# Patient Record
Sex: FEMALE | Race: ASIAN | NOT HISPANIC OR LATINO | Employment: FULL TIME
[De-identification: names, ages, dates, MRNs, and addresses within clinical notes are randomized per-mention and may not be internally consistent; named-entity substitution may affect disease eponyms.]

---

## 2017-08-12 ENCOUNTER — HEALTH MAINTENANCE LETTER (OUTPATIENT)
Age: 37
End: 2017-08-12

## 2017-11-18 ENCOUNTER — TELEPHONE (OUTPATIENT)
Dept: FAMILY MEDICINE | Facility: CLINIC | Age: 37
End: 2017-11-18

## 2019-02-12 ENCOUNTER — TELEPHONE (OUTPATIENT)
Dept: PSYCHIATRY | Facility: CLINIC | Age: 39
End: 2019-02-12

## 2019-02-12 NOTE — TELEPHONE ENCOUNTER
PSYCHIATRY CLINIC PHONE INTAKE     SERVICES REQUESTED / INTERESTED IN          Med Management    Presenting Problem and Brief History                              What would you like to be seen for? (brief description):  Pt has been on bi-polar medications for a long time, but was formally diagnosed over a year ago. [place], and she need a new provider. In the past, she received several different diagnosis, and medications, and her most recent diagnosis was from a provider in [place]. She is currently taking depakote, lamictal, gabapentin, adavan as needed. The meds are helping to treat her symptoms. She has somatic symptoms while taking her medications. She is prone to side effects with medications and sometimes, its hard for her to manage her medications due to the side effects, so her doctor was keeping her meds at a low dosage. Sleep is on and off. She has days when she's stresses out and her anxiety starts and it keeps her awake. She had one anxiety about a year ago. She was actively involved in CBT and DBT while in [place].     Have you received a mental health diagnosis? Yes   Which one (s): Bi-Polar mixed states  Is there any history of developmental delay?  No   Are you currently seeing a mental health provider?  Yes            Who / month last seen:  Luz Starks, Psychiatrist  At [facility] in [place], [place]. Julio Gonzalez, Specialist Psychiatrist, in [place], [place], Farzana Marcelino, Therapist in [place], [place]  Do you have mental health records elsewhere?  Yes  Will you sign a release so we can obtain them?  Yes    Have you ever been hospitalized for psychiatric reasons?  No  Describe:  But has been in a few outpt programs    Do you have current thoughts of self-harm?  No    Do you currently have thoughts of harming others?  No       Substance Use History     Do you have any history of alcohol / illicit drug use?  No  Describe:  NA  Have you ever received  treatment for this?  No    Describe:  NA     Social History     Does the patient have a guardian?  No    Name / number: NA  Have you had an ACT team in last 12 months?  No  Describe: NA   Do you have any current or past legal issues?  No  Describe: NA   OK to leave a detailed voicemail?  Yes    Medical/ Surgical History                                   Patient Active Problem List   Diagnosis     Heavy menses     CARDIOVASCULAR SCREENING; LDL GOAL LESS THAN 160     Major depressive disorder, recurrent episode, moderate (H)     Allergic rhinitis          Medications             Current Outpatient Medications   Medication Sig Dispense Refill     buPROPion (WELLBUTRIN) 100 MG tablet Take by mouth 2 times daily       cholecalciferol (VITAMIN D3) 76815 UNITS capsule Take 1 capsule (50,000 Units) by mouth once a week 12 capsule 0     Cyanocobalamin (VITAMIN B 12 PO)        levonorgestrel-ethinyl estradiol (AVIANE,ALESSE,LESSINA) 0.1-20 MG-MCG per tablet Take 1 tablet by mouth daily 3 Package 3     PARoxetine (PAXIL) 20 MG tablet Take 1 per day 30 tablet 4         DISPOSITION      2/12/19 Intake completed.

## 2019-02-13 ENCOUNTER — OFFICE VISIT (OUTPATIENT)
Dept: FAMILY MEDICINE | Facility: CLINIC | Age: 39
End: 2019-02-13
Attending: FAMILY MEDICINE
Payer: COMMERCIAL

## 2019-02-13 VITALS
SYSTOLIC BLOOD PRESSURE: 100 MMHG | HEART RATE: 97 BPM | BODY MASS INDEX: 21.43 KG/M2 | DIASTOLIC BLOOD PRESSURE: 66 MMHG | HEIGHT: 64 IN | WEIGHT: 125.5 LBS

## 2019-02-13 DIAGNOSIS — F31.9 BIPOLAR I DISORDER (H): Primary | ICD-10-CM

## 2019-02-13 PROCEDURE — G0463 HOSPITAL OUTPT CLINIC VISIT: HCPCS | Mod: ZF

## 2019-02-13 RX ORDER — DIVALPROEX SODIUM 250 MG/1
250 TABLET, DELAYED RELEASE ORAL DAILY
COMMUNITY
End: 2019-02-13

## 2019-02-13 RX ORDER — LORAZEPAM 0.5 MG/1
0.5 TABLET ORAL EVERY 6 HOURS PRN
COMMUNITY

## 2019-02-13 RX ORDER — NORGESTIMATE AND ETHINYL ESTRADIOL 7DAYSX3 LO
1 KIT ORAL DAILY
COMMUNITY
End: 2019-06-19

## 2019-02-13 RX ORDER — LAMOTRIGINE 25 MG/1
25 TABLET ORAL DAILY
Qty: 30 TABLET | Refills: 2 | Status: SHIPPED | OUTPATIENT
Start: 2019-02-13 | End: 2019-06-19

## 2019-02-13 RX ORDER — GABAPENTIN 100 MG/1
100 CAPSULE ORAL DAILY
Qty: 30 CAPSULE | Refills: 2 | Status: SHIPPED | OUTPATIENT
Start: 2019-02-13 | End: 2019-06-19

## 2019-02-13 RX ORDER — GABAPENTIN 100 MG/1
100 CAPSULE ORAL DAILY
COMMUNITY
End: 2019-02-13

## 2019-02-13 RX ORDER — LAMOTRIGINE 25 MG/1
25 TABLET ORAL DAILY
COMMUNITY
End: 2019-02-13

## 2019-02-13 RX ORDER — DIVALPROEX SODIUM 250 MG/1
250 TABLET, DELAYED RELEASE ORAL DAILY
Qty: 30 TABLET | Refills: 2 | Status: SHIPPED | OUTPATIENT
Start: 2019-02-13 | End: 2019-02-22 | Stop reason: ALTCHOICE

## 2019-02-13 ASSESSMENT — MIFFLIN-ST. JEOR: SCORE: 1221.32

## 2019-02-13 ASSESSMENT — PAIN SCALES - GENERAL: PAINLEVEL: NO PAIN (0)

## 2019-02-13 NOTE — PATIENT INSTRUCTIONS
[facility] [phone]  [facility] [phone]  [facility] [phone]  [facility] [phone]

## 2019-02-13 NOTE — PROGRESS NOTES
"Pt. Here to establish care  Move here recently from [place]--  Up to date on preventive heatlh per patient    1. Bipolar disorder, mixed--Dx depression age 14. Started various medication without working diagnosis in late 20's, had been resistant to medication in past, with multiple somatic symptoms, multiple diagnoses in past. Official diagnosis made a little over a year ago by mood disorder specialist in [place], done as second opinion as struggling with approach of first psychiatrist.   Has been on the current combination of medications since saw specialist, and per specialist, if have this type of condition, cannot be on antidepressants.     Per pt.. Mood has been the best it has been in  7 years on current dose and type of meds; no recent changes in medications. . Pt. states she is prone to side effects, told she processes meds differently so approach has been to lower doses if possible. Tends to get very drowsy. Using Ativan on average: 6 times since November.     No psychiatric  Hospitalizations, with 2 overdose in teens  No hx eating disorders  Cutting/SIB in college/early 20's, none now  ETOH--none, no problems in past  No recreational substances  No hallucinations    Describes self as Rapid cycling, with rumination  Working full time--high functions, but \"miserable\" all the time.     Stresses currently--current work environment stressful  didn't get desired job, critical of self  PHQ 9  Max, SI =3 but no plans, fixation always there, this is not unusual for patient, total score can be 1/2 this when not stressed  EAMON 7=19    Just started with therapist, on 3rd therapist. Doing CBT    2. Contraception-- On OCP for menstrual control. Menses regular, heavy, cramping lasting 7 days without OCPs. A little irritable with menses. Not currently sexually active with partner.     Not currently sexually active with partner.         Current Outpatient Medications   Medication     divalproex sodium delayed-release " "(DEPAKOTE) 250 MG DR tablet     gabapentin (NEURONTIN) 100 MG capsule     lamoTRIgine (LAMICTAL) 25 MG tablet     LORazepam (ATIVAN) 0.5 MG tablet     norgestim-eth estrad triphasic (ORTHO TRI-CYCLEN LO) 0.18/0.215/0.25 MG-25 MCG tablet     Nutritional Supplements (NUTRITIONAL SUPPLEMENT PO)     No current facility-administered medications for this visit.      PMH  Tonsillectomy    FH  Adopted    ROS  12 point ROS negative except where noted above    PE  Blood pressure 100/66, pulse 97, height 1.613 m (5' 3.5\"), weight 56.9 kg (125 lb 8 oz), last menstrual period 01/28/2019, not currently breastfeeding.  alert, NAD  Speech RRR, mood is mildy anxious, no psychomotor changes, thought process is appropriate,   Affect is normal. No evidence of suicidality.     A/P  1. Bipolar disorder by history  Refer to psychiatry, patient given multiple possible options, to check with insurance coverage  Discussed increasing either gabapentin or lamictal given degree of SI and increased stressors. Pt. Agrees to trial increase lamictal 50 mg daily, call if wishes to stay on higher dose, otherwise let me know and return back to 25 mg dose    Can consider Continous OCPS in future, refill given     F/u me if not getting into psychiatry in  3 months   "

## 2019-02-13 NOTE — LETTER
"2/13/2019       RE: Artie Diez  [address] [id number]     Dear Colleague,    Thank you for referring your patient, Artie Diez, to the [facility] at [facility]. Please see a copy of my visit note below.    Pt. Here to establish care  Move here recently from [place]--  Up to date on preventive heatlh per patient    1. Bipolar disorder, mixed--Dx depression age 14. Started various medication without working diagnosis in late 20's, had been resistant to medication in past, with multiple somatic symptoms, multiple diagnoses in past. Official diagnosis made a little over a year ago by mood disorder specialist in [place], done as second opinion as struggling with approach of first psychiatrist.   Has been on the current combination of medications since saw specialist, and per specialist, if have this type of condition, cannot be on antidepressants.     Per pt.. Mood has been the best it has been in  7 years on current dose and type of meds; no recent changes in medications. . Pt. states she is prone to side effects, told she processes meds differently so approach has been to lower doses if possible. Tends to get very drowsy. Using Ativan on average: 6 times since November.     No psychiatric  Hospitalizations, with 2 overdose in teens  No hx eating disorders  Cutting/SIB in college/early 20's, none now  ETOH--none, no problems in past  No recreational substances  No hallucinations    Describes self as Rapid cycling, with rumination  Working full time--high functions, but \"miserable\" all the time.     Stresses currently--current work environment stressful  didn't get desired job, critical of self  PHQ 9  Max, SI =3 but no plans, fixation always there, this is not unusual for patient, total score can be 1/2 this when not stressed  EAMON 7=19    Just started with therapist, on 3rd therapist. Doing CBT    2. Contraception-- On OCP for " "menstrual control. Menses regular, heavy, cramping lasting 7 days without OCPs. A little irritable with menses. Not currently sexually active with partner.     Not currently sexually active with partner.         Current Outpatient Medications   Medication     divalproex sodium delayed-release (DEPAKOTE) 250 MG DR tablet     gabapentin (NEURONTIN) 100 MG capsule     lamoTRIgine (LAMICTAL) 25 MG tablet     LORazepam (ATIVAN) 0.5 MG tablet     norgestim-eth estrad triphasic (ORTHO TRI-CYCLEN LO) 0.18/0.215/0.25 MG-25 MCG tablet     Nutritional Supplements (NUTRITIONAL SUPPLEMENT PO)     No current facility-administered medications for this visit.      PMH  Tonsillectomy    FH  Adopted    ROS  12 point ROS negative except where noted above    PE  Blood pressure 100/66, pulse 97, height 1.613 m (5' 3.5\"), weight 56.9 kg (125 lb 8 oz), last menstrual period 01/28/2019, not currently breastfeeding.  alert, NAD  Speech RRR, mood is mildy anxious, no psychomotor changes, thought process is appropriate,   Affect is normal. No evidence of suicidality.     A/P  1. Bipolar disorder by history  Refer to psychiatry, patient given multiple possible options, to check with insurance coverage  Discussed increasing either gabapentin or lamictal given degree of SI and increased stressors. Pt. Agrees to trial increase lamictal 50 mg daily, call if wishes to stay on higher dose, otherwise let me know and return back to 25 mg dose    Can consider Continous OCPS in future, refill given     F/u me if not getting into psychiatry in  3 months     Again, thank you for allowing me to participate in the care of your patient.      Sincerely,    Ran Cabral MD      "

## 2019-02-22 ENCOUNTER — TELEPHONE (OUTPATIENT)
Dept: OBGYN | Facility: CLINIC | Age: 39
End: 2019-02-22

## 2019-02-22 DIAGNOSIS — F31.9 BIPOLAR I DISORDER (H): Primary | ICD-10-CM

## 2019-02-22 RX ORDER — DIVALPROEX SODIUM 250 MG/1
250 TABLET, EXTENDED RELEASE ORAL DAILY
Qty: 30 TABLET | Refills: 2 | Status: SHIPPED | OUTPATIENT
Start: 2019-02-22 | End: 2019-05-21

## 2019-02-22 ASSESSMENT — ANXIETY QUESTIONNAIRES
3. WORRYING TOO MUCH ABOUT DIFFERENT THINGS: NEARLY EVERY DAY
1. FEELING NERVOUS, ANXIOUS, OR ON EDGE: NEARLY EVERY DAY
GAD7 TOTAL SCORE: 20
6. BECOMING EASILY ANNOYED OR IRRITABLE: NEARLY EVERY DAY
2. NOT BEING ABLE TO STOP OR CONTROL WORRYING: NEARLY EVERY DAY
7. FEELING AFRAID AS IF SOMETHING AWFUL MIGHT HAPPEN: NEARLY EVERY DAY
5. BEING SO RESTLESS THAT IT IS HARD TO SIT STILL: NEARLY EVERY DAY

## 2019-02-22 ASSESSMENT — PATIENT HEALTH QUESTIONNAIRE - PHQ9
SUM OF ALL RESPONSES TO PHQ QUESTIONS 1-9: 27
5. POOR APPETITE OR OVEREATING: MORE THAN HALF THE DAYS

## 2019-02-22 NOTE — TELEPHONE ENCOUNTER
Received phone call from pharmacy regarding patient's Divalproex rx recently prescribed by Dr. Cabral. Prescription was sent over as delayed release, but pharmacy states that patient was previously on extended release.    Reviewed with Dr. Cabral and verbal order to reorder for Divalproex extended release, as patient was previously prescribed.

## 2019-02-23 ASSESSMENT — ANXIETY QUESTIONNAIRES: GAD7 TOTAL SCORE: 20

## 2019-04-22 ENCOUNTER — TELEPHONE (OUTPATIENT)
Dept: FAMILY MEDICINE | Facility: CLINIC | Age: 39
End: 2019-04-22

## 2019-04-22 ENCOUNTER — OFFICE VISIT (OUTPATIENT)
Dept: FAMILY MEDICINE | Facility: CLINIC | Age: 39
End: 2019-04-22
Payer: COMMERCIAL

## 2019-04-22 VITALS
BODY MASS INDEX: 21.8 KG/M2 | TEMPERATURE: 98.3 F | DIASTOLIC BLOOD PRESSURE: 71 MMHG | WEIGHT: 125 LBS | HEART RATE: 88 BPM | SYSTOLIC BLOOD PRESSURE: 102 MMHG | OXYGEN SATURATION: 98 % | RESPIRATION RATE: 16 BRPM

## 2019-04-22 DIAGNOSIS — H61.21 IMPACTED CERUMEN OF RIGHT EAR: ICD-10-CM

## 2019-04-22 DIAGNOSIS — H60.391 INFECTIVE OTITIS EXTERNA, RIGHT: Primary | ICD-10-CM

## 2019-04-22 DIAGNOSIS — H60.391 INFECTIVE OTITIS EXTERNA, RIGHT: ICD-10-CM

## 2019-04-22 PROCEDURE — 99203 OFFICE O/P NEW LOW 30 MIN: CPT | Mod: 25 | Performed by: FAMILY MEDICINE

## 2019-04-22 PROCEDURE — 69209 REMOVE IMPACTED EAR WAX UNI: CPT | Mod: RT | Performed by: FAMILY MEDICINE

## 2019-04-22 RX ORDER — NEOMYCIN SULFATE, POLYMYXIN B SULFATE, HYDROCORTISONE 3.5; 10000; 1 MG/ML; [USP'U]/ML; MG/ML
3 SOLUTION/ DROPS AURICULAR (OTIC) 3 TIMES DAILY
Qty: 4 ML | Refills: 0 | Status: SHIPPED | OUTPATIENT
Start: 2019-04-22 | End: 2019-05-15

## 2019-04-22 RX ORDER — CIPROFLOXACIN 0.5 MG/.25ML
0.25 SOLUTION/ DROPS AURICULAR (OTIC) 2 TIMES DAILY
Qty: 3.5 ML | Refills: 0 | Status: SHIPPED | OUTPATIENT
Start: 2019-04-22 | End: 2019-05-15

## 2019-04-22 NOTE — PROGRESS NOTES
SUBJECTIVE:   Artie Diez is a 38 year old female who presents to clinic today for the following   health issues:      Ear Pain       Duration: x1 day     Description (location/character/radiation): left ear, would like to check on right ear too      Intensity:  severe    Accompanying signs and symptoms: painful, shooting pain and headache      History (similar episodes/previous evaluation): None    Therapies tried and outcome: extra strength tylenol, try flushing ear wax yesterday      The patient reports that about every year and a half she has an episode of ear pain and decreased hearing, goes to the doctor, is found to have cerumen impaction, which is removed, then treated with topical drops for slight infection which then resolves.  She is here today with similar symptoms, initially on the left side, which resolved when she irrigated at home.  Now she has pain in the right ear which is radiating up her head to cause a headache.  It woke her up last night.  No fevers, chills, sinus symptoms, sore throat or other sick symptoms.  Denies exudate from the ear.  Has been told her ear canals are small and twisty.  Her hearing is decreased on the right; no vertigo.          Additional history: as documented    Reviewed  and updated as needed this visit by clinical staff  Tobacco  Allergies  Meds  Med Hx  Surg Hx  Fam Hx  Soc Hx        Reviewed and updated as needed this visit by Provider         Patient Active Problem List   Diagnosis     Heavy menses     CARDIOVASCULAR SCREENING; LDL GOAL LESS THAN 160     Major depressive disorder, recurrent episode, moderate (H)     Allergic rhinitis     Anxiety state     Attention deficit disorder     Bipolar I disorder (H)     Dysmenorrhea     Glaucoma suspect     Myopia     Past Surgical History:   Procedure Laterality Date     no surgical history         Social History     Tobacco Use     Smoking status: Never Smoker     Smokeless tobacco: Never Used   Substance Use  Topics     Alcohol use: No     Family History   Problem Relation Age of Onset     Unknown/Adopted Mother      Unknown/Adopted Father          Current Outpatient Medications   Medication Sig Dispense Refill     ciprofloxacin (CETRAXAL) 0.2 % otic solution Place 0.25 mLs into the right ear 2 times daily for 7 days 3.5 mL 0     divalproex sodium extended-release (DEPAKOTE ER) 250 MG 24 hr tablet Take 1 tablet (250 mg) by mouth daily 30 tablet 2     gabapentin (NEURONTIN) 100 MG capsule Take 1 capsule (100 mg) by mouth daily 30 capsule 2     lamoTRIgine (LAMICTAL) 25 MG tablet Take 1 tablet (25 mg) by mouth daily 30 tablet 2     LORazepam (ATIVAN) 0.5 MG tablet Take 0.5 mg by mouth every 6 hours as needed for anxiety       norgestim-eth estrad triphasic (ORTHO TRI-CYCLEN LO) 0.18/0.215/0.25 MG-25 MCG tablet Take 1 tablet by mouth daily       Nutritional Supplements (NUTRITIONAL SUPPLEMENT PO) l-methyl folate  Zinc selenium  Hair skin and nails vitamin       Allergies   Allergen Reactions     Amphotericin B      Phenylbutazones      Penicillin G Nausea and Rash       ROS:  Constitutional, HEENT, cardiovascular, pulmonary, gi and gu systems are negative, except as otherwise noted.    OBJECTIVE:     /71 (BP Location: Left arm, Patient Position: Sitting, Cuff Size: Adult Regular)   Pulse 88   Temp 98.3  F (36.8  C) (Oral)   Resp 16   Wt 56.7 kg (125 lb)   SpO2 98%   BMI 21.80 kg/m    Body mass index is 21.8 kg/m .  GENERAL APPEARANCE: healthy, alert and no distress  EYES: Eyes grossly normal to inspection, PERRL and conjunctivae and sclerae normal  HENT: left EAC and TM normal, right EAC initially occluded by cerumen; following irrigation, the EAC is free from cerumen and is mildly erythematous and irritated.  The right TM appears normal.   The nose and mouth are without ulcers or lesions  NECK: no adenopathy, no asymmetry, masses, or scars and thyroid normal to palpation  RESP: lungs clear to auscultation - no  rales, rhonchi or wheezes  CV: regular rates and rhythm, normal S1 S2, no S3 or S4 and no murmur, click or rub  NEURO: Normal strength and tone, mentation intact and speech normal  PSYCH: mentation appears normal and affect normal/bright        ASSESSMENT/PLAN:             1. Infective otitis externa, right  Discussed that there is some irritation of the right EAC following irrigation; unclear if this is simply from irrigation or due to an infection.  Given the degree of pain she describes, I will treat for otitis externa.  F/u if needed.    - ciprofloxacin (CETRAXAL) 0.2 % otic solution; Place 0.25 mLs into the right ear 2 times daily for 7 days  Dispense: 3.5 mL; Refill: 0    2. Impacted cerumen of right ear  Removed via irrigation by DENIZ Caldera MD  [facility]

## 2019-04-22 NOTE — TELEPHONE ENCOUNTER
ciprofloxacin (CETRAXAL) 0.2 % otic solution is not covered by pt insurance, is there something else that can be used instead? Please assist. Thanks!     Stephani Baca  [employer] Patient Rep

## 2019-04-22 NOTE — NURSING NOTE
Patient identified using two patient identifiers.  Ear exam showing wax occlusion completed by provider.  Solution: warm water was placed in the bilateral ear(s) via irrigation tool: elephant ear.      Rachael Ignacio MA

## 2019-05-15 ENCOUNTER — OFFICE VISIT (OUTPATIENT)
Dept: FAMILY MEDICINE | Facility: CLINIC | Age: 39
End: 2019-05-15
Payer: COMMERCIAL

## 2019-05-15 VITALS
WEIGHT: 126.8 LBS | HEART RATE: 71 BPM | OXYGEN SATURATION: 99 % | HEIGHT: 63 IN | SYSTOLIC BLOOD PRESSURE: 97 MMHG | TEMPERATURE: 98.3 F | BODY MASS INDEX: 22.47 KG/M2 | DIASTOLIC BLOOD PRESSURE: 68 MMHG

## 2019-05-15 DIAGNOSIS — H92.01 RIGHT EAR PAIN: Primary | ICD-10-CM

## 2019-05-15 DIAGNOSIS — S00.411A EXCORIATION OF EAR CANAL, RIGHT, INITIAL ENCOUNTER: ICD-10-CM

## 2019-05-15 PROCEDURE — 99213 OFFICE O/P EST LOW 20 MIN: CPT | Performed by: NURSE PRACTITIONER

## 2019-05-15 ASSESSMENT — MIFFLIN-ST. JEOR: SCORE: 1219.29

## 2019-05-15 NOTE — PROGRESS NOTES
SUBJECTIVE:   Artie Diez is a 38 year old female who presents to clinic today for the following   health issues:    Concern - Ear Pain    Onset: 3 weeks     Description:   Right Ear is itchy, feels painful and feels muffled      Intensity: moderate    Progression of Symptoms:  worsening    Accompanying Signs & Symptoms:  Ear discomfort     Previous history of similar problem:   Chronic ear issues     Precipitating factors:   Worsened by: time     Alleviating factors:  Improved by: ear drops and flushing     Therapies Tried and outcome: got both ears flushed 3 weeks ago and got antibiotic drops .     Swimmer and little ear canals, now just feels itchy so has been digging in there admits tried qtips and scratchy sharp things   Every 1.5 years this happens since a teenager  No surgeries or tubes  Had drops she restarted but didn't help or makes worse  Some fluid came out not sure if was blood   No allergies or colds lately    Additional history: as documented    Reviewed  and updated as needed this visit by clinical staff         Reviewed and updated as needed this visit by Provider         Patient Active Problem List   Diagnosis     Heavy menses     CARDIOVASCULAR SCREENING; LDL GOAL LESS THAN 160     Major depressive disorder, recurrent episode, moderate (H)     Allergic rhinitis     Anxiety state     Attention deficit disorder     Bipolar I disorder (H)     Dysmenorrhea     Glaucoma suspect     Myopia     Past Surgical History:   Procedure Laterality Date     no surgical history         Social History     Tobacco Use     Smoking status: Never Smoker     Smokeless tobacco: Never Used   Substance Use Topics     Alcohol use: No     Family History   Problem Relation Age of Onset     Unknown/Adopted Mother      Unknown/Adopted Father          Current Outpatient Medications   Medication Sig Dispense Refill     divalproex sodium extended-release (DEPAKOTE ER) 250 MG 24 hr tablet Take 1 tablet (250 mg) by mouth  "daily 30 tablet 2     gabapentin (NEURONTIN) 100 MG capsule Take 1 capsule (100 mg) by mouth daily 30 capsule 2     lamoTRIgine (LAMICTAL) 25 MG tablet Take 1 tablet (25 mg) by mouth daily 30 tablet 2     LORazepam (ATIVAN) 0.5 MG tablet Take 0.5 mg by mouth every 6 hours as needed for anxiety       norgestim-eth estrad triphasic (ORTHO TRI-CYCLEN LO) 0.18/0.215/0.25 MG-25 MCG tablet Take 1 tablet by mouth daily       Nutritional Supplements (NUTRITIONAL SUPPLEMENT PO) l-methyl folate  Zinc selenium  Hair skin and nails vitamin       Allergies   Allergen Reactions     Amphotericin B      Phenylbutazones      Penicillin G Nausea and Rash       ROS:  Constitutional, HEENT, cardiovascular, pulmonary, gi and gu systems are negative, except as otherwise noted.    OBJECTIVE:     BP 97/68   Pulse 71   Temp 98.3  F (36.8  C) (Oral)   Ht 1.6 m (5' 3\")   Wt 57.5 kg (126 lb 12.8 oz)   LMP 04/22/2019   SpO2 99%   BMI 22.46 kg/m    Body mass index is 22.46 kg/m .  GENERAL: healthy, alert and no distress  EYES: Eyes grossly normal to inspection, PERRL and conjunctivae and sclerae normal  HENT: normal cephalic/atraumatic, right ear: perforation partially healed, canal with skin irritation no redness, nose and mouth without ulcers or lesions, oropharynx clear and oral mucous membranes moist  NECK: no adenopathy, no asymmetry, masses, or scars and thyroid normal to palpation  RESP: lungs clear to auscultation - no rales, rhonchi or wheezes  CV: regular rate and rhythm, normal S1 S2, no S3 or S4, no murmur, click or rub, no peripheral edema and peripheral pulses strong  MS: no gross musculoskeletal defects noted, no edema  SKIN: no suspicious lesions or rashes  NEURO: Normal strength and tone, mentation intact and speech normal  PSYCH: mentation appears normal, affect normal/bright    Diagnostic Test Results:  none     ASSESSMENT/PLAN:               ICD-10-CM    1. Right ear pain H92.01 OTOLARYNGOLOGY REFERRAL   2. Excoriation " of ear canal, right, initial encounter S00.411A OTOLARYNGOLOGY REFERRAL   appears was a perforation partially healed, vs effusion, also with canal excoration pt admits was rubbing and putting some sharp obhects in ears, stop all this and stop drops, earplug precautions in right ear and follow up with ENT if not improving     Patient Instructions       Patient Education     Earache, No Infection (Adult)  Earaches can happen without an infection. This occurs when air and fluid build up behind the eardrum causing a feeling of fullness and discomfort and reduced hearing. This is called otitis media with effusion (OME) or serous otitis media. It means there is fluid in the middle ear. It is not the same as acute otitis media, which is typically from infection.  OME can happen when you have a cold if congestion blocks the passage that drains the middle ear. This passage is called the eustachian tube. OME may also occur with nasal allergies or after a bacterial middle ear infection.    The pain or discomfort may come and go. You may hear clicking or popping sounds when you chew or swallow. You may feel that your balance is off. Or you may hear ringing in the ear.  It often takes from several weeks up to 3 months for the fluid to clear on its own. Oral pain relievers and ear drops help if there is pain. Decongestants and antihistamines sometimes help. Antibiotics don't help since there is no infection. Your doctor may prescribe a nasal spray to help reduce swelling in the nose and eustachian tube. This can allow the ear to drain.  If your OME doesn't improve after 3 months, surgery may be used to drain the fluid and insert a small tube in the eardrum to allow continued drainage.  Because the middle ear fluid can become infected, it is important to watch for signs of an ear infection which may develop later. These signs include increased ear pain, fever, or drainage from the ear.  Home care  The following guidelines will  help you care for yourself at home:    You may use over-the-counter medicine as directed to control pain, unless another medicine was prescribed. If you have chronic liver or kidney disease or ever had a stomach ulcer or GI bleeding, talk with your doctor before using these medicines. Aspirin should never be used in anyone under 18 years of age who is ill with a fever. It may cause severe liver damage.    You may use over-the-counter decongestants such as phenylephrine or pseudoephedrine. But they are not always helpful. Don't use nasal spray decongestants more than 3 days. Longer use can make congestion worse. Prescription nasal sprays from your doctor don't typically have those restrictions.    Antihistamines may help if you are also having allergy symptoms.    You may use medicines such as guaifenesin to thin mucus and promote drainage.  Follow-up care  Follow up with your healthcare provider or as advised if you are not feeling better after 3 days.  When to seek medical advice  Call your healthcare provider right away if any of the following occur:    Your ear pain gets worse or does not start to improve     Fever of 100.4 F (38 C) or higher, or as directed by your healthcare provider    Fluid or blood draining from the ear    Headache or sinus pain    Stiff neck    Unusual drowsiness or confusion  Date Last Reviewed: 10/1/2016    1125-1690 [employer]. [address] [place] [address]. All rights reserved. This information is not intended as a substitute for professional medical care. Always follow your healthcare professional's instructions.           Patient Education     Eardrum Rupture (Perforation)    Your eardrum is a thin membrane between your outer and middle ear. Sound waves entering your ear cause the membrane to vibrate. This helps you hear. An injury or infection can cause your eardrum to tear (rupture). This creates a hole (perforation) that may affect your hearing.  Causes  of eardrum perforation  Causes of a ruptured eardrum include:    Pressure from an ear infection    Putting an object such as a cotton swab or pencil into the ear    A very loud noise such as a gunshot close to the ear    Rapid changes in air pressure. These can happen during scuba diving or traveling at high altitudes.    A slap or blow to the ear  When to go to the emergency room (ER)  Seek medical care right away if you:    Have severe pain, bleeding, or ringing in your ear.    Lose your hearing suddenly.    Become very dizzy for no reason.    Have an object lodged in your ear.  A ruptured eardrum from an ear infection usually isn't an emergency. In fact, the rupture often relieves pressure and pain. It usually heals within hours or days. But you should have the ear looked at by a healthcare provider within 24 hours.  What to expect in the ER  Your ear will be examined. Treatment will depend on how severe the damage is. Small holes often heal on their own. A small patch may be placed over a minor eardrum tear. Large tears may need to be repaired during an operation. If you are very dizzy or have severe hearing loss, you are likely to stay in the hospital for treatment for one or more days.  Don't clean inside the ear canal with cotton swabs or any other object.   Date Last Reviewed: 10/1/2016    8017-3193 [employer]. [address] [place] [address]. All rights reserved. This information is not intended as a substitute for professional medical care. Always follow your healthcare professional's instructions.               [facility]ULEIKA [facility]

## 2019-05-15 NOTE — PATIENT INSTRUCTIONS
Patient Education     Earache, No Infection (Adult)  Earaches can happen without an infection. This occurs when air and fluid build up behind the eardrum causing a feeling of fullness and discomfort and reduced hearing. This is called otitis media with effusion (OME) or serous otitis media. It means there is fluid in the middle ear. It is not the same as acute otitis media, which is typically from infection.  OME can happen when you have a cold if congestion blocks the passage that drains the middle ear. This passage is called the eustachian tube. OME may also occur with nasal allergies or after a bacterial middle ear infection.    The pain or discomfort may come and go. You may hear clicking or popping sounds when you chew or swallow. You may feel that your balance is off. Or you may hear ringing in the ear.  It often takes from several weeks up to 3 months for the fluid to clear on its own. Oral pain relievers and ear drops help if there is pain. Decongestants and antihistamines sometimes help. Antibiotics don't help since there is no infection. Your doctor may prescribe a nasal spray to help reduce swelling in the nose and eustachian tube. This can allow the ear to drain.  If your OME doesn't improve after 3 months, surgery may be used to drain the fluid and insert a small tube in the eardrum to allow continued drainage.  Because the middle ear fluid can become infected, it is important to watch for signs of an ear infection which may develop later. These signs include increased ear pain, fever, or drainage from the ear.  Home care  The following guidelines will help you care for yourself at home:    You may use over-the-counter medicine as directed to control pain, unless another medicine was prescribed. If you have chronic liver or kidney disease or ever had a stomach ulcer or GI bleeding, talk with your doctor before using these medicines. Aspirin should never be used in anyone under 18 years of age who is  ill with a fever. It may cause severe liver damage.    You may use over-the-counter decongestants such as phenylephrine or pseudoephedrine. But they are not always helpful. Don't use nasal spray decongestants more than 3 days. Longer use can make congestion worse. Prescription nasal sprays from your doctor don't typically have those restrictions.    Antihistamines may help if you are also having allergy symptoms.    You may use medicines such as guaifenesin to thin mucus and promote drainage.  Follow-up care  Follow up with your healthcare provider or as advised if you are not feeling better after 3 days.  When to seek medical advice  Call your healthcare provider right away if any of the following occur:    Your ear pain gets worse or does not start to improve     Fever of 100.4 F (38 C) or higher, or as directed by your healthcare provider    Fluid or blood draining from the ear    Headache or sinus pain    Stiff neck    Unusual drowsiness or confusion  Date Last Reviewed: 10/1/2016    6593-7591 [employer]. [address] [place] [address]. All rights reserved. This information is not intended as a substitute for professional medical care. Always follow your healthcare professional's instructions.           Patient Education     Eardrum Rupture (Perforation)    Your eardrum is a thin membrane between your outer and middle ear. Sound waves entering your ear cause the membrane to vibrate. This helps you hear. An injury or infection can cause your eardrum to tear (rupture). This creates a hole (perforation) that may affect your hearing.  Causes of eardrum perforation  Causes of a ruptured eardrum include:    Pressure from an ear infection    Putting an object such as a cotton swab or pencil into the ear    A very loud noise such as a gunshot close to the ear    Rapid changes in air pressure. These can happen during scuba diving or traveling at high altitudes.    A slap or blow to the ear  When  to go to the emergency room (ER)  Seek medical care right away if you:    Have severe pain, bleeding, or ringing in your ear.    Lose your hearing suddenly.    Become very dizzy for no reason.    Have an object lodged in your ear.  A ruptured eardrum from an ear infection usually isn't an emergency. In fact, the rupture often relieves pressure and pain. It usually heals within hours or days. But you should have the ear looked at by a healthcare provider within 24 hours.  What to expect in the ER  Your ear will be examined. Treatment will depend on how severe the damage is. Small holes often heal on their own. A small patch may be placed over a minor eardrum tear. Large tears may need to be repaired during an operation. If you are very dizzy or have severe hearing loss, you are likely to stay in the hospital for treatment for one or more days.  Don't clean inside the ear canal with cotton swabs or any other object.   Date Last Reviewed: 10/1/2016    6417-1917 [employer]. [address] [place] [address], [place] [address]. All rights reserved. This information is not intended as a substitute for professional medical care. Always follow your healthcare professional's instructions.

## 2019-05-21 DIAGNOSIS — F31.9 BIPOLAR I DISORDER (H): ICD-10-CM

## 2019-05-21 RX ORDER — DIVALPROEX SODIUM 250 MG/1
250 TABLET, EXTENDED RELEASE ORAL DAILY
Qty: 30 TABLET | Refills: 0 | Status: SHIPPED | OUTPATIENT
Start: 2019-05-21 | End: 2019-06-19

## 2019-05-21 NOTE — TELEPHONE ENCOUNTER
Received refill request for depakote.  Last in clinic 2/13/19 with Dr Cabral. Dr Cabral wanted her to rtc in 3 months if hasn't established care with Psychiatrist. Left vm one month refill will be sent,and needs to f/u with Dr Cabral in clinic for further refills.

## 2019-06-19 ENCOUNTER — OFFICE VISIT (OUTPATIENT)
Dept: FAMILY MEDICINE | Facility: CLINIC | Age: 39
End: 2019-06-19
Attending: FAMILY MEDICINE
Payer: COMMERCIAL

## 2019-06-19 VITALS
DIASTOLIC BLOOD PRESSURE: 66 MMHG | BODY MASS INDEX: 22.47 KG/M2 | SYSTOLIC BLOOD PRESSURE: 97 MMHG | HEART RATE: 83 BPM | WEIGHT: 126.8 LBS | HEIGHT: 63 IN

## 2019-06-19 DIAGNOSIS — F31.9 BIPOLAR I DISORDER (H): ICD-10-CM

## 2019-06-19 PROCEDURE — G0463 HOSPITAL OUTPT CLINIC VISIT: HCPCS | Mod: ZF

## 2019-06-19 RX ORDER — GABAPENTIN 100 MG/1
100 CAPSULE ORAL DAILY
Qty: 90 CAPSULE | Refills: 0 | Status: SHIPPED | OUTPATIENT
Start: 2019-06-19 | End: 2022-09-01

## 2019-06-19 RX ORDER — LAMOTRIGINE 25 MG/1
25 TABLET ORAL DAILY
Qty: 90 TABLET | Refills: 0 | Status: SHIPPED | OUTPATIENT
Start: 2019-06-19 | End: 2022-05-23 | Stop reason: DRUGHIGH

## 2019-06-19 RX ORDER — DIVALPROEX SODIUM 250 MG/1
250 TABLET, EXTENDED RELEASE ORAL DAILY
Qty: 90 TABLET | Refills: 0 | Status: SHIPPED | OUTPATIENT
Start: 2019-06-19 | End: 2022-09-01

## 2019-06-19 RX ORDER — NORGESTIMATE AND ETHINYL ESTRADIOL 7DAYSX3 LO
1 KIT ORAL DAILY
Qty: 84 TABLET | Refills: 3 | Status: SHIPPED | OUTPATIENT
Start: 2019-06-19 | End: 2020-06-05

## 2019-06-19 ASSESSMENT — MIFFLIN-ST. JEOR: SCORE: 1219.29

## 2019-06-19 ASSESSMENT — PAIN SCALES - GENERAL: PAINLEVEL: NO PAIN (0)

## 2019-06-19 NOTE — PROGRESS NOTES
"Pt. Here for follow-up bipolar disorder    HPI:  Pt. Tried to find psychiatrist, but due to working with medical school, is reluctant to go to [facility] psychiatry clinic. Did see an outside psychiatrist and felt had inadequate intake, that physician was not attentive to history.   Will be attempting to find another psychiatrist again.     Continues on Lamictal 25 mg and Gabapentin 100 mg daily, Depakote  mg daily  Using Ativan 0.5 mg 3-4x/month  Transition difficult, job stressful, mother's health poor--(reason for pt.  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     Insomnia--mostly able to fall and stay asleep, sometimes dificulty with anxiety  Appetite--too good, eat because has to.  Needs exercise routine--driving everywhere    ROS  Review Of Systems  Respiratory: No shortness of breath, dyspnea on exertion, cough, or hemoptysis  Cardiovascular: negative for chest pain, palpitations, edema  Neurologic: negative  Weight stable  Otherwise as above        Blood pressure 97/66, pulse 83, height 1.6 m (5' 3\"), weight 57.5 kg (126 lb 12.8 oz), not currently breastfeeding.         Psychiatric Examination:   Appearance:  awake, alert and adequately groomed  Attitude:  cooperative  Eye Contact:  good  Mood:  depressed  Affect:  appropriate and in normal range  Speech:  clear, coherent  Psychomotor Behavior:  no evidence of tardive dyskinesia, dystonia, or tics  Thought Process:  logical, linear and goal oriented  Associations:  no loose associations  Thought Content:  no evidence of suicidal ideation or homicidal ideation, no auditory hallucinations present and no visual hallucinations present  Insight:  good  Judgment:  intact  Oriented to:  time, person, and place  Attention Span and Concentration:  intact  Recent and Remote Memory:  intact  Fund of Knowledge: " appropriate  Muscle Strength and Tone: normal    A/P  1. Bipolar disorder, current mood depressed  Stable on current medication regimen  Discussed scheduline with [facility] psychiatrist--get on waiting list if needed  Discussed increasing involvement with physical and social activities  weight stable  Exam: mildly depressed  Looking into activities--big brother big sister, gardening    Follow-up 3 months if no psychiatrist    CPE due around Jan 2020

## 2019-06-19 NOTE — TELEPHONE ENCOUNTER
Patient calling because she forgot to ask Dr. Cabral for a refill of ocp today when she was in clinic. Discussed with Dr. Cabral who approved refill. rx sent.

## 2019-06-19 NOTE — LETTER
"6/19/2019       RE: Artie Diez  [address] [address] [address]     Dear Colleague,    Thank you for referring your patient, Artie Diez, to the [facility] at [facility]. Please see a copy of my visit note below.    Pt. Here for follow-up bipolar disorder    HPI:  Pt. Tried to find psychiatrist, but due to working with medical school, is reluctant to go to [facility] psychiatry clinic. Did see an outside psychiatrist and felt had inadequate intake, that physician was not attentive to history.   Will be attempting to find another psychiatrist again.     Continues on Lamictal 25 mg and Gabapentin 100 mg daily, Depakote  mg daily  Using Ativan 0.5 mg 3-4x/month  Transition difficult, job stressful, mother's health poor--(reason for pt.  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     Insomnia--mostly able to fall and stay asleep, sometimes dificulty with anxiety  Appetite--too good, eat because has to.  Needs exercise routine--driving everywhere    ROS  Review Of Systems  Respiratory: No shortness of breath, dyspnea on exertion, cough, or hemoptysis  Cardiovascular: negative for chest pain, palpitations, edema  Neurologic: negative  Weight stable  Otherwise as above        Blood pressure 97/66, pulse 83, height 1.6 m (5' 3\"), weight 57.5 kg (126 lb 12.8 oz), not currently breastfeeding.         Psychiatric Examination:   Appearance:  awake, alert and adequately groomed  Attitude:  cooperative  Eye Contact:  good  Mood:  depressed  Affect:  appropriate and in normal range  Speech:  clear, coherent  Psychomotor Behavior:  no evidence of tardive dyskinesia, dystonia, or tics  Thought Process:  logical, linear and goal oriented  Associations:  no loose associations  Thought Content:  no evidence " of suicidal ideation or homicidal ideation, no auditory hallucinations present and no visual hallucinations present  Insight:  good  Judgment:  intact  Oriented to:  time, person, and place  Attention Span and Concentration:  intact  Recent and Remote Memory:  intact  Fund of Knowledge: appropriate  Muscle Strength and Tone: normal    A/P  1. Bipolar disorder, current mood depressed  Stable on current medication regimen  Discussed scheduline with [facility] psychiatrist--get on waiting list if needed  Discussed increasing involvement with physical and social activities  weight stable  Exam: mildly depressed  Looking into activities--big brother big sister, gardening    Follow-up 3 months if no psychiatrist    CPE due around Jan 2020      Again, thank you for allowing me to participate in the care of your patient.      Sincerely,    Ran Cabral MD

## 2019-07-18 ENCOUNTER — TELEPHONE (OUTPATIENT)
Dept: FAMILY MEDICINE | Facility: CLINIC | Age: 39
End: 2019-07-18

## 2019-07-18 NOTE — LETTER
[address]  [facility] [address]  Phone: [phone]  Fax: [phone]              July 18, 2019      Artie Diez  [facility] [facility] [address]        Dear Artie,    I care about your health and have reviewed your health plan. I have reviewed your medical conditions, medication list, and lab results and am making recommendations based on this review, to better manage your health.    You are in particular need of attention regarding:  -Cervical Cancer Screening    I am recommending that you:  -schedule a PAP SMEAR EXAM which is due.  Please disregard this reminder if you have had this exam elsewhere within the last year.  It would be helpful for us to have a copy of your recent pap smear report in our file so that we can best coordinate your care.        Here is a list of Health Maintenance topics that are due now or due soon:  Health Maintenance Due   Topic Date Due     HIV Screening  11/02/1994     Preventive Care Visit  05/19/2016     PAP Smear  06/09/2017       Please call us at [phone] (or use [employer]) to address the above recommendations.     Thank you for trusting [facility] and we appreciate the opportunity to serve you.  We look forward to supporting your healthcare needs in the future.    Healthy Regards,    Milagro Kinsey NP

## 2019-07-18 NOTE — TELEPHONE ENCOUNTER
Panel Management Review      Patient has the following on her problem list:     Depression / Dysthymia review    Measure:  Needs PHQ-9 score of 4 or less during index window.  Administer PHQ-9 and if score is 5 or more, send encounter to provider for next steps.    5 - 7 month window range: yes    PHQ-9 SCORE 2/20/2015 5/19/2015 2/22/2019   PHQ-9 Total Score 12 9 -   PHQ-9 Total Score - - 27       If PHQ-9 recheck is 5 or more, route to provider for next steps.    Patient is due for:  None      Composite cancer screening  Chart review shows that this patient is due/due soon for the following Pap Smear  Summary:    Patient is due/failing the following:   PAP and PHYSICAL    Action needed:   Patient needs office visit for physical.    Type of outreach:    Sent [employer] message. and Sent letter.    Questions for provider review:    None                                                                                                                                    Aleah Cid MA

## 2019-09-30 ENCOUNTER — HEALTH MAINTENANCE LETTER (OUTPATIENT)
Age: 39
End: 2019-09-30

## 2019-10-08 NOTE — TELEPHONE ENCOUNTER
Panel Management Review      Patient has the following on her problem list:     Depression / Dysthymia review    Measure:  Needs PHQ-9 score of 4 or less during index window.  Administer PHQ-9 and if score is 5 or more, send encounter to provider for next steps.    5 - 7 month window range: due    PHQ-9 SCORE 2/20/2015 5/19/2015 2/22/2019   PHQ-9 Total Score 12 9 -   PHQ-9 Total Score - - 27       If PHQ-9 recheck is 5 or more, route to provider for next steps.    Patient is due for:  PHQ9      Composite cancer screening  Chart review shows that this patient is due/due soon for the following None  Summary:    Patient is due/failing the following:   PHQ9    Action needed:   Patient needs to do PHQ9.    Type of outreach:    Sent [employer] message.    Questions for provider review:    None                                                                                                                                    Joann Bullock    Care Management Coordinator - [facility]    [facility]  [facility]

## 2019-11-13 DIAGNOSIS — F31.81 BIPOLAR II DISORDER (H): Primary | ICD-10-CM

## 2019-11-13 LAB
ALBUMIN SERPL-MCNC: 3.3 G/DL (ref 3.4–5)
ALP SERPL-CCNC: 64 U/L (ref 40–150)
ALT SERPL W P-5'-P-CCNC: 16 U/L (ref 0–50)
ANION GAP SERPL CALCULATED.3IONS-SCNC: 8 MMOL/L (ref 3–14)
AST SERPL W P-5'-P-CCNC: 13 U/L (ref 0–45)
BILIRUB DIRECT SERPL-MCNC: <0.1 MG/DL (ref 0–0.2)
BILIRUB SERPL-MCNC: 0.3 MG/DL (ref 0.2–1.3)
BUN SERPL-MCNC: 16 MG/DL (ref 7–30)
CALCIUM SERPL-MCNC: 8.7 MG/DL (ref 8.5–10.1)
CHLORIDE SERPL-SCNC: 107 MMOL/L (ref 94–109)
CO2 SERPL-SCNC: 24 MMOL/L (ref 20–32)
CREAT SERPL-MCNC: 0.72 MG/DL (ref 0.52–1.04)
GFR SERPL CREATININE-BSD FRML MDRD: >90 ML/MIN/{1.73_M2}
GLUCOSE SERPL-MCNC: 79 MG/DL (ref 70–99)
POTASSIUM SERPL-SCNC: 4 MMOL/L (ref 3.4–5.3)
PROT SERPL-MCNC: 7.3 G/DL (ref 6.8–8.8)
SODIUM SERPL-SCNC: 139 MMOL/L (ref 133–144)
VALPROATE SERPL-MCNC: 37 MG/L (ref 50–100)

## 2019-11-13 PROCEDURE — 80076 HEPATIC FUNCTION PANEL: CPT | Performed by: PSYCHIATRY & NEUROLOGY

## 2019-11-13 PROCEDURE — 80164 ASSAY DIPROPYLACETIC ACD TOT: CPT | Performed by: PSYCHIATRY & NEUROLOGY

## 2019-11-13 PROCEDURE — 80048 BASIC METABOLIC PNL TOTAL CA: CPT | Performed by: PSYCHIATRY & NEUROLOGY

## 2019-11-13 PROCEDURE — 36415 COLL VENOUS BLD VENIPUNCTURE: CPT | Performed by: PSYCHIATRY & NEUROLOGY

## 2019-12-21 ENCOUNTER — TELEPHONE (OUTPATIENT)
Dept: SCHEDULING | Age: 39
End: 2019-12-21

## 2020-06-05 DIAGNOSIS — Z30.41 SURVEILLANCE OF PREVIOUSLY PRESCRIBED CONTRACEPTIVE PILL: Primary | ICD-10-CM

## 2020-06-05 RX ORDER — NORGESTIMATE AND ETHINYL ESTRADIOL 7DAYSX3 LO
1 KIT ORAL DAILY
Qty: 84 TABLET | Refills: 0 | Status: SHIPPED | OUTPATIENT
Start: 2020-06-05 | End: 2020-09-10

## 2020-06-05 NOTE — TELEPHONE ENCOUNTER
Received refill request for OCP.  Last in clinic 6/2019.  Annual exams delayed until September due to covid.  Short-term refill sent.    Tried to call but mailbox was full. Sent [facility] message indicating need for annual exam and to call [phone] to schedule.

## 2020-09-10 DIAGNOSIS — Z30.41 SURVEILLANCE OF PREVIOUSLY PRESCRIBED CONTRACEPTIVE PILL: ICD-10-CM

## 2020-09-11 RX ORDER — NORGESTIMATE AND ETHINYL ESTRADIOL 7DAYSX3 LO
1 KIT ORAL DAILY
Qty: 84 TABLET | Refills: 0 | Status: SHIPPED | OUTPATIENT
Start: 2020-09-11 | End: 2020-10-30

## 2020-10-29 ASSESSMENT — ANXIETY QUESTIONNAIRES
GAD7 TOTAL SCORE: 11
1. FEELING NERVOUS, ANXIOUS, OR ON EDGE: MORE THAN HALF THE DAYS
GAD7 TOTAL SCORE: 11
3. WORRYING TOO MUCH ABOUT DIFFERENT THINGS: MORE THAN HALF THE DAYS
7. FEELING AFRAID AS IF SOMETHING AWFUL MIGHT HAPPEN: SEVERAL DAYS
7. FEELING AFRAID AS IF SOMETHING AWFUL MIGHT HAPPEN: SEVERAL DAYS
5. BEING SO RESTLESS THAT IT IS HARD TO SIT STILL: NOT AT ALL
6. BECOMING EASILY ANNOYED OR IRRITABLE: NEARLY EVERY DAY
2. NOT BEING ABLE TO STOP OR CONTROL WORRYING: SEVERAL DAYS
4. TROUBLE RELAXING: MORE THAN HALF THE DAYS

## 2020-10-30 ENCOUNTER — OFFICE VISIT (OUTPATIENT)
Dept: INTERNAL MEDICINE | Facility: CLINIC | Age: 40
End: 2020-10-30
Attending: INTERNAL MEDICINE
Payer: COMMERCIAL

## 2020-10-30 VITALS
HEIGHT: 63 IN | SYSTOLIC BLOOD PRESSURE: 118 MMHG | BODY MASS INDEX: 21.97 KG/M2 | HEART RATE: 89 BPM | DIASTOLIC BLOOD PRESSURE: 78 MMHG | WEIGHT: 124 LBS

## 2020-10-30 DIAGNOSIS — Z23 NEED FOR INFLUENZA VACCINATION: Primary | ICD-10-CM

## 2020-10-30 DIAGNOSIS — Z00.00 ROUTINE GENERAL MEDICAL EXAMINATION AT A HEALTH CARE FACILITY: ICD-10-CM

## 2020-10-30 DIAGNOSIS — F31.81 BIPOLAR II DISORDER (H): Primary | ICD-10-CM

## 2020-10-30 DIAGNOSIS — Z00.00 PREVENTATIVE HEALTH CARE: ICD-10-CM

## 2020-10-30 DIAGNOSIS — Z30.41 SURVEILLANCE OF PREVIOUSLY PRESCRIBED CONTRACEPTIVE PILL: ICD-10-CM

## 2020-10-30 LAB
ALBUMIN SERPL-MCNC: 3.6 G/DL (ref 3.4–5)
ALP SERPL-CCNC: 57 U/L (ref 40–150)
ALT SERPL W P-5'-P-CCNC: 15 U/L (ref 0–50)
ANION GAP SERPL CALCULATED.3IONS-SCNC: 9 MMOL/L (ref 3–14)
AST SERPL W P-5'-P-CCNC: 10 U/L (ref 0–45)
BILIRUB SERPL-MCNC: 0.3 MG/DL (ref 0.2–1.3)
BUN SERPL-MCNC: 12 MG/DL (ref 7–30)
CALCIUM SERPL-MCNC: 8.7 MG/DL (ref 8.5–10.1)
CHLORIDE SERPL-SCNC: 106 MMOL/L (ref 94–109)
CHOLEST SERPL-MCNC: 167 MG/DL
CO2 SERPL-SCNC: 26 MMOL/L (ref 20–32)
CREAT SERPL-MCNC: 0.79 MG/DL (ref 0.52–1.04)
ERYTHROCYTE [DISTWIDTH] IN BLOOD BY AUTOMATED COUNT: 11.9 % (ref 10–15)
GFR SERPL CREATININE-BSD FRML MDRD: >90 ML/MIN/{1.73_M2}
GLUCOSE SERPL-MCNC: 80 MG/DL (ref 70–99)
HCT VFR BLD AUTO: 40.1 % (ref 35–47)
HDLC SERPL-MCNC: 56 MG/DL
HGB BLD-MCNC: 13 G/DL (ref 11.7–15.7)
LDLC SERPL CALC-MCNC: 81 MG/DL
MCH RBC QN AUTO: 27.8 PG (ref 26.5–33)
MCHC RBC AUTO-ENTMCNC: 32.4 G/DL (ref 31.5–36.5)
MCV RBC AUTO: 86 FL (ref 78–100)
NONHDLC SERPL-MCNC: 111 MG/DL
PLATELET # BLD AUTO: 321 10E9/L (ref 150–450)
POTASSIUM SERPL-SCNC: 3.6 MMOL/L (ref 3.4–5.3)
PROT SERPL-MCNC: 7.8 G/DL (ref 6.8–8.8)
RBC # BLD AUTO: 4.67 10E12/L (ref 3.8–5.2)
SODIUM SERPL-SCNC: 141 MMOL/L (ref 133–144)
TRIGL SERPL-MCNC: 148 MG/DL
TSH SERPL DL<=0.005 MIU/L-ACNC: 2.22 MU/L (ref 0.4–4)
VALPROATE SERPL-MCNC: 39 MG/L (ref 50–100)
WBC # BLD AUTO: 7.6 10E9/L (ref 4–11)

## 2020-10-30 PROCEDURE — 99386 PREV VISIT NEW AGE 40-64: CPT | Performed by: INTERNAL MEDICINE

## 2020-10-30 PROCEDURE — 82306 VITAMIN D 25 HYDROXY: CPT | Performed by: INTERNAL MEDICINE

## 2020-10-30 PROCEDURE — 36415 COLL VENOUS BLD VENIPUNCTURE: CPT | Performed by: INTERNAL MEDICINE

## 2020-10-30 PROCEDURE — 85027 COMPLETE CBC AUTOMATED: CPT | Performed by: INTERNAL MEDICINE

## 2020-10-30 PROCEDURE — G0008 ADMIN INFLUENZA VIRUS VAC: HCPCS

## 2020-10-30 PROCEDURE — 86803 HEPATITIS C AB TEST: CPT | Performed by: INTERNAL MEDICINE

## 2020-10-30 PROCEDURE — 80061 LIPID PANEL: CPT | Performed by: INTERNAL MEDICINE

## 2020-10-30 PROCEDURE — 87389 HIV-1 AG W/HIV-1&-2 AB AG IA: CPT | Performed by: INTERNAL MEDICINE

## 2020-10-30 PROCEDURE — 80053 COMPREHEN METABOLIC PANEL: CPT | Performed by: INTERNAL MEDICINE

## 2020-10-30 PROCEDURE — 90686 IIV4 VACC NO PRSV 0.5 ML IM: CPT

## 2020-10-30 PROCEDURE — 250N000011 HC RX IP 250 OP 636

## 2020-10-30 PROCEDURE — G0463 HOSPITAL OUTPT CLINIC VISIT: HCPCS

## 2020-10-30 PROCEDURE — 80164 ASSAY DIPROPYLACETIC ACD TOT: CPT | Performed by: INTERNAL MEDICINE

## 2020-10-30 PROCEDURE — 84443 ASSAY THYROID STIM HORMONE: CPT | Performed by: INTERNAL MEDICINE

## 2020-10-30 RX ORDER — NORGESTIMATE AND ETHINYL ESTRADIOL 7DAYSX3 LO
1 KIT ORAL DAILY
Qty: 84 TABLET | Refills: 4 | Status: SHIPPED | OUTPATIENT
Start: 2020-10-30 | End: 2022-01-28

## 2020-10-30 ASSESSMENT — PAIN SCALES - GENERAL: PAINLEVEL: NO PAIN (0)

## 2020-10-30 ASSESSMENT — MIFFLIN-ST. JEOR: SCORE: 1201.59

## 2020-10-30 ASSESSMENT — ANXIETY QUESTIONNAIRES: GAD7 TOTAL SCORE: 11

## 2020-10-30 NOTE — NURSING NOTE
Chief Complaint   Patient presents with     Follow Up     Birth control refill   Shayla Villanueva LPN

## 2020-10-30 NOTE — LETTER
10/30/2020       RE: Artie Diez  [address] [id number]     Dear Colleague,    Thank you for referring your patient, Artie Diez, to the [facility] [place] at [facility]. Please see a copy of my visit note below.     SUBJECTIVE:   CC: Artie Diez is an 39 year old woman who presents for preventive health visit.       Patient has been advised of split billing requirements and indicates understanding: Yes  Healthy Habits:    Do you get at least three servings of calcium containing foods daily (dairy, green leafy vegetables, etc.)? yes    Amount of exercise or daily activities, outside of work: walking once a week    Problems taking medications regularly No    Medication side effects: No    Have you had an eye exam in the past two years? no    Do you see a dentist twice per year? yes    Do you have sleep apnea, excessive snoring or daytime drowsiness?no      -------------------------------------    Today's PHQ-2 Score:   PHQ-2 ( 1999 Pfizer) 2/27/2015 2/27/2015   Q1: Little interest or pleasure in doing things 0 0   Q2: Feeling down, depressed or hopeless 0 0   PHQ-2 Score 0 0       Abuse: Current or Past(Physical, Sexual or Emotional)- No  Do you feel safe in your environment? No        Social History     Tobacco Use     Smoking status: Never Smoker     Smokeless tobacco: Never Used   Substance Use Topics     Alcohol use: No     If you drink alcohol do you typically have >3 drinks per day or >7 drinks per week? No                     Reviewed orders with patient.  Reviewed health maintenance and updated orders accordingly - Yes  Labs reviewed in EPIC    Mammogram Screening: Patient under age 50, mutual decision reflected in health maintenance.      Pertinent mammograms are reviewed under the imaging tab.  History of abnormal Pap smear: NO - age 30-65 PAP every 5 years with negative HPV co-testing recommended  PAP / HPV  "6/9/2014   PAP NIL     Reviewed and updated as needed this visit by clinical staff  Tobacco  Allergies  Meds              Reviewed and updated as needed this visit by Provider                Past Medical History:   Diagnosis Date     Depressive disorder, not elsewhere classified     Depression (non-psychotic)     Excessive or frequent menstruation     Heavy periods      Past Surgical History:   Procedure Laterality Date     no surgical history         ROS:  Review of Systems     Constitutional:  Negative for fever and fatigue.   HENT:  Negative for dry mouth.    Eyes:  Negative for decreased vision.   Respiratory:   Negative for cough, wheezing, respiratory pain and cough disturbing sleep.    Cardiovascular:  Negative for chest pain and edema.   Gastrointestinal:  Negative for nausea, abdominal pain, diarrhea, constipation and melena.   Genitourinary:  Negative for voiding less frequently and excessive menstruation.   Musculoskeletal:  Negative for back pain.   Skin:  Negative for itching and rash.   Neurological:  Negative for weakness and numbness.   Endo/Heme:  Negative for anemia, swollen glands and bruises/bleeds easily.   Psychiatric/Behavioral:  Negative for depression, decreased concentration, mood swings and panic attacks.    Breast:  Negative for breast discharge, breast mass, breast pain and nipple retraction.   Endocrine:  Negative for altered temperature regulation, polyphagia, polydipsia, unwanted hair growth and change in facial hair.        OBJECTIVE:   /78   Pulse 89   Ht 1.6 m (5' 3\")   Wt 56.2 kg (124 lb)   LMP 10/07/2020   Breastfeeding No   BMI 21.97 kg/m    EXAM:  GENERAL: healthy, alert and no distress  EYES: Eyes grossly normal to inspection, PERRL and conjunctivae and sclerae normal  HENT: normal cephalic/atraumatic and oral mucous membranes moist  NECK: no adenopathy and no asymmetry, masses, or scars  RESP: lungs clear to auscultation - no rales, rhonchi or wheezes  CV: " regular rate and rhythm, normal S1 S2, no S3 or S4, no murmur, click or rub, no peripheral edema and peripheral pulses strong  ABDOMEN: soft, nontender, no hepatosplenomegaly, no masses and bowel sounds normal  MS: no gross musculoskeletal defects noted, no edema  SKIN: no suspicious lesions or rashes  NEURO: Normal strength and tone, mentation intact and speech normal  PSYCH: mentation appears normal, affect normal/bright    Diagnostic Test Results:  Labs reviewed in Epic    ASSESSMENT/PLAN:   1. Surveillance of previously prescribed contraceptive pill  Patient was given refill for birth control pill.  She is satisfied with the results of birth control pill use at this time.  - norgestim-eth estrad triphasic (ORTHO TRI-CYCLEN LO) 0.18/0.215/0.25 MG-25 MCG tablet; Take 1 tablet by mouth daily  Dispense: 84 tablet; Refill: 4    2. Need for influenza vaccination  Recommend influenza vaccine.  - FLU VAC PRESRV FREE QUAD SPLIT VIR 3+YRS IM  - Comprehensive metabolic panel  - Valproic acid    3. Preventative health care  Discussed preventive healthcare needs with patient.  Reviewed mammograms as well as Pap smears.  Patient was encouraged to contact her previous clinic for results of the Pap smear.  Recommend to continue with every 5-year Pap smear with HPV cotesting.  - MA Screening Digital Bilateral; Future  - Lipid Profile  - CBC with platelets  - TSH with free T4 reflex  - 25- OH-Vitamin D  - HIV Antigen Antibody Combo  - Hepatitis C Screen Reflex to HCV RNA Quant and Genotype    4. Routine general medical examination at a health care facility  Reviewed vaccinations and age-appropriate cancer screening with patient.      Patient has been advised of split billing requirements and indicates understanding: Yes  COUNSELING:   Reviewed preventive health counseling, as reflected in patient instructions       Regular exercise       Healthy diet/nutrition       Vision screening    Estimated body mass index is 21.97 kg/m  as  "calculated from the following:    Height as of this encounter: 1.6 m (5' 3\").    Weight as of this encounter: 56.2 kg (124 lb).        She reports that she has never smoked. She has never used smokeless tobacco.      Counseling Resources:  ATP IV Guidelines  Pooled Cohorts Equation Calculator  Breast Cancer Risk Calculator  BRCA-Related Cancer Risk Assessment: FHS-7 Tool  FRAX Risk Assessment  ICSI Preventive Guidelines  Dietary Guidelines for Americans, 2010  USDA's MyPlate  ASA Prophylaxis  Lung CA Screening    Niecy Ji MD  [facility] [place]    "

## 2020-10-30 NOTE — PROGRESS NOTES
SUBJECTIVE:   CC: Artie Diez is an 39 year old woman who presents for preventive health visit.       Patient has been advised of split billing requirements and indicates understanding: Yes  Healthy Habits:    Do you get at least three servings of calcium containing foods daily (dairy, green leafy vegetables, etc.)? yes    Amount of exercise or daily activities, outside of work: walking once a week    Problems taking medications regularly No    Medication side effects: No    Have you had an eye exam in the past two years? no    Do you see a dentist twice per year? yes    Do you have sleep apnea, excessive snoring or daytime drowsiness?no      -------------------------------------    Today's PHQ-2 Score:   PHQ-2 ( 1999 Pfizer) 2/27/2015 2/27/2015   Q1: Little interest or pleasure in doing things 0 0   Q2: Feeling down, depressed or hopeless 0 0   PHQ-2 Score 0 0       Abuse: Current or Past(Physical, Sexual or Emotional)- No  Do you feel safe in your environment? No        Social History     Tobacco Use     Smoking status: Never Smoker     Smokeless tobacco: Never Used   Substance Use Topics     Alcohol use: No     If you drink alcohol do you typically have >3 drinks per day or >7 drinks per week? No                     Reviewed orders with patient.  Reviewed health maintenance and updated orders accordingly - Yes  Labs reviewed in [facility]    Mammogram Screening: Patient under age 50, mutual decision reflected in health maintenance.      Pertinent mammograms are reviewed under the imaging tab.  History of abnormal Pap smear: NO - age 30-65 PAP every 5 years with negative HPV co-testing recommended  PAP / HPV 6/9/2014   PAP NIL     Reviewed and updated as needed this visit by clinical staff  Tobacco  Allergies  Meds              Reviewed and updated as needed this visit by Provider                Past Medical History:   Diagnosis Date     Depressive disorder, not elsewhere classified     Depression  "(non-psychotic)     Excessive or frequent menstruation     Heavy periods      Past Surgical History:   Procedure Laterality Date     no surgical history         ROS:  Review of Systems     Constitutional:  Negative for fever and fatigue.   HENT:  Negative for dry mouth.    Eyes:  Negative for decreased vision.   Respiratory:   Negative for cough, wheezing, respiratory pain and cough disturbing sleep.    Cardiovascular:  Negative for chest pain and edema.   Gastrointestinal:  Negative for nausea, abdominal pain, diarrhea, constipation and melena.   Genitourinary:  Negative for voiding less frequently and excessive menstruation.   Musculoskeletal:  Negative for back pain.   Skin:  Negative for itching and rash.   Neurological:  Negative for weakness and numbness.   Endo/Heme:  Negative for anemia, swollen glands and bruises/bleeds easily.   Psychiatric/Behavioral:  Negative for depression, decreased concentration, mood swings and panic attacks.    Breast:  Negative for breast discharge, breast mass, breast pain and nipple retraction.   Endocrine:  Negative for altered temperature regulation, polyphagia, polydipsia, unwanted hair growth and change in facial hair.        OBJECTIVE:   /78   Pulse 89   Ht 1.6 m (5' 3\")   Wt 56.2 kg (124 lb)   LMP 10/07/2020   Breastfeeding No   BMI 21.97 kg/m    EXAM:  GENERAL: healthy, alert and no distress  EYES: Eyes grossly normal to inspection, PERRL and conjunctivae and sclerae normal  HENT: normal cephalic/atraumatic and oral mucous membranes moist  NECK: no adenopathy and no asymmetry, masses, or scars  RESP: lungs clear to auscultation - no rales, rhonchi or wheezes  CV: regular rate and rhythm, normal S1 S2, no S3 or S4, no murmur, click or rub, no peripheral edema and peripheral pulses strong  ABDOMEN: soft, nontender, no hepatosplenomegaly, no masses and bowel sounds normal  MS: no gross musculoskeletal defects noted, no edema  SKIN: no suspicious lesions or " "rashes  NEURO: Normal strength and tone, mentation intact and speech normal  PSYCH: mentation appears normal, affect normal/bright    Diagnostic Test Results:  Labs reviewed in Epic    ASSESSMENT/PLAN:   1. Surveillance of previously prescribed contraceptive pill  Patient was given refill for birth control pill.  She is satisfied with the results of birth control pill use at this time.  - norgestim-eth estrad triphasic (ORTHO TRI-CYCLEN LO) 0.18/0.215/0.25 MG-25 MCG tablet; Take 1 tablet by mouth daily  Dispense: 84 tablet; Refill: 4    2. Need for influenza vaccination  Recommend influenza vaccine.  - FLU VAC PRESRV FREE QUAD SPLIT VIR 3+YRS IM  - Comprehensive metabolic panel  - Valproic acid    3. Preventative health care  Discussed preventive healthcare needs with patient.  Reviewed mammograms as well as Pap smears.  Patient was encouraged to contact her previous clinic for results of the Pap smear.  Recommend to continue with every 5-year Pap smear with HPV cotesting.  - MA Screening Digital Bilateral; Future  - Lipid Profile  - CBC with platelets  - TSH with free T4 reflex  - 25- OH-Vitamin D  - HIV Antigen Antibody Combo  - Hepatitis C Screen Reflex to HCV RNA Quant and Genotype    4. Routine general medical examination at a health care facility  Reviewed vaccinations and age-appropriate cancer screening with patient.      Patient has been advised of split billing requirements and indicates understanding: Yes  COUNSELING:   Reviewed preventive health counseling, as reflected in patient instructions       Regular exercise       Healthy diet/nutrition       Vision screening    Estimated body mass index is 21.97 kg/m  as calculated from the following:    Height as of this encounter: 1.6 m (5' 3\").    Weight as of this encounter: 56.2 kg (124 lb).        She reports that she has never smoked. She has never used smokeless tobacco.      Counseling Resources:  ATP IV Guidelines  Pooled Cohorts Equation " Calculator  Breast Cancer Risk Calculator  BRCA-Related Cancer Risk Assessment: FHS-7 Tool  FRAX Risk Assessment  ICSI Preventive Guidelines  Dietary Guidelines for Americans, 2010  USDA's MyPlate  ASA Prophylaxis  Lung CA Screening    Niecy Ji MD  [facility]

## 2020-11-01 LAB
DEPRECATED CALCIDIOL+CALCIFEROL SERPL-MC: 30 UG/L (ref 20–75)
HCV AB SERPL QL IA: NONREACTIVE
HIV 1+2 AB+HIV1 P24 AG SERPL QL IA: NONREACTIVE

## 2020-11-01 ASSESSMENT — ENCOUNTER SYMPTOMS
FEVER: 0
BREAST MASS: 0
INSOMNIA: 0
COUGH: 0
SWOLLEN GLANDS: 0
FATIGUE: 0
ABDOMINAL PAIN: 0
NERVOUS/ANXIOUS: 0
RESPIRATORY PAIN: 0
ALTERED TEMPERATURE REGULATION: 0
WEAKNESS: 0
BREAST PAIN: 0
CONSTIPATION: 0
WHEEZING: 0
POLYPHAGIA: 0
NAUSEA: 0
DEPRESSION: 0
DECREASED CONCENTRATION: 0
DIARRHEA: 0
COUGH DISTURBING SLEEP: 0
POLYDIPSIA: 0
PANIC: 0
NUMBNESS: 0
BRUISES/BLEEDS EASILY: 0
BACK PAIN: 0

## 2020-12-02 ENCOUNTER — OFFICE VISIT (OUTPATIENT)
Dept: OPHTHALMOLOGY | Facility: CLINIC | Age: 40
End: 2020-12-02
Attending: OPHTHALMOLOGY
Payer: COMMERCIAL

## 2020-12-02 DIAGNOSIS — H52.203 MYOPIC ASTIGMATISM OF BOTH EYES: ICD-10-CM

## 2020-12-02 DIAGNOSIS — H52.13 MYOPIC ASTIGMATISM OF BOTH EYES: ICD-10-CM

## 2020-12-02 DIAGNOSIS — H52.4 PRESBYOPIA: ICD-10-CM

## 2020-12-02 DIAGNOSIS — H40.003 GLAUCOMA SUSPECT OF BOTH EYES: Primary | ICD-10-CM

## 2020-12-02 PROCEDURE — G0463 HOSPITAL OUTPT CLINIC VISIT: HCPCS | Mod: 25

## 2020-12-02 PROCEDURE — 92004 COMPRE OPH EXAM NEW PT 1/>: CPT | Mod: GC | Performed by: OPHTHALMOLOGY

## 2020-12-02 PROCEDURE — 92015 DETERMINE REFRACTIVE STATE: CPT

## 2020-12-02 ASSESSMENT — REFRACTION_WEARINGRX
OS_AXIS: 006
SPECS_TYPE: SVL
OS_SPHERE: -1.75
OD_SPHERE: -1.50
OD_CYLINDER: +0.75
OS_CYLINDER: +1.00
OD_AXIS: 173

## 2020-12-02 ASSESSMENT — SLIT LAMP EXAM - LIDS
COMMENTS: NORMAL
COMMENTS: NORMAL

## 2020-12-02 ASSESSMENT — CONF VISUAL FIELD
OD_NORMAL: 1
OS_NORMAL: 1
METHOD: COUNTING FINGERS

## 2020-12-02 ASSESSMENT — REFRACTION_MANIFEST
OS_AXIS: 006
OD_SPHERE: -1.25
OS_CYLINDER: +1.00
OS_CYLINDER: +1.00
OD_CYLINDER: +0.75
OS_SPHERE: -1.50
OS_AXIS: 006
OD_AXIS: 173
OS_SPHERE: -2.00
OD_AXIS: 173
OD_SPHERE: -1.75
OD_CYLINDER: +0.75

## 2020-12-02 ASSESSMENT — VISUAL ACUITY
OS_CC: 20/20
METHOD: SNELLEN - LINEAR
OD_CC: J1+
OD_CC: 20/20
OS_CC: J1+

## 2020-12-02 ASSESSMENT — TONOMETRY
OD_IOP_MMHG: 10
OS_IOP_MMHG: 10
IOP_METHOD: APPLANATION

## 2020-12-02 ASSESSMENT — CUP TO DISC RATIO
OD_RATIO: 0.7
OS_RATIO: 0.8

## 2020-12-02 ASSESSMENT — EXTERNAL EXAM - LEFT EYE: OS_EXAM: NORMAL

## 2020-12-02 ASSESSMENT — EXTERNAL EXAM - RIGHT EYE: OD_EXAM: NORMAL

## 2020-12-02 NOTE — PROGRESS NOTES
CC:  Increased difficulty w/ vision on computer    Chief Complaint(s) and History of Present Illness(es)     New Patient     Laterality: both eyes    Onset: gradual    Timing: when reading    Context: near vision and distance vision    Associated symptoms: dryness.  Negative for eye pain, floaters and   itching    Pain scale: 0/10              Comments     Artie is here as a new patient for a medical eye exam. She says she has   been having trouble seeing up close with her single vision glasses. She   feels her distance vision has changed a little as well. Her current   glasses RX is five years old. She has a history of being a glaucoma   suspect as well.     Jhonny Matthews COT 3:12 PM December 2, 2020     HPI:  Pt is a healthy 39 yo F who presents for mildly worsened vision at near. Reports no other issues w/ vision. No flashes, floaters, diplopia. No other complaints at this time.     POHx:  Last eye exam: 10 years ago  Prior eye surgery/laser: denies  CTL wearer: occasional since 2015  Glasses: started wearing in 2015    Fam hx of eye disease: unknown - adopted    Gtts:  denies    Allergies:  denies    A/P:  1. Myopia w/ presbyopia  -Discussed presbyopia and MRx  -Dispensed MRx for bifocals and for computer glasses    2. Glaucoma suspect  -[facility] w/ Dr. Callahan (last seen 2015)  -Reports she underwent extensive testing for glaucoma suspicion, including IOP checks at various times during the day, VF testing annually - reportedly no abnormalities  -Noted to have enlarged C/D - 0.7/0.8  -Pachys at that time 493/492  -On my exam today, stable ONH appearance each eye   -recommend that she get VF testing in 1 year, possibly followed by OCT RNFL the year after that    Follow up: Annual exam - check G-Top VF at that time  --    Pt seen and examined w/ Dr. Rowena Kent MD - PGY 4  Ophthalmology resident    Teaching statement:  Complete documentation of historical and exam  elements from today's encounter can be found in the full encounter summary report (not reduplicated in this progress note). I personally obtained the chief complaint(s) and history of present illness.  I confirmed and edited as necessary the review of systems, past medical/surgical history, family history, social history, and examination findings as documented by others; and I examined the patient myself. I personally reviewed the relevant tests, images, and reports as documented above.     I formulated and edited as necessary the assessment and plan and discussed the findings and management plan with the patient and family.    Maya Guaman MD  Comprehensive Ophthalmology & Ocular Pathology  Department of Ophthalmology and Visual Neurosciences  [email or web address]  Pager [phone]

## 2020-12-30 ENCOUNTER — ANCILLARY PROCEDURE (OUTPATIENT)
Dept: MAMMOGRAPHY | Facility: CLINIC | Age: 40
End: 2020-12-30
Attending: INTERNAL MEDICINE
Payer: COMMERCIAL

## 2020-12-30 DIAGNOSIS — Z00.00 PREVENTATIVE HEALTH CARE: ICD-10-CM

## 2020-12-30 PROCEDURE — 77067 SCR MAMMO BI INCL CAD: CPT | Performed by: STUDENT IN AN ORGANIZED HEALTH CARE EDUCATION/TRAINING PROGRAM

## 2021-04-01 ENCOUNTER — TELEPHONE (OUTPATIENT)
Dept: PSYCHIATRY | Facility: CLINIC | Age: 41
End: 2021-04-01

## 2021-04-01 NOTE — TELEPHONE ENCOUNTER
PSYCHIATRY CLINIC PHONE INTAKE     SERVICES REQUESTED / INTERESTED IN          Med Management - [facility]    Presenting Problem and Brief History                              What would you like to be seen for? (brief description):  Patient is wanting an appointment with [facility] because their current psychiatrist is not comfortable making changes to depakote. Current meds are lamictal, depakote, ativan PRN, gabapentin, and recently started propranolol. She feels the medications are working fine and is just below therapeutic level for depakote. Patient reported she has been experiencing an episode for about a month so she is realizing she needs higher care than with her current psychiatrist.  Have you received a mental health diagnosis? Yes   Which one (s): Bipolar  Is there any history of developmental delay?  No   Are you currently seeing a mental health provider?  Yes            Who / month last seen:  Weekly therapy, psychiatry with private practice (Dr. Lee)  Do you have mental health records elsewhere?  Yes  Will you sign a release so we can obtain them?  Yes    Have you ever been hospitalized for psychiatric reasons?  No  Describe:      Do you have current thoughts of self-harm?  Yes  - talks to her therapist regularly. Declined speaking with a  today.  Do you currently have thoughts of harming others?  No       Substance Use History     Do you have any history of alcohol / illicit drug use?  No  Describe:    Have you ever received treatment for this?  No    Describe:       Social History     Who is the patient's a guardian?  No    Name / number:   Have you had an ACT team in last 12 months?  No  Describe:    Do you have any current or past legal issues?  No  Describe:    OK to leave a detailed voicemail?  Yes    Medical/ Surgical History                                   Patient Active Problem List   Diagnosis     Heavy menses     CARDIOVASCULAR SCREENING; LDL GOAL LESS THAN 160     Major depressive  disorder, recurrent episode, moderate (H)     Allergic rhinitis     Anxiety state     Attention deficit disorder     Bipolar I disorder (H)     Dysmenorrhea     Glaucoma suspect     Myopia          Medications             Current Outpatient Medications   Medication Sig Dispense Refill     divalproex sodium extended-release (DEPAKOTE ER) 250 MG 24 hr tablet Take 1 tablet (250 mg) by mouth daily 90 tablet 0     gabapentin (NEURONTIN) 100 MG capsule Take 1 capsule (100 mg) by mouth daily 90 capsule 0     lamoTRIgine (LAMICTAL) 25 MG tablet Take 1 tablet (25 mg) by mouth daily 90 tablet 0     LORazepam (ATIVAN) 0.5 MG tablet Take 0.5 mg by mouth every 6 hours as needed for anxiety       norgestim-eth estrad triphasic (ORTHO TRI-CYCLEN LO) 0.18/0.215/0.25 MG-25 MCG tablet Take 1 tablet by mouth daily 84 tablet 4     Nutritional Supplements (NUTRITIONAL SUPPLEMENT PO) l-methyl folate  Zinc selenium  Hair skin and nails vitamin           DISPOSITION      Completed phone screen with patient. Added to [facility] wait list. Female provider preferred but first available is ok.    Lula Teran,

## 2021-05-24 ENCOUNTER — RECORDS - HEALTHEAST (OUTPATIENT)
Dept: ADMINISTRATIVE | Facility: CLINIC | Age: 41
End: 2021-05-24

## 2021-10-24 ENCOUNTER — HEALTH MAINTENANCE LETTER (OUTPATIENT)
Age: 41
End: 2021-10-24

## 2021-10-27 ENCOUNTER — OFFICE VISIT (OUTPATIENT)
Dept: FAMILY MEDICINE | Facility: CLINIC | Age: 41
End: 2021-10-27
Attending: FAMILY MEDICINE
Payer: COMMERCIAL

## 2021-10-27 VITALS
BODY MASS INDEX: 19.49 KG/M2 | HEIGHT: 63 IN | SYSTOLIC BLOOD PRESSURE: 110 MMHG | DIASTOLIC BLOOD PRESSURE: 74 MMHG | HEART RATE: 102 BPM | WEIGHT: 110 LBS

## 2021-10-27 DIAGNOSIS — R63.4 WEIGHT LOSS: ICD-10-CM

## 2021-10-27 DIAGNOSIS — F31.9 BIPOLAR I DISORDER (H): Primary | ICD-10-CM

## 2021-10-27 DIAGNOSIS — R68.81 EARLY SATIETY: ICD-10-CM

## 2021-10-27 PROCEDURE — 99214 OFFICE O/P EST MOD 30 MIN: CPT | Performed by: FAMILY MEDICINE

## 2021-10-27 PROCEDURE — G0463 HOSPITAL OUTPT CLINIC VISIT: HCPCS

## 2021-10-27 RX ORDER — SPIRONOLACTONE 50 MG/1
TABLET, FILM COATED ORAL
COMMUNITY
Start: 2021-10-26 | End: 2023-03-29

## 2021-10-27 RX ORDER — ESZOPICLONE 1 MG/1
TABLET, FILM COATED ORAL
COMMUNITY
Start: 2021-10-18 | End: 2022-06-29

## 2021-10-27 RX ORDER — PROPRANOLOL HYDROCHLORIDE 10 MG/1
TABLET ORAL
COMMUNITY
Start: 2021-08-18 | End: 2022-09-02

## 2021-10-27 ASSESSMENT — ANXIETY QUESTIONNAIRES
7. FEELING AFRAID AS IF SOMETHING AWFUL MIGHT HAPPEN: SEVERAL DAYS
3. WORRYING TOO MUCH ABOUT DIFFERENT THINGS: NEARLY EVERY DAY
7. FEELING AFRAID AS IF SOMETHING AWFUL MIGHT HAPPEN: SEVERAL DAYS
GAD7 TOTAL SCORE: 16
8. IF YOU CHECKED OFF ANY PROBLEMS, HOW DIFFICULT HAVE THESE MADE IT FOR YOU TO DO YOUR WORK, TAKE CARE OF THINGS AT HOME, OR GET ALONG WITH OTHER PEOPLE?: SOMEWHAT DIFFICULT
4. TROUBLE RELAXING: NEARLY EVERY DAY
2. NOT BEING ABLE TO STOP OR CONTROL WORRYING: NEARLY EVERY DAY
5. BEING SO RESTLESS THAT IT IS HARD TO SIT STILL: NOT AT ALL
GAD7 TOTAL SCORE: 16
1. FEELING NERVOUS, ANXIOUS, OR ON EDGE: NEARLY EVERY DAY
6. BECOMING EASILY ANNOYED OR IRRITABLE: NEARLY EVERY DAY
GAD7 TOTAL SCORE: 16

## 2021-10-27 ASSESSMENT — PAIN SCALES - GENERAL: PAINLEVEL: NO PAIN (0)

## 2021-10-27 ASSESSMENT — MIFFLIN-ST. JEOR: SCORE: 1133.09

## 2021-10-27 NOTE — NURSING NOTE
Chief Complaint   Patient presents with     Follow Up     Thyroid concerns   Shayla Villanueva LPN

## 2021-10-27 NOTE — PROGRESS NOTES
Martha Alva is a 40 year old who presents for the following health issues     HPI     1. Weight loss  Pt. Noted 13 lbs weight loss over 4  Month period, appetite down, was under huge stress: parents sold family home and move to senior living, patient is their sole support system   Not sleeping well, energy/adrenaline up, pt moving at the same time --patient was living with them  Pandemic bad at the time, Anxiety    Parents settled down only 6 weeks ago, things still new, house closed only recently  Pt. Had new place in July, not able to focus on it--still looks like just moved in still helping parents, working full time.    Appetite came back around end of August,  But felt full quickly  Went to urgent care for sore throat, and discoved how much weight loss, felt lightheadedness  Had several episodes but then resolved    Mother has friend with similar mental health issues and wonders if pt has thyroid issues  Started on Lunesta for sleep and propranolol for anxiety, taking -10 mg multiple times/day several per week    Known bipolar disorder      Current Outpatient Medications   Medication     divalproex sodium extended-release (DEPAKOTE ER) 250 MG 24 hr tablet     eszopiclone (LUNESTA) 1 MG tablet     gabapentin (NEURONTIN) 100 MG capsule     lamoTRIgine (LAMICTAL) 25 MG tablet     LORazepam (ATIVAN) 0.5 MG tablet     norgestim-eth estrad triphasic (ORTHO TRI-CYCLEN LO) 0.18/0.215/0.25 MG-25 MCG tablet     Nutritional Supplements (NUTRITIONAL SUPPLEMENT PO)     propranolol (INDERAL) 10 MG tablet     spironolactone (ALDACTONE) 50 MG tablet     No current facility-administered medications for this visit.       Review of Systems    During stress period, diarrhea and constipation, now more constipation (baseline)  L calf little bigger, sometimes numb with walking x 3 mnths  A little more hair loss--on spironolactone  Globus sensation  Menses regular on OCP  Recent URI, Covid test negative    Patient Active  "Problem List   Diagnosis     Heavy menses     CARDIOVASCULAR SCREENING; LDL GOAL LESS THAN 160     Major depressive disorder, recurrent episode, moderate (H)     Allergic rhinitis     Anxiety state     Attention deficit disorder     Bipolar I disorder (H)     Dysmenorrhea     Glaucoma suspect     Myopia         Objective    /74   Pulse 102   Ht 1.6 m (5' 3\")   Wt 49.9 kg (110 lb)   LMP 09/27/2021   Breastfeeding No   BMI 19.49 kg/m    Body mass index is 19.49 kg/m .  Physical Exam   EXAM:  Constitutional: healthy, alert and no distress   Cardiovascular: negative, PMI normal. No lifts, heaves, or thrills. RRR. No murmurs, clicks gallops or rub  Respiratory: negative, Percussion normal. Good diaphragmatic excursion. Lungs clear  Neck: Neck supple. No adenopathy. Thyroid symmetric, normal size,, Carotids without bruits.  ENT: ENT exam normal, no neck nodes or sinus tenderness  No edema  Speech RRR, mood is mildly anxious, no psychomotor changes, thought process is appropriate,   Affect is normal. No evidence of suicidality.       EAMON 7 TOTAL SCORE: 16  A/P  1. Weight loss  2. Early satiety  3. Anxiety  Will check Vit D, CBC, TSH with reflex, Valproic acid  Discussed possible role anxiety, dyspepsia  Recommend small frequent meals, consider trial OTC omeprazole  Recommend discuss Anxiety management strategies with therapist.  Consider scheduled propranolol rather than prn, consider Buspar--to discuss with psychiatry    Follow-up prn        "

## 2021-10-27 NOTE — LETTER
10/27/2021       RE: Artie Diez  [address]  [place] [address]     Dear Colleague,    Thank you for referring your patient, Artie Diez, to the [facility] [place] [facility]. Please see a copy of my visit note below.          Subjective   Artie is a 40 year old who presents for the following health issues     HPI     1. Weight loss  Pt. Noted 13 lbs weight loss over 4  Month period, appetite down, was under huge stress: parents sold family home and move to senior living, patient is their sole support system   Not sleeping well, energy/adrenaline up, pt moving at the same time --patient was living with them  Pandemic bad at the time, Anxiety    Parents settled down only 6 weeks ago, things still new, house closed only recently  Pt. Had new place in July, not able to focus on it--still looks like just moved in still helping parents, working full time.    Appetite came back around end of August,  But felt full quickly  Went to urgent care for sore throat, and discoved how much weight loss, felt lightheadedness  Had several episodes but then resolved    Mother has friend with similar mental health issues and wonders if pt has thyroid issues  Started on Lunesta for sleep and propranolol for anxiety, taking -10 mg multiple times/day several per week    Known bipolar disorder      Current Outpatient Medications   Medication     divalproex sodium extended-release (DEPAKOTE ER) 250 MG 24 hr tablet     eszopiclone (LUNESTA) 1 MG tablet     gabapentin (NEURONTIN) 100 MG capsule     lamoTRIgine (LAMICTAL) 25 MG tablet     LORazepam (ATIVAN) 0.5 MG tablet     norgestim-eth estrad triphasic (ORTHO TRI-CYCLEN LO) 0.18/0.215/0.25 MG-25 MCG tablet     Nutritional Supplements (NUTRITIONAL SUPPLEMENT PO)     propranolol (INDERAL) 10 MG tablet     spironolactone (ALDACTONE) 50 MG tablet     No current facility-administered medications  "for this visit.       Review of Systems    During stress period, diarrhea and constipation, now more constipation (baseline)  L calf little bigger, sometimes numb with walking x 3 mnths  A little more hair loss--on spironolactone  Globus sensation  Menses regular on OCP  Recent URI, Covid test negative    Patient Active Problem List   Diagnosis     Heavy menses     CARDIOVASCULAR SCREENING; LDL GOAL LESS THAN 160     Major depressive disorder, recurrent episode, moderate (H)     Allergic rhinitis     Anxiety state     Attention deficit disorder     Bipolar I disorder (H)     Dysmenorrhea     Glaucoma suspect     Myopia         Objective    /74   Pulse 102   Ht 1.6 m (5' 3\")   Wt 49.9 kg (110 lb)   LMP 09/27/2021   Breastfeeding No   BMI 19.49 kg/m    Body mass index is 19.49 kg/m .  Physical Exam   EXAM:  Constitutional: healthy, alert and no distress   Cardiovascular: negative, PMI normal. No lifts, heaves, or thrills. RRR. No murmurs, clicks gallops or rub  Respiratory: negative, Percussion normal. Good diaphragmatic excursion. Lungs clear  Neck: Neck supple. No adenopathy. Thyroid symmetric, normal size,, Carotids without bruits.  ENT: ENT exam normal, no neck nodes or sinus tenderness  No edema  Speech RRR, mood is mildly anxious, no psychomotor changes, thought process is appropriate,   Affect is normal. No evidence of suicidality.       EAMON 7 TOTAL SCORE: 16  A/P  1. Weight loss  2. Early satiety  3. Anxiety  Will check Vit D, CBC, TSH with reflex, Valproic acid  Discussed possible role anxiety, dyspepsia  Recommend small frequent meals, consider trial OTC omeprazole  Recommend discuss Anxiety management strategies with therapist.  Consider scheduled propranolol rather than prn, consider Buspar--to discuss with psychiatry    Follow-up prn    Ran Cabral MD      "

## 2021-10-28 ASSESSMENT — ANXIETY QUESTIONNAIRES: GAD7 TOTAL SCORE: 16

## 2021-11-24 ENCOUNTER — LAB (OUTPATIENT)
Dept: LAB | Facility: CLINIC | Age: 41
End: 2021-11-24
Payer: COMMERCIAL

## 2021-11-24 DIAGNOSIS — R63.4 WEIGHT LOSS: ICD-10-CM

## 2021-11-24 DIAGNOSIS — F31.9 BIPOLAR I DISORDER (H): ICD-10-CM

## 2021-11-24 LAB
ALBUMIN SERPL-MCNC: 3.6 G/DL (ref 3.4–5)
ALP SERPL-CCNC: 49 U/L (ref 40–150)
ALT SERPL W P-5'-P-CCNC: 15 U/L (ref 0–50)
ANION GAP SERPL CALCULATED.3IONS-SCNC: 4 MMOL/L (ref 3–14)
AST SERPL W P-5'-P-CCNC: 11 U/L (ref 0–45)
BASOPHILS # BLD AUTO: 0 10E3/UL (ref 0–0.2)
BASOPHILS NFR BLD AUTO: 1 %
BILIRUB SERPL-MCNC: 0.2 MG/DL (ref 0.2–1.3)
BUN SERPL-MCNC: 13 MG/DL (ref 7–30)
CALCIUM SERPL-MCNC: 9.7 MG/DL (ref 8.5–10.1)
CHLORIDE BLD-SCNC: 108 MMOL/L (ref 94–109)
CO2 SERPL-SCNC: 29 MMOL/L (ref 20–32)
CREAT SERPL-MCNC: 0.79 MG/DL (ref 0.52–1.04)
EOSINOPHIL # BLD AUTO: 0.1 10E3/UL (ref 0–0.7)
EOSINOPHIL NFR BLD AUTO: 1 %
ERYTHROCYTE [DISTWIDTH] IN BLOOD BY AUTOMATED COUNT: 12 % (ref 10–15)
GFR SERPL CREATININE-BSD FRML MDRD: >90 ML/MIN/1.73M2
GLUCOSE BLD-MCNC: 77 MG/DL (ref 70–99)
HCT VFR BLD AUTO: 39.3 % (ref 35–47)
HGB BLD-MCNC: 12.9 G/DL (ref 11.7–15.7)
HOLD SPECIMEN: NORMAL
IMM GRANULOCYTES # BLD: 0 10E3/UL
IMM GRANULOCYTES NFR BLD: 0 %
LYMPHOCYTES # BLD AUTO: 2.8 10E3/UL (ref 0.8–5.3)
LYMPHOCYTES NFR BLD AUTO: 37 %
MCH RBC QN AUTO: 28.4 PG (ref 26.5–33)
MCHC RBC AUTO-ENTMCNC: 32.8 G/DL (ref 31.5–36.5)
MCV RBC AUTO: 86 FL (ref 78–100)
MONOCYTES # BLD AUTO: 0.4 10E3/UL (ref 0–1.3)
MONOCYTES NFR BLD AUTO: 5 %
NEUTROPHILS # BLD AUTO: 4.3 10E3/UL (ref 1.6–8.3)
NEUTROPHILS NFR BLD AUTO: 56 %
NRBC # BLD AUTO: 0 10E3/UL
NRBC BLD AUTO-RTO: 0 /100
PLATELET # BLD AUTO: 300 10E3/UL (ref 150–450)
POTASSIUM BLD-SCNC: 4.2 MMOL/L (ref 3.4–5.3)
PROT SERPL-MCNC: 7.6 G/DL (ref 6.8–8.8)
RBC # BLD AUTO: 4.55 10E6/UL (ref 3.8–5.2)
SODIUM SERPL-SCNC: 141 MMOL/L (ref 133–144)
TSH SERPL DL<=0.005 MIU/L-ACNC: 1.86 MU/L (ref 0.4–4)
VALPROATE SERPL-MCNC: 38 MG/L
WBC # BLD AUTO: 7.6 10E3/UL (ref 4–11)

## 2021-11-24 PROCEDURE — 82306 VITAMIN D 25 HYDROXY: CPT

## 2021-11-24 PROCEDURE — 85025 COMPLETE CBC W/AUTO DIFF WBC: CPT

## 2021-11-24 PROCEDURE — 80164 ASSAY DIPROPYLACETIC ACD TOT: CPT

## 2021-11-24 PROCEDURE — 80053 COMPREHEN METABOLIC PANEL: CPT

## 2021-11-24 PROCEDURE — 84443 ASSAY THYROID STIM HORMONE: CPT

## 2021-11-24 PROCEDURE — 36415 COLL VENOUS BLD VENIPUNCTURE: CPT

## 2021-11-26 LAB — DEPRECATED CALCIDIOL+CALCIFEROL SERPL-MC: 34 UG/L (ref 20–75)

## 2021-12-02 NOTE — RESULT ENCOUNTER NOTE
Dear Artie,     Here are your recent results which are within the expected range. Please continue with your current plan of care and let us know if you have any questions or concerns.    Regards,  Ran Cabral MD

## 2021-12-07 NOTE — PROGRESS NOTES
[facility]  Psychiatry Clinic  NEW PATIENT EVALUATION     CARE TEAM:  PCP- Ran Cabral, Psychotherapist- Evangelina ([place] [facility]), None  Artie Diez is a 41 year old who uses the name Artie and pronouns she, her, hers.      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 Her mental history dates back to her early childhood. Patient was adopted along with her twin sister from [place] at age 3 and 1/2. She had behavioral challenges as a child, which involved rages and destruction of property. In her teens she was diagnosed with depression and engaged in SIB by cutting as well as several over doses. She has been on several antidepressants in the past with very limited response. About 3 years ago she was diagnosed with Bipolar 2 Disorder while in [place]. She would feel that she mainly has mixed episodes. She has been on her current medication since her diagnosis and dose increase has been difficult due to her extreme sensitivity to medications, she has had Gene sight testing which also confirmed this.    Patient also gives a history consistent with EAMON as well as Social Anxiety.   With the stress of adjusting to a new environment and job after moving to [place] as well as taking care of her aged parents, she has been experiencing depressive symptoms in the last couple of weeks.    We have advised the major drug interaction between Depakote and Lamictal which she is currently prescribed, side effects were also discussed including the fatal skin rash Samay[place] Ito syndrome that could occur though rarely with Lamictal. She is to report to get medically checked if she had any concerns. Her Depakote level was on the lower end when checked 2 weeks ago, we plan to increase  Lamictal today to target depression, and would like her to have Lamictal level drawn before increasing the dose as Depakote could interact with this medication leading to increased levels.  Future considerations will include Lithium therapy, which has both antimanic and antidepressant effects.  There were no safety concerns today. We will review her again in the clinic in about 1 month. Emergency services and contacts were discussed.    [place]:  Indicates taking controlled medication as prescribed.     PLAN                                                                                                                1) Meds-  - Increase Lamotrigine 75 mg daily (after doing Lamotrigine levels)  - Depakote ER - 250 mg   - Gabapentin- 100 mg bid  - Lunesta 1 mg at bedtime (stopped working recently)  - Ativan 0.5 mg at bedtime/prn (3-4 times a week)  - Propranolol 10 mg tid /prn- for anxiety    PCP-   Spironolactone- 50 mg bid- for hair loss  L-Methylfolate    2) Psychotherapy- Individual therapy weekly    3) Next due-  Labs- Lamictal, Folate, Vitamin B12, and Methylmalonic acid levels ordered today  EKG-  As indicated  Rating scales- PHQ9    4) Referrals-  None    5) Dispo- 3-4 weeks     PERTINENT BACKGROUND                           [most recent eval 12/07/21]   Dx depression age 14. Started various medication without working diagnosis in late 20's, had been resistant to medication in past, with multiple somatic symptoms, multiple diagnoses in past. Official diagnosis made about 3 years ago by mood disorder specialist in [place], done as second opinion as struggling with approach of first psychiatrist.   Has been on the current combination of medications since saw specialist, and per specialist, if have this type of condition, cannot be on antidepressants. She is prone to side effects with medications and sometimes, its hard for her to manage her medications due to the side effects, so her doctor was  "keeping her meds at a low dosage. Sleep is on and off. She was actively involved in CBT and DBT while in [place]    Psych pertinent item history includes suicide attempt [xseveral overdoses as a teenager], SIB [mainly in her teens/20's] and mutiple psychotropic trials         CHIEF COMPLAINT   Management of Bipolar Disorder     MURIEL Alva was diagnosed with bipolar disorder about 3 years ago.  At the time she was living in [place]. She moved back to [place] recently and would like us to manage her medication.    Started taking medication at age 14 at the time she was diagnosed with depression, has had behavioral issues most of her life.  Adopted at age 4-1/2 from [place] with her twin sister.  States she was commenced on antidepressants, which seem to be not working.  In 2015 she has what she describes as really bad breakdown since then her mental health has been very difficult to manage.  In [place] she saw a psychiatrist and was started on several medications there was a concern for bipolar and she got a second opinion from the bipolar disorder specialist and she was diagnosed with bipolar disorder mixed state.  At that time she stopped antidepressants and was commenced Depakote Lamictal and gabapentin.  He recently moved back home to [place] for family reasons to be with her aging parents and states there has been a lot of stress associated with the move.  States things have been very rough although she presents as high functioning she has been \"pretty miserable\" every day.  She describes having a difficult time with her job as she is finding difficult with the people she works with.  Also other stressors include conflict with her parents \"being around them all day can be exhausting\".  States she has been on same the meds for a while.  Last spring she was really doing bad, had a \"mixed episode\".  She describes as some moments of hypomania,  being fixated on things, increased anxiety and energy as well " as suicidal thoughts. She engaged in SIB for a while. This was the first time she was doing it since her 20's.  The last couple of weeks has been very stressful to work life and her parents and she would feel that she is beginning to get into a depressive episode within the last week.  She rates her mood today as 4/10, where 10 is the best  Her sleep remains the same, has never been good after her breakdown in 2015 . She has difficulty falling asleep because her anxiety usually picks at night which affects her sleep  Appetite is beginning to improve, was poor due to increased stress with her parents in the last 6 months  Energy is always been pretty low states she always feels tired due to lack of sleep.  Sometimes at night she could get increased energy and stay up cleaning all night  Concentration has not been great because of stress in the last couple of weeks.    Does not endorse suicidal thoughts, last time was in spring when she had a mixed episode  At the time she was cutting and that was the first time she had done that since her teens.  She describes anxiety feeling anxious about everything as well as social anxiety which she says started after her break down in 2015.  This affects her's social interactions she has never had a boyfriend because being close to people gives her a lot of anxiety.   She is currently in therapy and also working on these things in therapy.  Does not endorse auditory or visual hallucinations  She does not endorse symptoms of PTSD or OCD  She does not use drugs or alcohol      RECENT PSYCH ROS:   Depression:  depressed mood, low energy, insomnia and poor concentration /memory  Elevated:  increased energy, decreased sleep need and distractibility   Psychosis:  none  Anxiety:  excessive worry, social anxiety and nervous/overwhelmed  Trauma Related:  none  Sleep: yes  Other: N/A    Adverse Effects: Hair loss- from Depakote, Lamictal- stimulating, takes it during the day, Can't drink  "at all- with Depakote  Pertinent Negative Symptoms: No suicidal ideation or violent ideation  Recent Substance Use:     None reported, takes 1 cup coffee     FAMILY and SOCIAL HISTORY                                 pt reported     Family Hx: Adopted, Has a twin sister with \"recreational depression that she takes recreational antidepressants.\"     Social Hx:  Financial/ Work- working , N   Partner/ - ,   Children- None      Living situation- lives alone   Social/ Spiritual Support- Parents, mostly mom    Feels Safe at Home- yes   Legal- None     Trauma History (self-report)- yes, abandoned at age 3 and 1/2 years   Early History/Education- Adopted from [place] with her Twin sister, they were abandoned by their bio mom, she has an older sister who was also adopted from [place] as an infant. She grew up in [place], has a Masters in Communications, older sister adopted 10 years before them       PSYCHIATRIC HISTORY     SIB- yes, started cutting SIB in , in her early 20's while in college   Suicide Attempt [#, most recent]- yes, 2 over doses as a teenager (Over dose on meds a few times in )  Suicidal Ideation Hx- yes    Violence/Aggression Hx- None  Psychosis Hx- None  Eating Disorder Hx- None  Other- None    Psych Hosp [#, most recent]- None  Commitment- None  ECT- None  Outpatient Programs - One outpatient program in 2001  Other - DBT x 2years ([id number])     PAST MED TRIALS         New Antidepressants:  Paxil (paroxetine) and Wellbutrin, Zyban, Aplenzin (bupropion)  Mood Stabilizers:  Lithium Carbonate and Depakote and Depakot ER (valproate)  Stimulants / ADHD Meds:  Adderall (amphetamine salts), Ritalin (methylphenidate) and Strattera (atomoxetine)  Tranquilizers:  Klonopin (clonazepam) and Buspar (buspirone)-  It caused Akathisia  Paxil- low dose, further bad side effects on higher doses, night sweats  Pristiq- Ok, low dose, stepping on Akathisia    Tends not to tolerate most medications, " "c/o GI symptoms and HA      SUBSTANCE USE HISTORY     Past Use- none reported  Treatment- #, most recent- N/A  Medical Consequences- N/A  Legal Consequences- N/A  Other- N/A     MEDICAL HISTORY and ALLERGY     ALLERGIES: Amphotericin b, Phenylbutazones, and Penicillin g    Patient Active Problem List   Diagnosis     Heavy menses     CARDIOVASCULAR SCREENING; LDL GOAL LESS THAN 160     Major depressive disorder, recurrent episode, moderate (H)     Allergic rhinitis     Anxiety state     Attention deficit disorder     Bipolar I disorder (H)     Dysmenorrhea     Glaucoma suspect     Myopia        MEDICAL REVIEW OF SYSTEMS   Contraception- oral contraceptives (combined)      A comprehensive review of systems was performed and is negative other than noted in the HPI.     MEDICATIONS     Current Outpatient Medications   Medication Sig Dispense Refill     divalproex sodium extended-release (DEPAKOTE ER) 250 MG 24 hr tablet Take 1 tablet (250 mg) by mouth daily 90 tablet 0     eszopiclone (LUNESTA) 1 MG tablet        gabapentin (NEURONTIN) 100 MG capsule Take 1 capsule (100 mg) by mouth daily 90 capsule 0     lamoTRIgine (LAMICTAL) 25 MG tablet Take 1 tablet (25 mg) by mouth daily 90 tablet 0     LORazepam (ATIVAN) 0.5 MG tablet Take 0.5 mg by mouth every 6 hours as needed for anxiety       norgestim-eth estrad triphasic (ORTHO TRI-CYCLEN LO) 0.18/0.215/0.25 MG-25 MCG tablet Take 1 tablet by mouth daily 84 tablet 4     Nutritional Supplements (NUTRITIONAL SUPPLEMENT PO) l-methyl folate  Zinc selenium  Hair skin and nails vitamin       propranolol (INDERAL) 10 MG tablet        spironolactone (ALDACTONE) 50 MG tablet         VITALS   /82   Pulse 112   Wt 51.3 kg (113 lb)   BMI 20.02 kg/m      MENTAL STATUS EXAM     Alertness: alert  and oriented  Appearance: well groomed  Behavior/Demeanor: cooperative, with good  eye contact   Speech: normal  Language: intact  Psychomotor: normal or unremarkable  Mood: \"Stressful- " "4/10\" - where 10 is the best  Affect: appropriate; congruent to: mood- yes, content- yes  Thought Process/Associations: unremarkable  Thought Content:  Reports none;  Denies suicidal & violent ideation and delusions  Perception:  Reports none;  Denies hallucinations  Insight: adequate  Judgment: good  Cognition: does  appear grossly intact; formal cognitive testing was not done  Gait and Station: unremarkable     LABS and DATA     PHQ9 TODAY = 24  PHQ 2/22/2019 12/8/2021   PHQ-9 Total Score 27 24   Q9: Thoughts of better off dead/self-harm past 2 weeks Nearly every day More than half the days       Recent Labs   Lab Test 11/24/21  1543 10/30/20  1200   CR 0.79 0.79   GFRESTIMATED >90 >90     Recent Labs   Lab Test 11/24/21  1543 10/30/20  1200   AST 11 10   ALT 15 15   ALKPHOS 49 57            PSYCHOTROPIC DRUG INTERACTIONS     DEPAKOTE & LAMICTAL- Valproate Products may enhance the adverse/toxic effect of LamoTRIgine. Valproate Products may increase the serum concentration of LamoTRIgine. Severity Major     ATIVAN, GABAPENTIN, DEPAKOTE, LAMICTAL- CNS Depressants may enhance the adverse/toxic effect of other CNS Depressants   LAMICTAL & ORTHO TRI-CYCLEN LO- Estrogen Derivatives (Contraceptive) may decrease the serum concentration of LamoTRIgine       MANAGEMENT:  Monitoring for adverse effects, routine labs, using lowest therapeutic dose of [Depakote & Lamictal] and patient is aware of risks     RISK STATEMENT for SAFETY     Artie Diez did not appear to be an imminent safety risk to self or others.    TREATMENT RISK STATEMENT: The risks, benefits, alternatives and potential adverse effects have been discussed and are understood by the pt. The pt understands the risks of using street drugs or alcohol. There are no medical contraindications, the pt agrees to treatment with the ability to do so. The pt knows to call the clinic for any problems or to access emergency care if needed.  Medical and substance use " concerns are documented above.  Psychotropic drug interaction check was done, including changes made today.     PROVIDER: Graciela Wright MD    Patient staffed in clinic with Dr. Chang who will sign the note.  Supervisor is Dr. Monreal.      TELEHEALTH ATTENDING ATTESTATION    Following the ACGME guidelines on telemedicine and direct supervision due to COVID-19, I was concurrently participating in and/or monitoring the patient care through appropriate telecommunication technology.  I discussed the key portions of the service with the resident, including the mental status examination and developing the plan of care. I reviewed key portions of the history with the resident. I agree with the findings and plan as documented in this note.     Nikhil Chang MD

## 2021-12-08 ENCOUNTER — OFFICE VISIT (OUTPATIENT)
Dept: PSYCHIATRY | Facility: CLINIC | Age: 41
End: 2021-12-08
Attending: PSYCHIATRY & NEUROLOGY
Payer: COMMERCIAL

## 2021-12-08 VITALS
BODY MASS INDEX: 20.02 KG/M2 | SYSTOLIC BLOOD PRESSURE: 127 MMHG | WEIGHT: 113 LBS | HEART RATE: 112 BPM | DIASTOLIC BLOOD PRESSURE: 82 MMHG

## 2021-12-08 DIAGNOSIS — F31.9 BIPOLAR I DISORDER (H): Primary | ICD-10-CM

## 2021-12-08 PROCEDURE — 90792 PSYCH DIAG EVAL W/MED SRVCS: CPT | Mod: GC | Performed by: STUDENT IN AN ORGANIZED HEALTH CARE EDUCATION/TRAINING PROGRAM

## 2021-12-08 PROCEDURE — G0463 HOSPITAL OUTPT CLINIC VISIT: HCPCS

## 2021-12-08 ASSESSMENT — PAIN SCALES - GENERAL: PAINLEVEL: NO PAIN (0)

## 2021-12-08 NOTE — PATIENT INSTRUCTIONS
It was nice seeing you today    Treatment Plan Today:     1) Medications-  - Increase Lamotrigine 75 mg daily  - Depakote ER - 250 mg   - Gabapentin- 100 mg bid  - Lunesta 1 mg at bedtime (stopped working recently)  - Ativan 0.5 mg at bedtime/prn (3-4 times a week)  - Propranolol 10 mg tid /prn- for anxiety        2) Follow-up appt with Dr Wright 4 weeks    3) Crisis numbers are below and clinic after hours number is [phone]              **For crisis resources, please see the information at the end of this document**     Patient Education      Thank you for coming to the [facility] HEALTH [facility] ADDI[facility] [place] [facility].    Lab Testing:  If you had lab testing today and your results are reassuring or normal they will be mailed to you or sent through [facility] within 7 days. If the lab tests need quick action we will call you with the results. The phone number we will call with results is # [phone] (home) . If this is not the best number please call our clinic and change the number.    Medication Refills:  If you need any refills please call your pharmacy and they will contact us. Our fax number for refills is [phone]. Please allow three business for refill processing. If you need to  your refill at a new pharmacy, please contact the new pharmacy directly. The new pharmacy will help you get your medications transferred.     Scheduling:  If you have any concerns about today's visit or wish to schedule another appointment please call our office during normal business hours [phone] (8-5:00 M-F)    Contact Us:  Please call [phone] during business hours (8-5:00 M-F).  If after clinic hours, or on the weekend, please call  [phone].    Financial Assistance [phone]  [facility] Billing [phone]  Central Billing Office, [facility]: [phone]  [facility] Billing [phone]  Medical Records [phone]  [facility] Patient Bill of Rights  [email or web address]       MENTAL HEALTH CRISIS NUMBERS:  For a medical emergency please call  911 or go to the nearest ER.     [facility]:   [facility] -[phone]   Crisis Residence [facility] -[phone]   Walk-In Counseling Center [facility] -[phone]   COPE 24/7 [facility] Mobile Team -[phone] (adults)/[phone] (child)  CHILD: [facility] needs assessment team - [phone]      [facility]:   [facility] - [phone]   Walk-in counseling [facility] - [phone]   Walk-in counseling [facility] - [phone]   Crisis Residence [facility] - [phone]  Urgent Care Adult [facility]-[phone] mobile unit/ 24/7 crisis line    National Crisis Numbers:   National Suicide Prevention Lifeline: 3-706-179-TALK (831-722-3154)  Poison Control Center - 1-347.177.8909  [facility]/resources for a list of additional resources (SOS)  Trans Lifeline a hotline for transgender people 8-336-794-8356  The [employer] a hotline for LGBT youth 0-492-235-5897  Crisis Text Line: For any crisis 24/7   To: 216592  see www.crisistextline.org  - IF MAKING A CALL FEELS TOO HARD, send a text!         Again thank you for choosing [facility] MENTAL HEALTH & ADDICTION [facility] and please let us know how we can best partner with you to improve you and your family's health.    You may be receiving a survey regarding this appointment. We would love to have your feedback, both positive and negative. The survey is done by an external company, so your answers are anonymous.

## 2021-12-09 ASSESSMENT — PATIENT HEALTH QUESTIONNAIRE - PHQ9: SUM OF ALL RESPONSES TO PHQ QUESTIONS 1-9: 24

## 2021-12-15 ENCOUNTER — LAB (OUTPATIENT)
Dept: LAB | Facility: CLINIC | Age: 41
End: 2021-12-15
Payer: COMMERCIAL

## 2021-12-15 ENCOUNTER — TELEPHONE (OUTPATIENT)
Dept: PSYCHIATRY | Facility: CLINIC | Age: 41
End: 2021-12-15

## 2021-12-15 DIAGNOSIS — F31.9 BIPOLAR I DISORDER (H): ICD-10-CM

## 2021-12-15 LAB — VIT B12 SERPL-MCNC: 366 PG/ML (ref 193–986)

## 2021-12-15 PROCEDURE — 82607 VITAMIN B-12: CPT

## 2021-12-15 PROCEDURE — 36415 COLL VENOUS BLD VENIPUNCTURE: CPT

## 2021-12-15 PROCEDURE — 83921 ORGANIC ACID SINGLE QUANT: CPT

## 2021-12-15 PROCEDURE — 80175 DRUG SCREEN QUAN LAMOTRIGINE: CPT

## 2021-12-15 PROCEDURE — 82746 ASSAY OF FOLIC ACID SERUM: CPT

## 2021-12-15 NOTE — CONFIDENTIAL NOTE
Called patient to follow up on lab test. At last visit plan was to check Lamictal level and then go up on the dose if level ok. She is yet to get the test done, says she will do that soon. She has also not increased  Lamictal dose.    At time of call she was at work. Did not report any acute concerns

## 2021-12-16 LAB — FOLATE SERPL-MCNC: >100 NG/ML

## 2021-12-17 LAB — LAMOTRIGINE SERPL-MCNC: 3.7 UG/ML

## 2021-12-19 ENCOUNTER — HEALTH MAINTENANCE LETTER (OUTPATIENT)
Age: 41
End: 2021-12-19

## 2021-12-21 LAB — METHYLMALONATE SERPL-SCNC: 0.21 UMOL/L (ref 0–0.4)

## 2022-01-03 ENCOUNTER — OFFICE VISIT (OUTPATIENT)
Dept: PSYCHIATRY | Facility: CLINIC | Age: 42
End: 2022-01-03
Attending: PSYCHIATRY & NEUROLOGY
Payer: COMMERCIAL

## 2022-01-03 VITALS
DIASTOLIC BLOOD PRESSURE: 75 MMHG | HEART RATE: 78 BPM | WEIGHT: 113.4 LBS | BODY MASS INDEX: 20.09 KG/M2 | SYSTOLIC BLOOD PRESSURE: 107 MMHG

## 2022-01-03 DIAGNOSIS — F31.81 BIPOLAR 2 DISORDER (H): Primary | ICD-10-CM

## 2022-01-03 PROCEDURE — G0463 HOSPITAL OUTPT CLINIC VISIT: HCPCS

## 2022-01-03 PROCEDURE — 99215 OFFICE O/P EST HI 40 MIN: CPT | Mod: GC | Performed by: STUDENT IN AN ORGANIZED HEALTH CARE EDUCATION/TRAINING PROGRAM

## 2022-01-03 ASSESSMENT — PAIN SCALES - GENERAL: PAINLEVEL: NO PAIN (0)

## 2022-01-03 ASSESSMENT — PATIENT HEALTH QUESTIONNAIRE - PHQ9: SUM OF ALL RESPONSES TO PHQ QUESTIONS 1-9: 23

## 2022-01-03 NOTE — PATIENT INSTRUCTIONS
It was nice seeing you today    Treatment Plan Today:     1) Medications-  - Increase Lamotrigine 75 mg daily x 2 weeks, and increase to 100mg as toleratd  - Depakote ER - 250 mg   - Gabapentin- 100 mg bid  - Lunesta 1 mg at bedtime (stopped working recently)  - Ativan 0.5 mg at bedtime/prn (3-4 times a week)  - Propranolol 10 mg tid /prn- for anxiety    PCP-   Spironolactone- 50 mg bid- for hair loss  L-Methylfolate      2) Follow-up appt with Dr Wright RTC in 6 weeks    3) Crisis numbers are below and clinic after hours number is [phone]              **For crisis resources, please see the information at the end of this document**     Patient Education      Thank you for coming to the [facility].    Lab Testing:  If you had lab testing today and your results are reassuring or normal they will be mailed to you or sent through [employer] within 7 days. If the lab tests need quick action we will call you with the results. The phone number we will call with results is # [phone] (home) . If this is not the best number please call our clinic and change the number.    Medication Refills:  If you need any refills please call your pharmacy and they will contact us. Our fax number for refills is [phone]. Please allow three business for refill processing. If you need to  your refill at a new pharmacy, please contact the new pharmacy directly. The new pharmacy will help you get your medications transferred.     Scheduling:  If you have any concerns about today's visit or wish to schedule another appointment please call our office during normal business hours [phone] (8-5:00 M-F)    Contact Us:  Please call [phone] during business hours (8-5:00 M-F).  If after clinic hours, or on the weekend, please call  [phone].    Financial Assistance [phone]  [facility] Billing [phone]  [place] Billing Office, [facility]: [phone]  [facility] Billing  [phone]  Medical Records [phone]  [place] Patient Bill of Rights [email or web address]       MENTAL HEALTH CRISIS NUMBERS:  For a medical emergency please call  911 or go to the nearest ER.     [facility]:   [facility] -[phone]   Crisis Residence [facility] Residence -[phone]   Walk-In Counseling Center \[facility]\"" -[phone]   COPE 24/7 [place] Mobile Team -[phone] (adults)/[phone] (child)  CHILD: [facility] needs assessment team - [phone]      [facility]:   [facility] - [phone]   Walk-in counseling [facility] - [phone]   Walk-in counseling [facility] - [phone]   Crisis Residence [facility] Residence - [phone]  Urgent Care Adult Mental [facility]-[phone] mobile unit/ 24/7 crisis line    National Crisis Numbers:   National Suicide Prevention Lifeline: 0-747-413-TALK (157-899-1479)  Poison Control Center - 1-981.343.1755  [facility]/resources for a list of additional resources (SOS)  Trans Lifeline a hotline for transgender people 8-938-557-8465  The [employer] a hotline for LGBT youth 1-553.986.2331  Crisis Text Line: For any crisis 24/7   To: 380482  see www.crisistextline.org  - IF MAKING A CALL FEELS TOO HARD, send a text!         Again thank you for choosing [facility] MENTAL HE[facility] & ADDICTION [facility] [[email or web address]] and please let us know how we can best partner with you to improve you and your family's health.    You may be receiving a survey regarding this appointment. We would love to have your feedback, both positive and negative. The survey is done by an external company, so your answers are anonymous.

## 2022-01-03 NOTE — PROGRESS NOTES
[facility]  Psychiatry Clinic  PSYCHIATRY PROGRESS NOTE     CARE TEAM:  PCP- Ran Cabral, Psychotherapist- [facility] ([place] [facility]), None  Artie Diez is a 41 year old who uses the name Artie and pronouns she, her, hers.      DIAGNOSIS   Bipolar II Disorder, current episode depressed  EAMON  Social Anxiety     ASSESSMENT   Artie reports a worsening in her depression and anxiety today.  This she attributes to ongoing stressors including taking care of her aged parents and her current job which she is hoping to change.  Her recent labs including B12, folate, Lamictal level were all within normal limits. We discussed today that Lamictal which she is currently prescribed is an approved medication for managing bipolar depression, as well as preventing cycling.   We have agreed to increase the dose of this to target her depressive symptoms.  With the improvement in her depression, her anxiety is likely to improve, however if this does not occur we will discuss further management options.  She meets with her therapist weekly and finds the support helpful.  There were no safety concerns today we will see her again in the clinic in about 6 weeks.  Emergency services were discussed and contact numbers given    [place] review was not needed today.     PLAN                                                                                                                1) Meds-  - Increase Lamotrigine 75 mg daily x 2 weeks, and increase to 100mg as toleratd  - Depakote ER - 250 mg   - Gabapentin- 100 mg bid  - Lunesta 1 mg at bedtime (unsure of benefit)  - Ativan 0.5 mg at bedtime/prn (3-4 times a week)  - Propranolol 10 mg tid /prn- for anxiety    PCP-   Spironolactone- 50 mg bid- for hair loss  L-Methylfolate    2) Psychotherapy- Individual therapy weekly    3) Next due-  Labs- Depakote labs 4/22  EKG-  As indicated  Rating scales- PHQ9    4) Referrals-  None    5) Dispo- 6 weeks      "PERTINENT BACKGROUND                           [most recent eval 12/07/21]   Dx depression age 14. Started various medication without working diagnosis in late 20's, had been resistant to medication in past, with multiple somatic symptoms, multiple diagnoses in past. Official diagnosis made about 3 years ago by mood disorder specialist in [place], done as second opinion as struggling with approach of first psychiatrist.   Has been on the current combination of medications since saw specialist, and per specialist, if have this type of condition, cannot be on antidepressants. She is prone to side effects with medications and sometimes, its hard for her to manage her medications due to the side effects, so her doctor was keeping her meds at a low dosage. Sleep is on and off. She was actively involved in CBT and DBT while in [place]    Psych pertinent item history includes suicide attempt [xseveral overdoses as a teenager], SIB [mainly in her teens/20's] and mutiple psychotropic trials           SUBJECTIVE   The holiday went well, she spent time with parents, sister and neice  She has been quite stressed with her job, she is trying to find a new job she was hoping to apply for one recently but discovered that she missed application deadline, she felt upset with herself and her anxiety has increased, especially in the evenings. She has been \"overthinking things... reading meaning into everything.\"  Her mood is \"sad\", rates is at a \"3/10\" where 10 is the best  And sleep is okay with the tablets which she takes to help her fall asleep  Her energy is usually low in the morning because she states she is not a morning person  She does not endorse suicidal thoughts today. Has had occasional thoughts of feeling better off dead, but has no plan or intent. She is able to reach out for help if the thoughts were to be more severe.  Does not endorse auditory or visual hallucinations  She has been using her DBT skills effectively " "trying to cope with anxiety.  She continues to meet with her therapist weekly and finds the support very helpful        RECENT PSYCH ROS:   Depression:  depressed mood, low energy and poor concentration /memory  Elevated:  distractibility   Psychosis:  none  Anxiety:  excessive worry, social anxiety and nervous/overwhelmed  Trauma Related:  none  Sleep: yes  Other: N/A    Adverse Effects: Hair loss- from Depakote, Lamictal- stimulating, takes it during the day, Can't drink at all- with Depakote  Pertinent Negative Symptoms: No suicidal ideation or violent ideation  Recent Substance Use:     None reported, takes 1 cup coffee     FAMILY and SOCIAL HISTORY                                 pt reported     Family Hx: Adopted, Has a twin sister with \"recreational depression that she takes recreational antidepressants.\"     Social Hx:  Financial/ Work- working , [employer]   Partner/ - ,   Children- None      Living situation- lives alone   Social/ Spiritual Support- Parents, mostly mom    Feels Safe at Home- yes   Legal- None         PSYCH and SUBSTANCE USE Critical Summary Points since July 2020         1/3/22- Increased Lamictal to target Bipolar depression       MEDICAL HISTORY and ALLERGY     ALLERGIES: Amphotericin b, Phenylbutazones, and Penicillin g    Patient Active Problem List   Diagnosis     Heavy menses     CARDIOVASCULAR SCREENING; LDL GOAL LESS THAN 160     Major depressive disorder, recurrent episode, moderate (H)     Allergic rhinitis     Anxiety state     Attention deficit disorder     Bipolar I disorder (H)     Dysmenorrhea     Glaucoma suspect     Myopia        MEDICAL REVIEW OF SYSTEMS   Contraception- oral contraceptives (combined)      A comprehensive review of systems was performed and is negative other than noted in the HPI.     MEDICATIONS     Current Outpatient Medications   Medication Sig Dispense Refill     divalproex sodium extended-release (DEPAKOTE ER) 250 MG 24 hr tablet Take " 1 tablet (250 mg) by mouth daily 90 tablet 0     eszopiclone (LUNESTA) 1 MG tablet        gabapentin (NEURONTIN) 100 MG capsule Take 1 capsule (100 mg) by mouth daily 90 capsule 0     lamoTRIgine (LAMICTAL) 25 MG tablet Take 1 tablet (25 mg) by mouth daily 90 tablet 0     LORazepam (ATIVAN) 0.5 MG tablet Take 0.5 mg by mouth every 6 hours as needed for anxiety       norgestim-eth estrad triphasic (ORTHO TRI-CYCLEN LO) 0.18/0.215/0.25 MG-25 MCG tablet Take 1 tablet by mouth daily 84 tablet 4     Nutritional Supplements (NUTRITIONAL SUPPLEMENT PO) l-methyl folate  Zinc selenium  Hair skin and nails vitamin       propranolol (INDERAL) 10 MG tablet        spironolactone (ALDACTONE) 50 MG tablet         VITALS   /75 (BP Location: Left arm, Patient Position: Sitting, Cuff Size: Adult Regular)   Pulse 78   Wt 51.4 kg (113 lb 6.4 oz)   BMI 20.09 kg/m      MENTAL STATUS EXAM     Alertness: alert  and oriented  Appearance: well groomed  Behavior/Demeanor: cooperative, with good  eye contact   Speech: normal  Language: intact  Psychomotor: normal or unremarkable  Mood: depressed  Affect: appropriate; congruent to: mood- yes, content- yes  Thought Process/Associations: unremarkable  Thought Content:  Reports none;  Denies suicidal & violent ideation and delusions  Perception:  Reports none;  Denies hallucinations  Insight: adequate  Judgment: good  Cognition: does  appear grossly intact; formal cognitive testing was not done  Gait and Station: unremarkable     LABS and DATA     PHQ9 TODAY = 24  PHQ 2/22/2019 12/8/2021 1/3/2022   PHQ-9 Total Score 27 24 23   Q9: Thoughts of better off dead/self-harm past 2 weeks Nearly every day More than half the days More than half the days       Recent Labs   Lab Test 11/24/21  1543 10/30/20  1200   CR 0.79 0.79   GFRESTIMATED >90 >90     Recent Labs   Lab Test 11/24/21  1543 10/30/20  1200   AST 11 10   ALT 15 15   ALKPHOS 49 57            PSYCHOTROPIC DRUG INTERACTIONS     DEPAKOTE  & LAMICTAL- Valproate Products may enhance the adverse/toxic effect of LamoTRIgine. Valproate Products may increase the serum concentration of LamoTRIgine. Severity Major     ATIVAN, GABAPENTIN, DEPAKOTE, LAMICTAL- CNS Depressants may enhance the adverse/toxic effect of other CNS Depressants   LAMICTAL & ORTHO TRI-CYCLEN LO- Estrogen Derivatives (Contraceptive) may decrease the serum concentration of LamoTRIgine       MANAGEMENT:  Monitoring for adverse effects, routine labs, using lowest therapeutic dose of [Depakote & Lamictal] and patient is aware of risks     RISK STATEMENT for SAFETY     Artie Diez did not appear to be an imminent safety risk to self or others.    TREATMENT RISK STATEMENT: The risks, benefits, alternatives and potential adverse effects have been discussed and are understood by the pt. The pt understands the risks of using street drugs or alcohol. There are no medical contraindications, the pt agrees to treatment with the ability to do so. The pt knows to call the clinic for any problems or to access emergency care if needed.  Medical and substance use concerns are documented above.  Psychotropic drug interaction check was done, including changes made today.     PROVIDER: Graciela Wright MD    Patient staffed in clinic with Dr. Spear who will sign the note.  Supervisor is Dr. Monreal.      TELEHEALTH ATTENDING ATTESTATION  Following the ACGME guidelines on telemedicine and direct supervision due to COVID-19, I was concurrently participating in and/or monitoring the patient care through appropriate telecommunication technology.  I discussed the key portions of the service with the resident, including the mental status examination and developing the plan of care. I reviewed key portions of the history with the resident. I agree with the findings and plan as documented in this note.   Demetrio Spear MD

## 2022-02-14 ENCOUNTER — VIRTUAL VISIT (OUTPATIENT)
Dept: PSYCHIATRY | Facility: CLINIC | Age: 42
End: 2022-02-14
Attending: PSYCHIATRY & NEUROLOGY
Payer: COMMERCIAL

## 2022-02-14 DIAGNOSIS — F31.81 BIPOLAR 2 DISORDER (H): Primary | ICD-10-CM

## 2022-02-14 PROCEDURE — 99215 OFFICE O/P EST HI 40 MIN: CPT | Mod: GT | Performed by: STUDENT IN AN ORGANIZED HEALTH CARE EDUCATION/TRAINING PROGRAM

## 2022-02-14 NOTE — PROGRESS NOTES
" VIDEO VISIT  Artie Diez is a 41 year old patient that has consented to receive services via billable video visit.      The patient has been notified of following:   \"This video visit will be conducted via a call between you and your physician/provider. We have found that certain health care needs can be provided without the need for an in-person physical exam. This service lets us provide the care you need with a video conversation. If a prescription is necessary we can send it directly to your pharmacy. If lab work is needed we can place an order for that and you can then stop by our lab to have the test done at a later time. Insurers are generally covering virtual visits as they would in-office visits so billing should not be different than normal.  If for some reason you do get billed incorrectly, you should contact the billing office to correct it and that number is in the AVS .    Patient will join video visit via:  email invite was sent (EnWavehart is preferred, but patient is unable to join via IntegriChain)    If patient attempts to join the video via IntegriChain at appointment start time, but is unable to, they would prefer that the provider send them a video invitation via:   Send to e-mail: [email or web address]      How would patient like to obtain AVS?:  EnWaveharDailyWorth    Video- Visit Details  Type of service:  video visit for medication management  Time of service:    Date:  02/14/2022    Video Start Time:  9:32 AM        Video End Time:  10:00 am    Reason for video visit:  Patient has requested telehealth visit  Originating Site (patient location):  [facility]- [place]   Location- Patient's home  Distant Site (provider location):  [facility]  Mode of Communication:  Video Conference via AmWell  Consent:  Patient has given verbal consent for video visit?: Yes              [facility]  Psychiatry Clinic  PSYCHIATRY PROGRESS NOTE     CARE TEAM:  PCP- Ran Cabral Psychotherapist- " [facility] ([place] [facility] None  Artie Diez is a 41 year old who uses the name Artie and pronouns she, her, hers.      DIAGNOSIS   Bipolar II Disorder, current episode depressed  EAMON  Social Anxiety     ASSESSMENT   Artie tried an increased dose of lamotrigine after our last visit, it was beneficial but she developed constipation and she was not sure if this was a side effect of the medication.  She subsequently reduced back to her previous dose of lamotrigine, and the constipation initially settled but has since resumed again.  Today she notes worsening in her mood as well as increased anxiety.  She also endorses chronic suicidal thoughts which are at baseline without any suicidal intent or plan.  She is able to volunteer a safety plan, noting that she did reach out about 10 months ago when she felt unsafe.  We have discussed today that constipation is a rare side effect of lamotrigine and it will be helpful to have a review with her primary care provider to exclude other causes.  After her review if another etiology for the constipation is found she could recommence increase of lamotrigine to 75 mg.  In the meantime she is to use propranolol as needed for her anxiety.    She endorses having poor concentration and was wondering if this stemmed from ADD diagnosis which she received as a teenager. We have given her  A list of centers where she could get ADHD/ ADD testing, and advised that it would be better to get her depression and anxiety under control before getting the test as these could affect the result.  She continues to meet with her therapist weekly for support.  We will see her again in the clinic in about 2 weeks.  Emergency services and contacts were extensively discussed and numbers were given.    [facility]     PLAN                                                                                                                1) Meds-  - Increase Lamotrigine 75 mg  daily x 2 weeks, and increase to 100mg as toleratd  - Depakote ER - 250 mg   - Gabapentin- 100 mg bid  - Lunesta 1 mg at bedtime (unsure of benefit)  - Ativan 0.5 mg at bedtime/prn (3-4 times a week)  - Propranolol 10 mg tid /prn- for anxiety    PCP-   Spironolactone- 50 mg bid- for hair loss  L-Methylfolate    2) Psychotherapy- Individual therapy weekly    3) Next due-  Labs- Depakote labs 4/22  EKG-  As indicated  Rating scales- PHQ9    4) Referrals-  None    5) Dispo- 2 weeks     PERTINENT BACKGROUND                           [most recent eval 12/07/21]   Dx depression age 14. Started various medication without working diagnosis in late 20's, had been resistant to medication in past, with multiple somatic symptoms, multiple diagnoses in past. Official diagnosis made about 3 years ago by mood disorder specialist in [place], done as second opinion as struggling with approach of first psychiatrist.   Has been on the current combination of medications since saw specialist, and per specialist, if have this type of condition, cannot be on antidepressants. She is prone to side effects with medications and sometimes, its hard for her to manage her medications due to the side effects, so her doctor was keeping her meds at a low dosage. Sleep is on and off. She was actively involved in CBT and DBT while in [place]    Psych pertinent item history includes suicide attempt [xseveral overdoses as a teenager], SIB [mainly in her teens/20's] and mutiple psychotropic trials           MURIEL Alva tried increasing the Lamictal after her last visit.  She felt like it was helping a little bit, but then developed severe constipation.  She does note that she is prone to side effects with medications and so usually on lower doses.  The constipation lasted for about 2 weeks, she then subsequently went back to the previous dose of her Lamictal felt better and after a week it started again.  She does note that she is under severe  "stress, due to issues at work and not sure if that might also be contributing to the constipation.  She is unable to really describe her mood right now, but thinks she might be \"burnt out\"..  She has been experiencing increased agitation, she has a lot of frustration with her work.  Recently she usually feels disappointed by people and is overly sensitive.  She has been attending therapy and does review these issues.  She would rate her mood as 3-4 over 10 where 10 is the best.  Her concentration is poor, was diagnosed with ADD as a teen, but never really treated for it as she had a bad reaction to Ritalin.  She endorses anxiety associated with insomnia, but has been trying to reduce her use of Ativan and limits it to mainly on Sunday night as Monday makes her very anxious.  She rates her anxiety about 6-7 over 10 with 10 is the worst.  She does use her coping skills, as well as as needed propanolol which helps with her physical symptoms.  As mentioned earlier her sleep is poor, describes struggling to fall asleep but when she does fall asleep she is able to stay asleep for 6 to 7 hours.  She does not endorse auditory or visual hallucinations  She endorses chronic SI, which she describes as always been a part of my life, but denies any intent or plan.  There is no history of recent drug or alcohol use.      RECENT PSYCH ROS:   Depression:  depressed mood, low energy and poor concentration /memory  Elevated:  distractibility   Psychosis:  none  Anxiety:  excessive worry, social anxiety and nervous/overwhelmed  Trauma Related:  none  Sleep: yes  Other: N/A    Adverse Effects: Hair loss- from Depakote, Lamictal- stimulating, takes it during the day, ? Constipation, Can't drink at all- with Depakote  Pertinent Negative Symptoms: No suicidal ideation or violent ideation  Recent Substance Use:     None reported, takes 1 cup coffee     FAMILY and SOCIAL HISTORY                                 pt reported     Family Hx: " "Adopted, Has a twin sister with \"recreational depression that she takes recreational antidepressants.\"     Social Hx:  Financial/ Work- working , N   Partner/ - ,   Children- None      Living situation- lives alone   Social/ Spiritual Support- Parents, mostly mom    Feels Safe at Home- yes   Legal- None         PSYCH and SUBSTANCE USE Critical Summary Points since July 2020         1/3/22- Increased Lamictal to target Bipolar depression  2/14/22- To retry increasing Lamictal     MEDICAL HISTORY and ALLERGY     ALLERGIES: Amphotericin b, Phenylbutazones, and Penicillin g    Patient Active Problem List   Diagnosis     Heavy menses     CARDIOVASCULAR SCREENING; LDL GOAL LESS THAN 160     Major depressive disorder, recurrent episode, moderate (H)     Allergic rhinitis     Anxiety state     Attention deficit disorder     Bipolar I disorder (H)     Dysmenorrhea     Glaucoma suspect     Myopia        MEDICAL REVIEW OF SYSTEMS   Contraception- oral contraceptives (combined)      A comprehensive review of systems was performed and is negative other than noted in the HPI.     MEDICATIONS     Current Outpatient Medications   Medication Sig Dispense Refill     divalproex sodium extended-release (DEPAKOTE ER) 250 MG 24 hr tablet Take 1 tablet (250 mg) by mouth daily 90 tablet 0     eszopiclone (LUNESTA) 1 MG tablet        gabapentin (NEURONTIN) 100 MG capsule Take 1 capsule (100 mg) by mouth daily 90 capsule 0     lamoTRIgine (LAMICTAL) 25 MG tablet Take 1 tablet (25 mg) by mouth daily 90 tablet 0     LORazepam (ATIVAN) 0.5 MG tablet Take 0.5 mg by mouth every 6 hours as needed for anxiety       norgestim-eth estrad triphasic (ORTHO TRI-CYCLEN LO) 0.18/0.215/0.25 MG-25 MCG tablet Take 1 tablet by mouth daily 84 tablet 4     Nutritional Supplements (NUTRITIONAL SUPPLEMENT PO) l-methyl folate  Zinc selenium  Hair skin and nails vitamin       propranolol (INDERAL) 10 MG tablet        spironolactone " "(ALDACTONE) 50 MG tablet         VITALS   There were no vitals taken for this visit.    MENTAL STATUS EXAM     Alertness: alert  and oriented  Appearance: well groomed  Behavior/Demeanor: cooperative, with good  eye contact   Speech: normal  Language: intact  Psychomotor: normal or unremarkable  Mood: \"burnt out..3-4/10\" where 10 is the best  Affect: appropriate; congruent to: mood- yes, content- yes  Thought Process/Associations: unremarkable  Thought Content:  Reports none and suicidal ideation without plan; without intent [details in history];  Denies violent ideation  Perception:  Reports none;  Denies hallucinations  Insight: adequate  Judgment: good  Cognition: does  appear grossly intact; formal cognitive testing was not done  Gait and Station: N/A (telehealth)     LABS and DATA     PHQ9 TODAY = 24  PHQ 2/22/2019 12/8/2021 1/3/2022   PHQ-9 Total Score 27 24 23   Q9: Thoughts of better off dead/self-harm past 2 weeks Nearly every day More than half the days More than half the days       Recent Labs   Lab Test 11/24/21  1543 10/30/20  1200   CR 0.79 0.79   GFRESTIMATED >90 >90     Recent Labs   Lab Test 11/24/21  1543 10/30/20  1200   AST 11 10   ALT 15 15   ALKPHOS 49 57            PSYCHOTROPIC DRUG INTERACTIONS     DEPAKOTE & LAMICTAL- Valproate Products may enhance the adverse/toxic effect of LamoTRIgine. Valproate Products may increase the serum concentration of LamoTRIgine. Severity Major     ATIVAN, GABAPENTIN, DEPAKOTE, LAMICTAL- CNS Depressants may enhance the adverse/toxic effect of other CNS Depressants   LAMICTAL & ORTHO TRI-CYCLEN LO- Estrogen Derivatives (Contraceptive) may decrease the serum concentration of LamoTRIgine       MANAGEMENT:  Monitoring for adverse effects, routine labs, using lowest therapeutic dose of [Depakote & Lamictal] and patient is aware of risks     RISK STATEMENT for SAFETY     Artie Diez endorsed suicidal ideation. SUICIDE RISK ASSESSMENT-  Risk factors for self-harm: " previous suicide attempt and recent symptom worsening.  Mitigating factors: no plan or intent, describes a safety plan, h/o seeking help , minimal substance use , commitment to family, good social support   and no recent SA.  The patient does not appear to be at imminent risk for self-harm, hospitalization is not recommended which the pt does  agree to. No hospitalization will be arranged. Based on degree of symptoms therapy and close psych follow-up was/were recommended which the pt does  agree to. Additional steps to minimize risk: med changes and frequent clinic visits.  Safety Plan placed in Pt Instructions: Yes.  Rate SI-desire: 0/5.    TREATMENT RISK STATEMENT: The risks, benefits, alternatives and potential adverse effects have been discussed and are understood by the pt. The pt understands the risks of using street drugs or alcohol. There are no medical contraindications, the pt agrees to treatment with the ability to do so. The pt knows to call the clinic for any problems or to access emergency care if needed.  Medical and substance use concerns are documented above.  Psychotropic drug interaction check was done, including changes made today.     PROVIDER: Graciela Wright MD    Patient staffed in clinic with Dr. Spear who will sign the note.  Supervisor is Dr. Monreal.      TELEHEALTH ATTENDING ATTESTATION  Following the ACGME guidelines on telemedicine and direct supervision due to COVID-19, I was concurrently participating in and/or monitoring the patient care through appropriate telecommunication technology.  I discussed the key portions of the service with the resident, including the mental status examination and developing the plan of care. I reviewed key portions of the history with the resident. I agree with the findings and plan as documented in this note.   Demetrio Spear MD

## 2022-02-16 ENCOUNTER — OFFICE VISIT (OUTPATIENT)
Dept: FAMILY MEDICINE | Facility: CLINIC | Age: 42
End: 2022-02-16
Attending: FAMILY MEDICINE
Payer: COMMERCIAL

## 2022-02-16 VITALS
HEART RATE: 79 BPM | DIASTOLIC BLOOD PRESSURE: 77 MMHG | SYSTOLIC BLOOD PRESSURE: 109 MMHG | WEIGHT: 115 LBS | BODY MASS INDEX: 20.37 KG/M2

## 2022-02-16 DIAGNOSIS — Z71.9 COUNSELING, UNSPECIFIED: ICD-10-CM

## 2022-02-16 DIAGNOSIS — K59.00 CONSTIPATION, UNSPECIFIED CONSTIPATION TYPE: ICD-10-CM

## 2022-02-16 DIAGNOSIS — N85.2 ENLARGED UTERUS: ICD-10-CM

## 2022-02-16 DIAGNOSIS — Z30.41 SURVEILLANCE OF PREVIOUSLY PRESCRIBED CONTRACEPTIVE PILL: ICD-10-CM

## 2022-02-16 DIAGNOSIS — Z12.4 SCREENING FOR CERVICAL CANCER: Primary | ICD-10-CM

## 2022-02-16 PROCEDURE — G0463 HOSPITAL OUTPT CLINIC VISIT: HCPCS | Mod: 25

## 2022-02-16 PROCEDURE — G0145 SCR C/V CYTO,THINLAYER,RESCR: HCPCS | Performed by: FAMILY MEDICINE

## 2022-02-16 PROCEDURE — 99214 OFFICE O/P EST MOD 30 MIN: CPT | Performed by: FAMILY MEDICINE

## 2022-02-16 PROCEDURE — 87624 HPV HI-RISK TYP POOLED RSLT: CPT | Performed by: FAMILY MEDICINE

## 2022-02-16 RX ORDER — NORGESTIMATE AND ETHINYL ESTRADIOL 7DAYSX3 LO
1 KIT ORAL DAILY
Qty: 84 TABLET | Refills: 4 | Status: SHIPPED | OUTPATIENT
Start: 2022-02-16 | End: 2023-03-15

## 2022-02-16 ASSESSMENT — PAIN SCALES - GENERAL: PAINLEVEL: NO PAIN (0)

## 2022-02-16 NOTE — LETTER
Date:March 2, 2022      Patient was self referred, no letter generated. Do not send.        [facility] Health Information

## 2022-02-16 NOTE — NURSING NOTE
Chief Complaint   Patient presents with     RECHECK     Birth control refill   Shayla Villanueva LPN

## 2022-02-16 NOTE — PROGRESS NOTES
Martha Alva is a 41 year old who presents for the following health issues     HPI   Last annual was 10/30/2020 with Dr. Ji, note reviewed    She has previously seen me for other issues as well as menstrual control for dysmenorrhea, controlled with OCP's; last seen by me for this in 2019  She is due for refills of OCP      She has never been  sexually active with partner, off OCP's has  heavy painful menses--regular, but last 2 weeks, no endometriosis. On OCP's, menses last 5 days, flow light to medium    Speculum exams painful. HPV anf Paps' all normal in past, last documented screened 2014.   Able to use tampons for periods  No headaches, leg swellling with medication, denies other side effects  Non smoker  No HPV vaccination      2. Constipation  Baseline constipation, became worse with startng Lamictal  Prior to this, complete bm 1-2x/wk  Now 4-5 incomplete BM's over last 4 weeks--back pain      3. Breast cancer screening  Last mammogram 2020 normal, dense breasts  Patient adopted, main risk factor is nulliparity      Current Outpatient Medications   Medication     divalproex sodium extended-release (DEPAKOTE ER) 250 MG 24 hr tablet     eszopiclone (LUNESTA) 1 MG tablet     gabapentin (NEURONTIN) 100 MG capsule     lamoTRIgine (LAMICTAL) 25 MG tablet     LORazepam (ATIVAN) 0.5 MG tablet     norgestim-eth estrad triphasic (ORTHO TRI-CYCLEN LO) 0.18/0.215/0.25 MG-25 MCG tablet     Nutritional Supplements (NUTRITIONAL SUPPLEMENT PO)     propranolol (INDERAL) 10 MG tablet     spironolactone (ALDACTONE) 50 MG tablet     No current facility-administered medications for this visit.     Family History   Problem Relation Age of Onset     Unknown/Adopted Mother      Unknown/Adopted Father      .  Review of Systems   Per HPI      Objective    /77   Pulse 79   Wt 52.2 kg (115 lb)   LMP 02/01/2022   Breastfeeding No   BMI 20.37 kg/m    Body mass index is 20.37 kg/m .  Physical Exam    EXAM:  Constitutional: healthy, alert and no distress   Abdomen: Abdomen soft, diffuse mild tender lower quadrants bilateral. . BS normal. No masses, organomegaly  : Deferred and Normal external genitalia without lesions. Unable to tolerate speculum. HPV swab done. 1 finger bimanual exam--full bladder, +/- enlarged uterus exam limited by 1 finger exam, adnexa nonpalpable    A/P  1. Dysmenorrhea  2. Cervical cancer screening  HPV screening per alternative guidelines  Continue current triphasic OCPs    3. Constipation  4. Borderline enlarged uterus  Pelvic Ultrasound evaluate as factor in constipation given exam  Miralax daily to bid until constipation relieved  Then either daily to every other day miralax or daily Senekot for mainentance  Increase Hydration, activity  Consider GI referral if not improving    5. Breast cancer screen counseling  Patient at average risk, discussed counseling guidelines, risks/benefits  Patient elects to further screening with mammography age 44    Follow-up if constipation not improving and for other preventive care within 6-12 months.

## 2022-02-16 NOTE — LETTER
2/16/2022       RE: Artie Diez  [address]  [place] [address]     Dear Colleague,    Thank you for referring your patient, Artie Diez, to the [facility] [place] at [facility]. Please see a copy of my visit note below.            Martha Alva is a 41 year old who presents for the following health issues     HPI   Last annual was 10/30/2020 with Dr. Ji, note reviewed    She has previously seen me for other issues as well as menstrual control for dysmenorrhea, controlled with OCP's; last seen by me for this in 2019  She is due for refills of OCP      She has never been  sexually active with partner, off OCP's has  heavy painful menses--regular, but last 2 weeks, no endometriosis. On OCP's, menses last 5 days, flow light to medium    Speculum exams painful. HPV anf Paps' all normal in past, last documented screened 2014.   Able to use tampons for periods  No headaches, leg swellling with medication, denies other side effects  Non smoker  No HPV vaccination      2. Constipation  Baseline constipation, became worse with startng Lamictal  Prior to this, complete bm 1-2x/wk  Now 4-5 incomplete BM's over last 4 weeks--back pain      3. Breast cancer screening  Last mammogram 2020 normal, dense breasts  Patient adopted, main risk factor is nulliparity      Current Outpatient Medications   Medication     divalproex sodium extended-release (DEPAKOTE ER) 250 MG 24 hr tablet     eszopiclone (LUNESTA) 1 MG tablet     gabapentin (NEURONTIN) 100 MG capsule     lamoTRIgine (LAMICTAL) 25 MG tablet     LORazepam (ATIVAN) 0.5 MG tablet     norgestim-eth estrad triphasic (ORTHO TRI-CYCLEN LO) 0.18/0.215/0.25 MG-25 MCG tablet     Nutritional Supplements (NUTRITIONAL SUPPLEMENT PO)     propranolol (INDERAL) 10 MG tablet     spironolactone (ALDACTONE) 50 MG tablet     No current facility-administered medications for this visit.      Family History   Problem Relation Age of Onset     Unknown/Adopted Mother      Unknown/Adopted Father      .  Review of Systems   Per HPI      Objective    /77   Pulse 79   Wt 52.2 kg (115 lb)   LMP 02/01/2022   Breastfeeding No   BMI 20.37 kg/m    Body mass index is 20.37 kg/m .  Physical Exam   EXAM:  Constitutional: healthy, alert and no distress   Abdomen: Abdomen soft, diffuse mild tender lower quadrants bilateral. . BS normal. No masses, organomegaly  : Deferred and Normal external genitalia without lesions. Unable to tolerate speculum. HPV swab done. 1 finger bimanual exam--full bladder, +/- enlarged uterus exam limited by 1 finger exam, adnexa nonpalpable    A/P  1. Dysmenorrhea  2. Cervical cancer screening  HPV screening per alternative guidelines  Continue current triphasic OCPs    3. Constipation  4. Borderline enlarged uterus  Pelvic Ultrasound evaluate as factor in constipation given exam  Miralax daily to bid until constipation relieved  Then either daily to every other day miralax or daily Senekot for mainentance  Increase Hydration, activity  Consider GI referral if not improving    5. Breast cancer screen counseling  Patient at average risk, discussed counseling guidelines, risks/benefits  Patient elects to further screening with mammography age 44    Follow-up if constipation not improving and for other preventive care within 6-12 months.            Again, thank you for allowing me to participate in the care of your patient.      Sincerely,    Ran Cabral MD

## 2022-02-18 NOTE — PATIENT INSTRUCTIONS
It was nice seeing you today    Treatment Plan Today:     1) Medications-  - Increase Lamotrigine 75 mg daily x 2 weeks, and increase to 100mg as toleratd  - Depakote ER - 250 mg   - Gabapentin- 100 mg bid  - Lunesta 1 mg at bedtime (unsure of benefit)  - Ativan 0.5 mg at bedtime/prn (3-4 times a week)  - Propranolol 10 mg tid /prn- for anxiety      2) Follow-up appt with Dr Wright in 2 weeks    3) Crisis numbers are below and clinic after hours number is [phone]              **For crisis resources, please see the information at the end of this document**     Patient Education      Thank you for coming to the [facility] [place] [facility].    Lab Testing:  If you had lab testing today and your results are reassuring or normal they will be mailed to you or sent through SirenServ within 7 days. If the lab tests need quick action we will call you with the results. The phone number we will call with results is # [phone] (home) . If this is not the best number please call our clinic and change the number.    Medication Refills:  If you need any refills please call your pharmacy and they will contact us. Our fax number for refills is [phone]. Please allow three business for refill processing. If you need to  your refill at a new pharmacy, please contact the new pharmacy directly. The new pharmacy will help you get your medications transferred.     Scheduling:  If you have any concerns about today's visit or wish to schedule another appointment please call our office during normal business hours [phone] (8-5:00 M-F)    Contact Us:  Please call [phone] during business hours (8-5:00 M-F).  If after clinic hours, or on the weekend, please call  [phone].    Financial Assistance [phone]  [facility] Billing [phone]  Central Billing Office, [facility]: [phone]  [place] Billing [phone]  Medical Records [phone]  [place] Patient Bill of Rights  [email or web address]       MENTAL HEALTH CRISIS NUMBERS:  For a medical emergency please call  911 or go to the nearest ER.     [facility]:   [facility] -[phone]   Crisis Residence [facility] -[phone]   Walk-In Counseling Center [facility] -[phone]   COPE 24/7 [facility] Mobile Team -[phone] (adults)/[phone] (child)  CHILD: [facility] needs assessment team - [phone]      [facility]:   [facility] - [phone]   Walk-in counseling [facility] - [phone]   Walk-in counseling [facility] - [phone]   Crisis Residence [facility] - [phone]  Urgent Care Adult Mental Ncwmdm-[phone] mobile unit/ 24/7 crisis line    National Crisis Numbers:   National Suicide Prevention Lifeline: 4-478-455-TALK (612-560-6228)  Poison Control Center - 1-972.745.6097  1EQ/resources for a list of additional resources (SOS)  Trans Lifeline a hotline for transgender people 9-715-607-2075  The [employer] Project a hotline for LGBT youth 2-242-558-8643  Crisis Text Line: For any crisis 24/7   To: 104972  see www.crisistextline.org  - IF MAKING A CALL FEELS TOO HARD, send a text!         Again thank you for choosing [facility] HE[facility] & ADDICTION [facility] and please let us know how we can best partner with you to improve you and your family's health.    You may be receiving a survey regarding this appointment. We would love to have your feedback, both positive and negative. The survey is done by an external company, so your answers are anonymous.

## 2022-02-21 LAB
BKR LAB AP GYN ADEQUACY: NORMAL
BKR LAB AP GYN INTERPRETATION: NORMAL
BKR LAB AP HPV REFLEX: NORMAL
BKR LAB AP LMP: NORMAL
BKR LAB AP PREVIOUS ABNORMAL: NORMAL
PATH REPORT.COMMENTS IMP SPEC: NORMAL
PATH REPORT.COMMENTS IMP SPEC: NORMAL
PATH REPORT.RELEVANT HX SPEC: NORMAL

## 2022-02-23 LAB
HUMAN PAPILLOMA VIRUS 16 DNA: NEGATIVE
HUMAN PAPILLOMA VIRUS 18 DNA: NEGATIVE
HUMAN PAPILLOMA VIRUS FINAL DIAGNOSIS: NORMAL
HUMAN PAPILLOMA VIRUS OTHER HR: NEGATIVE

## 2022-03-04 NOTE — RESULT ENCOUNTER NOTE
Dear Artie,     Here are your recent results which are negative for high risk HPV.  Please continue with your current plan of care and let us know if you have any questions or concerns.    Regards,  Ran Cabral MD

## 2022-03-10 ENCOUNTER — HOSPITAL ENCOUNTER (OUTPATIENT)
Dept: ULTRASOUND IMAGING | Facility: CLINIC | Age: 42
Discharge: HOME | End: 2022-03-10
Attending: FAMILY MEDICINE | Admitting: FAMILY MEDICINE
Payer: COMMERCIAL

## 2022-03-10 DIAGNOSIS — N85.2 ENLARGED UTERUS: ICD-10-CM

## 2022-03-10 PROCEDURE — 76856 US EXAM PELVIC COMPLETE: CPT | Mod: 26 | Performed by: RADIOLOGY

## 2022-03-10 PROCEDURE — 76856 US EXAM PELVIC COMPLETE: CPT

## 2022-03-11 ENCOUNTER — MYC MEDICAL ADVICE (OUTPATIENT)
Dept: FAMILY MEDICINE | Facility: CLINIC | Age: 42
End: 2022-03-11
Payer: COMMERCIAL

## 2022-03-11 DIAGNOSIS — R19.5 CHANGE IN STOOL: ICD-10-CM

## 2022-03-11 DIAGNOSIS — K59.09 OTHER CONSTIPATION: Primary | ICD-10-CM

## 2022-03-18 ENCOUNTER — TELEPHONE (OUTPATIENT)
Dept: GASTROENTEROLOGY | Facility: CLINIC | Age: 42
End: 2022-03-18
Payer: COMMERCIAL

## 2022-03-18 DIAGNOSIS — Z11.59 ENCOUNTER FOR SCREENING FOR OTHER VIRAL DISEASES: Primary | ICD-10-CM

## 2022-03-18 NOTE — TELEPHONE ENCOUNTER
Screening Questions  BlueKIND OF PREP RedLOCATION [review exclusion criteria] GreenSEDATION TYPE  1. Have you had a positive covid test in the last 90 days? N     2. Are you active on mychart? Y    3. What insurance is in the chart? [employer]     3.   Ordering/Referring Provider: Ran Cabral     4. BMI 20.4  [BMI OVER 40-EXTENDED PREP]  If greater than 40 review exclusion criteria [PAC APPT IF @ [facility]]        5.  Respiratory Screening :  [If yes to any of the following HOSPITAL setting only]     Do you use daily home oxygen? N    Do you have mod to severe Obstructive Sleep Apnea? N  [OKAY @ [facility] SH PH RI]   Do you have Pulmonary Hypertension? N     Do you have UNCONTROLLED asthma? N        6.   Have you had a heart or lung transplant? N      7.   Are you currently on dialysis? N [ If yes, G-PREP & HOSPITAL setting only]     8.   Do you have chronic kidney disease? N [ If yes, G-PREP ]    9.   Have you had a stroke or Transient ischemic attack (TIA - aka  mini stroke ) within 6 months?  N (If yes, please review exclusion criteria)    10.   In the past 6 months, have you had any heart related issues including cardiomyopathy or heart attack? N           If yes, did it require cardiac stenting or other implantable device? N      11.   Do you have any implantable devices in your body (pacemaker, defib, LVAD)? N (If yes, please review exclusion criteria)    12.   Do you take nitroglycerin? N           If yes, how often? N  (if yes, HOSPITAL setting ONLY)    13.   Are you currently taking any blood thinners? N           [IF YES, INFORM PATIENT TO FOLLOW UP W/ ORDERING PROVIDER FOR BRIDGING INSTRUCTIONS]     14.   Do you have a diagnosis of diabetes? N   [ If yes, G-PREP ]    15.   [FEMALES] Are you currently pregnant? N    If yes, how many weeks? N    16.   Are you taking any prescription pain medications on a routine schedule?  N  [ If yes, EXTENDED PREP.] [If yes, MAC]    17.   Do you have any chemical  dependencies such as alcohol, street drugs, or methadone?  N [If yes, MAC]    18.   Do you have any history of post-traumatic stress syndrome, severe anxiety or history of psychosis?  Y  [If yes, MAC]    19.   Do you have a significant intellectual disability?  N [If yes, MAC]    20.   Do you transfer independently?  Y    21.  On a regular basis do you go 3-5 days between bowel movements? Y   [ If yes, EXTENDED PREP.]    22.   Preferred LOCAL Pharmacy for Pre Prescription     [id number] [place], [place] - [address] AT [facility]      Scheduling Details      Caller : Artie  (Please ask for phone number if not scheduled by patient)    Type of Procedure Scheduled: Colon  Which Colonoscopy Prep was Sent?: Ext Prep  KHORUTS CF PATIENTS & GROEN'S PATIENTS NEEDS EXTENDED PREP  Surgeon: Apple  Date of Procedure: 4-13  Location: [facility]      Sedation Type: MAC  Conscious Sedation- Needs  for 6 hours after the procedure  MAC/General-Needs  for 24 hours after procedure    Pre-op Required at [facility], [place], [place] and OR for MAC sedation: Y  (advise patient they will need a pre-op prior to procedure -)      Informed patient they will need an adult  Y  Cannot take any type of public or medical transportation alone    Pre-Procedure Covid test to be completed at [facility] or Externally: Y -  RD lab 4-11     Confirmed Nurse will call to complete assessment Y    Additional comments:

## 2022-04-06 ENCOUNTER — TELEPHONE (OUTPATIENT)
Dept: GASTROENTEROLOGY | Facility: CLINIC | Age: 42
End: 2022-04-06

## 2022-04-06 DIAGNOSIS — K59.00 CONSTIPATION: Primary | ICD-10-CM

## 2022-04-06 RX ORDER — BISACODYL 5 MG/1
TABLET, DELAYED RELEASE ORAL
Qty: 2 TABLET | Refills: 0 | Status: SHIPPED | OUTPATIENT
Start: 2022-04-06 | End: 2022-06-29

## 2022-04-06 NOTE — TELEPHONE ENCOUNTER
Patient scheduled for colonoscopy on 4.13.2022.     Covid test scheduled: 4.11.2022    Arrival time: 0900    Facility location: [facility]    Sedation type: MAC    Indication for procedure: constipation, change in stool    Referring provider: Ran Cabral MD    Bowel prep recommendation: extended prep d/t constipation    Prep instructions sent via Fileforce.  Prep prescription sent to    [id number] [place], [place] - [address] AT [facility]    Pre visit planning completed.    Belen Abarca RN

## 2022-04-07 RX ORDER — ONDANSETRON 4 MG/1
TABLET, FILM COATED ORAL
Qty: 3 TABLET | Refills: 0 | Status: SHIPPED | OUTPATIENT
Start: 2022-04-07 | End: 2022-06-29

## 2022-04-07 NOTE — TELEPHONE ENCOUNTER
Attempted to contact patient regarding upcoming colonoscopy procedure on 4.13.2022 for pre assessment questions. No answer.     Left message to return call to [phone] #2    Belen Abarca RN

## 2022-04-07 NOTE — TELEPHONE ENCOUNTER
Pre assessment questions completed for upcoming colonoscopy procedure scheduled on 4/13/22    COVID test scheduled 4/11/22    Reviewed procedural arrival time 0900 and facility location [facility].    Designated  policy reviewed. Instructed to have someone stay 24 hours post procedure.     Reviewed Extended prep instructions with patient. No fiber/iron supplements or foods that contain nuts/seeds.    Patient verbalized understanding and had no questions or concerns at this time.    Michelle Ruth RN

## 2022-04-11 ENCOUNTER — LAB (OUTPATIENT)
Dept: LAB | Facility: CLINIC | Age: 42
End: 2022-04-11
Payer: COMMERCIAL

## 2022-04-11 DIAGNOSIS — Z11.59 ENCOUNTER FOR SCREENING FOR OTHER VIRAL DISEASES: ICD-10-CM

## 2022-04-11 PROCEDURE — U0005 INFEC AGEN DETEC AMPLI PROBE: HCPCS

## 2022-04-11 PROCEDURE — U0003 INFECTIOUS AGENT DETECTION BY NUCLEIC ACID (DNA OR RNA); SEVERE ACUTE RESPIRATORY SYNDROME CORONAVIRUS 2 (SARS-COV-2) (CORONAVIRUS DISEASE [COVID-19]), AMPLIFIED PROBE TECHNIQUE, MAKING USE OF HIGH THROUGHPUT TECHNOLOGIES AS DESCRIBED BY CMS-2020-01-R: HCPCS

## 2022-04-12 ENCOUNTER — TELEPHONE (OUTPATIENT)
Dept: GASTROENTEROLOGY | Facility: CLINIC | Age: 42
End: 2022-04-12
Payer: COMMERCIAL

## 2022-04-12 DIAGNOSIS — Z11.59 ENCOUNTER FOR SCREENING FOR OTHER VIRAL DISEASES: Primary | ICD-10-CM

## 2022-04-12 LAB — SARS-COV-2 RNA RESP QL NAA+PROBE: NEGATIVE

## 2022-04-12 NOTE — TELEPHONE ENCOUNTER
Caller: Artie Diez      Procedure: COLON    Date, Location, and Surgeon of Procedure Cancelled: 4/13/22, [facility], [place]    Ordering Provider:LATESHA    Reason for cancel (please be detailed, any staff messages or encounters to note?): pt has a minor cold (covid test negative)        Rescheduled: YES     If rescheduled:    Date: 4/18/22   Location:[facility]   Prep Resent: Y(changes to prep?)   Covid Test Rescheduled: Yes      Note any change or update to original order/sedation:

## 2022-04-12 NOTE — TELEPHONE ENCOUNTER
Health Call Center    Reason for Call: Other: Question regarding prep supplies for rescheduled procedure     Action Taken: Other: Called pt to let her know what the nurse said, mssg copied below        Belen Abarca, RN  Sabrina Frank  Yes these are OTC     -Hector             Previous Messages       ----- Message -----   From: Sabrina Frank   Sent: 4/12/2022  12:12 PM CDT   To: Endoscopy Nurse Pool   Subject: Prep question for pt who rescheduled             Artie rescheduled tomorrow's procedure due to a cold. She had already taken dulcolax & magnesium citrate. She wanted to know if these need to be sent into the pharmacy or can be purchased over the counter (she has some dulcolax at home as well). Assuming OTC but I wanted to check with you guys.     Thank you,   Sabrina

## 2022-04-13 ENCOUNTER — TELEPHONE (OUTPATIENT)
Dept: GASTROENTEROLOGY | Facility: CLINIC | Age: 42
End: 2022-04-13
Payer: COMMERCIAL

## 2022-04-13 NOTE — TELEPHONE ENCOUNTER
[facility]    Reason for Call: Other: Pt returning call for procedure PA     Action Taken: Patient transferred to: Yessica Ruth RN    Travel Screening: Not Applicable                                                                         Spontaneous, unlabored and symmetrical

## 2022-04-13 NOTE — TELEPHONE ENCOUNTER
Patient returned call.     Pre assessment questions completed for upcoming colonoscopy procedure scheduled on 4/18/22    COVID test scheduled 4/15/22    Reviewed procedural arrival time 0800 and facility location [facility].    Designated  policy reviewed. Instructed to have someone stay 24 hours post procedure.     Reviewed Extended prep instructions with patient. No fiber/iron supplements or foods that contain nuts/seeds.     Patient verbalized understanding and had no questions or concerns at this time.    Michelle Ruth RN

## 2022-04-13 NOTE — TELEPHONE ENCOUNTER
Rescheduled colonoscopy.    Attempted to contact patient regarding upcoming colonoscopy procedure on 4.18.2022 for pre assessment questions. No answer.     Left message to return call to [phone] #2    Covid test scheduled: 4.15.2022    Arrival time: 0800    Facility location: [facility]    Sedation type: ANGELICA Abarca RN

## 2022-04-15 ENCOUNTER — LAB (OUTPATIENT)
Dept: LAB | Facility: CLINIC | Age: 42
End: 2022-04-15
Payer: COMMERCIAL

## 2022-04-15 DIAGNOSIS — Z11.59 ENCOUNTER FOR SCREENING FOR OTHER VIRAL DISEASES: ICD-10-CM

## 2022-04-15 PROCEDURE — U0003 INFECTIOUS AGENT DETECTION BY NUCLEIC ACID (DNA OR RNA); SEVERE ACUTE RESPIRATORY SYNDROME CORONAVIRUS 2 (SARS-COV-2) (CORONAVIRUS DISEASE [COVID-19]), AMPLIFIED PROBE TECHNIQUE, MAKING USE OF HIGH THROUGHPUT TECHNOLOGIES AS DESCRIBED BY CMS-2020-01-R: HCPCS

## 2022-04-15 PROCEDURE — U0005 INFEC AGEN DETEC AMPLI PROBE: HCPCS

## 2022-04-16 LAB — SARS-COV-2 RNA RESP QL NAA+PROBE: NEGATIVE

## 2022-04-18 ENCOUNTER — DOCUMENTATION ONLY (OUTPATIENT)
Dept: GASTROENTEROLOGY | Facility: OUTPATIENT CENTER | Age: 42
End: 2022-04-18
Attending: FAMILY MEDICINE
Payer: COMMERCIAL

## 2022-04-18 ENCOUNTER — TRANSFERRED RECORDS (OUTPATIENT)
Dept: HEALTH INFORMATION MANAGEMENT | Facility: CLINIC | Age: 42
End: 2022-04-18
Payer: COMMERCIAL

## 2022-04-18 DIAGNOSIS — K59.09 OTHER CONSTIPATION: ICD-10-CM

## 2022-04-18 DIAGNOSIS — R19.5 CHANGE IN STOOL: ICD-10-CM

## 2022-04-20 DIAGNOSIS — K56.41 OBSTRUCTIVE DEFECATION (H): Primary | ICD-10-CM

## 2022-04-20 DIAGNOSIS — K59.00 CONSTIPATION: ICD-10-CM

## 2022-04-26 ENCOUNTER — MEDICAL CORRESPONDENCE (OUTPATIENT)
Dept: HEALTH INFORMATION MANAGEMENT | Facility: CLINIC | Age: 42
End: 2022-04-26
Payer: COMMERCIAL

## 2022-05-23 ENCOUNTER — VIRTUAL VISIT (OUTPATIENT)
Dept: PSYCHIATRY | Facility: CLINIC | Age: 42
End: 2022-05-23
Attending: PSYCHIATRY & NEUROLOGY
Payer: COMMERCIAL

## 2022-05-23 DIAGNOSIS — Z51.81 ENCOUNTER FOR THERAPEUTIC DRUG MONITORING: Primary | ICD-10-CM

## 2022-05-23 DIAGNOSIS — F31.81 BIPOLAR 2 DISORDER (H): ICD-10-CM

## 2022-05-23 PROCEDURE — 99214 OFFICE O/P EST MOD 30 MIN: CPT | Mod: HN | Performed by: STUDENT IN AN ORGANIZED HEALTH CARE EDUCATION/TRAINING PROGRAM

## 2022-05-23 RX ORDER — LAMOTRIGINE 25 MG/1
100 TABLET ORAL DAILY
Qty: 120 TABLET | Refills: 1 | Status: CANCELLED | OUTPATIENT
Start: 2022-05-23 | End: 2022-06-22

## 2022-05-23 RX ORDER — LAMOTRIGINE 25 MG/1
100 TABLET ORAL DAILY
Qty: 120 TABLET | Refills: 1 | Status: SHIPPED | OUTPATIENT
Start: 2022-05-23 | End: 2022-08-15 | Stop reason: DRUGHIGH

## 2022-05-23 ASSESSMENT — ANXIETY QUESTIONNAIRES
GAD7 TOTAL SCORE: 17
7. FEELING AFRAID AS IF SOMETHING AWFUL MIGHT HAPPEN: MORE THAN HALF THE DAYS
1. FEELING NERVOUS, ANXIOUS, OR ON EDGE: NEARLY EVERY DAY
5. BEING SO RESTLESS THAT IT IS HARD TO SIT STILL: NOT AT ALL
4. TROUBLE RELAXING: NEARLY EVERY DAY
GAD7 TOTAL SCORE: 17
2. NOT BEING ABLE TO STOP OR CONTROL WORRYING: NEARLY EVERY DAY
3. WORRYING TOO MUCH ABOUT DIFFERENT THINGS: NEARLY EVERY DAY
8. IF YOU CHECKED OFF ANY PROBLEMS, HOW DIFFICULT HAVE THESE MADE IT FOR YOU TO DO YOUR WORK, TAKE CARE OF THINGS AT HOME, OR GET ALONG WITH OTHER PEOPLE?: SOMEWHAT DIFFICULT
GAD7 TOTAL SCORE: 17
7. FEELING AFRAID AS IF SOMETHING AWFUL MIGHT HAPPEN: MORE THAN HALF THE DAYS
6. BECOMING EASILY ANNOYED OR IRRITABLE: NEARLY EVERY DAY

## 2022-05-23 ASSESSMENT — PATIENT HEALTH QUESTIONNAIRE - PHQ9
SUM OF ALL RESPONSES TO PHQ QUESTIONS 1-9: 18
SUM OF ALL RESPONSES TO PHQ QUESTIONS 1-9: 18
10. IF YOU CHECKED OFF ANY PROBLEMS, HOW DIFFICULT HAVE THESE PROBLEMS MADE IT FOR YOU TO DO YOUR WORK, TAKE CARE OF THINGS AT HOME, OR GET ALONG WITH OTHER PEOPLE: SOMEWHAT DIFFICULT

## 2022-05-23 NOTE — PROGRESS NOTES
Artie Diez is a 41 year old who has consented to receive services via billable video visit.      Pt will join video visit via: Beibamboo  If there are problems joining the visit, send backup video invite via: Send to preferred e-mail: [email or web address]    Originating Location (patient location): Patient's home  Distant Location (provider location): [facility] [place] [facility]    Will anyone else be joining the video visit? No    How would you prefer to obtain AVS?: MyChart     Video- Visit Details  Type of service:  video visit for medication management  Time of service:    Date:  05/23/2022    Video Start Time:  8:37 AM        Video End Time:  9:00 am    Reason for video visit:  Patient has requested telehealth visit  Originating Site (patient location):  [facility] [place]   Location- Patient's home  Distant Site (provider location):  [facility]  Mode of Communication:  Video Conference via Beibamboo  Consent:  Patient has given verbal consent for video visit?: Yes              [facility]  Psychiatry Clinic  PSYCHIATRY PROGRESS NOTE     CARE TEAM:  PCP- Ran Cabral, Psychotherapist- [facility] ([place] [facility]), None  Artie Diez is a 41 year old who uses the name Artie and pronouns she, her, hers.      DIAGNOSIS   Bipolar II Disorder, mre hypomanic/mixed  EAMON  Social Anxiety     ASSESSMENT   Artie reported that she had just come out of a depressive episode in  the last 5 weeks which was followed by a  hypomanic episode.  At this time things are relatively stable she would like to consider further medication management for her mood swings.  At her last visit we discussed going up on the lamotrigine, she was hesitant due to some concern for GI problems but it seems these issues were not linked to the lamotrigine.  Since her last visit she got up to 75 mg of lamotrigine and has been on that for the past 3 weeks, I have encouraged her to  go up to 100 mg today as discussed at our last visit and see if she gets more improvement from that. We have agreed to check in again in 3 weeks time to review her current management.   She will follow up on scheduling an ADHD test.  There were no acute concerns or imminent safety issues.   Emergency services and contacts were discussed.      [place] review was not needed today.     PLAN                                                                                                                1) Meds-  - Increase Lamotrigine 100 mg daily   - Depakote ER - 250 mg   - Gabapentin- 100 mg bid  - Lunesta 1 mg at bedtime (unsure of benefit)  - Ativan 0.5 mg at bedtime/prn (3-4 times a week)  - Propranolol 10 mg tid /prn- for anxiety    PCP-   Spironolactone- 50 mg bid- for hair loss  L-Methylfolate    2) Psychotherapy- Individual therapy weekly    3) Next due-  Labs- Depakote labs ordered today  EKG-  As indicated  Rating scales- PHQ9    4) Referrals-  None    5) Dispo- 3 weeks     PERTINENT BACKGROUND                           [most recent eval 12/07/21]   Dx depression age 14. Started various medication without working diagnosis in late 20's, had been resistant to medication in past, with multiple somatic symptoms, multiple diagnoses in past. Official diagnosis made about 3 years ago by mood disorder specialist in [place], done as second opinion as struggling with approach of first psychiatrist.   Has been on the current combination of medications since saw specialist, and per specialist, if have this type of condition, cannot be on antidepressants. She is prone to side effects with medications and sometimes, its hard for her to manage her medications due to the side effects, so her doctor was keeping her meds at a low dosage. Sleep is on and off. She was actively involved in CBT and DBT while in [place]    Psych pertinent item history includes suicide attempt [xseveral overdoses as a teenager], SIB [mainly in her  "teens/20's] and mutiple psychotropic trials           SUBJECTIVE   Artie states it has been a really busy time for her, she did r list of providers for ADHD testing but she is yet to schedule one.  She has been experiencing some severe GI issues, she had a colonoscopy which was clear, and she was also diagnosed with uterine fibroids, she is awaiting further testing.  At this time she does not think the GI symptoms she was having is related to the lamotrigine has since gone up to 75 mg in the last 3 weeks.    She had a bad depressive episode about 4 to 5 weeks ago, states she usually has them about twice a year, during the episode she felt like isolating herself and had some suicidal thoughts but no plan or intent.  This was followed by a few days of hypomanic episode.  Describes these as having a lot of energy , hyperfocus, and she got a lot done.  She said the downside was that she probably over shared her personal life with some people, this is out of character for her.  She describes her mood today as \"irritated\", states this is because she has some issues with her dad and feels very emotional.  Work has also been very difficult, says her boss \"is an absolute moron\", she makes mistakes and this comes down to her to fix , this increases her stress.  Overall she rates her mood as \"4/10\" with 10 is the best.  Sleep is okay, has been sleeping at night  She does not have auditory or visual hallucinations  She does not have suicidal thoughts  She is not experiencing any new side effects to her medication      RECENT PSYCH ROS:   Depression:  depressed mood, anhedonia, low energy and appetite changes  Elevated:  distractibility   Psychosis:  none  Anxiety:  social anxiety  Trauma Related:  none  Sleep: yes  Other: N/A    Adverse Effects: Hair loss- from Depakote, Lamictal- stimulating, takes it during the day, ? Constipation, Can't drink at all- with Depakote  Pertinent Negative Symptoms: No suicidal ideation or violent " "ideation  Recent Substance Use:     None reported, takes 1 cup coffee     FAMILY and SOCIAL HISTORY                                 pt reported     Family Hx: Adopted, Has a twin sister with \"recreational depression that she takes recreational antidepressants.\"     Social Hx:  Financial/ Work- working , N   Partner/ - ,   Children- None      Living situation- lives alone   Social/ Spiritual Support- Parents, mostly mom    Feels Safe at Home- yes   Legal- None         PSYCH and SUBSTANCE USE Critical Summary Points since July 2020         1/3/22- Increased Lamictal to target Bipolar depression  2/14/22- To retry increasing Lamictal  5/23/22- Increased Lamotrigine to 100 mg, mood stability   MEDICAL HISTORY and ALLERGY     ALLERGIES: Amphotericin b, Phenylbutazones, and Penicillin g    Patient Active Problem List   Diagnosis     Heavy menses     CARDIOVASCULAR SCREENING; LDL GOAL LESS THAN 160     Major depressive disorder, recurrent episode, moderate (H)     Allergic rhinitis     Anxiety state     Attention deficit disorder     Bipolar I disorder (H)     Dysmenorrhea     Glaucoma suspect     Myopia        MEDICAL REVIEW OF SYSTEMS   Contraception- oral contraceptives (combined)      A comprehensive review of systems was performed and is negative other than noted in the HPI.     MEDICATIONS     Current Outpatient Medications   Medication Sig Dispense Refill     bisacodyl (DULCOLAX) 5 MG EC tablet Take as directed. One day prior to exam at 10:00am take 2 tablets 2 tablet 0     divalproex sodium extended-release (DEPAKOTE ER) 250 MG 24 hr tablet Take 1 tablet (250 mg) by mouth daily 90 tablet 0     eszopiclone (LUNESTA) 1 MG tablet        gabapentin (NEURONTIN) 100 MG capsule Take 1 capsule (100 mg) by mouth daily 90 capsule 0     lamoTRIgine (LAMICTAL) 25 MG tablet Take 1 tablet (25 mg) by mouth daily 90 tablet 0     LORazepam (ATIVAN) 0.5 MG tablet Take 0.5 mg by mouth every 6 hours as needed " "for anxiety       magnesium citrate solution Take as directed. Two days prior to exam drink 10oz bottle of magnesium citrate at 4:00pm 296 mL 0     norgestim-eth estrad triphasic (ORTHO TRI-CYCLEN LO) 0.18/0.215/0.25 MG-25 MCG tablet Take 1 tablet by mouth daily 84 tablet 4     Nutritional Supplements (NUTRITIONAL SUPPLEMENT PO) l-methyl folate  Zinc selenium  Hair skin and nails vitamin       ondansetron (ZOFRAN) 4 MG tablet Take one tablet every 6 hours for nausea while drinking the bowel prep. 3 tablet 0     polyethylene glycol (GOLYTELY) 236 g suspension Take as directed. One day before your exam fill the first container with water. Cover and shake until mixed well. At 3:00pm drink one 8oz glass every 10-15 minutes until half of the first container is empty. Store the remainder in the refrigerator. At 8:00pm drink the second half of the first container until it is gone. Before you go to bed mix the second container with water and put in refrigerator. Six hours before your check in time drink one 8oz glass every 10-15 minutes until half of container is empty. Discard the remainder of solution. 8000 mL 0     propranolol (INDERAL) 10 MG tablet        spironolactone (ALDACTONE) 50 MG tablet         VITALS   There were no vitals taken for this visit.    MENTAL STATUS EXAM     Alertness: alert  and oriented  Appearance: well groomed  Behavior/Demeanor: cooperative, with good  eye contact   Speech: normal  Language: intact  Psychomotor: normal or unremarkable  Mood: \"4/10\". where 10 is the best  Affect: appropriate; congruent to: mood- yes, content- yes  Thought Process/Associations: unremarkable  Thought Content:  Reports none and suicidal ideation without plan; without intent [details in history];  Denies violent ideation  Perception:  Reports none;  Denies hallucinations  Insight: adequate  Judgment: good  Cognition: does  appear grossly intact; formal cognitive testing was not done  Gait and Station: N/A " (telehealth)     LABS and DATA     PHQ9 TODAY = 18  PHQ 12/8/2021 1/3/2022 5/23/2022   PHQ-9 Total Score 24 23 18   Q9: Thoughts of better off dead/self-harm past 2 weeks More than half the days More than half the days Not at all       Recent Labs   Lab Test 11/24/21  1543 10/30/20  1200   CR 0.79 0.79   GFRESTIMATED >90 >90     Recent Labs   Lab Test 11/24/21  1543 10/30/20  1200   AST 11 10   ALT 15 15   ALKPHOS 49 57            PSYCHOTROPIC DRUG INTERACTIONS     DEPAKOTE & LAMICTAL- Valproate Products may enhance the adverse/toxic effect of LamoTRIgine. Valproate Products may increase the serum concentration of LamoTRIgine. Severity Major     ATIVAN, GABAPENTIN, DEPAKOTE, LAMICTAL- CNS Depressants may enhance the adverse/toxic effect of other CNS Depressants   LAMICTAL & ORTHO TRI-CYCLEN LO- Estrogen Derivatives (Contraceptive) may decrease the serum concentration of LamoTRIgine       MANAGEMENT:  Monitoring for adverse effects, routine labs, using lowest therapeutic dose of [Depakote & Lamictal] and patient is aware of risks     RISK STATEMENT for SAFETY     Artie Diez did not appear to be an imminent safety risk to self or others.    TREATMENT RISK STATEMENT: The risks, benefits, alternatives and potential adverse effects have been discussed and are understood by the pt. The pt understands the risks of using street drugs or alcohol. There are no medical contraindications, the pt agrees to treatment with the ability to do so. The pt knows to call the clinic for any problems or to access emergency care if needed.  Medical and substance use concerns are documented above.  Psychotropic drug interaction check was done, including changes made today.     PROVIDER: Graciela Wright MD    Patient not staffed in clinic.  Note will be reviewed and signed by supervisor Dr. Pickens.

## 2022-05-23 NOTE — PATIENT INSTRUCTIONS
It was nice seeing you today    Treatment Plan Today:     1) Medications-  - Increase Lamotrigine 100 mg daily   - Depakote ER - 250 mg   - Gabapentin- 100 mg bid  - Lunesta 1 mg at bedtime (unsure of benefit)  - Ativan 0.5 mg at bedtime/prn (3-4 times a week)  - Propranolol 10 mg tid /prn- for anxiety    PCP-   Spironolactone- 50 mg bid- for hair loss  L-Methylfolate    2) Follow-up appt with Dr Wright 6/13/22    3) Crisis numbers are below and clinic after hours number is [phone]      **For crisis resources, please see the information at the end of this document**   Patient Education    Thank you for coming to the [facility] MENTAL HEALTH & ADDICTION [place] CLINIC.    Lab Testing:  If you had lab testing today and your results are reassuring or normal they will be mailed to you or sent through [employer] within 7 days. If the lab tests need quick action we will call you with the results. The phone number we will call with results is # [phone]. If this is not the best number please call our clinic and change the number.     Medication Refills:  If you need any refills please call your pharmacy and they will contact us. Our fax number for refills is [phone].   Three business days of notice are needed for general medication refill requests.   Five business days of notice are needed for controlled substance refill requests.   If you need to change to a different pharmacy, please contact the new pharmacy directly. The new pharmacy will help you get your medications transferred.     Contact Us:  Please call [phone] during business hours (8-5:00 M-F).  If you have medication related questions after clinic hours, or on the weekend, please call [phone].    Financial Assistance [phone]  Medical Records [phone]       MENTAL HEALTH CRISIS RESOURCES:  For a emergency help, please call 911 or go to the nearest Emergency Department.     Emergency Walk-In Options:   EmPATH Unit @  [facility] ([place]): [phone] - Specialized mental health emergency area designed to be calming  [facility] [place] [facility] ([place]): [phone]  [facility] ([place]): [phone]  [facility] ([place]): [phone]    [facility] Crisis Information:   [place]: [phone]  [place]: [phone]  [place] ([place]) - Adult: [phone]     Child: [phone]  [place] - Adult: [phone]     Child: [phone]  [place]: [phone]  List of all [facility] resources:   https://[place].[facility]/dhs/people-we-serve/adults/health-care/mental-health/resources/crisis-contacts.jsp    National Crisis Information:   Crisis Text Line: Text  [place]  to 677305  National Suicide Prevention Lifeline: 1-852-342-HULZ (1-355.262.3988)       For online chat options, visit https://suicidepreventionlifeline.org/chat/  Poison Control Center: 1-238.878.3631  Trans Lifeline: 1-232.274.9266 - Hotline for transgender people of all ages  The [employer]: 1-972.749.8753 - Hotline for LGBT youth     For Non-Emergency Support:   Fast Tracker: Mental Health & Substance Use Disorder Resources -   https://www.[employer].org/

## 2022-06-13 ENCOUNTER — VIRTUAL VISIT (OUTPATIENT)
Dept: PSYCHIATRY | Facility: CLINIC | Age: 42
End: 2022-06-13
Attending: PSYCHIATRY & NEUROLOGY
Payer: COMMERCIAL

## 2022-06-13 DIAGNOSIS — F31.81 BIPOLAR 2 DISORDER (H): Primary | ICD-10-CM

## 2022-06-13 PROCEDURE — 99214 OFFICE O/P EST MOD 30 MIN: CPT | Mod: GC | Performed by: STUDENT IN AN ORGANIZED HEALTH CARE EDUCATION/TRAINING PROGRAM

## 2022-06-13 RX ORDER — LAMOTRIGINE 25 MG/1
TABLET ORAL
Qty: 60 TABLET | Refills: 1 | Status: SHIPPED | OUTPATIENT
Start: 2022-06-13 | End: 2022-08-15

## 2022-06-13 RX ORDER — LAMOTRIGINE 100 MG/1
100 TABLET ORAL DAILY
Qty: 30 TABLET | Refills: 1 | Status: SHIPPED | OUTPATIENT
Start: 2022-06-13 | End: 2022-08-15

## 2022-06-13 NOTE — PATIENT INSTRUCTIONS
It was nice seeing you today    Treatment Plan Today:     1) Medications-  - Increase Lamotrigine to 125 mg x 2 weeks and then increase to 150 mg daily  - Depakote ER - 250 mg   - Gabapentin- 100 mg bid  - Lunesta 1 mg at bedtime (unsure of benefit)  - Ativan 0.5 mg at bedtime/prn (3-4 times a week)  - Propranolol 10 mg tid /prn- for anxiety    2) Follow-up appt with Dr Garcia in 1 month    3) Crisis numbers are below and clinic after hours number is [phone]      **For crisis resources, please see the information at the end of this document**   Patient Education    Thank you for coming to the [facility] MENTAL HEALTH & ADDICTION [place] CLINIC.     Lab Testing:  If you had lab testing today and your results are reassuring or normal they will be mailed to you or sent through [employer] within 7 days. If the lab tests need quick action we will call you with the results. The phone number we will call with results is # [phone]. If this is not the best number please call our clinic and change the number.     Medication Refills:  If you need any refills please call your pharmacy and they will contact us. Our fax number for refills is [phone].   Three business days of notice are needed for general medication refill requests.   Five business days of notice are needed for controlled substance refill requests.   If you need to change to a different pharmacy, please contact the new pharmacy directly. The new pharmacy will help you get your medications transferred.     Contact Us:  Please call [phone] during business hours (8-5:00 M-F).   If you have medication related questions after clinic hours, or on the weekend, please call [phone].     Financial Assistance [phone]   Medical Records [phone]       MENTAL HEALTH CRISIS RESOURCES:  For a emergency help, please call 911 or go to the nearest Emergency Department.     Emergency Walk-In Options:   EmPATH Unit @ [facility]  ([place]): [phone] - Specialized mental health emergency area designed to be calming  [facility] [place] ([place]): [phone]  [facility] ([place]): [phone]  [facility] ([place]): [phone]    [facility] Crisis Information:   [place]: [phone]  [place]: [phone]  [place] ([place]) - Adult: [phone]     Child: [phone]  [place] - Adult: [phone]     Child: [phone]  [place]: [phone]  List of all [facility] resources:   https://mn.[facility]/dhs/people-we-serve/adults/health-care/mental-health/resources/crisis-contacts.jsp    National Crisis Information:   Crisis Text Line: Text  [place]  to 787829  National Suicide Prevention Lifeline: 8-765-089-TALK (1-101.981.3427)       For online chat options, visit https://suicidepreventionlifeline.org/chat/  Poison Control Center: 1-533.336.4316  Trans Lifeline: 1-399.480.2688 - Hotline for transgender people of all ages  The [employer]: 1-390.543.1881 - Hotline for LGBT youth     For Non-Emergency Support:   Fast Tracker: Mental Health & Substance Use Disorder Resources -   https://www.[employer].org/

## 2022-06-13 NOTE — PROGRESS NOTES
Artie Diez is a 41 year old who has consented to receive services via billable video visit.      Pt will join video visit via: IEV  If there are problems joining the visit, send backup video invite via: Send to preferred e-mail: [email or web address]      Originating Location (patient location): Place of employment  Distant Location (provider location): [facility] MENTAL HEALTH & ADDICTION [facility]    Will anyone else be joining the video visit? No    How would you prefer to obtain AVS?: MyChart     Video- Visit Details  Type of service:  video visit for medication management  Time of service:    Date:  06/13/2022    Video Start Time:  9:31 AM        Video End Time:  10:00 am    Reason for video visit:  Patient has requested telehealth visit  Originating Site (patient location):  [facility]   Location- Patient's home  Distant Site (provider location):  [facility]  Mode of Communication:  Video Conference via IEV  Consent:  Patient has given verbal consent for video visit?: Yes              [facility]  Psychiatry Clinic  PSYCHIATRY PROGRESS NOTE     CARE TEAM:  PCP- Ran Cabral, Psychotherapist- [place] ([place] [facility]), None  Artie Diez is a 41 year old who uses the name Artie and pronouns she, her, hers.      DIAGNOSIS   Bipolar II Disorder, mre hypomanic/mixed  EAMON  Social Anxiety     ASSESSMENT   Artie reported some improvement in her mood initially after increasing her lamotrigine 100 mg.  However, she has been experiencing a lot of stress at work which has negatively affected her mental health.  At this time she is considering resigning even though she does not have another job.  We have encouraged her to explore this further in therapy, looking at the pros and cons and based on this deciding what would be the best for her to do in the current situation.    She is currently not experiencing suicidal thoughts, but did voice some  concern about them coming back if she had to leave her current job without an alternative.  We discussed and reviewed a safety a plan, she volunteered that she will be confident reaching out to her therapist as well as a close friend, we also advised her to reach out to the clinic and after-hours services, as well as the emergency department.  Emergency contact numbers were also given.  We have agreed today to further increase her lamotrigine by 25 mg for 2 weeks and a further  25 mg after that.  We informed her that her current dose of lamotrigine would not be regarded as therapeutic, and though it did give her some transient benefit might not be able to give sustained benefits especially with her ongoing stressors.  We further advised  that she might likely benefit from DBT at this time, we will be making a referral today.  There were no imminent safety concerns.  She will call to make an appointment with Dr Garcia in 1 month. She is aware of emergency services and contacts.    [facility] was checked today:  Indicates prescribed medications as per epic records.     PLAN                                                                                                                1) Meds-  - Increase Lamotrigine to 125 mg x 2 weeks and then increase to 150 mg daily  - Depakote ER - 250 mg   - Gabapentin- 100 mg bid  - Lunesta 1 mg at bedtime (unsure of benefit)  - Ativan 0.5 mg at bedtime/prn (3-4 times a week)  - Propranolol 10 mg tid /prn- for anxiety    PCP-   Spironolactone- 50 mg bid- for hair loss  L-Methylfolate    2) Psychotherapy- Individual therapy weekly    3) Next due-  Labs- Depakote labs ordered last visit, Lamotrigine level ordered today, encourage to get them done,  EKG-  As indicated  Rating scales- PHQ9    4) Referrals-  DBT    5) Dispo- RTC in 1 month with Dr Garcia     PERTINENT BACKGROUND                           [most recent eval 12/07/21]   Dx depression age 14. Started various medication without  "working diagnosis in late 20's, had been resistant to medication in past, with multiple somatic symptoms, multiple diagnoses in past. Official diagnosis made about 3 years ago by mood disorder specialist in [place], done as second opinion as struggling with approach of first psychiatrist.   Has been on the current combination of medications since saw specialist, and per specialist, if have this type of condition, cannot be on antidepressants. She is prone to side effects with medications and sometimes, its hard for her to manage her medications due to the side effects, so her doctor was keeping her meds at a low dosage. Sleep is on and off. She was actively involved in CBT and DBT while in [place]    Psych pertinent item history includes suicide attempt [xseveral overdoses as a teenager], SIB [mainly in her teens/20's] and mutiple psychotropic trials           SUBJECTIVE   Artie states she is doing all right, been a tough couple of weeks for her.  Work has been very stressful, and it is beginning to affect her mental health.  She is actually considering resigning even though she does not have an alternative employment.  Describes the work environment as manipulative and difficult to work in.  She feels as if she is constantly being monitored, is always in a state of hypervigilance getting quite paranoid.  She increased the lamotrigine 100 mg after our last visit, said her mood was improved and she felt a bit happier after that, but her mood has been affected by situational issues currently  Describes her mood today as \"anxious, sad\"  She has been sleeping a lot, states she feels very tired, sleeps about 6 hours on weekdays and more on the weekend  She has been stress eating  She does enjoy gardening, it is something that she finds quite therapeutic  She does not have auditory or visual hallucinations  She currently does not have suicidal thoughts, but does feel concerned that if she had to quit her job without an " "alternative she might get distressed to the point of having them  Currently she is not having any side effects from her medications.      RECENT PSYCH ROS:   Depression:  depressed mood, hypersomnia and appetite changes  Elevated:  distractibility   Psychosis:  none  Anxiety:  social anxiety  Trauma Related:  none  Sleep: yes  Other: N/A    Adverse Effects: Hair loss- from Depakote, Lamictal- stimulating, takes it during the day, ? Constipation, Can't drink at all- with Depakote  Pertinent Negative Symptoms: No suicidal ideation or violent ideation  Recent Substance Use:     None reported, takes 1 cup coffee     FAMILY and SOCIAL HISTORY                                 pt reported     Family Hx: Adopted, Has a twin sister with \"recreational depression that she takes recreational antidepressants.\"     Social Hx:  Financial/ Work- working , [employer]   Partner/ - ,   Children- None      Living situation- lives alone   Social/ Spiritual Support- Parents, mostly mom    Feels Safe at Home- yes   Legal- None         PSYCH and SUBSTANCE USE Critical Summary Points since July 2020         1/3/22- Increased Lamictal to target Bipolar depression  2/14/22- To retry increasing Lamictal  5/23/22- Increased Lamotrigine to 100 mg, mood stability  6/13/22- Increased Lamotrigine to 125 mg x 2 weeks and then 150 mg   MEDICAL HISTORY and ALLERGY     ALLERGIES: Amphotericin b, Phenylbutazones, and Penicillin g    Patient Active Problem List   Diagnosis     Heavy menses     CARDIOVASCULAR SCREENING; LDL GOAL LESS THAN 160     Major depressive disorder, recurrent episode, moderate (H)     Allergic rhinitis     Anxiety state     Attention deficit disorder     Bipolar I disorder (H)     Dysmenorrhea     Glaucoma suspect     Myopia        MEDICAL REVIEW OF SYSTEMS   Contraception- oral contraceptives (combined)      A comprehensive review of systems was performed and is negative other than noted in the HPI.     MEDICATIONS "     Current Outpatient Medications   Medication Sig Dispense Refill     bisacodyl (DULCOLAX) 5 MG EC tablet Take as directed. One day prior to exam at 10:00am take 2 tablets 2 tablet 0     divalproex sodium extended-release (DEPAKOTE ER) 250 MG 24 hr tablet Take 1 tablet (250 mg) by mouth daily 90 tablet 0     eszopiclone (LUNESTA) 1 MG tablet        gabapentin (NEURONTIN) 100 MG capsule Take 1 capsule (100 mg) by mouth daily 90 capsule 0     lamoTRIgine (LAMICTAL) 25 MG tablet Take 4 tablets (100 mg) by mouth daily 120 tablet 1     LORazepam (ATIVAN) 0.5 MG tablet Take 0.5 mg by mouth every 6 hours as needed for anxiety       magnesium citrate solution Take as directed. Two days prior to exam drink 10oz bottle of magnesium citrate at 4:00pm 296 mL 0     norgestim-eth estrad triphasic (ORTHO TRI-CYCLEN LO) 0.18/0.215/0.25 MG-25 MCG tablet Take 1 tablet by mouth daily 84 tablet 4     Nutritional Supplements (NUTRITIONAL SUPPLEMENT PO) l-methyl folate  Zinc selenium  Hair skin and nails vitamin       ondansetron (ZOFRAN) 4 MG tablet Take one tablet every 6 hours for nausea while drinking the bowel prep. 3 tablet 0     polyethylene glycol (GOLYTELY) 236 g suspension Take as directed. One day before your exam fill the first container with water. Cover and shake until mixed well. At 3:00pm drink one 8oz glass every 10-15 minutes until half of the first container is empty. Store the remainder in the refrigerator. At 8:00pm drink the second half of the first container until it is gone. Before you go to bed mix the second container with water and put in refrigerator. Six hours before your check in time drink one 8oz glass every 10-15 minutes until half of container is empty. Discard the remainder of solution. 8000 mL 0     propranolol (INDERAL) 10 MG tablet        spironolactone (ALDACTONE) 50 MG tablet         VITALS   There were no vitals taken for this visit.    MENTAL STATUS EXAM     Alertness: alert  and  oriented  Appearance: well groomed  Behavior/Demeanor: cooperative, with good  eye contact   Speech: normal  Language: intact  Psychomotor: normal or unremarkable  Mood: anxious and sad  Affect: appropriate; congruent to: mood- yes, content- yes  Thought Process/Associations: overinclusive   Thought Content:  Reports none;  Denies suicidal & violent ideation and delusions  Perception:  Reports none;  Denies hallucinations  Insight: adequate  Judgment: good  Cognition: does  appear grossly intact; formal cognitive testing was not done  Gait and Station: N/A (telehealth)     LABS and DATA     PHQ9 TODAY = 18  PHQ 12/8/2021 1/3/2022 5/23/2022   PHQ-9 Total Score 24 23 18   Q9: Thoughts of better off dead/self-harm past 2 weeks More than half the days More than half the days Not at all       Recent Labs   Lab Test 11/24/21  1543 10/30/20  1200   CR 0.79 0.79   GFRESTIMATED >90 >90     Recent Labs   Lab Test 11/24/21  1543 10/30/20  1200   AST 11 10   ALT 15 15   ALKPHOS 49 57            PSYCHOTROPIC DRUG INTERACTIONS     DEPAKOTE & LAMICTAL- Valproate Products may enhance the adverse/toxic effect of LamoTRIgine. Valproate Products may increase the serum concentration of LamoTRIgine. Severity Major     ATIVAN, GABAPENTIN, DEPAKOTE, LAMICTAL- CNS Depressants may enhance the adverse/toxic effect of other CNS Depressants   LAMICTAL & ORTHO TRI-CYCLEN LO- Estrogen Derivatives (Contraceptive) may decrease the serum concentration of LamoTRIgine       MANAGEMENT:  Monitoring for adverse effects, routine labs, using lowest therapeutic dose of [Depakote & Lamictal] and patient is aware of risks     RISK STATEMENT for SAFETY     Artie Diez did not appear to be an imminent safety risk to self or others.    TREATMENT RISK STATEMENT: The risks, benefits, alternatives and potential adverse effects have been discussed and are understood by the pt. The pt understands the risks of using street drugs or alcohol. There are no medical  contraindications, the pt agrees to treatment with the ability to do so. The pt knows to call the clinic for any problems or to access emergency care if needed.  Medical and substance use concerns are documented above.  Psychotropic drug interaction check was done, including changes made today.     PROVIDER: Graciela Wright MD    Patient staffed in clinic with Dr. Spear who will sign the note.  Supervisor is Dr. Pickens.      I directly participated in the patient interview with the resident and discussed the key portions of the service with the resident, including the mental status examination and developing the plan of care. I reviewed key portions of the history with the resident. I agree with the findings and plan as documented in this note.   Demetrio Spear MD

## 2022-06-17 ENCOUNTER — LAB (OUTPATIENT)
Dept: LAB | Facility: CLINIC | Age: 42
End: 2022-06-17
Payer: COMMERCIAL

## 2022-06-17 DIAGNOSIS — F31.81 BIPOLAR 2 DISORDER (H): ICD-10-CM

## 2022-06-17 DIAGNOSIS — K59.00 CONSTIPATION: ICD-10-CM

## 2022-06-17 DIAGNOSIS — Z51.81 ENCOUNTER FOR THERAPEUTIC DRUG MONITORING: ICD-10-CM

## 2022-06-17 LAB
ALBUMIN SERPL-MCNC: 3.5 G/DL (ref 3.4–5)
ALP SERPL-CCNC: 56 U/L (ref 40–150)
ALT SERPL W P-5'-P-CCNC: 11 U/L (ref 0–50)
AST SERPL W P-5'-P-CCNC: 10 U/L (ref 0–45)
BASOPHILS # BLD AUTO: 0 10E3/UL (ref 0–0.2)
BASOPHILS NFR BLD AUTO: 0 %
BILIRUB DIRECT SERPL-MCNC: <0.1 MG/DL (ref 0–0.2)
BILIRUB SERPL-MCNC: 0.2 MG/DL (ref 0.2–1.3)
EOSINOPHIL # BLD AUTO: 0.2 10E3/UL (ref 0–0.7)
EOSINOPHIL NFR BLD AUTO: 2 %
ERYTHROCYTE [DISTWIDTH] IN BLOOD BY AUTOMATED COUNT: 12.1 % (ref 10–15)
HCT VFR BLD AUTO: 38.9 % (ref 35–47)
HGB BLD-MCNC: 13 G/DL (ref 11.7–15.7)
IMM GRANULOCYTES # BLD: 0 10E3/UL
IMM GRANULOCYTES NFR BLD: 0 %
LYMPHOCYTES # BLD AUTO: 2.8 10E3/UL (ref 0.8–5.3)
LYMPHOCYTES NFR BLD AUTO: 31 %
MCH RBC QN AUTO: 28.5 PG (ref 26.5–33)
MCHC RBC AUTO-ENTMCNC: 33.4 G/DL (ref 31.5–36.5)
MCV RBC AUTO: 85 FL (ref 78–100)
MONOCYTES # BLD AUTO: 0.5 10E3/UL (ref 0–1.3)
MONOCYTES NFR BLD AUTO: 6 %
NEUTROPHILS # BLD AUTO: 5.4 10E3/UL (ref 1.6–8.3)
NEUTROPHILS NFR BLD AUTO: 61 %
NRBC # BLD AUTO: 0 10E3/UL
NRBC BLD AUTO-RTO: 0 /100
PLATELET # BLD AUTO: 323 10E3/UL (ref 150–450)
PROT SERPL-MCNC: 7.4 G/DL (ref 6.8–8.8)
RBC # BLD AUTO: 4.56 10E6/UL (ref 3.8–5.2)
TSH SERPL DL<=0.005 MIU/L-ACNC: 1.09 MU/L (ref 0.4–4)
VALPROATE SERPL-MCNC: 37 MG/L
WBC # BLD AUTO: 9 10E3/UL (ref 4–11)

## 2022-06-17 PROCEDURE — 36415 COLL VENOUS BLD VENIPUNCTURE: CPT

## 2022-06-17 PROCEDURE — 80164 ASSAY DIPROPYLACETIC ACD TOT: CPT

## 2022-06-17 PROCEDURE — 85004 AUTOMATED DIFF WBC COUNT: CPT

## 2022-06-17 PROCEDURE — 84443 ASSAY THYROID STIM HORMONE: CPT

## 2022-06-17 PROCEDURE — 80175 DRUG SCREEN QUAN LAMOTRIGINE: CPT

## 2022-06-17 PROCEDURE — 80076 HEPATIC FUNCTION PANEL: CPT

## 2022-06-19 LAB — LAMOTRIGINE SERPL-MCNC: 4.4 UG/ML

## 2022-06-20 ENCOUNTER — MYC MEDICAL ADVICE (OUTPATIENT)
Dept: FAMILY MEDICINE | Facility: CLINIC | Age: 42
End: 2022-06-20

## 2022-06-29 ENCOUNTER — OFFICE VISIT (OUTPATIENT)
Dept: FAMILY MEDICINE | Facility: CLINIC | Age: 42
End: 2022-06-29
Attending: FAMILY MEDICINE
Payer: COMMERCIAL

## 2022-06-29 VITALS
SYSTOLIC BLOOD PRESSURE: 102 MMHG | BODY MASS INDEX: 20.73 KG/M2 | WEIGHT: 117 LBS | HEART RATE: 91 BPM | DIASTOLIC BLOOD PRESSURE: 70 MMHG

## 2022-06-29 DIAGNOSIS — M77.8 WRIST TENDONITIS: Primary | ICD-10-CM

## 2022-06-29 PROCEDURE — 99213 OFFICE O/P EST LOW 20 MIN: CPT | Performed by: FAMILY MEDICINE

## 2022-06-29 PROCEDURE — G0463 HOSPITAL OUTPT CLINIC VISIT: HCPCS

## 2022-06-29 ASSESSMENT — ANXIETY QUESTIONNAIRES
7. FEELING AFRAID AS IF SOMETHING AWFUL MIGHT HAPPEN: SEVERAL DAYS
1. FEELING NERVOUS, ANXIOUS, OR ON EDGE: NEARLY EVERY DAY
2. NOT BEING ABLE TO STOP OR CONTROL WORRYING: NEARLY EVERY DAY
GAD7 TOTAL SCORE: 17
GAD7 TOTAL SCORE: 17
5. BEING SO RESTLESS THAT IT IS HARD TO SIT STILL: SEVERAL DAYS
8. IF YOU CHECKED OFF ANY PROBLEMS, HOW DIFFICULT HAVE THESE MADE IT FOR YOU TO DO YOUR WORK, TAKE CARE OF THINGS AT HOME, OR GET ALONG WITH OTHER PEOPLE?: VERY DIFFICULT
GAD7 TOTAL SCORE: 17
6. BECOMING EASILY ANNOYED OR IRRITABLE: NEARLY EVERY DAY
4. TROUBLE RELAXING: NEARLY EVERY DAY
7. FEELING AFRAID AS IF SOMETHING AWFUL MIGHT HAPPEN: SEVERAL DAYS
3. WORRYING TOO MUCH ABOUT DIFFERENT THINGS: NEARLY EVERY DAY

## 2022-06-29 ASSESSMENT — PAIN SCALES - GENERAL: PAINLEVEL: MILD PAIN (3)

## 2022-06-29 NOTE — NURSING NOTE
Chief Complaint   Patient presents with     Tendonitis     Radiating pain up arms, wearing wrist brace and needs referral for new

## 2022-06-29 NOTE — PROGRESS NOTES
Martha Alva is a 41 year old, presenting for the following health issues:  Tendonitis (Radiating pain up arms, wearing wrist brace and needs referral for new )      HPI     Had thumb (DeQuervain's?) bilateral  Tenonitis 12 years ago, saw hand PT. Wasgiven splints for both thumbs. Pt. Did well until recently   When doing lots of gardening on deck, lifting 50 lb bags with hands rather than back, legs and arms  Pain in wrist and up inside forearm bilateral, aching pain, tight, even sore to touch  No tingling or numbness, no focal weakness  Splints are not staying on anymore       Continues to help parents with gardening  Other triggers: typing, using cellphone, computer/mouse   Does take breaks, ice  helps    Has improved vs. 2 weeks ago.      Objective    /70   Pulse 91   Wt 53.1 kg (117 lb)   BMI 20.73 kg/m    Body mass index is 20.73 kg/m .  Physical Exam   Alert, NAD  Hands:  No effusion/edema, no warmth or erythema  Tender with palpation over extensor pollucis longis bilaterally, Finkelstein's test negative  Wrist pain with Flexion >extension  Strength wrist and thumbs intact    A/P  1. Tendonitis, thumb and wrist bilaterally  Refer to OT hand   Discussed appropriate lift mechanics  Avoid lifting heavy things items (>10-20 lbs) and , rest wrists  as much as possible until see OT    Follow-up prn    .  ..  Answers for HPI/ROS submitted by the patient on 6/29/2022  EAMON 7 TOTAL SCORE: 17

## 2022-07-06 ENCOUNTER — THERAPY VISIT (OUTPATIENT)
Dept: OCCUPATIONAL THERAPY | Facility: CLINIC | Age: 42
End: 2022-07-06
Attending: FAMILY MEDICINE
Payer: COMMERCIAL

## 2022-07-06 DIAGNOSIS — M25.531 PAIN IN BOTH WRISTS: Primary | ICD-10-CM

## 2022-07-06 DIAGNOSIS — M77.8 WRIST TENDONITIS: ICD-10-CM

## 2022-07-06 DIAGNOSIS — M25.532 PAIN IN BOTH WRISTS: Primary | ICD-10-CM

## 2022-07-06 PROCEDURE — 97760 ORTHOTIC MGMT&TRAING 1ST ENC: CPT | Mod: GO | Performed by: OCCUPATIONAL THERAPIST

## 2022-07-06 PROCEDURE — 97165 OT EVAL LOW COMPLEX 30 MIN: CPT | Mod: GO | Performed by: OCCUPATIONAL THERAPIST

## 2022-07-06 NOTE — PROGRESS NOTES
Hand Therapy Initial Evaluation    Current Date:  7/6/2022    Diagnosis: B wrist flexor tendinitis; B DeQuervain's tenosynovitis  DOI: 6/13/22    Precautions: none    Subjective:  Artie Diez is a 41 year old female.    Answers for HPI/ROS submitted by the patient on 7/6/2022  Reason for Visit:: Wrist, hand, arm  When problem began:: 6/13/2022  How problem occurred:: Carrying heavy bags  Number scale: 6/10  General health as reported by patient: good  Please check all that apply to your current or past medical history: changes in bowel/bladder, depression, mental illness  Medications you are currently taking: anti-depressants  What are your primary job tasks: computer work.   Has a good ergo setup at work. Working on a better setup for home - mainly for back.  Current occupation: , at the [employer].    Patient reports symptoms of the bilateral hands, wrists, FA.  Pt notes she had about 9 bags of soil, and would carry them from the top, with palms down. Or dragging them. Doing a lot of gardening on the deck.  The pain seems delayed. It didn't show up for a few weeks after I moved the bags.  For a while it was feeling okay. Then I was making the bed, and the bottom sheet was tight, but I pulled it. The next day I could barely even hold my water jug with 2 hands.  After seeing the MD - doing some tests. It was not bad in the moment, but later it was really sore.  Working the mouse is a little more painful than the keyboard, with the L. Can be sore when on the mouse, phone.     Occupational Profile Information:  Right hand dominant  Prior functional level:  no limitations  Patient reports symptoms of pain and weakness/loss of strength  Special tests:  none.    Previous treatment: ottobock braces, icing  Barriers include:none  Mobility: No difficulty  Transportation: drives  Currently working in normal job without restrictions  Leisure activities/hobbies: planting [employer] this year, and  "flowers    Functional Outcome Measure:   Upper Extremity Functional Index Score:  SCORE:   Column Totals: /80: (P) 47   (A lower score indicates greater disability.)      Objective:  Pain Level (Scale 0-10):   7/6/2022   At Rest Mild to moderate   With Use \"     Pain Description:  Date 7/6/2022   Location B volar and radial wrists. Hands - dorsal side. Also volar wrists up into forearms, no at or across the elbow   Pain Quality When it first started, it was a lot of pain, now its more achy.   Frequency often     Pain is worst  varies. Usually better in the morning but yesterday it wasn't - had to ice   Exacerbated by Lift, pull   Relieved by Has been icing 3 times a day. Has been using the B ottobock splints most of the time when awake.  Took some advil - may have helped.   Progression After making the bed it got worse. It became tighter and carrying became different. Even having to use 2 hands to pour, and hold a water glass.     Sensation   WNL throughout all nerve distributions; per patient report    Edema   None visible    ROM  Thumbs: WFL, some     Wrists  7/6/2022  AROM is WFL, but some soreness, pulling to ulnar distal FA, radial wrists, dorsal FA - mainly with UD, ext, supination    Resisted testing with mild pressure 7/6/2022 7/6/2022    Right Left   Wrist ext +Feels it to the volar distal FA -   Wrist flexion - Feels it to volar ulnar side   EDC +Feels it volarly    FDS +Feels it dorsally/radial to hand/wrist    EBP + NT   Pain Report:    + mild    ++ moderate    +++ severe     Palpation  Tenderness / pain Report: - none  + mild    ++ moderate    +++ severe      7/6/2022 7/6/2022   Location Right Left   Flexor wad / MEP Sore, less of a delay Sore, some delay   Extensor wad / LEP \" \"   Biceps - distally     Volar FA     Dorsal FA              Assessment:  Patient presents with symptoms consistent with condition as noted above. She presents with mild pain at the B first dorsal compartments.  It appears " that the finger and wrist flexors are quite irritable.  Of note, pt is pretty sore at the end of the eval.    Patient's limitations or Problem List includes: pain, weakness,  of the bilateral wrist, hand and thumb which interferes with the patient's ability to perform ADLs and instrumental activities of daily living as compared to previous level of function.    Rehab Potential:  Excellent, expect return to normal activities.    Patient will benefit from skilled Occupational Therapy to increase ROM and decrease pain, to return to previous activity level and resume normal daily tasks and to reach their rehab potential.    Barriers to Learning:  no barriers    Communication Issues:  Patient appears to be able to clearly communicate and understand verbal and written communication and follow directions correctly.    Chart Review: Brief history including review of medical and/or therapy records relating to the presenting problem and Simple history review with patient    Identified Performance Deficits: work , recreation, self cares and home establishment and management.  Assessment of Occupational Performance:  3-5 Performance Deficits    Clinical Decision Making (Complexity): Low complexity    Treatment Explanation:  The following has been discussed with the patient:  RX ordered/plan of care  Anticipated outcomes  Possible risks and side effects    Plan:  Frequency:  1 X week, once daily  Duration:  for 8 weeks    Treatment Plan:   Modalities:  US and Paraffin  Therapeutic Exercise:  AROM, PROM, Tendon Gliding, Extensor Tracking, Isotonics, Isometrics and Stabilization  Neuromuscular re-education:  Proprioceptive Training and Kinesiotaping  Manual Techniques:  Friction massage, Myofascial release and Manual edema mobilization  Orthotic Fabrication:  Static, Hand based and Forearm based  Self Care:  Self Care Tasks, Ergonomic Considerations and Work Tasks    Discharge Plan:  Achieve all LTG.  Independent in home treatment  program.  Reach maximal therapeutic benefit.    Home Program:   Continue icing  Gentle stretching  Right custom volar wrist splint - wear at night, day if helpful  Bilateral OttoBock orthoses, extra small  May want to consider taking an NSAID on a regular basis, possibly Voltaren  Compression sleeves - use if helpful    Next visit/ Plan:   Check symptoms, check orthoses, check exercises    Thank you for the opportunity to work with this patient.   If you have questions or concerns, please contact me with an in basket message or via the information below.     Michelle Kinsey MS, OTR/L, CHT  Certified Hand Therapist    [facility] - Hand Therapy  [facility]  [address] [place] [address], Room [address] [place] [id number]    Email: [email or web address]   Phone / Voicemail: [phone]   Hand Therapy  phone: [phone] (staffed M-F)  Fax: [phone]

## 2022-07-08 ENCOUNTER — MYC MEDICAL ADVICE (OUTPATIENT)
Dept: OCCUPATIONAL THERAPY | Facility: CLINIC | Age: 42
End: 2022-07-08

## 2022-07-11 ENCOUNTER — THERAPY VISIT (OUTPATIENT)
Dept: OCCUPATIONAL THERAPY | Facility: CLINIC | Age: 42
End: 2022-07-11
Payer: COMMERCIAL

## 2022-07-11 DIAGNOSIS — M25.532 PAIN IN BOTH WRISTS: Primary | ICD-10-CM

## 2022-07-11 DIAGNOSIS — M25.531 PAIN IN BOTH WRISTS: Primary | ICD-10-CM

## 2022-07-11 PROCEDURE — 97763 ORTHC/PROSTC MGMT SBSQ ENC: CPT | Mod: GO | Performed by: OCCUPATIONAL THERAPIST

## 2022-07-12 NOTE — TELEPHONE ENCOUNTER
REFERRAL INFORMATION:    Referring Provider:  Misha    Referring Clinic:  [facility]    Reason for Visit/Diagnosis: nausea, abdominal pain     FUTURE VISIT INFORMATION:    Appointment Date: 8.16.22    Appointment Time: 12 PM     NOTES STATUS DETAILS   OFFICE NOTE from Referring Provider Internal 4.18.22 [facility] Cabral   OFFICE NOTE from Other Specialist N/A    HOSPITAL DISCHARGE SUMMARY/  ED VISITS N/A    OPERATIVE REPORT N/A    MEDICATION LIST Internal         ENDOSCOPY  N/A    COLONOSCOPY Received 4.18.22   ERCP N/A    EUS N/A    STOOL TESTING N/A    PERTINENT LABS Internal    PATHOLOGY REPORTS (RELATED) N/A    IMAGING (CT, MRI, EGD, MRCP, Small Bowel Follow Through/SBT, MR/CT Enterography) Internal 5.24.21 XR ab

## 2022-07-13 ENCOUNTER — THERAPY VISIT (OUTPATIENT)
Dept: OCCUPATIONAL THERAPY | Facility: CLINIC | Age: 42
End: 2022-07-13
Payer: COMMERCIAL

## 2022-07-13 DIAGNOSIS — M25.531 PAIN IN BOTH WRISTS: Primary | ICD-10-CM

## 2022-07-13 DIAGNOSIS — M25.532 PAIN IN BOTH WRISTS: Primary | ICD-10-CM

## 2022-07-13 PROCEDURE — 97535 SELF CARE MNGMENT TRAINING: CPT | Mod: GO | Performed by: OCCUPATIONAL THERAPIST

## 2022-07-13 PROCEDURE — 97140 MANUAL THERAPY 1/> REGIONS: CPT | Mod: GO | Performed by: OCCUPATIONAL THERAPIST

## 2022-07-13 PROCEDURE — 97035 APP MDLTY 1+ULTRASOUND EA 15: CPT | Mod: GO | Performed by: OCCUPATIONAL THERAPIST

## 2022-07-13 NOTE — TELEPHONE ENCOUNTER
DIAGNOSIS: BILATERAL wrist pain, OT is not helping, too painful to go to OT (at times) / Michelle Kinsey OT / [employer] / No imaging done   APPOINTMENT DATE: 7.21.22   NOTES STATUS DETAILS   OFFICE NOTE from referring provider Internal OT in [facility]   OFFICE NOTE from other specialist Internal 6.29.22 Dr Ran Cabral, [facility]   MEDICATION LIST Internal    XRAYS (IMAGES & REPORTS) Internal 9.10.13 L wrist

## 2022-07-14 ENCOUNTER — MYC MEDICAL ADVICE (OUTPATIENT)
Dept: FAMILY MEDICINE | Facility: CLINIC | Age: 42
End: 2022-07-14

## 2022-07-14 DIAGNOSIS — M77.8 WRIST TENDONITIS: Primary | ICD-10-CM

## 2022-07-20 ENCOUNTER — THERAPY VISIT (OUTPATIENT)
Dept: OCCUPATIONAL THERAPY | Facility: CLINIC | Age: 42
End: 2022-07-20
Payer: COMMERCIAL

## 2022-07-20 DIAGNOSIS — M25.532 PAIN IN BOTH WRISTS: Primary | ICD-10-CM

## 2022-07-20 DIAGNOSIS — M25.531 PAIN IN BOTH WRISTS: Primary | ICD-10-CM

## 2022-07-20 PROCEDURE — 97140 MANUAL THERAPY 1/> REGIONS: CPT | Mod: GO | Performed by: OCCUPATIONAL THERAPIST

## 2022-07-20 PROCEDURE — 97535 SELF CARE MNGMENT TRAINING: CPT | Mod: GO | Performed by: OCCUPATIONAL THERAPIST

## 2022-07-20 PROCEDURE — 97035 APP MDLTY 1+ULTRASOUND EA 15: CPT | Mod: GO | Performed by: OCCUPATIONAL THERAPIST

## 2022-07-21 ENCOUNTER — OFFICE VISIT (OUTPATIENT)
Dept: ORTHOPEDICS | Facility: CLINIC | Age: 42
End: 2022-07-21
Payer: COMMERCIAL

## 2022-07-21 ENCOUNTER — PRE VISIT (OUTPATIENT)
Dept: ORTHOPEDICS | Facility: CLINIC | Age: 42
End: 2022-07-21

## 2022-07-21 ENCOUNTER — ANCILLARY PROCEDURE (OUTPATIENT)
Dept: MRI IMAGING | Facility: CLINIC | Age: 42
End: 2022-07-21
Attending: FAMILY MEDICINE
Payer: COMMERCIAL

## 2022-07-21 VITALS — WEIGHT: 117 LBS | HEIGHT: 63 IN | BODY MASS INDEX: 20.73 KG/M2

## 2022-07-21 DIAGNOSIS — R20.0 NUMBNESS AND TINGLING IN BOTH HANDS: ICD-10-CM

## 2022-07-21 DIAGNOSIS — R20.2 NUMBNESS AND TINGLING IN BOTH HANDS: ICD-10-CM

## 2022-07-21 DIAGNOSIS — R29.898 BILATERAL ARM WEAKNESS: Primary | ICD-10-CM

## 2022-07-21 DIAGNOSIS — R29.898 BILATERAL ARM WEAKNESS: ICD-10-CM

## 2022-07-21 PROCEDURE — 99204 OFFICE O/P NEW MOD 45 MIN: CPT | Mod: GC | Performed by: FAMILY MEDICINE

## 2022-07-21 PROCEDURE — 72141 MRI NECK SPINE W/O DYE: CPT | Mod: GC | Performed by: STUDENT IN AN ORGANIZED HEALTH CARE EDUCATION/TRAINING PROGRAM

## 2022-07-21 RX ORDER — PREDNISONE 50 MG/1
50 TABLET ORAL DAILY
Qty: 5 TABLET | Refills: 0 | Status: SHIPPED | OUTPATIENT
Start: 2022-07-21 | End: 2022-09-06

## 2022-07-21 NOTE — LETTER
7/21/2022      RE: Artie Diez  [address]  [place] [address]     Dear Colleague,    Thank you for referring your patient, Artie Diez, to the [facility] [place]. Please see a copy of my visit note below.    ASSESSMENT/PLAN:    (R29.671) Bilateral arm weakness  (primary encounter diagnosis)  Comment: unclear etiology of her symptoms; they do not fit a specific dermatomal pattern or peripheral nerve pattern; we discussed checking an EMG but her symptoms have only been for a month so it may be too soon to check; she is also afraid of needles so she would like to avoid if possible; She did have some hyperreflexia on exam and does note significant weakness bilateral so I do think it is reasonable to check a c-spine MRI; we also discussed trying a short course of pred to see if this is inflammatory in nature; precautions given; will follow with her after her MRI  Plan: MRI Cervical spine w/o contrast, predniSONE (DELTASONE) 50 MG tablet          (R20.0,  R20.2) Numbness and tingling in both hands  Comment: see above  Plan: MRI Cervical spine w/o contrast, predniSONE (DELTASONE) 50 MG tablet    Attestation:  This patient has been seen and evaluated by me, Bienvenido Deleon MD with the resident, Dr Schwartz and the care team. I agree with the findings and plan of care as documented in this note.          Bienvenido Deleon MD  July 21, 2022  9:49 AM        Pt is a 41 year old female here today for:     Bilateral Wrist/ Hand pain:   Location: Both wrists, extends up into forearms and around dorsal aspect of hands with radiation into finger. Describes the pain as a burning sensation. When the pain is present, movement makes it worse.   Duration: Started about a month ago, severity increased about two weeks ago.   Trauma/ Fall? Over a month ago, she was carrying around fifty pound bags of dirt for gardening just with her hands. Started to experience pain after this.    Swelling? Minimal   Numbness/ Tingling? Minimal, only with very specific positions.  No sharp, shooting pain down the arm.   Weakness? Yes. Has to use both hands to drink a glass of water.   Imaging? 2013 when she was diagnosed with thumb tendonitis.    Treatment? Follows with OT. Ices her wrists for about six hours a day. 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     Per PT message received today:  Michelle Kinsey, Bienvenido Brown MD HelloArtie had an onset of pain at the forearms, wrists, starting mid June. Came on with handling heavy bags of dirt by the tops. I think there could have been some underlying issues already - work ergonomics. Still hard to do chores especially with .   At first it was pretty bad and quite distressing to her, and I thought lets get it set up to have more MD eyes on this too. I have seen her four times. Flexor tenosynovitis I think maybe, and some elements of nerve irritation (med/rad). The extensor tendons dennis. 1st dorsal compartments irritable too.   Fortunately the pain has been better the past ~4 days - feeling weak. I will definitely keep seeing her.   I think she would like to clarify things like NSAID dosing schedule - how much, when and for how long. As well as helping confirm what this may be, and any medical pointers.   Thanks,   Rhona       Past Medical History:   Diagnosis Date     Depressive disorder, not elsewhere classified     Depression (non-psychotic)     Excessive or frequent menstruation     Heavy periods      Past Surgical History:   Procedure Laterality Date     no surgical history        Current Outpatient Medications   Medication Sig Dispense Refill     divalproex sodium extended-release (DEPAKOTE ER) 250 MG 24 hr tablet Take 1 tablet (250 mg) by mouth daily 90 tablet 0     gabapentin (NEURONTIN) 100 MG capsule Take 1 capsule (100 mg) by  "mouth daily 90 capsule 0     lamoTRIgine (LAMICTAL) 100 MG tablet Take 1 tablet (100 mg) by mouth daily 30 tablet 1     lamoTRIgine (LAMICTAL) 25 MG tablet Take 1 tablet (25 mg) by mouth daily for two weeks and then increase to 2 tablets (50 mg) by mouth daily. Take along with one 100 mg tablet. 60 tablet 1     lamoTRIgine (LAMICTAL) 25 MG tablet Take 4 tablets (100 mg) by mouth daily 120 tablet 1     LORazepam (ATIVAN) 0.5 MG tablet Take 0.5 mg by mouth every 6 hours as needed for anxiety       norgestim-eth estrad triphasic (ORTHO TRI-CYCLEN LO) 0.18/0.215/0.25 MG-25 MCG tablet Take 1 tablet by mouth daily 84 tablet 4     Nutritional Supplements (NUTRITIONAL SUPPLEMENT PO) l-methyl folate  Zinc selenium  Hair skin and nails vitamin       propranolol (INDERAL) 10 MG tablet        spironolactone (ALDACTONE) 50 MG tablet         Allergies   Allergen Reactions     Amphotericin B      Phenylbutazones      Penicillin G Nausea and Rash      ROS:   Gen- no fevers/chills   Rheum - no morning stiffness   Derm - no rash/ redness   Neuro -see HPI   Remainder of ROS negative.     Exam:   Ht 1.6 m (5' 3\")   Wt 53.1 kg (117 lb)   BMI 20.73 kg/m         Bilateral WRIST/HAND:  Exam was fairly benign except that pt noted pain over dorsal and palmar hand extending to forearm bilaterally after the evaluation  Inspection: Swelling - No; Atrophy - No   Sensation: intact in median, radial, ulnar distribution   ROM:   Wrist: flexion- full/ extension- full/ pronation- full/ supination- full;   radial deviation - full; ulnar deviation- full   Hand: Full flexion at PIP/DIP, finger abduction/ adduction.   Strength: 5/5 in all motions   Bony tenderness:   Wrist: Carpal bones: NO; Snuffbox: NO   Hand: Metacarpals: NO; Phalanges: NO    Tendons: FDS/FDP/Extensor mechanism intact   Maneuvers: neg -Tinel's/Phalen  Other: No nodules palpated in palmar aspect. DTR's 2-3+      Again, thank you for allowing me to participate in the care of your " patient.      Sincerely,    Bienvenido Deleon MD

## 2022-07-21 NOTE — PROGRESS NOTES
ASSESSMENT/PLAN:    (R28.922) Bilateral arm weakness  (primary encounter diagnosis)  Comment: unclear etiology of her symptoms; they do not fit a specific dermatomal pattern or peripheral nerve pattern; we discussed checking an EMG but her symptoms have only been for a month so it may be too soon to check; she is also afraid of needles so she would like to avoid if possible; She did have some hyperreflexia on exam and does note significant weakness bilateral so I do think it is reasonable to check a c-spine MRI; we also discussed trying a short course of pred to see if this is inflammatory in nature; precautions given; will follow with her after her MRI  Plan: MRI Cervical spine w/o contrast, predniSONE (DELTASONE) 50 MG tablet          (R20.0,  R20.2) Numbness and tingling in both hands  Comment: see above  Plan: MRI Cervical spine w/o contrast, predniSONE (DELTASONE) 50 MG tablet    Attestation:  This patient has been seen and evaluated by me, Bienvenido Deleon MD with the resident, Dr Schwartz and the care team. I agree with the findings and plan of care as documented in this note.          Bienvenido Deleon MD  July 21, 2022  9:49 AM        Pt is a 41 year old female here today for:     Bilateral Wrist/ Hand pain:   Location: Both wrists, extends up into forearms and around dorsal aspect of hands with radiation into finger. Describes the pain as a burning sensation. When the pain is present, movement makes it worse.   Duration: Started about a month ago, severity increased about two weeks ago.   Trauma/ Fall? Over a month ago, she was carrying around fifty pound bags of dirt for gardening just with her hands. Started to experience pain after this.   Swelling? Minimal   Numbness/ Tingling? Minimal, only with very specific positions.  No sharp, shooting pain down the arm.   Weakness? Yes. Has to use both hands to drink a glass of water.   Imaging? 2013 when she was diagnosed with thumb tendonitis.    Treatment? Follows  with OT. Ices her wrists for about six hours a day. 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     Per PT message received today:  Michelle Kinsey, Bienvenido Brown MD HelArtie jose had an onset of pain at the forearms, wrists, starting mid June. Came on with handling heavy bags of dirt by the tops. I think there could have been some underlying issues already - work ergonomics. Still hard to do chores especially with .   At first it was pretty bad and quite distressing to her, and I thought lets get it set up to have more MD eyes on this too. I have seen her four times. Flexor tenosynovitis I think maybe, and some elements of nerve irritation (med/rad). The extensor tendons dennis. 1st dorsal compartments irritable too.   Fortunately the pain has been better the past ~4 days - feeling weak. I will definitely keep seeing her.   I think she would like to clarify things like NSAID dosing schedule - how much, when and for how long. As well as helping confirm what this may be, and any medical pointers.   Thanks,   Rhona       Past Medical History:   Diagnosis Date     Depressive disorder, not elsewhere classified     Depression (non-psychotic)     Excessive or frequent menstruation     Heavy periods      Past Surgical History:   Procedure Laterality Date     no surgical history        Current Outpatient Medications   Medication Sig Dispense Refill     divalproex sodium extended-release (DEPAKOTE ER) 250 MG 24 hr tablet Take 1 tablet (250 mg) by mouth daily 90 tablet 0     gabapentin (NEURONTIN) 100 MG capsule Take 1 capsule (100 mg) by mouth daily 90 capsule 0     lamoTRIgine (LAMICTAL) 100 MG tablet Take 1 tablet (100 mg) by mouth daily 30 tablet 1     lamoTRIgine (LAMICTAL) 25 MG tablet Take 1 tablet (25 mg) by mouth daily for two weeks and then increase to 2 tablets (50 mg) by mouth daily. Take along with  "one 100 mg tablet. 60 tablet 1     lamoTRIgine (LAMICTAL) 25 MG tablet Take 4 tablets (100 mg) by mouth daily 120 tablet 1     LORazepam (ATIVAN) 0.5 MG tablet Take 0.5 mg by mouth every 6 hours as needed for anxiety       norgestim-eth estrad triphasic (ORTHO TRI-CYCLEN LO) 0.18/0.215/0.25 MG-25 MCG tablet Take 1 tablet by mouth daily 84 tablet 4     Nutritional Supplements (NUTRITIONAL SUPPLEMENT PO) l-methyl folate  Zinc selenium  Hair skin and nails vitamin       propranolol (INDERAL) 10 MG tablet        spironolactone (ALDACTONE) 50 MG tablet         Allergies   Allergen Reactions     Amphotericin B      Phenylbutazones      Penicillin G Nausea and Rash      ROS:   Gen- no fevers/chills   Rheum - no morning stiffness   Derm - no rash/ redness   Neuro -see HPI   Remainder of ROS negative.     Exam:   Ht 1.6 m (5' 3\")   Wt 53.1 kg (117 lb)   BMI 20.73 kg/m         Bilateral WRIST/HAND:  Exam was fairly benign except that pt noted pain over dorsal and palmar hand extending to forearm bilaterally after the evaluation  Inspection: Swelling - No; Atrophy - No   Sensation: intact in median, radial, ulnar distribution   ROM:   Wrist: flexion- full/ extension- full/ pronation- full/ supination- full;   radial deviation - full; ulnar deviation- full   Hand: Full flexion at PIP/DIP, finger abduction/ adduction.   Strength: 5/5 in all motions   Bony tenderness:   Wrist: Carpal bones: NO; Snuffbox: NO   Hand: Metacarpals: NO; Phalanges: NO    Tendons: FDS/FDP/Extensor mechanism intact   Maneuvers: neg -Tinel's/Phalen  Other: No nodules palpated in palmar aspect. DTR's 2-3+      "

## 2022-08-02 ENCOUNTER — MYC MEDICAL ADVICE (OUTPATIENT)
Dept: FAMILY MEDICINE | Facility: CLINIC | Age: 42
End: 2022-08-02

## 2022-08-02 ENCOUNTER — TELEPHONE (OUTPATIENT)
Dept: GASTROENTEROLOGY | Facility: CLINIC | Age: 42
End: 2022-08-02

## 2022-08-02 NOTE — TELEPHONE ENCOUNTER
JITENDRAM and sent mychart. 8/16 appt needs rescheduling, Rose no longer practicing at [facility]. Reschedule with any GI provider - New Urgent visit type.

## 2022-08-03 ENCOUNTER — THERAPY VISIT (OUTPATIENT)
Dept: OCCUPATIONAL THERAPY | Facility: CLINIC | Age: 42
End: 2022-08-03
Payer: COMMERCIAL

## 2022-08-03 DIAGNOSIS — M25.531 PAIN IN BOTH WRISTS: Primary | ICD-10-CM

## 2022-08-03 DIAGNOSIS — M25.532 PAIN IN BOTH WRISTS: Primary | ICD-10-CM

## 2022-08-03 DIAGNOSIS — F31.81 BIPOLAR 2 DISORDER (H): ICD-10-CM

## 2022-08-03 PROCEDURE — 97535 SELF CARE MNGMENT TRAINING: CPT | Mod: GO | Performed by: OCCUPATIONAL THERAPIST

## 2022-08-03 NOTE — PROGRESS NOTES
"SOAP note information for 8/3/2022.  Please refer to the daily flowsheet for treatment today, total treatment time and time spent performing 1:1 timed codes.         Diagnosis: B wrist flexor tendinitis; B DeQuervain's tenosynovitis  DOI: 6/13/22    Precautions: none    Subjective:    After last time was very sore. Could not hold over even a couple knives.  Did get a massage and that helped a lot.  Will be doing that again, probably weekly.  Also interested in chiropractic care potentially, and will be looking into this.    On day 3 of 5d prednisone course. Helps to have things be quite manageable in the day.     The fingers tend to curl on the L (flex) at rest - doing it today after a long day.      Driving - did okay, felt it a bit after 3 hr drive. Did notice turning head to the sides, when on a boat - felt it on one arm or the other. Before trip, did extend neck and felt it really strong into the volar L FA.    Getting more pain on the top of the hands and sides, now with getting back into the routines, after the vacation.    Can drink a glass of water with one hand - progress.  Light to med grocery bag, going about 8 steps was the max - has a rolling cart she can use.      Objective:  Pain Level (Scale 0-10):   7/6/2022 8/4/2022     At Rest Mild to moderate mild   With Use \" Moderate to severe     Pain Description:  Date 7/6/2022   Location B volar and radial wrists. Hands - dorsal side. Also volar wrists up into forearms, no at or across the elbow   Pain Quality When it first started, it was a lot of pain, now its more achy.   Frequency often     Pain is worst  varies. Usually better in the morning but yesterday it wasn't - had to ice   Exacerbated by Lift, pull   Relieved by Has been icing 3 times a day. Has been using the B ottobock splints most of the time when awake.  Took some advil - may have helped.   Progression After making the bed it got worse. It became tighter and carrying became different. Even " "having to use 2 hands to pour, and hold a water glass.     Sensation   WNL throughout all nerve distributions; per patient report    Edema   None visible    ROM  Thumbs: WFL, some     Wrists  7/6/2022  AROM is WFL, but some soreness, pulling to ulnar distal FA, radial wrists, dorsal FA - mainly with UD, ext, supination    Resisted testing with mild pressure 7/6/2022 7/6/2022    Right Left   Wrist ext +Feels it to the volar distal FA -   Wrist flexion - Feels it to volar ulnar side   EDC +Feels it volarly    FDS +Feels it dorsally/radial to hand/wrist    EBP + NT   Pain Report:    + mild    ++ moderate    +++ severe     Palpation  Tenderness / pain Report: - none  + mild    ++ moderate    +++ severe      7/6/2022 7/6/2022   Location Right Left   Flexor wad / MEP Sore, less of a delay Sore, some delay   Extensor wad / LEP \" \"   Biceps - distally     Volar FA     Dorsal FA              Home Program:    icing  Gentle stretching and nerve gliding did not go well on 7/20  Right custom volar wrist splint - wear at night, day if helpful  Bilateral OttoBock orthoses, extra small  Voltaren  Compression sleeves -using more often than orthoses   has a rolling cart she can use from car<> home.  Help from mom and aunt, and hired help coming for home work setup.  Massage therapist, maybe chiro  Getting help to set up home work station.  KT - inhibition along volar FA, to the flexor tendons.  Guasha tool for STM, as well as L wrist brace to try on the L    Next: check symptoms, ergo setup, progress thex when able  Possible referral to PT for cervical tx  US, KT  Would any further assessment of the neck/spine be warranted  "

## 2022-08-04 RX ORDER — LAMOTRIGINE 25 MG/1
100 TABLET ORAL DAILY
Qty: 120 TABLET | Refills: 1 | OUTPATIENT
Start: 2022-08-04

## 2022-08-07 NOTE — PROGRESS NOTES
Call start: 9:01 AM  Call stop: 9:17AM  Call duration: 16 minutes    ASSESSMENT/PLAN:    (R20.0,  R20.2) Numbness and tingling in both hands  (primary encounter diagnosis)  Comment: she is making slow progress and I do agree with her and her OT that her neck/ cervical radic is a component of her pain despite the clean c-spine MRI. Will have her do dedicated neck PT; she is safe to continue massage therapy and consult with a chiropractor.  We will touch base again via telephone call in 4 wks. Precautions/ anticipatory guidance given  Plan: Physical Therapy Referral          (R29.898) Bilateral arm weakness  Comment: see above  Plan: Physical Therapy Referral          Bienvenido Deleon MD  August 8, 2022  9:15 AM      Pt is a 41 year old female last seen on 7/21/2022 here for follow up of:     Bilateral arm weakness/ paresthesias - massage therapy has done wonders - not sure why but noted strength increasing 20% after first session and pain decreased approx 50%. Then she went on vacation and it was really helpful. Was icing and wearing braces. Started pred after she returned from vacation. Did note that her arms started to flare as she git back into work but the pred helped during this transition. Was working 8 hr days and had mild flares. By 4p, pain was bad. Mornings were tough. Evenings were tough. Was able to go grocery shopping though but noted that carrying bags was difficult. Today, she made her bed which is progress. Saw massage therapist this weekend which helped again. Still does feel a pull in her arm and fingers curl when she turns her neck, L>R. Thinking about seeing a chiropractor    Per OT message to me on 8/4/22:  Michelle Kinsey, OT  Bienvenido Deleon MD  Hi Dr Deleon,   I am updating you for Artie...   I saw her yesterday. Gentle exercises I gave her last time flared things up significantly.   On day 3 of 5d prednisone course. Helps to have things be quite manageable in the day.   I still have suspicion  there is still something near the neck ie some cranky nerve roots or muscle tightness affecting things. Nothing that would show up on EMG though I bet.   I will talk to her next time again about PT, but with similar patients it seems helpful to have the PTs work some of their magic with the neck.   She is doing better, after her trip.  But things are becoming sore again in the forearms, as she gets back into work activities. Working on improving work setup for home. 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   Did get a massage and that helped a lot - will be doing this regularly most likely.   Also interested in chiropractic care potentially, and will be looking into this.   Did notice turning head to the sides, when on a boat - felt it on one arm or the other. Mentions again that before her trip, did extend neck and felt it really strong into the volar L FA.   Can hold a glass of water with one hand - progress.   -Rhona     MRI cervical spine - 7/21/2022  FINDINGS:     Cervical: Normal cervical vertebral alignment. Normal marrow signal.  No cord signal abnormality.     The findings on a level by level basis are as follows:   C2-3: No significant spinal canal or neural foraminal stenosis.   C3-4: No significant spinal canal or neural foraminal stenosis.   C4-5: No significant spinal canal or neural foraminal stenosis.   C5-6: No significant spinal canal or neural foraminal stenosis.   C6-7: No significant spinal canal or neural foraminal stenosis.   C7-T1: No significant spinal canal or neural foraminal stenosis.     The visualized skull base, posterior fossa, and paraspinal soft  tissues are within normal limits.                                                                    IMPRESSION:  1. No significant spinal canal or neural foraminal stenosis.  2. No myelopathic cervical spinal cord signal.    Per my last note:     (R23.623) Bilateral arm weakness  (primary encounter diagnosis)  Comment: unclear etiology  of her symptoms; they do not fit a specific dermatomal pattern or peripheral nerve pattern; we discussed checking an EMG but her symptoms have only been for a month so it may be too soon to check; she is also afraid of needles so she would like to avoid if possible; She did have some hyperreflexia on exam and does note significant weakness bilateral so I do think it is reasonable to check a c-spine MRI; we also discussed trying a short course of pred to see if this is inflammatory in nature; precautions given; will follow with her after her MRI  Plan: MRI Cervical spine w/o contrast, predniSONE (DELTASONE) 50 MG tablet          (R20.0,  R20.2) Numbness and tingling in both hands  Comment: see above  Plan: MRI Cervical spine w/o contrast, predniSONE (DELTASONE) 50 MG tablet    Past Medical History:   Diagnosis Date     Depressive disorder, not elsewhere classified     Depression (non-psychotic)     Excessive or frequent menstruation     Heavy periods      Current Outpatient Medications   Medication Sig Dispense Refill     divalproex sodium extended-release (DEPAKOTE ER) 250 MG 24 hr tablet Take 1 tablet (250 mg) by mouth daily 90 tablet 0     gabapentin (NEURONTIN) 100 MG capsule Take 1 capsule (100 mg) by mouth daily 90 capsule 0     lamoTRIgine (LAMICTAL) 100 MG tablet Take 1 tablet (100 mg) by mouth daily 30 tablet 1     lamoTRIgine (LAMICTAL) 25 MG tablet Take 1 tablet (25 mg) by mouth daily for two weeks and then increase to 2 tablets (50 mg) by mouth daily. Take along with one 100 mg tablet. 60 tablet 1     lamoTRIgine (LAMICTAL) 25 MG tablet Take 4 tablets (100 mg) by mouth daily 120 tablet 1     LORazepam (ATIVAN) 0.5 MG tablet Take 0.5 mg by mouth every 6 hours as needed for anxiety       norgestim-eth estrad triphasic (ORTHO TRI-CYCLEN LO) 0.18/0.215/0.25 MG-25 MCG tablet Take 1 tablet by mouth daily 84 tablet 4     Nutritional Supplements (NUTRITIONAL SUPPLEMENT PO) l-methyl folate  Zinc selenium  Hair  skin and nails vitamin       predniSONE (DELTASONE) 50 MG tablet Take 1 tablet (50 mg) by mouth daily Take with food; do not take other anti-inflammatories while on prednisone 5 tablet 0     propranolol (INDERAL) 10 MG tablet        spironolactone (ALDACTONE) 50 MG tablet         Allergies   Allergen Reactions     Amphotericin B      Phenylbutazones      Penicillin G Nausea and Rash      ROS:   Gen- no fevers/chills   Neuro - see HPI  Remainder of ROS negative.     Exam:

## 2022-08-08 ENCOUNTER — VIRTUAL VISIT (OUTPATIENT)
Dept: ORTHOPEDICS | Facility: CLINIC | Age: 42
End: 2022-08-08
Payer: COMMERCIAL

## 2022-08-08 DIAGNOSIS — R20.0 NUMBNESS AND TINGLING IN BOTH HANDS: Primary | ICD-10-CM

## 2022-08-08 DIAGNOSIS — R20.2 NUMBNESS AND TINGLING IN BOTH HANDS: Primary | ICD-10-CM

## 2022-08-08 DIAGNOSIS — R29.898 BILATERAL ARM WEAKNESS: ICD-10-CM

## 2022-08-08 PROCEDURE — 99213 OFFICE O/P EST LOW 20 MIN: CPT | Mod: TEL | Performed by: FAMILY MEDICINE

## 2022-08-08 NOTE — LETTER
8/8/2022       RE: Artie Diez  [address]  [place] [address]     Dear Colleague,    Thank you for referring your patient, Artie Diez, to the [facility] [place] at [facility]. Please see a copy of my visit note below.    Call start: 9:01 AM  Call stop: 9:17AM  Call duration: 16 minutes    ASSESSMENT/PLAN:    (R20.0,  R20.2) Numbness and tingling in both hands  (primary encounter diagnosis)  Comment: she is making slow progress and I do agree with her and her OT that her neck/ cervical radic is a component of her pain despite the clean c-spine MRI. Will have her do dedicated neck PT; she is safe to continue massage therapy and consult with a chiropractor.  We will touch base again via telephone call in 4 wks. Precautions/ anticipatory guidance given  Plan: Physical Therapy Referral          (R26.520) Bilateral arm weakness  Comment: see above  Plan: Physical Therapy Referral          Bienvenido Deleon MD  August 8, 2022  9:15 AM      Pt is a 41 year old female last seen on 7/21/2022 here for follow up of:     Bilateral arm weakness/ paresthesias - massage therapy has done wonders - not sure why but noted strength increasing 20% after first session and pain decreased approx 50%. Then she went on vacation and it was really helpful. Was icing and wearing braces. Started pred after she returned from vacation. Did note that her arms started to flare as she git back into work but the pred helped during this transition. Was working 8 hr days and had mild flares. By 4p, pain was bad. Mornings were tough. Evenings were tough. Was able to go grocery shopping though but noted that carrying bags was difficult. Today, she made her bed which is progress. Saw massage therapist this weekend which helped again. Still does feel a pull in her arm and fingers curl when she turns her neck, L>R. Thinking about seeing a chiropractor    Per  OT message to me on 8/4/22:  Michelle Kinsey, OT  Bienvenido Deleon MD  Hi Dr Deleon,   I am updating you for Artie...   I saw her yesterday. Gentle exercises I gave her last time flared things up significantly.   On day 3 of 5d prednisone course. Helps to have things be quite manageable in the day.   I still have suspicion there is still something near the neck ie some cranky nerve roots or muscle tightness affecting things. Nothing that would show up on EMG though I bet.   I will talk to her next time again about PT, but with similar patients it seems helpful to have the PTs work some of their magic with the neck.   She is doing better, after her trip.  But things are becoming sore again in the forearms, as she gets back into work activities. Working on improving work setup for home. 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   Did get a massage and that helped a lot - will be doing this regularly most likely.   Also interested in chiropractic care potentially, and will be looking into this.   Did notice turning head to the sides, when on a boat - felt it on one arm or the other. Mentions again that before her trip, did extend neck and felt it really strong into the volar L FA.   Can hold a glass of water with one hand - progress.   -Rhona     MRI cervical spine - 7/21/2022  FINDINGS:     Cervical: Normal cervical vertebral alignment. Normal marrow signal.  No cord signal abnormality.     The findings on a level by level basis are as follows:   C2-3: No significant spinal canal or neural foraminal stenosis.   C3-4: No significant spinal canal or neural foraminal stenosis.   C4-5: No significant spinal canal or neural foraminal stenosis.   C5-6: No significant spinal canal or neural foraminal stenosis.   C6-7: No significant spinal canal or neural foraminal stenosis.   C7-T1: No significant spinal canal or neural foraminal stenosis.     The visualized skull base, posterior fossa, and paraspinal soft  tissues are  within normal limits.                                                                    IMPRESSION:  1. No significant spinal canal or neural foraminal stenosis.  2. No myelopathic cervical spinal cord signal.    Per my last note:     (R26.904) Bilateral arm weakness  (primary encounter diagnosis)  Comment: unclear etiology of her symptoms; they do not fit a specific dermatomal pattern or peripheral nerve pattern; we discussed checking an EMG but her symptoms have only been for a month so it may be too soon to check; she is also afraid of needles so she would like to avoid if possible; She did have some hyperreflexia on exam and does note significant weakness bilateral so I do think it is reasonable to check a c-spine MRI; we also discussed trying a short course of pred to see if this is inflammatory in nature; precautions given; will follow with her after her MRI  Plan: MRI Cervical spine w/o contrast, predniSONE (DELTASONE) 50 MG tablet          (R20.0,  R20.2) Numbness and tingling in both hands  Comment: see above  Plan: MRI Cervical spine w/o contrast, predniSONE (DELTASONE) 50 MG tablet    Past Medical History:   Diagnosis Date     Depressive disorder, not elsewhere classified     Depression (non-psychotic)     Excessive or frequent menstruation     Heavy periods      Current Outpatient Medications   Medication Sig Dispense Refill     divalproex sodium extended-release (DEPAKOTE ER) 250 MG 24 hr tablet Take 1 tablet (250 mg) by mouth daily 90 tablet 0     gabapentin (NEURONTIN) 100 MG capsule Take 1 capsule (100 mg) by mouth daily 90 capsule 0     lamoTRIgine (LAMICTAL) 100 MG tablet Take 1 tablet (100 mg) by mouth daily 30 tablet 1     lamoTRIgine (LAMICTAL) 25 MG tablet Take 1 tablet (25 mg) by mouth daily for two weeks and then increase to 2 tablets (50 mg) by mouth daily. Take along with one 100 mg tablet. 60 tablet 1     lamoTRIgine (LAMICTAL) 25 MG tablet Take 4 tablets (100 mg) by mouth daily 120  tablet 1     LORazepam (ATIVAN) 0.5 MG tablet Take 0.5 mg by mouth every 6 hours as needed for anxiety       norgestim-eth estrad triphasic (ORTHO TRI-CYCLEN LO) 0.18/0.215/0.25 MG-25 MCG tablet Take 1 tablet by mouth daily 84 tablet 4     Nutritional Supplements (NUTRITIONAL SUPPLEMENT PO) l-methyl folate  Zinc selenium  Hair skin and nails vitamin       predniSONE (DELTASONE) 50 MG tablet Take 1 tablet (50 mg) by mouth daily Take with food; do not take other anti-inflammatories while on prednisone 5 tablet 0     propranolol (INDERAL) 10 MG tablet        spironolactone (ALDACTONE) 50 MG tablet         Allergies   Allergen Reactions     Amphotericin B      Phenylbutazones      Penicillin G Nausea and Rash      ROS:   Gen- no fevers/chills   Neuro - see HPI  Remainder of ROS negative.     Exam:         Again, thank you for allowing me to participate in the care of your patient.      Sincerely,    Bienvenido Deleon MD

## 2022-08-15 ENCOUNTER — THERAPY VISIT (OUTPATIENT)
Dept: OCCUPATIONAL THERAPY | Facility: CLINIC | Age: 42
End: 2022-08-15
Payer: COMMERCIAL

## 2022-08-15 DIAGNOSIS — M25.531 PAIN IN BOTH WRISTS: Primary | ICD-10-CM

## 2022-08-15 DIAGNOSIS — F31.81 BIPOLAR 2 DISORDER (H): ICD-10-CM

## 2022-08-15 DIAGNOSIS — M25.532 PAIN IN BOTH WRISTS: Primary | ICD-10-CM

## 2022-08-15 PROCEDURE — 97110 THERAPEUTIC EXERCISES: CPT | Mod: GO | Performed by: OCCUPATIONAL THERAPIST

## 2022-08-15 PROCEDURE — 97535 SELF CARE MNGMENT TRAINING: CPT | Mod: GO | Performed by: OCCUPATIONAL THERAPIST

## 2022-08-15 RX ORDER — LAMOTRIGINE 25 MG/1
TABLET ORAL
Qty: 60 TABLET | Refills: 0 | Status: SHIPPED | OUTPATIENT
Start: 2022-08-15 | End: 2022-09-01

## 2022-08-15 RX ORDER — LAMOTRIGINE 100 MG/1
100 TABLET ORAL DAILY
Qty: 30 TABLET | Refills: 0 | Status: SHIPPED | OUTPATIENT
Start: 2022-08-15 | End: 2022-09-01

## 2022-08-15 NOTE — TELEPHONE ENCOUNTER
Writer called the patient at [phone] and left a DVM asking for a CB to verify what dose of  lamoTRIgine (LAMICTAL) the patient is currently taking. Clinic line [phone] left as CB number.Yuliya Miguel LPN

## 2022-08-15 NOTE — PROGRESS NOTES
"SOAP note information for 8/15/2022.  Please refer to the daily flowsheet for treatment today, total treatment time and time spent performing 1:1 timed codes.       Diagnosis: B wrist flexor tendinitis; B DeQuervain's tenosynovitis  DOI: 6/13/22    Precautions: none    Subjective:    Saw chiro. Also saw massage therapist on weekends.  Pt notes there was a day next week felt very good. When the fingers curl up and there is less pain that's a good day.     Notes that the arms do not like pulling - ie pulling sheets off bed. Knowing boundaries a bit more.  Lifting - watering can - not full but did it several times.  Today - sore to radial wrists. Has been able to make the bed but loosely.   Work has been busier. Not working at home - not fully set up yet.    Objective:  Pain Level (Scale 0-10):   7/6/2022 8/4/2022      At Rest Mild to moderate mild    With Use \" Moderate to severe      Pain Description:  Date 7/6/2022 8/15/2022     Location B volar and radial wrists. Hands - dorsal side. Also volar wrists up into forearms, no at or across the elbow Mainly radial wrists today   Pain Quality When it first started, it was a lot of pain, now its more achy.    Frequency often      Pain is worst  varies. Usually better in the morning but yesterday it wasn't - had to ice    Exacerbated by Lift, pull    Relieved by Has been icing 3 times a day. Has been using the B ottobock splints most of the time when awake.  Took some advil - may have helped. Mainly using voltaren at this time and activity modifications   Progression After making the bed it got worse. It became tighter and carrying became different. Even having to use 2 hands to pour, and hold a water glass.      Sensation   WNL throughout all nerve distributions; per patient report    Edema   None visible    ROM  Thumbs: WFL, some     Wrists  7/6/2022  AROM is WFL, but some soreness, pulling to ulnar distal FA, radial wrists, dorsal FA - mainly with UD, ext, " "supination    Resisted testing with mild pressure 7/6/2022   7/6/2022 8/15/2022      Right Left R   Wrist ext +Feels it to the volar distal FA - + Feels it to the volar distal FA   Wrist flexion - Feels it to volar ulnar side    EDC +Feels it volarly     FDS +Feels it dorsally/radial to hand/wrist     EPB + NT + Feels it to the volar distal FA   Pain Report:    + mild    ++ moderate    +++ severe     Palpation  Tenderness / pain Report: - none  + mild    ++ moderate    +++ severe      7/6/2022 7/6/2022   Location Right Left   Flexor wad / MEP Sore, less of a delay Sore, some delay   Extensor wad / LEP \" \"   Biceps - distally     Volar FA     Dorsal FA                Home Program:   Pain mgmt  icing prn  Voltaren  Compression sleeves   Massage therapy  KT - inhibition along volar FA, to the flexor tendons.  Guasha tool for STM    Orthoses  Bilateral OttoBock orthoses, extra small - daytime - typing  Right custom volar wrist splint - wear at night, day if helpful    Adaptations   has a rolling cart she can use from car<> home.  Help from mom and aunt, and hired help coming for home work setup.  Getting help to set up home work station.    Other  Working with chiro  Working at work - better setup. Wears blue braces  Has a vertical mouse  PT set up for cervical tx    Exercises  See PTRx 8/15 - shoulder ER/scap retrac  Gentle stretching and nerve gliding did not go well on 7/20    Next:   check symptoms, progress exercise/strengthening as indicated  Radial wrists, tightness    "

## 2022-08-15 NOTE — TELEPHONE ENCOUNTER
Last seen: 6/13/22  RTC: 1 month  Cancel: 0  No-show: 0  Next appt: 0  - needs appt with Dr. Garcia - Scheduling attempting to reach patient     Incoming refill from patient    Medication requested:   lamoTRIgine (LAMICTAL) 100 MG tablet  lamoTRIgine (LAMICTAL) 25 MG tablet     From chart note:  Increase Lamotrigine to 125 mg x 2 weeks and then increase to 150 mg daily     Medication sent to provider for review - outside return to clinic window    Writer confirmed with pharmacy that patient has been on schedule with refills    Noted for patient to schedule appt for further refills

## 2022-08-16 ENCOUNTER — PRE VISIT (OUTPATIENT)
Dept: GASTROENTEROLOGY | Facility: CLINIC | Age: 42
End: 2022-08-16

## 2022-08-16 NOTE — TELEPHONE ENCOUNTER
Writer LVM for patient to call the clinic to:    Confirm that rx has been sent to pharmacy, however please call the clinic to check in on current dose    Schedule initial appt with Dr. Garcia.

## 2022-08-17 ENCOUNTER — MYC MEDICAL ADVICE (OUTPATIENT)
Dept: PSYCHIATRY | Facility: CLINIC | Age: 42
End: 2022-08-17

## 2022-08-17 NOTE — TELEPHONE ENCOUNTER
Patient is now on Dr. Garcia's calendar for 9/1/22.  Writer sent [employer] message asking patient to reply with current lamictal dose.

## 2022-08-17 NOTE — CONFIDENTIAL NOTE
Patient replied via [employer] indicating she is currently taking 125 mg Lamictal.  Writer instructed patient to continue at this dose until her appointment with Dr. Garcia on 9/1/22.      Patient instructed to call or mychart the clinic with any questions.

## 2022-08-19 NOTE — PROGRESS NOTES
[facility] Initial Evaluation    Subjective:  Artie Diez is a 41 year old female with a cervical condition. Mechanism of injury: Unknown cause - possibly from poor computer ergonomics and/or lifting heavy bags while gardening. Where: (home, work, MVA, community, recreation/sport, unknown, other): NA. Onset of symptoms: 7/1/22, PT order date: 8/8/22. Location of symptoms: bilateral forearms, bilateral wrists, bilateral all fingers. Pain level on number scale: 4/10. Quality of pain: pulling. Associated symptoms: weakness, numbness, tingling. Pain frequency (constant/intermittent): constant. Symptoms are exacerbated by: carrying, lifting, pulling, reading, working, typing on computer, cleaning house, emptying , gardening, cervical rotation, cervical extension and flexion. Symptoms are relieved by: wearing wrist brace, voltaren cream, ice. Progression of symptoms since onset (same/better/worse): better. Special tests (x-ray, MRI, CT scan, EMG, bone scan): MRI. Previous treatment: massage therapy. Improvement with previous treatment: yes. General health as reported by patient is good. Pertinent medical history includes:  see Epic. Medical allergies includes: see Epic. Surgical history includes: see Epic. Current medications include: see Epic. Occupation:  at [employer] [place]. Patient is (working in normal job without restrictions/working in normal job with restrictions/working in an alternate job/not working due to present treatment problem): working in normal job. Primary job tasks: computer work. Barriers at home/work: None reported by patient. Red flags: None reported by patient.    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 tasks: computer work    Objective  Posture     Sitting (good/fair/poor): fair  Standing (good/fair/poor):  fair  Protruded head (yes/no):  no  Wry neck (right/left/nil):  nil  Relevant wry neck (yes/no):  no  Correction of posture (better/worse/no effect): better    Neurological    Myotomes L R   C4 (shoulder elevation) 5/5 5/5   C5 (shoulder abduction) 5/5 5/5   C6 (elbow flexion) 5/5 5/5   C7 (elbow extension) 5/5 5/5   C8 (thumb extension) 5-/5 5/5   T1 (finger add/abd) 5/5 5-/5     Sensory Deficit, Reflexes, Dural Signs: cervical dermatomes WNL    Cervical Movement Loss José Luis Mod Min Nil Pain   Protrusion    x +left wrist and all fingers, forearm   Flexion    x +bilateral forearms and wrist   Retraction   x  +bilateral wrist and hands   Extension   x  +bilateral forearms and thumb   Rotation Left   x  +right forearm and wrist   Rotation Right   x  +left forearm and hand   Lateral Flexion Right   x  +left wrist   Lateral Flexion Left   x  +right wrist     Assessment/Plan:    Patient is a 41 year old female with cervical complaints.    Patient has the following significant findings with corresponding treatment plan.                Diagnosis 1:  Neck pain with bilateral radiculopathy  Pain -  manual therapy, self management, education, directional preference exercise and home program  Decreased ROM/flexibility - manual therapy and therapeutic exercise  Decreased joint mobility - manual therapy and therapeutic exercise  Decreased strength - therapeutic exercise and therapeutic activities  Impaired muscle performance - neuro re-education  Decreased function - therapeutic activities  Impaired posture - neuro re-education    Previous and current functional limitations:  (See Goal Flow Sheet for this information)    Short term and Long term goals: (See Goal Flow Sheet for this information)     Communication  ability:  Patient appears to be able to clearly communicate and understand verbal and written communication and follow directions correctly.  Treatment Explanation - The following has been discussed with the patient:   RX ordered/plan of care  Anticipated outcomes  Possible risks and side effects  This patient would benefit from PT intervention to resume normal activities.   Rehab potential is good.    Frequency:  1 X week, once daily  Duration:  for 8 weeks  Discharge Plan:  Achieve all LTG.  Independent in home treatment program.  Reach maximal therapeutic benefit.    Please refer to the daily flowsheet for treatment today, total treatment time and time spent performing 1:1 timed codes.

## 2022-08-22 ENCOUNTER — THERAPY VISIT (OUTPATIENT)
Dept: PHYSICAL THERAPY | Facility: CLINIC | Age: 42
End: 2022-08-22
Attending: FAMILY MEDICINE
Payer: COMMERCIAL

## 2022-08-22 DIAGNOSIS — M54.2 NECK PAIN: ICD-10-CM

## 2022-08-22 DIAGNOSIS — R20.0 NUMBNESS AND TINGLING IN BOTH HANDS: ICD-10-CM

## 2022-08-22 DIAGNOSIS — R20.2 NUMBNESS AND TINGLING IN BOTH HANDS: ICD-10-CM

## 2022-08-22 DIAGNOSIS — R29.898 BILATERAL ARM WEAKNESS: ICD-10-CM

## 2022-08-22 PROCEDURE — 97161 PT EVAL LOW COMPLEX 20 MIN: CPT | Mod: GP | Performed by: PHYSICAL THERAPIST

## 2022-08-22 PROCEDURE — 97530 THERAPEUTIC ACTIVITIES: CPT | Mod: GP | Performed by: PHYSICAL THERAPIST

## 2022-08-22 PROCEDURE — 97110 THERAPEUTIC EXERCISES: CPT | Mod: GP | Performed by: PHYSICAL THERAPIST

## 2022-08-23 ENCOUNTER — THERAPY VISIT (OUTPATIENT)
Dept: OCCUPATIONAL THERAPY | Facility: CLINIC | Age: 42
End: 2022-08-23
Payer: COMMERCIAL

## 2022-08-23 DIAGNOSIS — M25.531 PAIN IN BOTH WRISTS: Primary | ICD-10-CM

## 2022-08-23 DIAGNOSIS — M25.532 PAIN IN BOTH WRISTS: Primary | ICD-10-CM

## 2022-08-23 PROCEDURE — 97535 SELF CARE MNGMENT TRAINING: CPT | Mod: GO | Performed by: OCCUPATIONAL THERAPIST

## 2022-08-23 PROCEDURE — 97140 MANUAL THERAPY 1/> REGIONS: CPT | Mod: GO | Performed by: OCCUPATIONAL THERAPIST

## 2022-08-23 NOTE — PROGRESS NOTES
"SOAP note information for 8/23/2022.  Please refer to the daily flowsheet for treatment today, total treatment time and time spent performing 1:1 timed codes.       Diagnosis: B wrist flexor tendinitis; B DeQuervain's tenosynovitis  DOI: 6/13/22    Precautions: none    Subjective:  Hasn't been using an icepack for a while but did yesterday, after PT and after taking care of things after burning something in the kitchen.  Used voltaren quite a bit last weekend.  Fingers curling last night, that's when things are irritable.  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 He does work on the arms, L gets more sore than the R. When he works on the L FA its one of the more painful areas of the whole body.    Objective:  Pain Level (Scale 0-10):   7/6/2022 8/4/2022      At Rest Mild to moderate mild    With Use \" Moderate to severe      Pain Description:  Date 7/6/2022 8/15/2022   8/23/2022     Location B volar and radial wrists. Hands - dorsal side. Also volar wrists up into forearms, no at or across the elbow Mainly radial wrists today B FA, dorsal>volar, L>R   Pain Quality When it first started, it was a lot of pain, now its more achy.  Sore to touch, achy, may be an advil kind of a day.   Frequency often       Pain is worst  varies. Usually better in the morning but yesterday it wasn't - had to ice     Exacerbated by Lift, pull     Relieved by Has been icing 3 times a day. Has been using the B ottobock splints most of the time when awake.  Took some advil - may have helped. Mainly using voltaren at this time and activity modifications    Progression After making the bed it got worse. It became tighter and carrying became different. Even having to use 2 hands to pour, and hold a water glass.       Sensation   WNL throughout all nerve distributions; per patient report    Edema   None visible    ROM  Thumbs: WFL, some     Wrists  7/6/2022  AROM is WFL, but some soreness, pulling to ulnar distal FA, radial wrists, dorsal FA - " "mainly with UD, ext, supination    Resisted testing with mild pressure 7/6/2022   7/6/2022 8/15/2022      Right Left R   Wrist ext +Feels it to the volar distal FA - + Feels it to the volar distal FA   Wrist flexion - Feels it to volar ulnar side    EDC +Feels it volarly     FDS +Feels it dorsally/radial to hand/wrist     EPB + NT + Feels it to the volar distal FA   Pain Report:    + mild    ++ moderate    +++ severe     Palpation  Tenderness / pain Report: - none  + mild    ++ moderate    +++ severe      7/6/2022 7/6/2022 8/23/2022      Location Right Left R L   Flexor wad / MEP Sore, less of a delay Sore, some delay     Extensor wad / LEP \" \"     Biceps - distally       Volar FA       Dorsal FA   myofascial restrictions noted - mid to distal forearm  L>R myofascial restrictions noted - mid to distal forearm              Home Program:   (* indicates most helpful things to date)  Pain mgmt  *icing prn  *Voltaren  Compression sleeves   Massage therapy  KT - inhibition along volar FA, to the flexor tendons.  Guasha tool for STM    Orthoses  Bilateral OttoBock orthoses, extra small - daytime - typing  Right custom volar wrist splint - wear at night, day if helpful    Adaptations   has a rolling cart she can use from car<> home.  Help from mom and aunt, and hired help coming for home work setup.  Getting help to set up home work station.    Other  Working with chiro, had assessment by PT  *Working at work - better setup. Wears blue braces  Has a vertical mouse    Exercises  See PTRx 8/15 - shoulder ER/scap retrac  8/23/2022  Gentle stretching and nerve gliding did not go well so far    Next:   Take a break for now from hand therapy, continue with other aspects of tx  progress to some gentle strengthening  Would like to get back to a point where working at home is feasible                "

## 2022-08-29 ASSESSMENT — ANXIETY QUESTIONNAIRES
GAD7 TOTAL SCORE: 16
5. BEING SO RESTLESS THAT IT IS HARD TO SIT STILL: SEVERAL DAYS
IF YOU CHECKED OFF ANY PROBLEMS ON THIS QUESTIONNAIRE, HOW DIFFICULT HAVE THESE PROBLEMS MADE IT FOR YOU TO DO YOUR WORK, TAKE CARE OF THINGS AT HOME, OR GET ALONG WITH OTHER PEOPLE: VERY DIFFICULT
3. WORRYING TOO MUCH ABOUT DIFFERENT THINGS: NEARLY EVERY DAY
GAD7 TOTAL SCORE: 16
6. BECOMING EASILY ANNOYED OR IRRITABLE: MORE THAN HALF THE DAYS
4. TROUBLE RELAXING: NEARLY EVERY DAY
GAD7 TOTAL SCORE: 16
1. FEELING NERVOUS, ANXIOUS, OR ON EDGE: NEARLY EVERY DAY
7. FEELING AFRAID AS IF SOMETHING AWFUL MIGHT HAPPEN: SEVERAL DAYS
8. IF YOU CHECKED OFF ANY PROBLEMS, HOW DIFFICULT HAVE THESE MADE IT FOR YOU TO DO YOUR WORK, TAKE CARE OF THINGS AT HOME, OR GET ALONG WITH OTHER PEOPLE?: VERY DIFFICULT
7. FEELING AFRAID AS IF SOMETHING AWFUL MIGHT HAPPEN: SEVERAL DAYS
2. NOT BEING ABLE TO STOP OR CONTROL WORRYING: NEARLY EVERY DAY

## 2022-08-31 NOTE — PROGRESS NOTES
VIDEO VISIT  Artie Diez is a 41 year old patient who is being evaluated via a billable video visit.      How would you like to obtain your AVS?: MyChart  AVS SmartPhrase [PsychAVS] has been placed in 'Patient Instructions': Yes      Video- Visit Details  Type of service:  video visit for diagnostic assessment  Time of service:    Start Time:  8:54 AM    End Time:  9:57 AM    Originating Site (patient location):  [facility] [place]   Location- Patient's home  Distant Site (provider location):  [facility]  Mode of Communication:  Video Conference via [facility]  Psychiatry Clinic  TRANSFER of CARE DIAGNOSTIC ASSESSMENT     CARE TEAM:  PCP- Ran Cabral, Psychotherapist- Evangel[place] ([place] [facility]), None  Artie Diez is a 41 year old who uses the name Artie and pronouns she, her, hers.       DIAGNOSIS   Bipolar II Disorder, rapid cycling, currently hypomanic r/o mixed episode  History of EAMON      ASSESSMENT   Artie reported feeling in a hypomanic state currently. Notably sleep is intact, but increased irritability, racing thoughts, faster speech, greater productivity at work. Some thoughts of death, but no rylie suicidality. No high risk or impulsive behavior. Some excessive guilt, but this appears to be her baseline, not an episodic symptom. She takes ativan 0.5 mg twice per week for control of anxious thoughts before bed that keep her up. Last prescription was from October of last year. She reports self-discontinuing lunesta.    She was hoping to increase lamotrigine today to address hypomania. Clarified that typically depakote would be more effective in slowing hypomania, but she preferred lamotrigine, which was previously recommended by Dr. Spear. Depakote can increase the level of lamotrigine, so she was agreeable to do a small increase, with another level drawn once she reaches steady state. Last level was 4.4 in June. Discussed risk of SJS, and she  "verbalized understanding that she needs to seek emergency care if she develops a rash.  If symptoms do not improve with regimen of mood stabilizers, consider augmentation with \"atypical antipsychotic.\"    Discussed warning signs of worsening paula, and discussed safety plan, including her calling the clinic if any symptoms are worsening.      [place]:  Indicates prescribed medications as per epic records.      PLAN                                                                                                                 1) Meds-  - Increase Lamotrigine to 175 mg daily.  - Depakote ER - 250 mg   - Gabapentin- 100 mg bid  - discontinue Lunesta   - Ativan 0.5 mg at bedtime/prn (2-3 times a week)  - Propranolol 10 mg tid /prn- for anxiety (1-3 times per day)     PCP-   Spironolactone- 50 mg bid- for hair loss  L-Methylfolate     2) Psychotherapy- Individual therapy weekly, works on DBT skills.      3) Next due-  Labs-   EKG-  As indicated  Rating scales- PHQ9     4) Referrals-  None       5) Dispo- RTC in 3 weeks      PERTINENT BACKGROUND                           [most recent eval 12/07/21]   Dx depression age 14. Started various medication without working diagnosis in late 20's, had been resistant to medication in past, with multiple somatic symptoms, multiple diagnoses in past. Official diagnosis made about 3 years ago by mood disorder specialist in [place], done as second opinion as struggling with approach of first psychiatrist.   Has been on the current combination of medications since saw specialist, and per specialist, if have this type of condition, cannot be on antidepressants. She is prone to side effects with medications and sometimes, its hard for her to manage her medications due to the side effects, so her doctor was keeping her meds at a low dosage. Sleep is on and off. She was actively involved in CBT and DBT while in [place]     Psych pertinent item history includes suicide attempt " "[xseveral overdoses as a teenager], SIB [mainly in her teens/20's] and mutiple psychotropic trials         SUBJECTIVE       -Dr. Spear recommended 200 mg minimum of lamictal, and this is her target dose. Irritated that Dr. Spear is no longer with the clinic.  -Stressful past several months. Works at the [employer] Working to transfer out of a toxic environment.  -Feeling hypomanic, irritable. Racing thoughts. Wants to get angry and yell at someone, but hasn't gone there yet.  -Has thoughts of death, but no active SI: \"I wouldn't be mad if I wasn't around, but I don't want to hurt myself.\"  -Has an arm injury, working with OT/PT. Has re-injured herself in the past couple weeks.  -Takes ativan PRN for sleep. Has taken this twice this past week. Also just passing out from exhaustion this week, like hitting a wall. Gets 6 hours per night for years.  -Working on getting better exercise. Appetite had been a little better, but now is lower with this period of stress.  -Dad seems to be developing dementia, which is hugely stressful. Siblings are not bearing witness to this.  -Stressed by [place] non-communicativeness and racism. Feels that [place] was a better fit in terms of regional personality.  -Friend's sister  by suicide 4 months ago. Has been connecting with this friend more.      Recent Social History: see below    Recent Psych Symptoms:   Depression:  appetite changes, excessive guilt and psychomotor changes [agitation]  Elevated:  increased energy, increased activity, racing thoughts, pressured speech and dysregulation  Psychosis:  none  Anxiety:  None  Trauma Related:  angry outbursts and dysregulation      Recent Substance Use:     -alcohol: No   -cannabis: No   -tobacco: No  -caffeine:  Yes: 1-2 cups per day   -opioids: No   Narcan Kit currrent: No   -other: none    Pertinent Negatives: No suicidal or violent ideation, self-injury, psychosis, hallucinations, delusions, aggression and harmful substance use  Adverse " "Effects: none reported     FAMILY and SOCIAL HISTORY                                 pt reported     Family Hx: Adopted, Has a twin sister \"She's an unkind person, and I think that's how her mental health expresses itself.\"     Social Hx:  Financial/ Work- working , [employer]   Partner/ - Single \"Men create a lot of anxiety, I think I have attachment issues.\"  Children- None      Living situation- lives alone   Social/ Spiritual Support- Parents, mostly mom     Feels Safe at Home- yes   Legal- None     Trauma History (self-report)- yes, abandoned at age 3 and 1/2 years   Early History/Education- Adopted from [place] with her Twin sister, they were abandoned by their bio mom, she has an older sister who was also adopted from [place] as an infant. She grew up in [place], has a Masters in Communications, older sister adopted 10 years before them       PSYCHIATRIC HISTORY      SIB- yes, started cutting SIB in , in her early 20's while in college   Suicide Attempt [#, most recent]- yes, 2 over doses as a teenager (Over dose on meds a few times in HS)  Suicidal Ideation Hx- yes     Violence/Aggression Hx- None  Psychosis Hx- None  Eating Disorder Hx- None  Other- None     Psych Hosp [#, most recent]- None  Commitment- None  ECT- None  Outpatient Programs - One outpatient program in 2001  Other - DBT x 2years ([id number])      PAST MED TRIALS            New Antidepressants:  Paxil (paroxetine) and Wellbutrin  (bupropion)  Mood Stabilizers:  Lithium Carbonate and Depakote and Depakot ER (valproate)  Stimulants / ADHD Meds:  Adderall (amphetamine salts), Ritalin (methylphenidate) and Strattera (atomoxetine)  Tranquilizers:  Klonopin (clonazepam) and Buspar (buspirone)-  It caused Akathisia  Paxil- low dose, further bad side effects on higher doses, night sweats  Pristiq- Ok, low dose, stepping on Akathisia     Tends not to tolerate most medications, c/o GI symptoms and HA       SUBSTANCE USE HISTORY      Past " Use- none reported  Treatment- #, most recent- N/A  Medical Consequences- N/A  Legal Consequences- N/A  Other- N/A       MEDICAL HISTORY and ALLERGY     ALLERGIES: Amphotericin b, Phenylbutazones, and Penicillin g    Patient Active Problem List   Diagnosis     Heavy menses     CARDIOVASCULAR SCREENING; LDL GOAL LESS THAN 160     Major depressive disorder, recurrent episode, moderate (H)     Allergic rhinitis     Anxiety state     Attention deficit disorder     Bipolar I disorder (H)     Dysmenorrhea     Glaucoma suspect     Myopia     Pain in both wrists     Neck pain        MEDICAL REVIEW OF SYSTEMS   Contraception-  Yes   Pregnant- No  A comprehensive review of systems was performed and is negative other than noted in the HPI.     MEDICATIONS     Current Outpatient Medications   Medication Sig Dispense Refill     divalproex sodium extended-release (DEPAKOTE ER) 250 MG 24 hr tablet Take 1 tablet (250 mg) by mouth daily 90 tablet 0     gabapentin (NEURONTIN) 100 MG capsule Take 1 capsule (100 mg) by mouth daily 90 capsule 0     lamoTRIgine (LAMICTAL) 100 MG tablet Take 1 tablet (100 mg) by mouth daily Take along with two 25 mg tablets for a combined total of 150 mg.  +++Patient needs to schedule appt for further refills+++ Please do not start taking this medication if you missed 3 or more days of lamotrigine before this refill. Please call the clinic immediatly. 30 tablet 0     lamoTRIgine (LAMICTAL) 25 MG tablet Take two tablets (50 mg) by mouth daily.Take along with one 100 mg tablet for a combined total of 150 mg.  +++Patient needs to schedule appt for further refills.+++ 60 tablet 0     LORazepam (ATIVAN) 0.5 MG tablet Take 0.5 mg by mouth every 6 hours as needed for anxiety       norgestim-eth estrad triphasic (ORTHO TRI-CYCLEN LO) 0.18/0.215/0.25 MG-25 MCG tablet Take 1 tablet by mouth daily 84 tablet 4     Nutritional Supplements (NUTRITIONAL SUPPLEMENT PO) l-methyl folate  Zinc selenium  Hair skin and nails  vitamin       predniSONE (DELTASONE) 50 MG tablet Take 1 tablet (50 mg) by mouth daily Take with food; do not take other anti-inflammatories while on prednisone 5 tablet 0     propranolol (INDERAL) 10 MG tablet        spironolactone (ALDACTONE) 50 MG tablet         VITALS   There were no vitals taken for this visit.    MENTAL STATUS EXAM     Alertness: alert  and oriented  Appearance: well groomed  Behavior/Demeanor: cooperative and pleasant, with good  eye contact   Speech: increased rate  Language: intact  Psychomotor: normal or unremarkable  Mood: irritable  Affect: full range and labile; congruent to: mood- yes, content- yes  Thought Process/Associations: circumstantial  Thought Content:  Reports suicidal ideation;  Denies violent ideation and delusions   Perception:  Reports none;  Denies auditory hallucinations and visual hallucinations  Insight: good  Judgment: good  Cognition: does  appear grossly intact; formal cognitive testing was not done  Gait and Station: N/A ([facility])     LABS and DATA     PHQ 12/8/2021 1/3/2022 5/23/2022   PHQ-9 Total Score 24 23 18   Q9: Thoughts of better off dead/self-harm past 2 weeks More than half the days More than half the days Not at all       Recent Labs   Lab Test 11/24/21  1543 10/30/20  1200   CR 0.79 0.79   GFRESTIMATED >90 >90     Recent Labs   Lab Test 06/17/22  1617 11/24/21  1543   AST 10 11   ALT 11 15   ALKPHOS 56 49          PSYCHOTROPIC DRUG INTERACTIONS                                                       PSYCHCLINICDDI   DEPAKOTE & LAMICTAL- Valproate Products may enhance the adverse/toxic effect of LamoTRIgine. Valproate Products may increase the serum concentration of LamoTRIgine. Severity Major     ATIVAN, GABAPENTIN, DEPAKOTE, LAMICTAL- CNS Depressants may enhance the adverse/toxic effect of other CNS Depressants   LAMICTAL & ORTHO TRI-CYCLEN LO- Estrogen Derivatives (Contraceptive) may decrease the serum concentration of LamoTRIgine          MANAGEMENT:  Monitoring for adverse effects, routine labs, using lowest therapeutic dose of [Depakote & Lamictal] and patient is aware of risks     RISK STATEMENT for SAFETY     Artie endorsed suicidal ideation. SUICIDE RISK ASSESSMENT-  Risk factors for self-harm: previous suicide attempt, recent symptom worsening, lives alone/ isolated and work stress.  Mitigating factors: no plan or intent, describes a safety plan, h/o seeking help , minimal substance use , future oriented, commitment to family, stable housing and stable finances.  The patient does not appear to be at imminent risk for self-harm, hospitalization is not recommended which the pt does  agree to. No hospitalization will be arranged. Based on degree of symptoms therapy and close psych follow-up was/were recommended which the pt does  agree to. Additional steps to minimize risk: med changes.    TREATMENT RISK STATEMENT: The risks, benefits, alternatives and potential adverse effects have been discussed and are understood by the pt. The pt understands the risks of using street drugs or alcohol. There are no medical contraindications, the pt agrees to treatment with the ability to do so. The pt knows to call the clinic for any problems or to access emergency care if needed.  Medical and substance use concerns are documented above.  Psychotropic drug interaction check was done, including changes made today.     PROVIDER: Toan Garcia MD    Patient not staffed in clinic.  Note will be reviewed and signed by supervisor Dr. Barron.

## 2022-09-01 ENCOUNTER — VIRTUAL VISIT (OUTPATIENT)
Dept: PSYCHIATRY | Facility: CLINIC | Age: 42
End: 2022-09-01
Attending: PSYCHIATRY & NEUROLOGY
Payer: COMMERCIAL

## 2022-09-01 DIAGNOSIS — F31.81 BIPOLAR 2 DISORDER (H): Primary | ICD-10-CM

## 2022-09-01 PROCEDURE — 90792 PSYCH DIAG EVAL W/MED SRVCS: CPT | Mod: GT | Performed by: STUDENT IN AN ORGANIZED HEALTH CARE EDUCATION/TRAINING PROGRAM

## 2022-09-01 RX ORDER — GABAPENTIN 100 MG/1
100 CAPSULE ORAL DAILY
Qty: 90 CAPSULE | Refills: 0 | Status: SHIPPED | OUTPATIENT
Start: 2022-09-01 | End: 2022-09-29

## 2022-09-01 RX ORDER — LAMOTRIGINE 100 MG/1
100 TABLET ORAL DAILY
Qty: 30 TABLET | Refills: 0 | Status: SHIPPED | OUTPATIENT
Start: 2022-09-01 | End: 2022-09-29

## 2022-09-01 RX ORDER — DIVALPROEX SODIUM 250 MG/1
250 TABLET, EXTENDED RELEASE ORAL DAILY
Qty: 90 TABLET | Refills: 0 | Status: SHIPPED | OUTPATIENT
Start: 2022-09-01 | End: 2022-09-29

## 2022-09-01 RX ORDER — LAMOTRIGINE 25 MG/1
TABLET ORAL
Qty: 90 TABLET | Refills: 0 | Status: SHIPPED | OUTPATIENT
Start: 2022-09-01 | End: 2022-09-27

## 2022-09-01 ASSESSMENT — PATIENT HEALTH QUESTIONNAIRE - PHQ9
SUM OF ALL RESPONSES TO PHQ QUESTIONS 1-9: 20
SUM OF ALL RESPONSES TO PHQ QUESTIONS 1-9: 20
10. IF YOU CHECKED OFF ANY PROBLEMS, HOW DIFFICULT HAVE THESE PROBLEMS MADE IT FOR YOU TO DO YOUR WORK, TAKE CARE OF THINGS AT HOME, OR GET ALONG WITH OTHER PEOPLE: VERY DIFFICULT

## 2022-09-01 NOTE — PATIENT INSTRUCTIONS
It was tyrone meeting you today.  -Increase lamictal to 175 mg, go to ED if rash develops  -Reach out to clinic if symptoms worsen or fail to improve.    **For crisis resources, please see the information at the end of this document**   Patient Education    Thank you for coming to the [facility].     Lab Testing:  If you had lab testing today and your results are reassuring or normal they will be mailed to you or sent through [employer] within 7 days. If the lab tests need quick action we will call you with the results. The phone number we will call with results is # [phone]. If this is not the best number please call our clinic and change the number.     Medication Refills:  If you need any refills please call your pharmacy and they will contact us. Our fax number for refills is [phone].   Three business days of notice are needed for general medication refill requests.   Five business days of notice are needed for controlled substance refill requests.   If you need to change to a different pharmacy, please contact the new pharmacy directly. The new pharmacy will help you get your medications transferred.     Contact Us:  Please call [phone] during business hours (8-5:00 M-F).   If you have medication related questions after clinic hours, or on the weekend, please call [phone].     Financial Assistance [phone]   Medical Records [phone]       MENTAL HEALTH CRISIS RESOURCES:  For a emergency help, please call 911 or go to the nearest Emergency Department.     Emergency Walk-In Options:   EmPATH Unit @ [facility] ([place]): [phone] - Specialized mental health emergency area designed to be calming  [facility] ([place]): [phone]  [facility] ([place]): [phone]  [facility] ([place]): [phone]    [facility] Crisis Information:   [place]: [phone]  [place]:  [phone]  [place] (COPE) - Adult: [phone]     Child: [phone]  [place] - Adult: [phone]     Child: [phone]  [place]: [phone]  List of all [facility] resources:   https://[place].gov/dhs/people-we-serve/adults/health-care/mental-health/resources/crisis-contacts.jsp    National Crisis Information:   Crisis Text Line: Text  [place]  to 593235  Suicide & Crisis Lifeline: 988  National Suicide Prevention Lifeline: 7-261-980-TALK (1-420.192.5617)       For online chat options, visit https://suicidepreventionlifeline.org/chat/  Poison Control Center: 1-840.796.6351  Trans Lifeline: 1-443.156.5951 - Hotline for transgender people of all ages  The [employer] Project: 3-211-219-2444 - Hotline for LGBT youth     For Non-Emergency Support:   Fast Tracker: Mental Health & Substance Use Disorder Resources -   https://www.[employer].org/

## 2022-09-02 RX ORDER — PROPRANOLOL HYDROCHLORIDE 10 MG/1
10 TABLET ORAL 3 TIMES DAILY PRN
Qty: 90 TABLET | Refills: 1 | Status: SHIPPED | OUTPATIENT
Start: 2022-09-02 | End: 2022-10-18

## 2022-09-06 ENCOUNTER — VIRTUAL VISIT (OUTPATIENT)
Dept: ORTHOPEDICS | Facility: CLINIC | Age: 42
End: 2022-09-06

## 2022-09-06 DIAGNOSIS — R29.898 BILATERAL ARM WEAKNESS: ICD-10-CM

## 2022-09-06 DIAGNOSIS — R20.2 NUMBNESS AND TINGLING IN BOTH HANDS: Primary | ICD-10-CM

## 2022-09-06 DIAGNOSIS — R20.0 NUMBNESS AND TINGLING IN BOTH HANDS: Primary | ICD-10-CM

## 2022-09-06 PROCEDURE — 99213 OFFICE O/P EST LOW 20 MIN: CPT | Mod: 95 | Performed by: FAMILY MEDICINE

## 2022-09-06 NOTE — LETTER
9/6/2022       RE: Artie Diez  [address]  [place] [address]     Dear Colleague,    Thank you for referring your patient, Artie Diez, to the [facility] SP[facility]IC[facility] [place] at [facility]. Please see a copy of my visit note below.    Call start: 9:35 AM  Call stop: 9:49 AM  Call duration: 14 minutes     ASSESSMENT/PLAN:    (R20.0,  R20.2) Numbness and tingling in both hands  (primary encounter diagnosis)  Comment: significant progress has definitely been made but has been up and down recently w/ flares; will check inflammatory labs and have her continue PT and massage; hold on chiro and OT; f/u call in 4 wks  Plan: Erythrocyte sedimentation rate auto, CRP         inflammation, Rheumatoid factor, Anti Nuclear         Prisca IgG by IFA with Reflex          (R29.898) Bilateral arm weakness  Comment: see above  Plan: Erythrocyte sedimentation rate auto, CRP         inflammation, Rheumatoid factor, Anti Nuclear         Prisca IgG by IFA with Reflex          Bienvenido Deleon MD  September 6, 2022  9:45 AM       Pt is a 41 year old female last seen on 8/8/22 via virtual visit evaluated today via telephone visit again for follow up of:     1) Numbness/tingling/ weakness in arms - had a few weeks w/o pain and very little pain  A little after she saw PT/OT she did well  Still some weakness  Few days when fingers curled on L side   Last week did a few things that aggravated it  Household chores -> scrubbing/ cleaning -> felt worse but recovered after a week   Weekend felt limited   Her mom came over and helped her -> changing sheets is particularly difficult  Yesterday was carrying a large container of water from a dehumidifier; used water on her plants to not waste the water  Immediately felt much worse. Could not make dinner or drive; last night started to feel better and this morning started to feel better  Planning to take a break from OT ->  unable to do a lot of exercises because they made pain so much worse  PT exercises are more manageable   Chiro -unsure if it is helping?   Big thing is massage is helping the most     Per OT note on 8/23/22:  Discussion of current function, and role of hand therapy at this time to help with return to function. Pt is doing well with modifications, and pacing. Hand therapy interventions over the past few visits have resulted in increased symptoms.  She would like to pursue other treatment options at this point, but return in about a month, and progress to functional strengthening when able.    Per my last note:  (R20.0,  R20.2) Numbness and tingling in both hands  (primary encounter diagnosis)  Comment: she is making slow progress and I do agree with her and her OT that her neck/ cervical radic is a component of her pain despite the clean c-spine MRI. Will have her do dedicated neck PT; she is safe to continue massage therapy and consult with a chiropractor.  We will touch base again via telephone call in 4 wks. Precautions/ anticipatory guidance given  Plan: Physical Therapy Referral          (R25.792) Bilateral arm weakness  Comment: see above  Plan: Physical Therapy Referral    Past Medical History:   Diagnosis Date     Depressive disorder, not elsewhere classified     Depression (non-psychotic)     Excessive or frequent menstruation     Heavy periods      Current Outpatient Medications   Medication Sig Dispense Refill     divalproex sodium extended-release (DEPAKOTE ER) 250 MG 24 hr tablet Take 1 tablet (250 mg) by mouth daily 90 tablet 0     gabapentin (NEURONTIN) 100 MG capsule Take 1 capsule (100 mg) by mouth daily 90 capsule 0     lamoTRIgine (LAMICTAL) 100 MG tablet Take 1 tablet (100 mg) by mouth daily Take along with 3 25 mg tablets for a combined total of 175 mg. 30 tablet 0     lamoTRIgine (LAMICTAL) 25 MG tablet Take 3 tablets (75 mg) by mouth daily. Take along with one 100 mg tablet for a combined total  of 175 mg. 90 tablet 0     LORazepam (ATIVAN) 0.5 MG tablet Take 0.5 mg by mouth every 6 hours as needed for anxiety       norgestim-eth estrad triphasic (ORTHO TRI-CYCLEN LO) 0.18/0.215/0.25 MG-25 MCG tablet Take 1 tablet by mouth daily 84 tablet 4     Nutritional Supplements (NUTRITIONAL SUPPLEMENT PO) l-methyl folate  Zinc selenium  Hair skin and nails vitamin (Patient not taking: Reported on 9/1/2022)       predniSONE (DELTASONE) 50 MG tablet Take 1 tablet (50 mg) by mouth daily Take with food; do not take other anti-inflammatories while on prednisone (Patient not taking: Reported on 9/1/2022) 5 tablet 0     propranolol (INDERAL) 10 MG tablet Take 1 tablet (10 mg) by mouth 3 times daily as needed (Anxiety) 90 tablet 1     spironolactone (ALDACTONE) 50 MG tablet         Allergies   Allergen Reactions     Amphotericin B      Phenylbutazones      Penicillin G Nausea and Rash      ROS:   Gen- no fevers/chills   Rheum - no morning stiffness   Derm - no rash/ redness   Neuro - see HPI  Remainder of ROS negative.     Exam:   There were no vitals taken for this visit.          Again, thank you for allowing me to participate in the care of your patient.      Sincerely,    Bienvenido Deleon MD

## 2022-09-06 NOTE — PROGRESS NOTES
Call start: 9:35 AM  Call stop: 9:49 AM  Call duration: 14 minutes     ASSESSMENT/PLAN:    (R20.0,  R20.2) Numbness and tingling in both hands  (primary encounter diagnosis)  Comment: significant progress has definitely been made but has been up and down recently w/ flares; will check inflammatory labs and have her continue PT and massage; hold on chiro and OT; f/u call in 4 wks  Plan: Erythrocyte sedimentation rate auto, CRP         inflammation, Rheumatoid factor, Anti Nuclear         Prisca IgG by IFA with Reflex          (R29.898) Bilateral arm weakness  Comment: see above  Plan: Erythrocyte sedimentation rate auto, CRP         inflammation, Rheumatoid factor, Anti Nuclear         Prisca IgG by IFA with Reflex          Bienvenido Deleon MD  September 6, 2022  9:45 AM       Pt is a 41 year old female last seen on 8/8/22 via virtual visit evaluated today via telephone visit again for follow up of:     1) Numbness/tingling/ weakness in arms - had a few weeks w/o pain and very little pain  A little after she saw PT/OT she did well  Still some weakness  Few days when fingers curled on L side   Last week did a few things that aggravated it  Household chores -> scrubbing/ cleaning -> felt worse but recovered after a week   Weekend felt limited   Her mom came over and helped her -> changing sheets is particularly difficult  Yesterday was carrying a large container of water from a dehumidifier; used water on her plants to not waste the water  Immediately felt much worse. Could not make dinner or drive; last night started to feel better and this morning started to feel better  Planning to take a break from OT -> unable to do a lot of exercises because they made pain so much worse  PT exercises are more manageable   Chiro -unsure if it is helping?   Big thing is massage is helping the most     Per OT note on 8/23/22:  Discussion of current function, and role of hand therapy at this time to help with return to function. Pt is doing  well with modifications, and pacing. Hand therapy interventions over the past few visits have resulted in increased symptoms.  She would like to pursue other treatment options at this point, but return in about a month, and progress to functional strengthening when able.    Per my last note:  (R20.0,  R20.2) Numbness and tingling in both hands  (primary encounter diagnosis)  Comment: she is making slow progress and I do agree with her and her OT that her neck/ cervical radic is a component of her pain despite the clean c-spine MRI. Will have her do dedicated neck PT; she is safe to continue massage therapy and consult with a chiropractor.  We will touch base again via telephone call in 4 wks. Precautions/ anticipatory guidance given  Plan: Physical Therapy Referral          (N81.318) Bilateral arm weakness  Comment: see above  Plan: Physical Therapy Referral    Past Medical History:   Diagnosis Date     Depressive disorder, not elsewhere classified     Depression (non-psychotic)     Excessive or frequent menstruation     Heavy periods      Current Outpatient Medications   Medication Sig Dispense Refill     divalproex sodium extended-release (DEPAKOTE ER) 250 MG 24 hr tablet Take 1 tablet (250 mg) by mouth daily 90 tablet 0     gabapentin (NEURONTIN) 100 MG capsule Take 1 capsule (100 mg) by mouth daily 90 capsule 0     lamoTRIgine (LAMICTAL) 100 MG tablet Take 1 tablet (100 mg) by mouth daily Take along with 3 25 mg tablets for a combined total of 175 mg. 30 tablet 0     lamoTRIgine (LAMICTAL) 25 MG tablet Take 3 tablets (75 mg) by mouth daily. Take along with one 100 mg tablet for a combined total of 175 mg. 90 tablet 0     LORazepam (ATIVAN) 0.5 MG tablet Take 0.5 mg by mouth every 6 hours as needed for anxiety       norgestim-eth estrad triphasic (ORTHO TRI-CYCLEN LO) 0.18/0.215/0.25 MG-25 MCG tablet Take 1 tablet by mouth daily 84 tablet 4     Nutritional Supplements (NUTRITIONAL SUPPLEMENT PO) l-methyl  folate  Zinc selenium  Hair skin and nails vitamin (Patient not taking: Reported on 9/1/2022)       predniSONE (DELTASONE) 50 MG tablet Take 1 tablet (50 mg) by mouth daily Take with food; do not take other anti-inflammatories while on prednisone (Patient not taking: Reported on 9/1/2022) 5 tablet 0     propranolol (INDERAL) 10 MG tablet Take 1 tablet (10 mg) by mouth 3 times daily as needed (Anxiety) 90 tablet 1     spironolactone (ALDACTONE) 50 MG tablet         Allergies   Allergen Reactions     Amphotericin B      Phenylbutazones      Penicillin G Nausea and Rash      ROS:   Gen- no fevers/chills   Rheum - no morning stiffness   Derm - no rash/ redness   Neuro - see HPI  Remainder of ROS negative.     Exam:   There were no vitals taken for this visit.

## 2022-09-15 DIAGNOSIS — F31.81 BIPOLAR 2 DISORDER (H): Primary | ICD-10-CM

## 2022-09-26 ENCOUNTER — TRANSFERRED RECORDS (OUTPATIENT)
Dept: HEALTH INFORMATION MANAGEMENT | Facility: CLINIC | Age: 42
End: 2022-09-26

## 2022-09-27 ENCOUNTER — MYC MEDICAL ADVICE (OUTPATIENT)
Dept: PSYCHIATRY | Facility: CLINIC | Age: 42
End: 2022-09-27

## 2022-09-27 ENCOUNTER — VIRTUAL VISIT (OUTPATIENT)
Dept: PSYCHIATRY | Facility: CLINIC | Age: 42
End: 2022-09-27
Attending: PSYCHIATRY & NEUROLOGY
Payer: COMMERCIAL

## 2022-09-27 DIAGNOSIS — F41.1 GAD (GENERALIZED ANXIETY DISORDER): ICD-10-CM

## 2022-09-27 DIAGNOSIS — F31.81 BIPOLAR 2 DISORDER (H): Primary | ICD-10-CM

## 2022-09-27 PROCEDURE — 99214 OFFICE O/P EST MOD 30 MIN: CPT | Mod: HN | Performed by: STUDENT IN AN ORGANIZED HEALTH CARE EDUCATION/TRAINING PROGRAM

## 2022-09-27 NOTE — PATIENT INSTRUCTIONS
**For crisis resources, please see the information at the end of this document**   Patient Education    Thank you for coming to the [facility] MENT[facility] HEALTH & [facility]DI[facility] [place] CLIN[facility].     Lab Testing:  If you had lab testing today and your results are reassuring or normal they will be mailed to you or sent through Aligo within 7 days. If the lab tests need quick action we will call you with the results. The phone number we will call with results is # [phone]. If this is not the best number please call our clinic and change the number.     Medication Refills:  If you need any refills please call your pharmacy and they will contact us. Our fax number for refills is [phone].   Three business days of notice are needed for general medication refill requests.   Five business days of notice are needed for controlled substance refill requests.   If you need to change to a different pharmacy, please contact the new pharmacy directly. The new pharmacy will help you get your medications transferred.     Contact Us:  Please call [phone] during business hours (8-5:00 M-F).   If you have medication related questions after clinic hours, or on the weekend, please call [phone].     Financial Assistance [phone]   Medical Records [phone]       MENTAL HEALTH CRISIS RESOURCES:  For a emergency help, please call 911 or go to the nearest Emergency Department.     Emergency Walk-In Options:   EmPATH Unit @ [facility] ([place]): [phone] - Specialized mental health emergency area designed to be calming  [facility] ([place]): [phone]  [facility] ([place]): [phone]  [facility]): [phone]    [facility] Crisis Information:   [place]: [phone]  [place]: [phone]  [place] ([place]) - Adult: [phone]     Child: [phone]  [place] - Adult: [phone]     Child: [phone]  [place]:  [phone]  List of all [facility] resources:   https://[place].gov/dhs/people-we-serve/adults/health-care/mental-health/resources/crisis-contacts.jsp    National Crisis Information:   Crisis Text Line: Text  [place]  to 352890  Suicide & Crisis Lifeline: 988  National Suicide Prevention Lifeline: 3-724-153-TALK (1-262.581.7727)       For online chat options, visit https://suicidepreventionlifeline.org/chat/  Poison Control Center: 6-998-811-8171  Trans Lifeline: 6-707-508-0338 - Hotline for transgender people of all ages  The [employer] Project: 3-124-213-8047 - Hotline for LGBT youth     For Non-Emergency Support:   Fast Tracker: Mental Health & Substance Use Disorder Resources -   https://www.Believe.inckWatticsn.org/

## 2022-09-27 NOTE — PROGRESS NOTES
Artie Diez is a 41 year old who has consented to receive services via billable video visit.      Pt will join video visit via: [place]  If there are problems joining the visit, send backup video invite via: Text to preferred phone: [phone]      Originating Location (patient location): Patient's home  Distant Location (provider location): [facility] MENTAL HEALTH & ADDI[facility] [place] [facility]    Will anyone else be joining the video visit? No

## 2022-09-27 NOTE — PROGRESS NOTES
VIDEO VISIT  Artie Diez is a 41 year old patient who is being evaluated via a billable video visit.      How would you like to obtain your AVS?: MyChart  AVS SmartPhrase [PsychAVS] has been placed in 'Patient Instructions': Yes      Video- Visit Details  Type of service:  video visit for diagnostic assessment  Time of service:    Start Time:  9:04 AM    End Time:  9:57 AM    Originating Site (patient location):  [facility]- [place]   Location- Patient's home  Distant Site (provider location):  [facility]  Mode of Communication:  Video Conference via [facility]  Psychiatry Clinic  TRANSFER of CARE DIAGNOSTIC ASSESSMENT     CARE TEAM:  PCP- Ran Cabral, Psychotherapist- [place] ([place] [facility]), None  Artie Diez is a 41 year old who uses the name Atrie and pronouns she, her, hers.       DIAGNOSIS   Bipolar II Disorder, rapid cycling, currently hypomanic r/o mixed episode  History of EAMON      ASSESSMENT   Artie reported feeling less hypomanic since our last visit.  Less irritable, and sleep is improved.  She reported that she was confused as to the dose she was taking last time we talked, and had been taking 125 mg daily.  After our last visit she increased it to 150.  Perhaps feels slightly more edgy with current dose of Lamictal, but feels like it must be helping.  She gets unpleasant withdrawal symptoms if she takes her Lamictal more than a few hours late.  Discussed increasing gabapentin to hopefully get off of Ativan, but she felt like she wanted to think about this change.  We also discussed perhaps being on a full dose of 1 med rather than low doses of several meds.  She reports that she has had bad side effects in the past, but would also consider this option.  For the time being, she wanted to maintain stability and keep things the same, which is very reasonable.    Assuming this hypomanic episode is receding, we will stay vigilant for emerging  depression.  Thankfully, suicidal ideation has resolved.    Discussed warning signs of worsening paula, and discussed safety plan, including her calling the clinic if any symptoms are worsening.      [place]:  Indicates prescribed medications as per epic records.      PLAN                                                                                                                 1) Meds-  - Continue lamotrigine to 150 mg daily.  - Depakote ER - 250 mg   - gabapentin- 100 mg bid  - Ativan 0.5 mg at bedtime/prn (2 times a week)  - propranolol 10 mg tid /prn- for anxiety (1-3 times per day)     PCP-   Spironolactone- 50 mg bid- for hair loss  L-Methylfolate     2) Psychotherapy- Individual therapy weekly, works on DBT skills.      3) Next due-  Labs-   EKG-  As indicated  Rating scales- PHQ9     4) Referrals-  None       5) Dispo- RTC in 3 weeks      PERTINENT BACKGROUND                           [most recent eval 12/07/21]   Dx depression age 14. Started various medication without working diagnosis in late 20's, had been resistant to medication in past, with multiple somatic symptoms, multiple diagnoses in past. Official diagnosis made about 3 years ago by mood disorder specialist in [place], done as second opinion as struggling with approach of first psychiatrist.   Has been on the current combination of medications since saw specialist, and per specialist, if have this type of condition, cannot be on antidepressants. She is prone to side effects with medications and sometimes, its hard for her to manage her medications due to the side effects, so her doctor was keeping her meds at a low dosage. Sleep is on and off. She was actively involved in CBT and DBT while in [place]     Psych pertinent item history includes suicide attempt [xseveral overdoses as a teenager], SIB [mainly in her teens/20's] and mutiple psychotropic trials         SUBJECTIVE     -Increased dose of Lamictal from 125mg to 150mg.  "Feeling withdrawal symptoms if she takes the dose later, as few as a couple hours. Gets a numb-type headache .   -Has been the busiest she's ever been at work. Irritability has been better, still present, but more adaptable.  -Social justice working group has been emotionally demanding.   -In the past, the pattern when she's experiencing heightened stress, a depressive episode follows.  -Finding things more humorous. Relationships remain intact despite irritability.  -Sleep is fine. Getting 6-7, usually needs 8-9 hours.  -Usually take ativan the night before she goes to the office, but didn't this week.  -GI work-up has been overly intrusive. Hands and arms are functional, but she's been delaying medical care.      Recent Social History:   Financial/ Work- working , [employer]   Partner/ - Single \"Men create a lot of anxiety, I think I have attachment issues.\"  Children- None      Living situation- lives alone   Social/ Spiritual Support- Parents, mostly mom    Recent Psych Symptoms:   Depression:  excessive guilt and psychomotor changes [agitation]  Elevated:  racing thoughts and dysregulation  Psychosis:  none  Anxiety:  None  Trauma Related:  angry outbursts and dysregulation      Recent Substance Use:     -alcohol: No   -cannabis: No   -tobacco: No  -caffeine:  Yes: 1-2 cups per day   -opioids: No   Narcan Kit currrent: No   -other: none    Pertinent Negatives: No suicidal or violent ideation, self-injury, psychosis, hallucinations, delusions, aggression and harmful substance use  Adverse Effects: none reported      PAST MED TRIALS         New Antidepressants:  Paxil (paroxetine) and Wellbutrin  (bupropion)  Mood Stabilizers:  Lithium Carbonate and Depakote and Depakot ER (valproate)  Stimulants / ADHD Meds:  Adderall (amphetamine salts), Ritalin (methylphenidate) and Strattera (atomoxetine)  Tranquilizers:  Klonopin (clonazepam) and Buspar (buspirone)-  It caused Akathisia  Paxil- low dose, " further bad side effects on higher doses, night sweats  Pristiq- Ok, low dose, stepping on Akathisia     Tends not to tolerate most medications, c/o GI symptoms and HA        MEDICAL HISTORY and ALLERGY     ALLERGIES: Amphotericin b, Phenylbutazones, and Penicillin g    Patient Active Problem List   Diagnosis     Heavy menses     CARDIOVASCULAR SCREENING; LDL GOAL LESS THAN 160     Major depressive disorder, recurrent episode, moderate (H)     Allergic rhinitis     Anxiety state     Attention deficit disorder     Bipolar I disorder (H)     Dysmenorrhea     Glaucoma suspect     Myopia     Pain in both wrists     Neck pain        MEDICAL REVIEW OF SYSTEMS   Contraception-  Yes   Pregnant- No  A comprehensive review of systems was performed and is negative other than noted in the HPI.     MEDICATIONS     Current Outpatient Medications   Medication Sig Dispense Refill     divalproex sodium extended-release (DEPAKOTE ER) 250 MG 24 hr tablet Take 1 tablet (250 mg) by mouth daily 90 tablet 1     gabapentin (NEURONTIN) 100 MG capsule Take 1 capsule (100 mg) by mouth 2 times daily 60 capsule 1     lamoTRIgine (LAMICTAL) 100 MG tablet Take 1 tablet (100 mg) by mouth daily Take along with two 25 mg tablets for a combined total of 150 mg. 30 tablet 1     lamoTRIgine (LAMICTAL) 25 MG tablet Take 2 tablets (50 mg) by mouth daily. Take along with one 100 mg tablet for a combined total of 150 mg. 60 tablet 1     LORazepam (ATIVAN) 0.5 MG tablet Take 0.5 mg by mouth every 6 hours as needed for anxiety       norgestim-eth estrad triphasic (ORTHO TRI-CYCLEN LO) 0.18/0.215/0.25 MG-25 MCG tablet Take 1 tablet by mouth daily 84 tablet 4     Nutritional Supplements (NUTRITIONAL SUPPLEMENT PO) l-methyl folate  Zinc selenium  Hair skin and nails vitamin (Patient not taking: Reported on 9/1/2022)       propranolol (INDERAL) 10 MG tablet Take 1 tablet (10 mg) by mouth 3 times daily as needed (Anxiety) 90 tablet 1     spironolactone  (ALDACTONE) 50 MG tablet         VITALS   There were no vitals taken for this visit.    MENTAL STATUS EXAM     Alertness: alert  and oriented  Appearance: well groomed  Behavior/Demeanor: cooperative and pleasant, with good  eye contact   Speech: increased rate  Language: intact  Psychomotor: normal or unremarkable  Mood: less irritable  Affect: full range; congruent to: mood- yes, content- yes  Thought Process/Associations: circumstantial  Thought Content:  Reports none;  Denies suicidal & violent ideation and delusions  Perception:  Reports none;  Denies auditory hallucinations and visual hallucinations  Insight: good  Judgment: good  Cognition: does  appear grossly intact; formal cognitive testing was not done  Gait and Station: N/A (telehealth)     LABS and DATA     PHQ 1/3/2022 5/23/2022 9/1/2022   PHQ-9 Total Score 23 18 20   Q9: Thoughts of better off dead/self-harm past 2 weeks More than half the days Not at all More than half the days   F/U: Thoughts of suicide or self-harm - - Yes   F/U: Self harm-plan - - No   F/U: Self-harm action - - No   F/U: Safety concerns - - No       Recent Labs   Lab Test 11/24/21  1543 10/30/20  1200   CR 0.79 0.79   GFRESTIMATED >90 >90     Recent Labs   Lab Test 06/17/22  1617 11/24/21  1543   AST 10 11   ALT 11 15   ALKPHOS 56 49          PSYCHOTROPIC DRUG INTERACTIONS                                                       PSYCHCLINICDDI   DEPAKOTE & LAMICTAL- Valproate Products may enhance the adverse/toxic effect of LamoTRIgine. Valproate Products may increase the serum concentration of LamoTRIgine. Severity Major     ATIVAN, GABAPENTIN, DEPAKOTE, LAMICTAL- CNS Depressants may enhance the adverse/toxic effect of other CNS Depressants   LAMICTAL & ORTHO TRI-CYCLEN LO- Estrogen Derivatives (Contraceptive) may decrease the serum concentration of LamoTRIgine         MANAGEMENT:  Monitoring for adverse effects, routine labs, using lowest therapeutic dose of [Depakote & Lamictal]  and patient is aware of risks     RISK STATEMENT for SAFETY     Artie did not appear to be at an elevated risk to herself or others.    PROVIDER: Toan Garcia MD    Patient not staffed in clinic.  Note will be reviewed and signed by supervisor Dr. Barron.

## 2022-09-29 RX ORDER — LAMOTRIGINE 100 MG/1
100 TABLET ORAL DAILY
Qty: 30 TABLET | Refills: 1 | Status: SHIPPED | OUTPATIENT
Start: 2022-09-29 | End: 2022-10-03

## 2022-09-29 RX ORDER — LAMOTRIGINE 25 MG/1
TABLET ORAL
Qty: 60 TABLET | Refills: 1 | Status: SHIPPED | OUTPATIENT
Start: 2022-09-29 | End: 2022-10-18

## 2022-09-29 RX ORDER — DIVALPROEX SODIUM 250 MG/1
250 TABLET, EXTENDED RELEASE ORAL DAILY
Qty: 90 TABLET | Refills: 1 | Status: SHIPPED | OUTPATIENT
Start: 2022-09-29 | End: 2022-10-18

## 2022-09-29 RX ORDER — GABAPENTIN 100 MG/1
100 CAPSULE ORAL 2 TIMES DAILY
Qty: 60 CAPSULE | Refills: 1 | Status: SHIPPED | OUTPATIENT
Start: 2022-09-29 | End: 2022-10-18

## 2022-10-03 ENCOUNTER — MYC MEDICAL ADVICE (OUTPATIENT)
Dept: PSYCHIATRY | Facility: CLINIC | Age: 42
End: 2022-10-03

## 2022-10-03 DIAGNOSIS — F31.81 BIPOLAR 2 DISORDER (H): ICD-10-CM

## 2022-10-03 RX ORDER — LAMOTRIGINE 100 MG/1
100 TABLET ORAL DAILY
Qty: 30 TABLET | Refills: 1 | Status: SHIPPED | OUTPATIENT
Start: 2022-10-03 | End: 2022-10-18

## 2022-10-03 NOTE — CONFIDENTIAL NOTE
Patient received medication from a new  when she went to  Lamictal 100 mg prescription at pharmacy.  Due to history of issues with changing medication manufacturers, prescription written for ALEMBIC  only (per patient).      Routed to provider.

## 2022-10-10 ENCOUNTER — DOCUMENTATION ONLY (OUTPATIENT)
Dept: GASTROENTEROLOGY | Facility: CLINIC | Age: 42
End: 2022-10-10

## 2022-10-10 ENCOUNTER — OFFICE VISIT (OUTPATIENT)
Dept: GASTROENTEROLOGY | Facility: CLINIC | Age: 42
End: 2022-10-10
Payer: COMMERCIAL

## 2022-10-10 VITALS
DIASTOLIC BLOOD PRESSURE: 67 MMHG | SYSTOLIC BLOOD PRESSURE: 101 MMHG | WEIGHT: 118 LBS | HEART RATE: 86 BPM | BODY MASS INDEX: 20.9 KG/M2 | OXYGEN SATURATION: 98 %

## 2022-10-10 DIAGNOSIS — M62.89 PELVIC FLOOR DYSFUNCTION: Primary | ICD-10-CM

## 2022-10-10 DIAGNOSIS — K59.00 CONSTIPATION, UNSPECIFIED CONSTIPATION TYPE: ICD-10-CM

## 2022-10-10 PROCEDURE — 99213 OFFICE O/P EST LOW 20 MIN: CPT | Performed by: INTERNAL MEDICINE

## 2022-10-10 ASSESSMENT — PAIN SCALES - GENERAL: PAINLEVEL: NO PAIN (0)

## 2022-10-10 NOTE — Clinical Note
Aleah can you please call and make a referral for Artie Medel  -for pelvic floor dysfunction needs therapy at the pelvic floor [place] She was already evaluated there and was tested and has pelvic floor dysfunction.  Once the referrals and may be could give her a call and let her know that they will be calling her however it works thank you so much

## 2022-10-10 NOTE — LETTER
10/10/2022         RE: Artie Diez  [address]  [place] [address]        Dear Colleague,    Thank you for referring your patient, Artie Diez, to the [facility] [place]. Please see a copy of my visit note below.      GASTROENTEROLOGY CONSULTATION       Site of Visit:   [address]  Provider:   Jani Baires MD    PCP:   Ran Cabral    Chief Complaint:    Chronic constipation    Assessment:  Pelvic floor dysfunction per outside testing  Symptoms correlate with pelvic floor dysfunction or anal outlet obstruction.  Negative colonoscopy this year, normal thyroid levels, normal calcium levels.    Recommendations:   Pelvic floor retraining with the pelvic floor center [place]    History of Present Illness:   This is a 41-year-old is here for follow-up after pelvic floor diagnosis and evaluation.  Part of the outside records patient has evidence of pelvic floor dysfunction.  She has incomplete evacuation and paradoxical motion of her puborectalis muscle.  She underwent colonoscopy with Dr. Carter López earlier this year which was negative.  She has sudden onset of constipation his last year that really felt like she could not move anything getting can come out of her bowels.  It did respond to MiraLAX therapy.  She is currently taking 1 cap of MiraLAX daily and this does work.  She lost about 30 pounds last year and she is gained about half of it back.  She tells me last was extremely stressful for multiple reasons.  She has started new medications recently.  She is taking several different medications for bipolar disorder.  She is not not currently on narcotics or any anticholinergic drugs.  She has not previously suffered with constipation.    Current Outpatient Medications   Medication Sig Dispense Refill     divalproex sodium extended-release (DEPAKOTE ER) 250 MG 24 hr tablet Take 1 tablet (250 mg) by mouth daily 90 tablet 1     gabapentin (NEURONTIN) 100  MG capsule Take 1 capsule (100 mg) by mouth 2 times daily 60 capsule 1     lamoTRIgine (LAMICTAL) 100 MG tablet Take 1 tablet (100 mg) by mouth daily Take along with two 25 mg tablets for a combined total of 150 mg. 30 tablet 1     lamoTRIgine (LAMICTAL) 25 MG tablet Take 2 tablets (50 mg) by mouth daily. Take along with one 100 mg tablet for a combined total of 150 mg. 60 tablet 1     LORazepam (ATIVAN) 0.5 MG tablet Take 0.5 mg by mouth every 6 hours as needed for anxiety       norgestim-eth estrad triphasic (ORTHO TRI-CYCLEN LO) 0.18/0.215/0.25 MG-25 MCG tablet Take 1 tablet by mouth daily 84 tablet 4     propranolol (INDERAL) 10 MG tablet Take 1 tablet (10 mg) by mouth 3 times daily as needed (Anxiety) 90 tablet 1     spironolactone (ALDACTONE) 50 MG tablet        Nutritional Supplements (NUTRITIONAL SUPPLEMENT PO) l-methyl folate  Zinc selenium  Hair skin and nails vitamin (Patient not taking: Reported on 10/10/2022)         Past Surgical History:   Procedure Laterality Date     no surgical history         Past Medical History:   Diagnosis Date     Depressive disorder, not elsewhere classified     Depression (non-psychotic)     Excessive or frequent menstruation     Heavy periods       Family History   Problem Relation Age of Onset     Unknown/Adopted Mother      Unknown/Adopted Father         reports that she has never smoked. She has never used smokeless tobacco. She reports that she does not drink alcohol and does not use drugs.         Physical Exam:   /67   Pulse 86   Wt 53.5 kg (118 lb)   SpO2 98%   BMI 20.90 kg/m    Wt:   Wt Readings from Last 2 Encounters:   10/10/22 53.5 kg (118 lb)   07/21/22 53.1 kg (117 lb)      Constitutional: cooperative, pleasant, not dyspneic/diaphoretic, no acute distress  Eyes: Sclera anicteric/injected  Ears/nose/mouth/throat: Normal oropharynx without ulcers or exudate, mucus membranes moist, hearing intact  Neck: supple, thyroid normal size  Cardiac: Regular rate  and rhythm no murmurs rubs or gallops.  CV: No edema  Respiratory: Unlabored breathing, no wheezes rales or ronchi  Lymph: No axillary, submandibular, supraclavicular or inguinal lymphadenopathy  Abd: Nondistended, +bs, no hepatosplenomegaly, nontender, no peritoneal signs  Skin: warm, perfused, no jaundice  Neuro: AAO x 3, No asterixis  Psych: Normal affect  MSK: Normal gait    Wt Readings from Last 2 Encounters:   10/10/22 53.5 kg (118 lb)   07/21/22 53.1 kg (117 lb)             Again, thank you for allowing me to participate in the care of your patient.        Sincerely,        Jani Baires MD

## 2022-10-10 NOTE — PROGRESS NOTES
[facility] order, demographics, last office note was faxed to the [facility] at [phone]. Order scanned into chart. Notified patient that their order was faxed to [facility].    Ruthie Rios LPN

## 2022-10-10 NOTE — PROGRESS NOTES
GASTROENTEROLOGY CONSULTATION       Site of Visit:   [address]  Provider:   Jani Baires MD    PCP:   Ran Cabral    Chief Complaint:    Chronic constipation    Assessment:  Pelvic floor dysfunction per outside testing  Symptoms correlate with pelvic floor dysfunction or anal outlet obstruction.  Negative colonoscopy this year, normal thyroid levels, normal calcium levels.    Recommendations:   Pelvic floor retraining with the pelvic floor center [place]    History of Present Illness:   This is a 41-year-old is here for follow-up after pelvic floor diagnosis and evaluation.  Part of the outside records patient has evidence of pelvic floor dysfunction.  She has incomplete evacuation and paradoxical motion of her puborectalis muscle.  She underwent colonoscopy with Dr. Carter López earlier this year which was negative.  She has sudden onset of constipation his last year that really felt like she could not move anything getting can come out of her bowels.  It did respond to MiraLAX therapy.  She is currently taking 1 cap of MiraLAX daily and this does work.  She lost about 30 pounds last year and she is gained about half of it back.  She tells me last was extremely stressful for multiple reasons.  She has started new medications recently.  She is taking several different medications for bipolar disorder.  She is not not currently on narcotics or any anticholinergic drugs.  She has not previously suffered with constipation.    Current Outpatient Medications   Medication Sig Dispense Refill     divalproex sodium extended-release (DEPAKOTE ER) 250 MG 24 hr tablet Take 1 tablet (250 mg) by mouth daily 90 tablet 1     gabapentin (NEURONTIN) 100 MG capsule Take 1 capsule (100 mg) by mouth 2 times daily 60 capsule 1     lamoTRIgine (LAMICTAL) 100 MG tablet Take 1 tablet (100 mg) by mouth daily Take along with two 25 mg tablets for a combined total of 150 mg. 30 tablet 1     lamoTRIgine (LAMICTAL) 25 MG  tablet Take 2 tablets (50 mg) by mouth daily. Take along with one 100 mg tablet for a combined total of 150 mg. 60 tablet 1     LORazepam (ATIVAN) 0.5 MG tablet Take 0.5 mg by mouth every 6 hours as needed for anxiety       norgestim-eth estrad triphasic (ORTHO TRI-CYCLEN LO) 0.18/0.215/0.25 MG-25 MCG tablet Take 1 tablet by mouth daily 84 tablet 4     propranolol (INDERAL) 10 MG tablet Take 1 tablet (10 mg) by mouth 3 times daily as needed (Anxiety) 90 tablet 1     spironolactone (ALDACTONE) 50 MG tablet        Nutritional Supplements (NUTRITIONAL SUPPLEMENT PO) l-methyl folate  Zinc selenium  Hair skin and nails vitamin (Patient not taking: Reported on 10/10/2022)         Past Surgical History:   Procedure Laterality Date     no surgical history         Past Medical History:   Diagnosis Date     Depressive disorder, not elsewhere classified     Depression (non-psychotic)     Excessive or frequent menstruation     Heavy periods       Family History   Problem Relation Age of Onset     Unknown/Adopted Mother      Unknown/Adopted Father         reports that she has never smoked. She has never used smokeless tobacco. She reports that she does not drink alcohol and does not use drugs.         Physical Exam:   /67   Pulse 86   Wt 53.5 kg (118 lb)   SpO2 98%   BMI 20.90 kg/m    Wt:   Wt Readings from Last 2 Encounters:   10/10/22 53.5 kg (118 lb)   07/21/22 53.1 kg (117 lb)      Constitutional: cooperative, pleasant, not dyspneic/diaphoretic, no acute distress  Eyes: Sclera anicteric/injected  Ears/nose/mouth/throat: Normal oropharynx without ulcers or exudate, mucus membranes moist, hearing intact  Neck: supple, thyroid normal size  Cardiac: Regular rate and rhythm no murmurs rubs or gallops.  CV: No edema  Respiratory: Unlabored breathing, no wheezes rales or ronchi  Lymph: No axillary, submandibular, supraclavicular or inguinal lymphadenopathy  Abd: Nondistended, +bs, no hepatosplenomegaly, nontender, no  peritoneal signs  Skin: warm, perfused, no jaundice  Neuro: AAO x 3, No asterixis  Psych: Normal affect  MSK: Normal gait    Wt Readings from Last 2 Encounters:   10/10/22 53.5 kg (118 lb)   07/21/22 53.1 kg (117 lb)

## 2022-10-10 NOTE — PATIENT INSTRUCTIONS
It was a pleasure taking care of you today. I've included a brief summary of our discussion and care plan from today's visit below.  Please review this information with your primary care provider.  _______________________________________________________________________    My recommendations are summarized as follows:  Would return to pelvic floor [facility] for therapy for her pelvic floor dysfunction   Continue MiraLAX daily      If you need any follow-up appointments, please use the following phone numbers below.    To schedule or reschedule a follow-up GI appointment, call [phone] option 1    To schedule your endoscopy procedure, call [phone] option 2    To schedule imaging, please call [phone]     To schedule your lab appointment at [address] floor lab call [phone]. Call your [place] lab directly if it is not [facility]. If you use a non-[place] lab, please let us know where to fax your lab order (call or message Aleah at [phone] option 1).      _______________________________________________________________________    Please be in touch if there are any further questions that arise following today's visit.  There are multiple ways to contact your gastroenterology care team.      During business hours, you may reach your gastroenterology RN Care Coordinator, Aleah, at [phone] option 1.      You can always send a secure message through Pipette. Pipette messages are answered by your nurse or doctor typically within 24 hours. Please allow extra time on weekends and holidays.     What is Pipette?  Pipette is a secure way for you to access all of your healthcare records from the [facility].  It is a web based computer program, so you can sign on to it from any location.  It also allows you to send secure messages to your care team.  I recommend signing up for Pipette access if you have not already done so and are comfortable with  using a computer.     For urgent/emergent questions after business hours, you may reach the on-call GI Fellow by contacting the [facility]  at [phone].     How will I get the results of any tests ordered?    You will receive all of your results.  If you have signed up for [facility], any tests ordered at your visit will be available to you after your physician reviews them.  Typically this takes 1-2 weeks.  If there are urgent results that require a change in your care plan, your physician or nurse will call you to discuss the next steps.      Thank you for choosing [facility]!       Sincerely,    Jani Baires MD  [facility]  Division of Gastroenterology

## 2022-10-12 ENCOUNTER — MEDICAL CORRESPONDENCE (OUTPATIENT)
Dept: HEALTH INFORMATION MANAGEMENT | Facility: CLINIC | Age: 42
End: 2022-10-12

## 2022-10-16 ENCOUNTER — HEALTH MAINTENANCE LETTER (OUTPATIENT)
Age: 42
End: 2022-10-16

## 2022-10-17 ASSESSMENT — ANXIETY QUESTIONNAIRES
GAD7 TOTAL SCORE: 13
5. BEING SO RESTLESS THAT IT IS HARD TO SIT STILL: NOT AT ALL
7. FEELING AFRAID AS IF SOMETHING AWFUL MIGHT HAPPEN: SEVERAL DAYS
8. IF YOU CHECKED OFF ANY PROBLEMS, HOW DIFFICULT HAVE THESE MADE IT FOR YOU TO DO YOUR WORK, TAKE CARE OF THINGS AT HOME, OR GET ALONG WITH OTHER PEOPLE?: SOMEWHAT DIFFICULT
6. BECOMING EASILY ANNOYED OR IRRITABLE: NEARLY EVERY DAY
4. TROUBLE RELAXING: SEVERAL DAYS
1. FEELING NERVOUS, ANXIOUS, OR ON EDGE: NEARLY EVERY DAY
GAD7 TOTAL SCORE: 13
GAD7 TOTAL SCORE: 13
3. WORRYING TOO MUCH ABOUT DIFFERENT THINGS: NEARLY EVERY DAY
7. FEELING AFRAID AS IF SOMETHING AWFUL MIGHT HAPPEN: SEVERAL DAYS
IF YOU CHECKED OFF ANY PROBLEMS ON THIS QUESTIONNAIRE, HOW DIFFICULT HAVE THESE PROBLEMS MADE IT FOR YOU TO DO YOUR WORK, TAKE CARE OF THINGS AT HOME, OR GET ALONG WITH OTHER PEOPLE: SOMEWHAT DIFFICULT
2. NOT BEING ABLE TO STOP OR CONTROL WORRYING: MORE THAN HALF THE DAYS

## 2022-10-17 ASSESSMENT — PATIENT HEALTH QUESTIONNAIRE - PHQ9
SUM OF ALL RESPONSES TO PHQ QUESTIONS 1-9: 17
10. IF YOU CHECKED OFF ANY PROBLEMS, HOW DIFFICULT HAVE THESE PROBLEMS MADE IT FOR YOU TO DO YOUR WORK, TAKE CARE OF THINGS AT HOME, OR GET ALONG WITH OTHER PEOPLE: SOMEWHAT DIFFICULT
SUM OF ALL RESPONSES TO PHQ QUESTIONS 1-9: 17

## 2022-10-18 ENCOUNTER — VIRTUAL VISIT (OUTPATIENT)
Dept: PSYCHIATRY | Facility: CLINIC | Age: 42
End: 2022-10-18
Attending: PSYCHIATRY & NEUROLOGY
Payer: COMMERCIAL

## 2022-10-18 DIAGNOSIS — F31.81 BIPOLAR 2 DISORDER (H): ICD-10-CM

## 2022-10-18 PROCEDURE — 99214 OFFICE O/P EST MOD 30 MIN: CPT | Mod: HN | Performed by: STUDENT IN AN ORGANIZED HEALTH CARE EDUCATION/TRAINING PROGRAM

## 2022-10-18 RX ORDER — LAMOTRIGINE 25 MG/1
TABLET ORAL
Qty: 60 TABLET | Refills: 1 | Status: SHIPPED | OUTPATIENT
Start: 2022-10-18 | End: 2022-11-18

## 2022-10-18 RX ORDER — GABAPENTIN 100 MG/1
CAPSULE ORAL
Qty: 90 CAPSULE | Refills: 1 | Status: SHIPPED | OUTPATIENT
Start: 2022-10-18 | End: 2022-11-18

## 2022-10-18 RX ORDER — DIVALPROEX SODIUM 250 MG/1
250 TABLET, EXTENDED RELEASE ORAL DAILY
Qty: 90 TABLET | Refills: 1 | Status: SHIPPED | OUTPATIENT
Start: 2022-10-18 | End: 2022-11-18

## 2022-10-18 RX ORDER — PROPRANOLOL HYDROCHLORIDE 10 MG/1
10 TABLET ORAL 3 TIMES DAILY PRN
Qty: 90 TABLET | Refills: 1 | Status: SHIPPED | OUTPATIENT
Start: 2022-10-18 | End: 2022-11-18

## 2022-10-18 RX ORDER — LAMOTRIGINE 100 MG/1
100 TABLET ORAL DAILY
Qty: 30 TABLET | Refills: 1 | Status: SHIPPED | OUTPATIENT
Start: 2022-10-18 | End: 2022-11-18

## 2022-10-18 NOTE — PROGRESS NOTES
Artie Diez is a 41 year old who is being evaluated via a billable video visit.      Pt will join video visit via: [employer]  If there are problems joining the visit, send backup video invite via: Send to preferred e-mail: [email or web address]    Reason for telehealth visit: Patient has requested telehealth visit    Originating location (patient location): Patient's place of employment    Will anyone else be joining the visit? No      Answers for HPI/ROS submitted by the patient on 10/17/2022  If you checked off any problems, how difficult have these problems made it for you to do your work, take care of things at home, or get along with other people?: Somewhat difficult  PHQ9 TOTAL SCORE: 17  EAMON 7 TOTAL SCORE: 13
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
## 2022-10-18 NOTE — PATIENT INSTRUCTIONS
**For crisis resources, please see the information at the end of this document**   Patient Education    Thank you for coming to the [facility] [place] [facility].     Lab Testing:  If you had lab testing today and your results are reassuring or normal they will be mailed to you or sent through [employer] within 7 days. If the lab tests need quick action we will call you with the results. The phone number we will call with results is # [phone]. If this is not the best number please call our clinic and change the number.     Medication Refills:  If you need any refills please call your pharmacy and they will contact us. Our fax number for refills is [phone].   Three business days of notice are needed for general medication refill requests.   Five business days of notice are needed for controlled substance refill requests.   If you need to change to a different pharmacy, please contact the new pharmacy directly. The new pharmacy will help you get your medications transferred.     Contact Us:  Please call [phone] during business hours (8-5:00 M-F).   If you have medication related questions after clinic hours, or on the weekend, please call [phone].     Financial Assistance [phone]   Medical Records [phone]       MENTAL HEALTH CRISIS RESOURCES:  For a emergency help, please call 911 or go to the nearest Emergency Department.     Emergency Walk-In Options:   EmPATH Unit @ [facility] ([place]): [phone] - Specialized mental health emergency area designed to be calming  [facility] ([place]): [phone]  [facility] ([place]): [phone]  [facility]): [phone]    [facility] Crisis Information:   [place]: [phone]  [place]: [phone]  Gil (SHEFALI) - Adult: [phone]     Child: [phone]  [place] - Adult: [phone]     Child: [phone]  [place]:  [phone]  List of all [facility] resources:   https://[place].gov/dhs/people-we-serve/adults/health-care/mental-health/resources/crisis-contacts.jsp    National Crisis Information:   Crisis Text Line: Text  [place]  to 411356  Suicide & Crisis Lifeline: 988  National Suicide Prevention Lifeline: 5-226-195-TALK (1-927.702.5347)       For online chat options, visit https://suicidepreventionlifeline.org/chat/  Poison Control Center: 5-600-893-5594  Trans Lifeline: 1-404-113-9080 - Hotline for transgender people of all ages  The Frantz Project: 6-301-555-5440 - Hotline for LGBT youth     For Non-Emergency Support:   Fast Tracker: Mental Health & Substance Use Disorder Resources -   https://www.[employer] ltdn.org/

## 2022-10-26 ENCOUNTER — THERAPY VISIT (OUTPATIENT)
Dept: OCCUPATIONAL THERAPY | Facility: CLINIC | Age: 42
End: 2022-10-26
Payer: COMMERCIAL

## 2022-10-26 DIAGNOSIS — M25.532 PAIN IN BOTH WRISTS: Primary | ICD-10-CM

## 2022-10-26 DIAGNOSIS — M25.531 PAIN IN BOTH WRISTS: Primary | ICD-10-CM

## 2022-10-26 PROCEDURE — 97535 SELF CARE MNGMENT TRAINING: CPT | Mod: GO | Performed by: OCCUPATIONAL THERAPIST

## 2022-10-26 PROCEDURE — 97110 THERAPEUTIC EXERCISES: CPT | Mod: GO | Performed by: OCCUPATIONAL THERAPIST

## 2022-10-26 NOTE — PROGRESS NOTES
"Hand Therapy Progress Note  Please refer to the daily flowsheet for treatment today, total treatment time and time spent performing 1:1 timed codes.       Current Date:  10/26/2022    Diagnosis: B wrist flexor tendinitis; B DeQuervain's tenosynovitis  DOI: 6/13/22    Precautions: none    Subjective:  Last week had to ice a few times. I bounce back pretty well, much faster.    Has been transferring out summer <> winter clothes in bins.  Has a service that drops off distilled water - will have one liter to pour into the humidifiers. Has been doing that, and it's uncomfortable, but not the wrists, more the forearms.  Filling Mary - that is about the max.   Making the bed - can do it, can start to tug- but knows limit for tugging.   Not wearing braces at work.  Moved some plants and that didn't feel good - quite uncomfortable when doing it, and recovering within one hour.  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  Hit a bit of a natural wall, as far as healing. Massage still really helpful - every other week. Has been getting a lot of headaches.    Chair for home is very adjustable now. Does need to get a KB tray.    Has weights at home, wanted to get more into that, would love to get back to that.  Walking a few times a week. This winter - thinking of a good winter sport - thinking of xc skiing - [place].      Objective:  Pain Level (Scale 0-10):   7/6/2022 8/4/2022   10/27/2022     At Rest Mild to moderate mild mild   With Use \" Moderate to severe moderate     Pain Description:  Date 7/6/2022 8/15/2022   8/23/2022   10/26/2022     Location B volar and radial wrists. Hands - dorsal side. Also volar wrists up into forearms, no at or across the elbow Mainly radial wrists today B FA, dorsal>volar, L>R Mainly volar forearms. L dorsal proximal FA as well, could be from lifting yesterday. Wrist pain is much less.   Pain Quality When it first " started, it was a lot of pain, now its more achy.  Sore to touch, achy, may be an advil kind of a day.    Frequency often        Pain is worst  varies. Usually better in the morning but yesterday it wasn't - had to ice      Exacerbated by Lift, pull      Relieved by Has been icing 3 times a day. Has been using the B ottobock splints most of the time when awake.  Took some advil - may have helped. Mainly using voltaren at this time and activity modifications     Progression After making the bed it got worse. It became tighter and carrying became different. Even having to use 2 hands to pour, and hold a water glass.        Sensation   WNL throughout all nerve distributions; per patient report    Edema   None visible    ROM  Thumbs: WFL, some     Wrists  7/6/2022  AROM is WFL, but some soreness, pulling to ulnar distal FA, radial wrists, dorsal FA - mainly with UD, ext, supination    Resisted testing with mild pressure 7/6/2022   7/6/2022 8/15/2022      Right Left R   Wrist ext +Feels it to the volar distal FA - + Feels it to the volar distal FA   Wrist flexion - Feels it to volar ulnar side    EDC +Feels it volarly     FDS +Feels it dorsally/radial to hand/wrist     EPB + NT + Feels it to the volar distal FA   Pain Report:    + mild    ++ moderate    +++ severe     Palpation  Tenderness / pain Report: - none  + mild    ++ moderate    +++ severe      8/23/2022    10/26/2022     Location R L bilateral   Flexor wad / MEP   +   Extensor wad / LEP   + on the L   Biceps - distally      Volar FA      Dorsal FA myofascial restrictions noted - mid to distal forearm  L>R myofascial restrictions noted - mid to distal forearm            Assessment:  Pt has had less pain overall. She is still impacted by many home chores. She would like to build strength, get back into activities, and be able to work pain free.  She would benefit from further hand therapy to assist in meeting these goals.    Overall Assessment:  Patient would  benefit from continued therapy to achieve rehab potential  Goals have been reviewed and progress or achievement has occurred:  see goal sheet for details and updates.    Plan:  Frequency/Duration:  Recommend continuing to see patient  2 X a month, once daily  for 3 months  Appropriateness of Rx I have re-evaluated this patient and find that the nature, scope, duration and intensity of the therapy is appropriate for the medical condition of the patient.    Treatment Plan:   Modalities:  US and Paraffin  Therapeutic Exercise:  AROM, Isotonics, Isometrics and Stabilization  Manual Techniques:  Myofascial release  Self Care:  Self Care Tasks, Ergonomic Considerations and Work Tasks      Home Program:   10/27/2022  Nerve Gliding Proximal Radial- once at work, also for a warm up for exercises  Side Arm Raise, SH flexion, elbow flex with 1/2 pound cuff weights  Chin tucks with PT    (* indicates most helpful things to date)  *icing prn. Has also tried: Voltaren, Compression sleeves, *Massage therapy, KT - inhibition along volar FA, to the flexor tendons, Guasha tool for STM, Bilateral OttoBock orthoses, extra small - daytime - typing, Right custom volar wrist splint - wear at night, day if helpful  Adaptations:   has a rolling cart she can use from car<> home.  Help from mom and aunt, and hired help coming for home work setup.  Had help to set up home work station good chair, may get KB tray  *Working at work - good setup. Wears blue braces  Has a vertical mouse    Next:   F/u re: light UE strengthening, plan for xc skiing, work, overall housework and activity tolerance. Wants to return to baseline, understandably

## 2022-10-28 ENCOUNTER — TRANSFERRED RECORDS (OUTPATIENT)
Dept: HEALTH INFORMATION MANAGEMENT | Facility: CLINIC | Age: 42
End: 2022-10-28

## 2022-11-07 PROBLEM — M54.2 NECK PAIN: Status: RESOLVED | Noted: 2022-08-22 | Resolved: 2022-11-07

## 2022-11-17 ASSESSMENT — ANXIETY QUESTIONNAIRES
GAD7 TOTAL SCORE: 15
IF YOU CHECKED OFF ANY PROBLEMS ON THIS QUESTIONNAIRE, HOW DIFFICULT HAVE THESE PROBLEMS MADE IT FOR YOU TO DO YOUR WORK, TAKE CARE OF THINGS AT HOME, OR GET ALONG WITH OTHER PEOPLE: VERY DIFFICULT
3. WORRYING TOO MUCH ABOUT DIFFERENT THINGS: NEARLY EVERY DAY
GAD7 TOTAL SCORE: 15
7. FEELING AFRAID AS IF SOMETHING AWFUL MIGHT HAPPEN: SEVERAL DAYS
8. IF YOU CHECKED OFF ANY PROBLEMS, HOW DIFFICULT HAVE THESE MADE IT FOR YOU TO DO YOUR WORK, TAKE CARE OF THINGS AT HOME, OR GET ALONG WITH OTHER PEOPLE?: VERY DIFFICULT
5. BEING SO RESTLESS THAT IT IS HARD TO SIT STILL: NOT AT ALL
1. FEELING NERVOUS, ANXIOUS, OR ON EDGE: NEARLY EVERY DAY
2. NOT BEING ABLE TO STOP OR CONTROL WORRYING: MORE THAN HALF THE DAYS
4. TROUBLE RELAXING: NEARLY EVERY DAY
7. FEELING AFRAID AS IF SOMETHING AWFUL MIGHT HAPPEN: SEVERAL DAYS
6. BECOMING EASILY ANNOYED OR IRRITABLE: NEARLY EVERY DAY
GAD7 TOTAL SCORE: 15

## 2022-11-17 ASSESSMENT — PATIENT HEALTH QUESTIONNAIRE - PHQ9
SUM OF ALL RESPONSES TO PHQ QUESTIONS 1-9: 21
SUM OF ALL RESPONSES TO PHQ QUESTIONS 1-9: 21
10. IF YOU CHECKED OFF ANY PROBLEMS, HOW DIFFICULT HAVE THESE PROBLEMS MADE IT FOR YOU TO DO YOUR WORK, TAKE CARE OF THINGS AT HOME, OR GET ALONG WITH OTHER PEOPLE: SOMEWHAT DIFFICULT

## 2022-11-18 ENCOUNTER — VIRTUAL VISIT (OUTPATIENT)
Dept: PSYCHIATRY | Facility: CLINIC | Age: 42
End: 2022-11-18
Attending: PSYCHIATRY & NEUROLOGY
Payer: COMMERCIAL

## 2022-11-18 DIAGNOSIS — F31.81 BIPOLAR 2 DISORDER (H): ICD-10-CM

## 2022-11-18 DIAGNOSIS — F41.1 GAD (GENERALIZED ANXIETY DISORDER): Primary | ICD-10-CM

## 2022-11-18 PROCEDURE — 99214 OFFICE O/P EST MOD 30 MIN: CPT | Mod: HN | Performed by: STUDENT IN AN ORGANIZED HEALTH CARE EDUCATION/TRAINING PROGRAM

## 2022-11-18 RX ORDER — LAMOTRIGINE 100 MG/1
100 TABLET ORAL DAILY
Qty: 30 TABLET | Refills: 1 | Status: SHIPPED | OUTPATIENT
Start: 2022-11-18 | End: 2022-12-09

## 2022-11-18 RX ORDER — PROPRANOLOL HYDROCHLORIDE 10 MG/1
10-20 TABLET ORAL 3 TIMES DAILY PRN
Qty: 90 TABLET | Refills: 1 | Status: SHIPPED | OUTPATIENT
Start: 2022-11-18 | End: 2022-12-09

## 2022-11-18 RX ORDER — LAMOTRIGINE 25 MG/1
TABLET ORAL
Qty: 60 TABLET | Refills: 1 | Status: SHIPPED | OUTPATIENT
Start: 2022-11-18 | End: 2022-12-09

## 2022-11-18 RX ORDER — DIVALPROEX SODIUM 250 MG/1
250 TABLET, EXTENDED RELEASE ORAL DAILY
Qty: 90 TABLET | Refills: 1 | Status: SHIPPED | OUTPATIENT
Start: 2022-11-18 | End: 2022-12-09

## 2022-11-18 RX ORDER — GABAPENTIN 100 MG/1
100 CAPSULE ORAL 2 TIMES DAILY
Qty: 90 CAPSULE | Refills: 1 | Status: SHIPPED | OUTPATIENT
Start: 2022-11-18 | End: 2022-12-09

## 2022-11-18 NOTE — PROGRESS NOTES
Artie Diez is a 42 year old who is being evaluated via a billable video visit.      Pt will join video visit via: [employer]  If there are problems joining the visit, send backup video invite via: Send to preferred e-mail: [email or web address]    Reason for telehealth visit: Patient has requested telehealth visit    Originating location (patient location): Patient's place of employment    Will anyone else be joining the visit? No      Answers for HPI/ROS submitted by the patient on 11/17/2022  If you checked off any problems, how difficult have these problems made it for you to do your work, take care of things at home, or get along with other people?: Somewhat difficult  PHQ9 TOTAL SCORE: 21  EAMON 7 TOTAL SCORE: 15

## 2022-11-18 NOTE — PATIENT INSTRUCTIONS
It was really nice talking today.  -increase propranolol to 1.5 - 2 tablets (15-20 mg) up to three times per day as needed for social anxiety    **For crisis resources, please see the information at the end of this document**   Patient Education    Thank you for coming to the [facility] [place] [facility].     Lab Testing:  If you had lab testing today and your results are reassuring or normal they will be mailed to you or sent through [employer] within 7 days. If the lab tests need quick action we will call you with the results. The phone number we will call with results is # [phone]. If this is not the best number please call our clinic and change the number.     Medication Refills:  If you need any refills please call your pharmacy and they will contact us. Our fax number for refills is [phone].   Three business days of notice are needed for general medication refill requests.   Five business days of notice are needed for controlled substance refill requests.   If you need to change to a different pharmacy, please contact the new pharmacy directly. The new pharmacy will help you get your medications transferred.     Contact Us:  Please call [phone] during business hours (8-5:00 M-F).   If you have medication related questions after clinic hours, or on the weekend, please call [phone].     Financial Assistance [phone]   Medical Records [phone]       MENTAL HEALTH CRISIS RESOURCES:  For a emergency help, please call 911 or go to the nearest Emergency Department.     Emergency Walk-In Options:   EmPATH Unit @ [facility] ([place]): [phone] - Specialized mental health emergency area designed to be calming  [facility] ([place]): [phone]  [facility] ([place]): [phone]  [facility]): [phone]    [facility] Crisis Information:   [place]: [phone]  [place]:  [phone]  [place] (COPE) - Adult: [phone]     Child: [phone]  [place] - Adult: [phone]     Child: [phone]  [place]: [phone]  List of all [facility] resources:   https://[place].gov/dhs/people-we-serve/adults/health-care/mental-health/resources/crisis-contacts.jsp    National Crisis Information:   Crisis Text Line: Text  [place]  to 737402  Suicide & Crisis Lifeline: 988  National Suicide Prevention Lifeline: 3-846-831-TALK (1-332.846.3085)       For online chat options, visit https://suicidepreventionlifeline.org/chat/  Poison Control Center: 1-471.582.2620  Trans Lifeline: 1-647.428.8014 - Hotline for transgender people of all ages  The Frantz Project: 3-291-776-8834 - Hotline for LGBT youth     For Non-Emergency Support:   Fast Tracker: Mental Health & Substance Use Disorder Resources -   https://www.[employer].org/
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
## 2022-12-09 ENCOUNTER — VIRTUAL VISIT (OUTPATIENT)
Dept: PSYCHIATRY | Facility: CLINIC | Age: 42
End: 2022-12-09
Attending: PSYCHIATRY & NEUROLOGY
Payer: COMMERCIAL

## 2022-12-09 DIAGNOSIS — F31.81 BIPOLAR 2 DISORDER (H): ICD-10-CM

## 2022-12-09 PROCEDURE — 99214 OFFICE O/P EST MOD 30 MIN: CPT | Mod: HN | Performed by: STUDENT IN AN ORGANIZED HEALTH CARE EDUCATION/TRAINING PROGRAM

## 2022-12-09 RX ORDER — PROPRANOLOL HYDROCHLORIDE 10 MG/1
10-20 TABLET ORAL 3 TIMES DAILY PRN
Qty: 90 TABLET | Refills: 1 | Status: SHIPPED | OUTPATIENT
Start: 2022-12-09 | End: 2023-01-05

## 2022-12-09 RX ORDER — GABAPENTIN 100 MG/1
100 CAPSULE ORAL 2 TIMES DAILY
Qty: 90 CAPSULE | Refills: 1 | Status: SHIPPED | OUTPATIENT
Start: 2022-12-09 | End: 2023-01-05

## 2022-12-09 RX ORDER — LAMOTRIGINE 25 MG/1
TABLET ORAL
Qty: 60 TABLET | Refills: 1 | Status: SHIPPED | OUTPATIENT
Start: 2022-12-09 | End: 2023-01-05

## 2022-12-09 RX ORDER — DIVALPROEX SODIUM 250 MG/1
250 TABLET, EXTENDED RELEASE ORAL DAILY
Qty: 90 TABLET | Refills: 1 | Status: SHIPPED | OUTPATIENT
Start: 2022-12-09 | End: 2023-01-05

## 2022-12-09 RX ORDER — LAMOTRIGINE 100 MG/1
100 TABLET ORAL DAILY
Qty: 30 TABLET | Refills: 1 | Status: SHIPPED | OUTPATIENT
Start: 2022-12-09 | End: 2023-01-05

## 2022-12-09 NOTE — PATIENT INSTRUCTIONS
**For crisis resources, please see the information at the end of this document**   Patient Education    Thank you for coming to the [facility] [place] [facility].     Lab Testing:  If you had lab testing today and your results are reassuring or normal they will be mailed to you or sent through [employer] within 7 days. If the lab tests need quick action we will call you with the results. The phone number we will call with results is # [phone]. If this is not the best number please call our clinic and change the number.     Medication Refills:  If you need any refills please call your pharmacy and they will contact us. Our fax number for refills is [phone].   Three business days of notice are needed for general medication refill requests.   Five business days of notice are needed for controlled substance refill requests.   If you need to change to a different pharmacy, please contact the new pharmacy directly. The new pharmacy will help you get your medications transferred.     Contact Us:  Please call [phone] during business hours (8-5:00 M-F).   If you have medication related questions after clinic hours, or on the weekend, please call [phone].     Financial Assistance [phone]   Medical Records [phone]       MENTAL HEALTH CRISIS RESOURCES:  For a emergency help, please call 911 or go to the nearest Emergency Department.     Emergency Walk-In Options:   EmPATH Unit @ [facility] ([place]): [phone] - Specialized mental health emergency area designed to be calming  [facility] ([place]): [phone]  [facility] ([place]): [phone]  [facility]): [phone]    [facility] Crisis Information:   [place]: [phone]  [place]: [phone]  Gil (SHEFALI) - Adult: [phone]     Child: [phone]  [place] - Adult: [phone]     Child: [phone]  [place]:  [phone]  List of all [facility] resources:   https://[place].gov/dhs/people-we-serve/adults/health-care/mental-health/resources/crisis-contacts.jsp    National Crisis Information:   Crisis Text Line: Text  [place]  to 036975  Suicide & Crisis Lifeline: 988  National Suicide Prevention Lifeline: 7-668-394-TALK (1-901.242.4617)       For online chat options, visit https://suicidepreventionlifeline.org/chat/  Poison Control Center: 9-846-014-0072  Trans Lifeline: 3-131-734-2957 - Hotline for transgender people of all ages  [employer]: 9-053-097-4437 - Hotline for LGBT youth     For Non-Emergency Support:   Fast Tracker: Mental Health & Substance Use Disorder Resources -   https://www.[employer]cit[employer]n.org/

## 2022-12-09 NOTE — PROGRESS NOTES
VIDEO VISIT  Artie Diez is a 42 year old patient who is being evaluated via a billable video visit.      How would you like to obtain your AVS?: MyChart  AVS SmartPhrase [PsychAVS] has been placed in 'Patient Instructions': Yes      Video- Visit Details  Type of service:  video visit for medication management  Time of service:    Start Time:  8:34 AM    End Time:  9:22 AM    Distant Site (provider location):  [facility]  Mode of Communication:  Video Conference via [facility]  Psychiatry Clinic  MEDICAL PROGRESS NOTE     CARE TEAM:  PCP- Ran Cabral, Psychotherapist- Evangel[place] ([place] [facility]), None  Artie Diez is a 41 year old who uses the name Artie and pronouns she, her, hers.       DIAGNOSIS   Bipolar II Disorder, rapid cycling, MRE hypomanic  History of EAMON      ASSESSMENT   Artie reported worse sleep and difficulties with cognition. She is not feeling depressed, but does feel demoralized. Propranolol was perhaps helpful, but limited by excessive tiredness. She's thinking a lot about the patterns of relationship difficulties, and processing this in therapy. She's also working with a consultant to examine how systemic racism limits her work and ability to feel integrated in her work. She was not able to tolerate the increased gabapentin due to headache the day after taking the extra dose at bedtime. I do think that guanfacine might be a helpful addition or replacement for propranolol for both focus and trauma symptoms, and we will discuss this more at our next visit.    She has chronic SI which is passive. She will reach out if these thoughts are changing or worsening. No acute safety concerns.    Future considerations:  - We have discussed perhaps being on a full dose of 1 med rather than low doses of several meds. Given her history of side effects, the current strategy may be a better fit for her at this time.  -Guanfacine might be a  good alternative for both attention and sympatholysis, will discuss next visit.    [place] today:  Indicates prescribed medications as per epic records.      PLAN                                                                                                                 1) Meds-  - lamotrigine 125 mg daily.  - Depakote ER - 250 mg   - gabapentin- 100 mg every day at noon and 100 mg at bedtime.  - Ativan 0.5 mg at bedtime/prn (2 times a week)  - propranolol 10-20 mg tid /prn- for anxiety (1-3 times per day)     PCP-   Spironolactone- 50 mg bid- for hair loss  L-Methylfolate     2) Psychotherapy-   Individual therapy weekly, works on DBT skills.   Working with racial trauma consultant     3) Next due-  Labs- Valproate due in Jan 2023  EKG-  As indicated  Rating scales- PHQ9     4) Referrals-  ADHD referral       5) Dispo- RTC in 3 weeks      PERTINENT BACKGROUND                           [most recent eval 12/07/21]   Dx depression age 14. Started various medication without working diagnosis in late 20's, had been resistant to medication in past, with multiple somatic symptoms, multiple diagnoses in past. Official diagnosis made about 3 years ago by mood disorder specialist in [place], done as second opinion as struggling with approach of first psychiatrist.   Has been on the current combination of medications since saw specialist, and per specialist, if have this type of condition, cannot be on antidepressants. She is prone to side effects with medications and sometimes, its hard for her to manage her medications due to the side effects, so her doctor was keeping her meds at a low dosage. Sleep is on and off. She was actively involved in CBT and DBT while in [place]     Psych pertinent item history includes suicide attempt [xseveral overdoses as a teenager], SIB [mainly in her teens/20's] and mutiple psychotropic trials         SUBJECTIVE     -Things are ok.   -Sleep is worse. Thoughts keep her up, feels  like a rolodex of negative things that have happened in the past. Wonders if this is causing worse cognition.  - Thinking a lot about relationships, and her pattern of being highly critical of people who are good for her.  -Talking with a consultant about racial trauma. Experiencing grief, currently angry.  -Med  switches more through her pharmacy, and she feels the switch. This is worrisome in the long term for her.  -When she was younger, would feel very protective of her twin. She is the older twin, and felt the responsibility of this.  -Doesn't feel depressed, but does feel like the universe is metaphorically sending her the message that she has to figure something out. Sometimes feels like its telling hr to give up, but she works to find the meaning.  -Feels guilty about accepting money from her parents, but is grateful for the support.  -Propranolol maybe contributes to tiredness.  -Has sensory sensitivities.    Recent Social History:   Financial/ Work- working , [employer]   Partner/ - Single    Children- None      Living situation- lives alone   Social/ Spiritual Support- Parents, mostly mom    Recent Psych Symptoms:   Depression:  suicidal ideation, low energy, insomnia, poor concentration /memory and excessive guilt  Elevated:  dysregulation  Psychosis:  none  Anxiety:  excessive worry, social anxiety and rumination  Trauma Related:  dysregulation      Recent Substance Use:     -alcohol: No   -cannabis: No   -tobacco: No  -caffeine:  Yes: 1-2 cups per day   -opioids: No   Narcan Kit currrent: No   -other: none    Pertinent Negatives: No violent ideation, self-injury, psychosis, hallucinations, delusions, aggression and harmful substance use  Adverse Effects: none reported      PAST MED TRIALS         New Antidepressants:  Paxil (paroxetine) and Wellbutrin  (bupropion)  Mood Stabilizers:  Lithium Carbonate and Depakote and Depakot ER (valproate)  Stimulants / ADHD Meds:  Adderall  (amphetamine salts), Ritalin (methylphenidate) and Strattera (atomoxetine)  Tranquilizers:  Klonopin (clonazepam) and Buspar (buspirone)-  It caused Akathisia  Paxil- low dose, further bad side effects on higher doses, night sweats  Pristiq- Ok, low dose, stepping on Akathisia     Tends not to tolerate most medications, c/o GI symptoms and HA       PSYCH and SUBSTANCE USE Critical Summary Points since July 2022 9/1/22: Transfer DA. Some hypomanic symptoms. Increased lamotrigine  9/27/22: Less hypomania, no med changes  10/18/22: Mood more stable, had increased rumination with lamicatl increase, so we dropped it back down to 125 mg. Increased bedtime gabapentin  11/18/22: Gabapentin increase not tolerable. Increased propranolol PRN from 10-20 mg.  12/9/22: Propranolol increase perhaps helpful. No changes           MEDICAL HISTORY and ALLERGY     ALLERGIES: Amphotericin b, Phenylbutazones, and Penicillin g    Patient Active Problem List   Diagnosis     Heavy menses     CARDIOVASCULAR SCREENING; LDL GOAL LESS THAN 160     Major depressive disorder, recurrent episode, moderate (H)     Allergic rhinitis     Anxiety state     Attention deficit disorder     Bipolar I disorder (H)     Dysmenorrhea     Glaucoma suspect     Myopia     Pain in both wrists        MEDICAL REVIEW OF SYSTEMS   Contraception-  Yes   Pregnant- No    A comprehensive review of systems was performed and is negative other than noted in the HPI.     MEDICATIONS     Current Outpatient Medications   Medication Sig Dispense Refill     divalproex sodium extended-release (DEPAKOTE ER) 250 MG 24 hr tablet Take 1 tablet (250 mg) by mouth daily 90 tablet 1     gabapentin (NEURONTIN) 100 MG capsule Take 1 capsule (100 mg) by mouth 2 times daily 90 capsule 1     lamoTRIgine (LAMICTAL) 100 MG tablet Take 1 tablet (100 mg) by mouth daily Take along with one 25 mg tablet for a combined total of 125 mg. 30 tablet 1     lamoTRIgine (LAMICTAL) 25 MG tablet Take 1  "tablet (25 mg) by mouth daily. Take along with one 100 mg tablet for a combined total of 125 mg. 60 tablet 1     LORazepam (ATIVAN) 0.5 MG tablet Take 0.5 mg by mouth every 6 hours as needed for anxiety       norgestim-eth estrad triphasic (ORTHO TRI-CYCLEN LO) 0.18/0.215/0.25 MG-25 MCG tablet Take 1 tablet by mouth daily 84 tablet 4     Nutritional Supplements (NUTRITIONAL SUPPLEMENT PO) l-methyl folate  Zinc selenium  Hair skin and nails vitamin       propranolol (INDERAL) 10 MG tablet Take 1-2 tablets (10-20 mg) by mouth 3 times daily as needed (Anxiety) 90 tablet 1     spironolactone (ALDACTONE) 50 MG tablet         VITALS   There were no vitals taken for this visit.    MENTAL STATUS EXAM     Alertness: alert  and oriented  Appearance: well groomed  Behavior/Demeanor: cooperative and pleasant, with good  eye contact   Speech: regular rate and rhythm  Language: intact  Psychomotor: normal or unremarkable  Mood:  \"ok\" and \"tired\"  Affect: full range; congruent to: mood- yes, content- yes  Thought Process/Associations: unremarkable  Thought Content:  Reports suicidal ideation;  Denies violent ideation and delusions   Perception:  Reports none;  Denies auditory hallucinations and visual hallucinations  Insight: good  Judgment: good  Cognition: does  appear grossly intact; formal cognitive testing was not done  Gait and Station: N/A ([facility])     LABS and DATA     PHQ 9/1/2022 10/17/2022 11/17/2022   PHQ-9 Total Score 20 17 21   Q9: Thoughts of better off dead/self-harm past 2 weeks More than half the days Several days More than half the days   F/U: Thoughts of suicide or self-harm Yes No No   F/U: Self harm-plan No - -   F/U: Self-harm action No - -   F/U: Safety concerns No No No       Recent Labs   Lab Test 11/24/21  1543 10/30/20  1200   CR 0.79 0.79   GFRESTIMATED >90 >90     Recent Labs   Lab Test 06/17/22  1617 11/24/21  1543   AST 10 11   ALT 11 15   ALKPHOS 56 49          PSYCHOTROPIC DRUG INTERACTIONS    "                                                    PSYCHCLINICDDI   DEPAKOTE & LAMICTAL- Valproate Products may enhance the adverse/toxic effect of LamoTRIgine. Valproate Products may increase the serum concentration of LamoTRIgine. Severity Major     ATIVAN, GABAPENTIN, DEPAKOTE, LAMICTAL- CNS Depressants may enhance the adverse/toxic effect of other CNS Depressants   LAMICTAL & ORTHO TRI-CYCLEN LO- Estrogen Derivatives (Contraceptive) may decrease the serum concentration of LamoTRIgine         MANAGEMENT:  Monitoring for adverse effects, routine labs, using lowest therapeutic dose of [Depakote & Lamictal] and patient is aware of risks     RISK STATEMENT for SAFETY     Artie endorsed suicidal ideation. SUICIDE RISK ASSESSMENT-  Risk factors for self-harm: previous suicide attempt and relationship conflict.  Mitigating factors: no plan or intent, describes a safety plan, h/o seeking help , minimal substance use , future oriented, stable housing and stable finances.  The patient does not appear to be at imminent risk for self-harm, hospitalization is not recommended which the pt does  agree to. No hospitalization will be arranged. Based on degree of symptoms close psych follow-up was/were recommended which the pt does  agree to. Additional steps to minimize risk: frequent clinic visits.     PROVIDER: Toan Garcia MD    Patient not staffed in clinic.  Note will be reviewed and signed by supervisor Dr. Barron.       MEDICAL DECISION MAKING        (SmartPhrase .[id number])     - At least 1 chronic problem that is not stable    - Engaged in prescription drug management during visit (discussed any medication benefits, side effects, alternatives, etc.)

## 2022-12-09 NOTE — PROGRESS NOTES
Artie Diez is a 42 year old who is being evaluated via a billable video visit.      Pt will join video visit via: [employer]  If there are problems joining the visit, send backup video invite via: Send to preferred e-mail: [email or web address]    Reason for telehealth visit: Patient has requested telehealth visit    Originating location (patient location): Patient's home    Will anyone else be joining the visit? No

## 2022-12-14 PROBLEM — M25.531 PAIN IN BOTH WRISTS: Status: RESOLVED | Noted: 2022-07-06 | Resolved: 2022-12-14

## 2022-12-14 PROBLEM — M25.532 PAIN IN BOTH WRISTS: Status: RESOLVED | Noted: 2022-07-06 | Resolved: 2022-12-14

## 2023-01-04 ASSESSMENT — ANXIETY QUESTIONNAIRES
7. FEELING AFRAID AS IF SOMETHING AWFUL MIGHT HAPPEN: SEVERAL DAYS
GAD7 TOTAL SCORE: 17
8. IF YOU CHECKED OFF ANY PROBLEMS, HOW DIFFICULT HAVE THESE MADE IT FOR YOU TO DO YOUR WORK, TAKE CARE OF THINGS AT HOME, OR GET ALONG WITH OTHER PEOPLE?: VERY DIFFICULT
1. FEELING NERVOUS, ANXIOUS, OR ON EDGE: NEARLY EVERY DAY
IF YOU CHECKED OFF ANY PROBLEMS ON THIS QUESTIONNAIRE, HOW DIFFICULT HAVE THESE PROBLEMS MADE IT FOR YOU TO DO YOUR WORK, TAKE CARE OF THINGS AT HOME, OR GET ALONG WITH OTHER PEOPLE: VERY DIFFICULT
5. BEING SO RESTLESS THAT IT IS HARD TO SIT STILL: SEVERAL DAYS
4. TROUBLE RELAXING: NEARLY EVERY DAY
GAD7 TOTAL SCORE: 17
6. BECOMING EASILY ANNOYED OR IRRITABLE: NEARLY EVERY DAY
GAD7 TOTAL SCORE: 17
2. NOT BEING ABLE TO STOP OR CONTROL WORRYING: NEARLY EVERY DAY
7. FEELING AFRAID AS IF SOMETHING AWFUL MIGHT HAPPEN: SEVERAL DAYS
3. WORRYING TOO MUCH ABOUT DIFFERENT THINGS: NEARLY EVERY DAY

## 2023-01-04 ASSESSMENT — PATIENT HEALTH QUESTIONNAIRE - PHQ9
SUM OF ALL RESPONSES TO PHQ QUESTIONS 1-9: 20
SUM OF ALL RESPONSES TO PHQ QUESTIONS 1-9: 20
10. IF YOU CHECKED OFF ANY PROBLEMS, HOW DIFFICULT HAVE THESE PROBLEMS MADE IT FOR YOU TO DO YOUR WORK, TAKE CARE OF THINGS AT HOME, OR GET ALONG WITH OTHER PEOPLE: SOMEWHAT DIFFICULT

## 2023-01-05 ENCOUNTER — VIRTUAL VISIT (OUTPATIENT)
Dept: PSYCHIATRY | Facility: CLINIC | Age: 43
End: 2023-01-05
Attending: PSYCHIATRY & NEUROLOGY
Payer: COMMERCIAL

## 2023-01-05 DIAGNOSIS — F31.81 BIPOLAR 2 DISORDER (H): ICD-10-CM

## 2023-01-05 PROCEDURE — 99214 OFFICE O/P EST MOD 30 MIN: CPT | Mod: HN | Performed by: STUDENT IN AN ORGANIZED HEALTH CARE EDUCATION/TRAINING PROGRAM

## 2023-01-05 RX ORDER — GABAPENTIN 100 MG/1
100 CAPSULE ORAL 2 TIMES DAILY
Qty: 60 CAPSULE | Refills: 1 | Status: SHIPPED | OUTPATIENT
Start: 2023-01-05 | End: 2023-01-31

## 2023-01-05 RX ORDER — LAMOTRIGINE 25 MG/1
TABLET ORAL
Qty: 30 TABLET | Refills: 1 | Status: SHIPPED | OUTPATIENT
Start: 2023-01-05 | End: 2023-01-31

## 2023-01-05 RX ORDER — DIVALPROEX SODIUM 250 MG/1
250 TABLET, EXTENDED RELEASE ORAL DAILY
Qty: 90 TABLET | Refills: 1 | Status: SHIPPED | OUTPATIENT
Start: 2023-01-05 | End: 2023-01-31

## 2023-01-05 RX ORDER — PROPRANOLOL HYDROCHLORIDE 10 MG/1
10-20 TABLET ORAL 3 TIMES DAILY PRN
Qty: 90 TABLET | Refills: 1 | Status: SHIPPED | OUTPATIENT
Start: 2023-01-05 | End: 2023-01-31

## 2023-01-05 RX ORDER — LAMOTRIGINE 100 MG/1
100 TABLET ORAL DAILY
Qty: 30 TABLET | Refills: 1 | Status: SHIPPED | OUTPATIENT
Start: 2023-01-05 | End: 2023-01-31

## 2023-01-05 NOTE — NURSING NOTE
Patient denies any changes since echeck-in regarding medication and allergies and states all information entered during echeck-in remains accurate.  Veronica Merritt VF

## 2023-01-05 NOTE — PROGRESS NOTES
Artie Diez is a 42 year old who is being evaluated via a billable video visit.      Pt will join video visit via: [employer]  If there are problems joining the visit, send backup video invite via: Send to preferred e-mail: [email or web address]    Reason for telehealth visit: Patient has requested telehealth visit    Originating location (patient location): Patient's home    Will anyone else be joining the visit? No      Answers for HPI/ROS submitted by the patient on 1/4/2023  If you checked off any problems, how difficult have these problems made it for you to do your work, take care of things at home, or get along with other people?: Somewhat difficult  PHQ9 TOTAL SCORE: 20  EAMON 7 TOTAL SCORE: 17

## 2023-01-05 NOTE — PROGRESS NOTES
VIDEO VISIT  Artie Diez is a 42 year old patient who is being evaluated via a billable video visit.      How would you like to obtain your AVS?: MyChart  AVS SmartPhrase [PsychAVS] has been placed in 'Patient Instructions': Yes      Video- Visit Details  Type of service:  video visit for medication management  Time of service:    Start Time:  8:45 AM    End Time:  9:30 AM    Distant Site (provider location):  [facility]  Mode of Communication:  Video Conference via [facility]  Psychiatry Clinic  MEDICAL PROGRESS NOTE     CARE TEAM:  PCP- Ran Cabral, Psychotherapist- Evangelina ([place] [facility]), None  Artie Diez is a 41 year old who uses the name Artie and pronouns she, her, hers.       DIAGNOSIS   Bipolar II Disorder, rapid cycling, MRE hypomanic  History of EAMON      ASSESSMENT   Artie reported continued trouble with sleep and difficulties with cognition. She is not feeling depressed, but does feel demoralized.  She is looking for a car right now, which is a major source of stress.  Sleep initiation is difficult, due to multiple factors.  Sleeptime has been more delayed over the winter break, and we will use melatonin to try to shift sleep back earlier.  She continues to do work in therapy to help manage interpersonal symptoms.  She has had a few instances of hypnopompic hallucinations.  No hallucinations outside of that.    She has chronic SI which is passive. She will reach out if these thoughts are changing or worsening. No acute safety concerns.    Future considerations:  - We have discussed perhaps being on a full dose of 1 med rather than low doses of several meds. Given her history of side effects, the current strategy may be a better fit for her at this time.  -Guanfacine might be a good alternative for both attention and sympatholysis, will discuss this if focus on sleep is not yielding positive results.    [place] was [place] today:   Indicates prescribed medications as per epic records.      PLAN                                                                                                                 1) Meds-  - lamotrigine 125 mg daily.  - Depakote ER - 250 mg   - gabapentin- 100 mg every day at noon and 100 mg at bedtime.  - Ativan 0.5 mg at bedtime/prn (2 times a week)  - propranolol 10-20 mg tid /prn- for anxiety (1-3 times per day)    -Start melatonin 1-1.5 mg 2 hours      PCP-   Spironolactone- 50 mg bid- for hair loss  L-Methylfolate     2) Psychotherapy-   Individual therapy on temporary hold, works on DBT skills.   Working with racial trauma consultant     3) Next due-  Labs- Valproate due in Feb 2023  EKG-  As indicated  Rating scales- PHQ9     4) Referrals-  ADHD referral       5) Dispo- RTC in 4 weeks      PERTINENT BACKGROUND                           [most recent eval 12/07/21]   Dx depression age 14. Started various medication without working diagnosis in late 20's, had been resistant to medication in past, with multiple somatic symptoms, multiple diagnoses in past. Official diagnosis made about 3 years ago by mood disorder specialist in [place], done as second opinion as struggling with approach of first psychiatrist.   Has been on the current combination of medications since saw specialist, and per specialist, if have this type of condition, cannot be on antidepressants. She is prone to side effects with medications and sometimes, its hard for her to manage her medications due to the side effects, so her doctor was keeping her meds at a low dosage. Sleep is on and off. She was actively involved in CBT and DBT while in [place]     Psych pertinent item history includes suicide attempt [xseveral overdoses as a teenager], SIB [mainly in her teens/20's] and mutiple psychotropic trials         SUBJECTIVE     -Car hunt has been emotionally taxing. Feels shamefully privileged that her parents are financially helping her  out.  -Been losing sleep over car decision. Had a few instances of hearing a baby crying and static in the period before she's fully awake. Feels guilt about not going to bed earlier, wants to sleep better for better memory.  -Brainstormed different ways for her to get to bed earlier. Sleep journal, night time routine, motivational enhancement.  -Took an ativan last night.   -Feels that sleep is a bigger factor in attention than possible ADHD.  -Increased rumination about things in the past.  -Really likes the work she's doing with racial trauma specialist.  -Her twin is back, which is going ok, but she has a pattern of antagonism in the past.    Recent Social History:   Financial/ Work- working , [employer]   Partner/ - Single    Children- None      Living situation- lives alone   Social/ Spiritual Support- Parents, mostly mom    Recent Psych Symptoms:   Depression:  suicidal ideation, low energy, insomnia, poor concentration /memory and excessive guilt  Elevated:  dysregulation  Psychosis:  none  Anxiety:  excessive worry, social anxiety and rumination  Trauma Related:  dysregulation      Recent Substance Use:     -alcohol: No   -cannabis: No   -tobacco: No  -caffeine:  Yes: 1-2 cups per day   -opioids: No   Narcan Kit currrent: No   -other: none    Pertinent Negatives: No violent ideation, self-injury, psychosis, hallucinations, delusions, aggression and harmful substance use  Adverse Effects: none reported      PAST MED TRIALS         New Antidepressants:  Paxil (paroxetine) and Wellbutrin  (bupropion)  Mood Stabilizers:  Lithium Carbonate and Depakote and Depakot ER (valproate)  Stimulants / ADHD Meds:  Adderall (amphetamine salts), Ritalin (methylphenidate) and Strattera (atomoxetine)  Tranquilizers:  Klonopin (clonazepam) and Buspar (buspirone)-  It caused Akathisia  Paxil- low dose, further bad side effects on higher doses, night sweats  Pristiq- Ok, low dose, stepping on Akathisia     Tends  not to tolerate most medications, c/o GI symptoms and HA       PSYCH and SUBSTANCE USE Critical Summary Points since July 2022 9/1/22: Transfer DA. Some hypomanic symptoms. Increased lamotrigine  9/27/22: Less hypomania, no med changes  10/18/22: Mood more stable, had increased rumination with lamicatl increase, so we dropped it back down to 125 mg. Increased bedtime gabapentin  11/18/22: Gabapentin increase not tolerable. Increased propranolol PRN from 10-20 mg.  12/9/22: Propranolol increase perhaps helpful. No changes  1/5/23: Focusing on sleep routine. Added melatonin.           MEDICAL HISTORY and ALLERGY     ALLERGIES: Amphotericin b, Phenylbutazones, and Penicillin g    Patient Active Problem List   Diagnosis     Heavy menses     CARDIOVASCULAR SCREENING; LDL GOAL LESS THAN 160     Major depressive disorder, recurrent episode, moderate (H)     Allergic rhinitis     Anxiety state     Attention deficit disorder     Bipolar I disorder (H)     Dysmenorrhea     Glaucoma suspect     Myopia        MEDICAL REVIEW OF SYSTEMS   Contraception-  Yes   Pregnant- No    A comprehensive review of systems was performed and is negative other than noted in the HPI.     MEDICATIONS     Current Outpatient Medications   Medication Sig Dispense Refill     divalproex sodium extended-release (DEPAKOTE ER) 250 MG 24 hr tablet Take 1 tablet (250 mg) by mouth daily 90 tablet 1     gabapentin (NEURONTIN) 100 MG capsule Take 1 capsule (100 mg) by mouth 2 times daily 90 capsule 1     lamoTRIgine (LAMICTAL) 100 MG tablet Take 1 tablet (100 mg) by mouth daily Take along with one 25 mg tablet for a combined total of 125 mg. 30 tablet 1     lamoTRIgine (LAMICTAL) 25 MG tablet Take 1 tablet (25 mg) by mouth daily. Take along with one 100 mg tablet for a combined total of 125 mg. 60 tablet 1     LORazepam (ATIVAN) 0.5 MG tablet Take 0.5 mg by mouth every 6 hours as needed for anxiety       norgestim-eth estrad triphasic (ORTHO TRI-CYCLEN LO)  "0.18/0.215/0.25 MG-25 MCG tablet Take 1 tablet by mouth daily 84 tablet 4     Nutritional Supplements (NUTRITIONAL SUPPLEMENT PO) l-methyl folate  Zinc selenium  Hair skin and nails vitamin       propranolol (INDERAL) 10 MG tablet Take 1-2 tablets (10-20 mg) by mouth 3 times daily as needed (Anxiety) 90 tablet 1     spironolactone (ALDACTONE) 50 MG tablet         VITALS   There were no vitals taken for this visit.    MENTAL STATUS EXAM     Alertness: alert  and oriented  Appearance: well groomed  Behavior/Demeanor: cooperative and pleasant, with good  eye contact   Speech: regular rate and rhythm  Language: intact  Psychomotor: normal or unremarkable  Mood:  \"ok\" and \"tired\"  Affect: full range; congruent to: mood- yes, content- yes  Thought Process/Associations: unremarkable  Thought Content:  Reports suicidal ideation;  Denies violent ideation and delusions   Perception:  Reports none;  Denies auditory hallucinations and visual hallucinations  Insight: good  Judgment: good  Cognition: does  appear grossly intact; formal cognitive testing was not done  Gait and Station: N/A ([facility])     LABS and DATA     PHQ 10/17/2022 11/17/2022 1/4/2023   PHQ-9 Total Score 17 21 20   Q9: Thoughts of better off dead/self-harm past 2 weeks Several days More than half the days Several days   F/U: Thoughts of suicide or self-harm No No No   F/U: Self harm-plan - - -   F/U: Self-harm action - - -   F/U: Safety concerns No No No       Recent Labs   Lab Test 11/24/21  1543 10/30/20  1200   CR 0.79 0.79   GFRESTIMATED >90 >90     Recent Labs   Lab Test 06/17/22  1617 11/24/21  1543   AST 10 11   ALT 11 15   ALKPHOS 56 49          PSYCHOTROPIC DRUG INTERACTIONS                                                       PSYCHCLINICDDI   DEPAKOTE & LAMICTAL- Valproate Products may enhance the adverse/toxic effect of LamoTRIgine. Valproate Products may increase the serum concentration of LamoTRIgine. Severity Major     ATIVAN, GABAPENTIN, " DEPAKOTE, LAMICTAL- CNS Depressants may enhance the adverse/toxic effect of other CNS Depressants   LAMICTAL & ORTHO TRI-CYCLEN LO- Estrogen Derivatives (Contraceptive) may decrease the serum concentration of LamoTRIgine         MANAGEMENT:  Monitoring for adverse effects, routine labs, using lowest therapeutic dose of [Depakote & Lamictal] and patient is aware of risks     RISK STATEMENT for SAFETY     Artie endorsed suicidal ideation. SUICIDE RISK ASSESSMENT-  Risk factors for self-harm: previous suicide attempt and relationship conflict.  Mitigating factors: no plan or intent, describes a safety plan, h/o seeking help , minimal substance use , future oriented, stable housing and stable finances.  The patient does not appear to be at imminent risk for self-harm, hospitalization is not recommended which the pt does  agree to. No hospitalization will be arranged. Based on degree of symptoms close psych follow-up was/were recommended which the pt does  agree to. Additional steps to minimize risk: frequent clinic visits.     PROVIDER: Toan Garcia MD    Patient not staffed in clinic.  Note will be reviewed and signed by supervisor Dr. Barron.       MEDICAL DECISION MAKING        (SmartPhrase .[id number])     - At least 1 chronic problem that is not stable    - Engaged in prescription drug management during visit (discussed any medication benefits, side effects, alternatives, etc.)

## 2023-01-05 NOTE — PATIENT INSTRUCTIONS
**For crisis resources, please see the information at the end of this document**   Patient Education    Thank you for coming to the [facility] [place] [facility].     Lab Testing:  If you had lab testing today and your results are reassuring or normal they will be mailed to you or sent through [email or web address] within 7 days. If the lab tests need quick action we will call you with the results. The phone number we will call with results is # [phone]. If this is not the best number please call our clinic and change the number.     Medication Refills:  If you need any refills please call your pharmacy and they will contact us. Our fax number for refills is [phone].   Three business days of notice are needed for general medication refill requests.   Five business days of notice are needed for controlled substance refill requests.   If you need to change to a different pharmacy, please contact the new pharmacy directly. The new pharmacy will help you get your medications transferred.     Contact Us:  Please call [phone] during business hours (8-5:00 M-F).   If you have medication related questions after clinic hours, or on the weekend, please call [phone].     Financial Assistance [phone]   Medical Records [phone]       MENTAL HEALTH CRISIS RESOURCES:  For a emergency help, please call 911 or go to the nearest Emergency Department.     Emergency Walk-In Options:   EmPATH Unit @ [facility] ([place]): [phone] - Specialized mental health emergency area designed to be calming  [facility] ([place]): [phone]  [facility] ([place]): [phone]  [facility]): [phone]    [facility] Crisis Information:   [place]: [phone]  [place]: [phone]  [place] ([place]) - Adult: [phone]     Child: [phone]  [place] - Adult: [phone]     Child: [phone]  [place]:  [phone]  List of all [facility] resources:   https://[place].gov/dhs/people-we-serve/adults/health-care/mental-health/resources/crisis-contacts.jsp    National Crisis Information:   Crisis Text Line: Text  [place]  to 494374  Suicide & Crisis Lifeline: 988  National Suicide Prevention Lifeline: 8-293-337-TALK (1-888.461.9366)       For online chat options, visit https://suicidepreventionlifeline.org/chat/  Poison Control Center: 2-489-503-8765  Trans Lifeline: 9-801-897-6817 - Hotline for transgender people of all ages  [employer]: 1-611-615-1327 - Hotline for LGBT youth     For Non-Emergency Support:   Fast Tracker: Mental Health & Substance Use Disorder Resources -   https://www.[employer].org/

## 2023-01-31 ENCOUNTER — VIRTUAL VISIT (OUTPATIENT)
Dept: PSYCHIATRY | Facility: CLINIC | Age: 43
End: 2023-01-31
Attending: PSYCHIATRY & NEUROLOGY
Payer: COMMERCIAL

## 2023-01-31 DIAGNOSIS — F31.81 BIPOLAR 2 DISORDER (H): ICD-10-CM

## 2023-01-31 PROCEDURE — 99214 OFFICE O/P EST MOD 30 MIN: CPT | Mod: VID | Performed by: STUDENT IN AN ORGANIZED HEALTH CARE EDUCATION/TRAINING PROGRAM

## 2023-01-31 RX ORDER — PROPRANOLOL HYDROCHLORIDE 10 MG/1
10-20 TABLET ORAL 3 TIMES DAILY PRN
Qty: 90 TABLET | Refills: 1 | Status: SHIPPED | OUTPATIENT
Start: 2023-01-31 | End: 2023-02-28

## 2023-01-31 RX ORDER — DIVALPROEX SODIUM 250 MG/1
250 TABLET, EXTENDED RELEASE ORAL DAILY
Qty: 90 TABLET | Refills: 1 | Status: SHIPPED | OUTPATIENT
Start: 2023-01-31 | End: 2023-02-28

## 2023-01-31 RX ORDER — GABAPENTIN 100 MG/1
100 CAPSULE ORAL 2 TIMES DAILY
Qty: 60 CAPSULE | Refills: 1 | Status: SHIPPED | OUTPATIENT
Start: 2023-01-31 | End: 2023-02-28

## 2023-01-31 RX ORDER — LAMOTRIGINE 25 MG/1
TABLET ORAL
Qty: 30 TABLET | Refills: 1 | Status: SHIPPED | OUTPATIENT
Start: 2023-01-31 | End: 2023-02-28

## 2023-01-31 RX ORDER — LAMOTRIGINE 100 MG/1
100 TABLET ORAL DAILY
Qty: 30 TABLET | Refills: 1 | Status: SHIPPED | OUTPATIENT
Start: 2023-01-31 | End: 2023-02-28

## 2023-01-31 NOTE — PATIENT INSTRUCTIONS
**For crisis resources, please see the information at the end of this document**   Patient Education    Thank you for coming to the [facility]ENTAL HEALTH & [facility] [place] [facility].     Lab Testing:  If you had lab testing today and your results are reassuring or normal they will be mailed to you or sent through [employer] within 7 days. If the lab tests need quick action we will call you with the results. The phone number we will call with results is # [phone]. If this is not the best number please call our clinic and change the number.     Medication Refills:  If you need any refills please call your pharmacy and they will contact us. Our fax number for refills is [phone].   Three business days of notice are needed for general medication refill requests.   Five business days of notice are needed for controlled substance refill requests.   If you need to change to a different pharmacy, please contact the new pharmacy directly. The new pharmacy will help you get your medications transferred.     Contact Us:  Please call [phone] during business hours (8-5:00 M-F).   If you have medication related questions after clinic hours, or on the weekend, please call [phone].     Financial Assistance [phone]   Medical Records [phone]       MENTAL HEALTH CRISIS RESOURCES:  For a emergency help, please call 911 or go to the nearest Emergency Department.     Emergency Walk-In Options:   EmPATH Unit @ [facility] ([place]): [phone] - Specialized mental health emergency area designed to be calming  [facility] ([place]): [phone]  [facility] ([place]): [phone]  [facility]): [phone]    [facility] Crisis Information:   [place]: [phone]  [place]: [phone]  [place] ([place]) - Adult: [phone]     Child: [phone]  [place] - Adult: [phone]     Child: [phone]  [place]:  [phone]  List of all [facility] resources:   https://[place].gov/dhs/people-we-serve/adults/health-care/mental-health/resources/crisis-contacts.jsp    National Crisis Information:   Crisis Text Line: Text  [place]  to 302186  Suicide & Crisis Lifeline: 988  National Suicide Prevention Lifeline: 9-754-105-TALK (1-442.781.2441)       For online chat options, visit https://suicidepreventionlifeline.org/chat/  Poison Control Center: 0-686-468-4152  Trans Lifeline: 0-151-197-0452 - Hotline for transgender people of all ages  The [employer] Project: 0-982-056-4070 - Hotline for LGBT youth     For Non-Emergency Support:   Fast Tracker: Mental Health & Substance Use Disorder Resources -   https://www.[employer].org/

## 2023-01-31 NOTE — PROGRESS NOTES
VIDEO VISIT  Artie Diez is a 42 year old patient who is being evaluated via a billable video visit.      How would you like to obtain your AVS?: MyChart  AVS SmartPhrase [PsychAVS] has been placed in 'Patient Instructions': Yes      Video- Visit Details  Type of service:  video visit for medication management  Time of service:    Start Time:  10:41 AM    End Time:  11:22 AM    Distant Site (provider location):  [facility]  Mode of Communication:  Video Conference via [facility]  Psychiatry Clinic  MEDICAL PROGRESS NOTE     CARE TEAM:  PCP- Ran Cabral, Psychotherapist- Evangelina ([facility]), None  Artie Diez is a 41 year old who uses the name Artie and pronouns she, her, hers.       DIAGNOSIS   Bipolar II Disorder, rapid cycling, MRE hypomanic  History of EAMON      ASSESSMENT   Artie reported continued trouble with sleep. She is not feeling deeply depressed, but does feel demoralized and judgmental of herself.  She is looking for a car right now, which is a major source of stress.  Sleep initiation is difficult, due to multiple factors.  She finds it helpful to have our check-ins to provide accountability for changing sleep patterns.  She continues to do work in therapy to help manage interpersonal symptoms.      She has chronic SI which is passive. She will reach out if these thoughts are changing or worsening. No acute safety concerns.    Future considerations:  - We have discussed perhaps being on a full dose of 1 med rather than low doses of several meds. Given her history of side effects, the current strategy may be a better fit for her at this time.  -Guanfacine might be a good alternative for both attention and sympatholysis, will discuss this if focus on sleep is not yielding positive results.      PLAN                                                                                                                 1) Meds-  -  lamotrigine 125 mg daily.  - Depakote ER - 250 mg   - gabapentin- 100 mg every day at noon and 100 mg at bedtime.  - propranolol 10-20 mg tid /prn- for anxiety (1-3 times per day)  - melatonin 1-1.5 mg 2 hours      PCP-   - Ativan 0.5 mg at bedtime/prn (2 times a week)  Spironolactone- 50 mg bid- for hair loss  L-Methylfolate     2) Psychotherapy-   Individual therapy on temporary hold, works on DBT skills.   Working with racial trauma consultant     3) Next due-  Labs- Valproate due in Feb 2023  EKG-  As indicated  Rating scales- N/a     4) Referrals-  None     5) Dispo- RTC in 4 weeks      PERTINENT BACKGROUND                           [most recent eval 12/07/21]   Dx depression age 14. Started various medication without working diagnosis in late 20's, had been resistant to medication in past, with multiple somatic symptoms, multiple diagnoses in past. Official diagnosis made about 3 years ago by mood disorder specialist in [place], done as second opinion as struggling with approach of first psychiatrist.   Has been on the current combination of medications since saw specialist, and per specialist, if have this type of condition, cannot be on antidepressants. She is prone to side effects with medications and sometimes, its hard for her to manage her medications due to the side effects, so her doctor was keeping her meds at a low dosage. Sleep is on and off. She was actively involved in CBT and DBT while in [place]     Psych pertinent item history includes suicide attempt [xseveral overdoses as a teenager], SIB [mainly in her teens/20's] and mutiple psychotropic trials         SUBJECTIVE     -Been struggling a little bit.  -Still not sleeping great. It helps having an accountability check-in with me.  -Feels a little bit out of her body.  -Cognition is negatively impacted by poor sleep, and this is worrisome to her.  -Feels judgmental about herself when she struggles sleeping.  -Older sister is getting a divorce,  and she thinks about the impact of this on her niece. Thinking about what boundaries she needs in these relationships. Has been reading about these relationship dynamics, which isn't providing any desirable solutions.  -Relationship with a friend in [place] is drifting apart. Wonders why this isn't more distressing to her.  -Found a car. Won't get it until March, but is grateful the process is done. Relieved.  -Work with racial trauma consultant has been good.  -Brainstormed different ways for her to get to bed earlier. Sleep journal, night time routine, motivational enhancement.      Recent Social History:   Financial/ Work- working , [employer]   Partner/ - Single    Children- None      Living situation- lives alone   Social/ Spiritual Support- Parents, mostly mom    Recent Psych Symptoms:   Depression:  suicidal ideation, low energy, insomnia, poor concentration /memory and excessive guilt  Elevated:  dysregulation  Psychosis:  none  Anxiety:  excessive worry, social anxiety and rumination  Trauma Related:  dysregulation      Recent Substance Use:     -alcohol: No   -cannabis: No   -tobacco: No  -caffeine:  Yes: 1-2 cups per day   -opioids: No   Narcan Kit currrent: No   -other: none        PAST MED TRIALS         New Antidepressants:  Paxil (paroxetine) and Wellbutrin  (bupropion)  Mood Stabilizers:  Lithium Carbonate and Depakote and Depakot ER (valproate)  Stimulants / ADHD Meds:  Adderall (amphetamine salts), Ritalin (methylphenidate) and Strattera (atomoxetine)  Tranquilizers:  Klonopin (clonazepam) and Buspar (buspirone)-  It caused Akathisia  Paxil- low dose, further bad side effects on higher doses, night sweats  Pristiq- Ok, low dose, stepping on Akathisia     Tends not to tolerate most medications, c/o GI symptoms and HA       PSYCH and SUBSTANCE USE Critical Summary Points since July 2022 9/1/22: Transfer DA. Some hypomanic symptoms. Increased lamotrigine  9/27/22: Less hypomania, no  med changes  10/18/22: Mood more stable, had increased rumination with lamicatl increase, so we dropped it back down to 125 mg. Increased bedtime gabapentin  11/18/22: Gabapentin increase not tolerable. Increased propranolol PRN from 10-20 mg.  12/9/22: Propranolol increase perhaps helpful. No changes  1/5/23: Focusing on sleep routine. Added melatonin.  1/31/23: Sleep still bad. No changes.           MEDICAL HISTORY and ALLERGY     ALLERGIES: Amphotericin b, Phenylbutazones, and Penicillin g    Patient Active Problem List   Diagnosis     Heavy menses     CARDIOVASCULAR SCREENING; LDL GOAL LESS THAN 160     Major depressive disorder, recurrent episode, moderate (H)     Allergic rhinitis     Anxiety state     Attention deficit disorder     Bipolar I disorder (H)     Dysmenorrhea     Glaucoma suspect     Myopia        MEDICATIONS     Current Outpatient Medications   Medication Sig Dispense Refill     divalproex sodium extended-release (DEPAKOTE ER) 250 MG 24 hr tablet Take 1 tablet (250 mg) by mouth daily 90 tablet 1     gabapentin (NEURONTIN) 100 MG capsule Take 1 capsule (100 mg) by mouth 2 times daily 60 capsule 1     lamoTRIgine (LAMICTAL) 100 MG tablet Take 1 tablet (100 mg) by mouth daily Take along with one 25 mg tablet for a combined total of 125 mg. 30 tablet 1     lamoTRIgine (LAMICTAL) 25 MG tablet Take 1 tablet (25 mg) by mouth daily. Take along with one 100 mg tablet for a combined total of 125 mg. 30 tablet 1     LORazepam (ATIVAN) 0.5 MG tablet Take 0.5 mg by mouth every 6 hours as needed for anxiety       norgestim-eth estrad triphasic (ORTHO TRI-CYCLEN LO) 0.18/0.215/0.25 MG-25 MCG tablet Take 1 tablet by mouth daily 84 tablet 4     Nutritional Supplements (NUTRITIONAL SUPPLEMENT PO) l-methyl folate  Zinc selenium  Hair skin and nails vitamin       propranolol (INDERAL) 10 MG tablet Take 1-2 tablets (10-20 mg) by mouth 3 times daily as needed (Anxiety) 90 tablet 1     spironolactone (ALDACTONE) 50 MG  "tablet         VITALS   There were no vitals taken for this visit.    MENTAL STATUS EXAM     Alertness: alert  and oriented  Appearance: well groomed  Behavior/Demeanor: cooperative and pleasant, with good  eye contact   Speech: regular rate and rhythm  Language: intact  Psychomotor: normal or unremarkable  Mood:  \"struggling a bit\"  Affect: full range; congruent to: mood- yes, content- yes  Thought Process/Associations: unremarkable  Thought Content:  Reports suicidal ideation;  Denies violent ideation and delusions   Perception:  Reports none;  Denies auditory hallucinations and visual hallucinations  Insight: good  Judgment: good  Cognition: does  appear grossly intact; formal cognitive testing was not done  Gait and Station: N/A (telehealth)     LABS and DATA     PHQ 10/17/2022 11/17/2022 1/4/2023   PHQ-9 Total Score 17 21 20   Q9: Thoughts of better off dead/self-harm past 2 weeks Several days More than half the days Several days   F/U: Thoughts of suicide or self-harm No No No   F/U: Self harm-plan - - -   F/U: Self-harm action - - -   F/U: Safety concerns No No No       Recent Labs   Lab Test 11/24/21  1543 10/30/20  1200   CR 0.79 0.79   GFRESTIMATED >90 >90     Recent Labs   Lab Test 06/17/22  1617 11/24/21  1543   AST 10 11   ALT 11 15   ALKPHOS 56 49          PSYCHOTROPIC DRUG INTERACTIONS                                                       PSYCHCLINICDDI   DEPAKOTE & LAMICTAL- Valproate Products may enhance the adverse/toxic effect of LamoTRIgine. Valproate Products may increase the serum concentration of LamoTRIgine. Severity Major     ATIVAN, GABAPENTIN, DEPAKOTE, LAMICTAL- CNS Depressants may enhance the adverse/toxic effect of other CNS Depressants   LAMICTAL & ORTHO TRI-CYCLEN LO- Estrogen Derivatives (Contraceptive) may decrease the serum concentration of LamoTRIgine         MANAGEMENT:  Monitoring for adverse effects, routine labs, using lowest therapeutic dose of [Depakote & Lamictal] and " patient is aware of risks     RISK STATEMENT for SAFETY     Artie endorsed suicidal ideation. SUICIDE RISK ASSESSMENT-  Risk factors for self-harm: previous suicide attempt and relationship conflict.  Mitigating factors: no plan or intent, describes a safety plan, h/o seeking help , minimal substance use , future oriented, stable housing and stable finances.  The patient does not appear to be at imminent risk for self-harm, hospitalization is not recommended which the pt does  agree to. No hospitalization will be arranged. Based on degree of symptoms close psych follow-up was/were recommended which the pt does  agree to. Additional steps to minimize risk: frequent clinic visits.     PROVIDER: Toan Garcia MD    Patient not staffed in clinic.  Note will be reviewed and signed by supervisor Dr. Barron.       MEDICAL DECISION MAKING        (SmartPhrase .[email or web address])     - At least 1 chronic problem that is not stable    - Engaged in prescription drug management during visit (discussed any medication benefits, side effects, alternatives, etc.)

## 2023-01-31 NOTE — PROGRESS NOTES
Artie Diez is a 42 year old who is being evaluated via a billable video visit.      Pt will join video visit via: its learning  If there are problems joining the visit, send backup video invite via: Send to preferred e-mail: [email or web address]    Reason for telehealth visit: Patient has requested telehealth visit    Originating location (patient location): Patient's home    Will anyone else be joining the visit? No

## 2023-02-27 ASSESSMENT — ANXIETY QUESTIONNAIRES
7. FEELING AFRAID AS IF SOMETHING AWFUL MIGHT HAPPEN: MORE THAN HALF THE DAYS
1. FEELING NERVOUS, ANXIOUS, OR ON EDGE: NEARLY EVERY DAY
4. TROUBLE RELAXING: MORE THAN HALF THE DAYS
8. IF YOU CHECKED OFF ANY PROBLEMS, HOW DIFFICULT HAVE THESE MADE IT FOR YOU TO DO YOUR WORK, TAKE CARE OF THINGS AT HOME, OR GET ALONG WITH OTHER PEOPLE?: VERY DIFFICULT
3. WORRYING TOO MUCH ABOUT DIFFERENT THINGS: NEARLY EVERY DAY
5. BEING SO RESTLESS THAT IT IS HARD TO SIT STILL: NOT AT ALL
IF YOU CHECKED OFF ANY PROBLEMS ON THIS QUESTIONNAIRE, HOW DIFFICULT HAVE THESE PROBLEMS MADE IT FOR YOU TO DO YOUR WORK, TAKE CARE OF THINGS AT HOME, OR GET ALONG WITH OTHER PEOPLE: VERY DIFFICULT
GAD7 TOTAL SCORE: 15
GAD7 TOTAL SCORE: 15
6. BECOMING EASILY ANNOYED OR IRRITABLE: NEARLY EVERY DAY
2. NOT BEING ABLE TO STOP OR CONTROL WORRYING: MORE THAN HALF THE DAYS
7. FEELING AFRAID AS IF SOMETHING AWFUL MIGHT HAPPEN: MORE THAN HALF THE DAYS
GAD7 TOTAL SCORE: 15

## 2023-02-27 ASSESSMENT — PATIENT HEALTH QUESTIONNAIRE - PHQ9
10. IF YOU CHECKED OFF ANY PROBLEMS, HOW DIFFICULT HAVE THESE PROBLEMS MADE IT FOR YOU TO DO YOUR WORK, TAKE CARE OF THINGS AT HOME, OR GET ALONG WITH OTHER PEOPLE: SOMEWHAT DIFFICULT
SUM OF ALL RESPONSES TO PHQ QUESTIONS 1-9: 22
SUM OF ALL RESPONSES TO PHQ QUESTIONS 1-9: 22

## 2023-02-28 ENCOUNTER — VIRTUAL VISIT (OUTPATIENT)
Dept: PSYCHIATRY | Facility: CLINIC | Age: 43
End: 2023-02-28
Attending: PSYCHIATRY & NEUROLOGY
Payer: COMMERCIAL

## 2023-02-28 ENCOUNTER — MYC MEDICAL ADVICE (OUTPATIENT)
Dept: PSYCHIATRY | Facility: CLINIC | Age: 43
End: 2023-02-28
Payer: COMMERCIAL

## 2023-02-28 DIAGNOSIS — Z51.81 ENCOUNTER FOR THERAPEUTIC DRUG MONITORING: ICD-10-CM

## 2023-02-28 DIAGNOSIS — F31.81 BIPOLAR 2 DISORDER (H): Primary | ICD-10-CM

## 2023-02-28 PROCEDURE — 99214 OFFICE O/P EST MOD 30 MIN: CPT | Mod: HN | Performed by: STUDENT IN AN ORGANIZED HEALTH CARE EDUCATION/TRAINING PROGRAM

## 2023-02-28 PROCEDURE — G0463 HOSPITAL OUTPT CLINIC VISIT: HCPCS | Mod: PN,GT | Performed by: STUDENT IN AN ORGANIZED HEALTH CARE EDUCATION/TRAINING PROGRAM

## 2023-02-28 RX ORDER — LAMOTRIGINE 25 MG/1
TABLET ORAL
Qty: 60 TABLET | Refills: 1 | Status: SHIPPED | OUTPATIENT
Start: 2023-02-28 | End: 2023-03-28

## 2023-02-28 RX ORDER — DIVALPROEX SODIUM 250 MG/1
250 TABLET, EXTENDED RELEASE ORAL DAILY
Qty: 90 TABLET | Refills: 1 | Status: SHIPPED | OUTPATIENT
Start: 2023-02-28 | End: 2023-03-28

## 2023-02-28 RX ORDER — PROPRANOLOL HYDROCHLORIDE 10 MG/1
10-20 TABLET ORAL 3 TIMES DAILY PRN
Qty: 90 TABLET | Refills: 1 | Status: SHIPPED | OUTPATIENT
Start: 2023-02-28 | End: 2023-03-28

## 2023-02-28 RX ORDER — GABAPENTIN 100 MG/1
100 CAPSULE ORAL 2 TIMES DAILY
Qty: 60 CAPSULE | Refills: 1 | Status: SHIPPED | OUTPATIENT
Start: 2023-02-28 | End: 2023-03-28

## 2023-02-28 RX ORDER — LAMOTRIGINE 100 MG/1
100 TABLET ORAL DAILY
Qty: 30 TABLET | Refills: 1 | Status: SHIPPED | OUTPATIENT
Start: 2023-02-28 | End: 2023-03-28

## 2023-02-28 NOTE — PROGRESS NOTES
Video- Visit Details  Type of service:  video visit for medication management  Time of service:    Start Time:  10:41 AM    End Time:  11:22 AM    Distant Site (provider location):  [facility]  Mode of Communication:  Video Conference via [employer]       [facility]  Psychiatry Clinic  MEDICAL PROGRESS NOTE     CARE TEAM:  PCP- Ran Cabral, Psychotherapist- Evangelina ([facility]), None  Artie Diez is a 41 year old who uses the name Artie and pronouns she, her, hers.       DIAGNOSIS   Bipolar II Disorder, rapid cycling, MRE hypomanic vs mixed  History of EAMON      ASSESSMENT   Artie reported some improvement to sleep. She is not feeling deeply depressed, but does feel demoralized and judgmental of herself.    She finds it helpful to have our check-ins to provide accountability for changing sleep patterns, and she has had some success setting an alarm to start her bedtime routine.  She continues to do work in therapy to help manage interpersonal symptoms.      She has chronic SI which is passive. She will reach out if these thoughts are changing or worsening. No acute safety concerns.    Future considerations:  - We have discussed perhaps being on a full dose of 1 med rather than low doses of several meds. Given her history of side effects, the current strategy may be a better fit for her at this time.  -Guanfacine might be a good alternative for both attention and sympatholysis, will discuss this if focus on sleep is not yielding positive results.      PLAN                                                                                                                 1) Meds-  - lamotrigine 150 mg daily.  - Depakote ER - 250 mg   - gabapentin- 100 mg every day at noon and 100 mg at bedtime.  - propranolol 10-20 mg tid /prn- for anxiety (1-3 times per day)  - melatonin 1-1.5 mg 2 hours      PCP-   - Ativan 0.25-0.5 mg at bedtime/prn  Spironolactone- 50 mg bid-  for hair loss  L-Methylfolate     2) Psychotherapy-   Individual therapy on temporary hold, works on DBT skills.   Working with racial trauma consultant     3) Next due-  Labs- Valproate due in Feb 2023, ordered  EKG-  As indicated  Rating scales- N/a     4) Referrals-  None     5) Dispo- RTC in 4 weeks      PERTINENT BACKGROUND                           [most recent eval 12/07/21]   Dx depression age 14. Started various medication without working diagnosis in late 20's, had been resistant to medication in past, with multiple somatic symptoms, multiple diagnoses in past. Official diagnosis made about 3 years ago by mood disorder specialist in [place], done as second opinion as struggling with approach of first psychiatrist.   Has been on the current combination of medications since saw specialist, and per specialist, if have this type of condition, cannot be on antidepressants. She is prone to side effects with medications and sometimes, its hard for her to manage her medications due to the side effects, so her doctor was keeping her meds at a low dosage. Sleep is on and off. She was actively involved in CBT and DBT while in [place]     Psych pertinent item history includes suicide attempt [xseveral overdoses as a teenager], SIB [mainly in her teens/20's] and mutiple psychotropic trials         SUBJECTIVE     -Things have been a little bit difficult. More anxious and emotional.  -Performance review coming up, which is contributing to anxiety. Also applied to a new job, and is concerned about her performance.  -Has been thinking more about intuition, and is hoping that this type of mindfulness will be helpful.  -Continuing to work with racial trauma consultant, is working through some of her book recommendations.  -Sleep has been better the past few weeks. Sets an alarm to prompt starting bedtime routine. Occasionally gets distracted by other tasks, but it's generally going better.   -Melatonin, 3 propranolol, and  half an ativan seems to be working for sleep.  -Increased Lamictal back to 150 mg to address increased stressors and agitation.  -Agitated more in the past few weeks, anger towards many things, focusing on negative characteristics of others.  -Struggling with her father's refusal to get help with his anxiety and depression.  -Suicidal thoughts are more present and salient. No preparation. Future oriented    Recent Social History:   Financial/ Work- working , [place]   Partner/ - Single    Children- None      Living situation- lives alone   Social/ Spiritual Support- Parents, mostly mom    Recent Psych Symptoms:   Depression:  suicidal ideation, low energy, insomnia, poor concentration /memory and excessive guilt  Elevated:  dysregulation  Psychosis:  none  Anxiety:  excessive worry, social anxiety and rumination  Trauma Related:  dysregulation      Recent Substance Use:     -alcohol: No   -cannabis: No   -tobacco: No  -caffeine:  Yes: 1-2 cups per day   -opioids: No   Narcan Kit currrent: No   -other: none        PAST MED TRIALS         New Antidepressants:  Paxil (paroxetine) and Wellbutrin  (bupropion)  Mood Stabilizers:  Lithium Carbonate and Depakote and Depakot ER (valproate)  Stimulants / ADHD Meds:  Adderall (amphetamine salts), Ritalin (methylphenidate) and Strattera (atomoxetine)  Tranquilizers:  Klonopin (clonazepam) and Buspar (buspirone)-  It caused Akathisia  Paxil- low dose, further bad side effects on higher doses, night sweats  Pristiq- Ok, low dose, stepping on Akathisia     Tends not to tolerate most medications, c/o GI symptoms and HA       PSYCH and SUBSTANCE USE Critical Summary Points since July 2022 9/1/22: Transfer DA. Some hypomanic symptoms. Increased lamotrigine  9/27/22: Less hypomania, no med changes  10/18/22: Mood more stable, had increased rumination with lamicatl increase, so we dropped it back down to 125 mg. Increased bedtime gabapentin  11/18/22: Gabapentin  increase not tolerable. Increased propranolol PRN from 10-20 mg.  12/9/22: Propranolol increase perhaps helpful. No changes  1/5/23: Focusing on sleep routine. Added melatonin.  1/31/23: Sleep still bad. No changes.  2/28/23: Sleep improved with some changes to routine. No med changes           MEDICAL HISTORY and ALLERGY     ALLERGIES: Amphotericin b, Phenylbutazones, and Penicillin g    Patient Active Problem List   Diagnosis     Heavy menses     CARDIOVASCULAR SCREENING; LDL GOAL LESS THAN 160     Major depressive disorder, recurrent episode, moderate (H)     Allergic rhinitis     Anxiety state     Attention deficit disorder     Bipolar I disorder (H)     Dysmenorrhea     Glaucoma suspect     Myopia        MEDICATIONS     Current Outpatient Medications   Medication Sig Dispense Refill     divalproex sodium extended-release (DEPAKOTE ER) 250 MG 24 hr tablet Take 1 tablet (250 mg) by mouth daily 90 tablet 1     gabapentin (NEURONTIN) 100 MG capsule Take 1 capsule (100 mg) by mouth 2 times daily 60 capsule 1     lamoTRIgine (LAMICTAL) 100 MG tablet Take 1 tablet (100 mg) by mouth daily Take along with one 25 mg tablet for a combined total of 125 mg. 30 tablet 1     lamoTRIgine (LAMICTAL) 25 MG tablet Take 1 tablet (25 mg) by mouth daily. Take along with one 100 mg tablet for a combined total of 125 mg. 30 tablet 1     LORazepam (ATIVAN) 0.5 MG tablet Take 0.5 mg by mouth every 6 hours as needed for anxiety       norgestim-eth estrad triphasic (ORTHO TRI-CYCLEN LO) 0.18/0.215/0.25 MG-25 MCG tablet Take 1 tablet by mouth daily 84 tablet 4     Nutritional Supplements (NUTRITIONAL SUPPLEMENT PO) l-methyl folate  Zinc selenium  Hair skin and nails vitamin       propranolol (INDERAL) 10 MG tablet Take 1-2 tablets (10-20 mg) by mouth 3 times daily as needed (Anxiety) 90 tablet 1     spironolactone (ALDACTONE) 50 MG tablet         VITALS     Pulse Readings from Last 3 Encounters:   10/10/22 86   06/29/22 91   02/16/22 79  "    Wt Readings from Last 3 Encounters:   10/10/22 53.5 kg (118 lb)   07/21/22 53.1 kg (117 lb)   06/29/22 53.1 kg (117 lb)     BP Readings from Last 3 Encounters:   10/10/22 101/67   06/29/22 102/70   02/16/22 109/77        MENTAL STATUS EXAM     Alertness: alert  and oriented  Appearance: well groomed  Behavior/Demeanor: cooperative and pleasant, with good  eye contact   Speech: regular rate and rhythm  Language: intact  Psychomotor: normal or unremarkable  Mood:  \"a little more agitated\"  Affect: full range; congruent to: mood- yes, content- yes  Thought Process/Associations: unremarkable  Thought Content:  Reports suicidal ideation;  Denies violent ideation and delusions   Perception:  Reports none;  Denies auditory hallucinations and visual hallucinations  Insight: good  Judgment: good  Cognition: does  appear grossly intact; formal cognitive testing was not done  Gait and Station: N/A (telehealth)     LABS and DATA     PHQ 11/17/2022 1/4/2023 2/27/2023   PHQ-9 Total Score 21 20 22   Q9: Thoughts of better off dead/self-harm past 2 weeks More than half the days Several days More than half the days   F/U: Thoughts of suicide or self-harm No No No   F/U: Self harm-plan - - -   F/U: Self-harm action - - -   F/U: Safety concerns No No No       Recent Labs   Lab Test 11/24/21  1543 10/30/20  1200   CR 0.79 0.79   GFRESTIMATED >90 >90     Recent Labs   Lab Test 06/17/22  1617 11/24/21  1543   AST 10 11   ALT 11 15   ALKPHOS 56 49          PSYCHOTROPIC DRUG INTERACTIONS                                                       PSYCHCLINICDDI   DEPAKOTE & LAMICTAL- Valproate Products may enhance the adverse/toxic effect of LamoTRIgine. Valproate Products may increase the serum concentration of LamoTRIgine. Severity Major     ATIVAN, GABAPENTIN, DEPAKOTE, LAMICTAL- CNS Depressants may enhance the adverse/toxic effect of other CNS Depressants   LAMICTAL & ORTHO TRI-CYCLEN LO- Estrogen Derivatives (Contraceptive) may decrease " the serum concentration of LamoTRIgine         MANAGEMENT:  Monitoring for adverse effects, routine labs, using lowest therapeutic dose of [Depakote & Lamictal] and patient is aware of risks     RISK STATEMENT for SAFETY     Artie endorsed suicidal ideation. SUICIDE RISK ASSESSMENT-  Risk factors for self-harm: previous suicide attempt and relationship conflict.  Mitigating factors: no plan or intent, describes a safety plan, h/o seeking help , minimal substance use , future oriented, stable housing and stable finances.  The patient does not appear to be at imminent risk for self-harm, hospitalization is not recommended which the pt does  agree to. No hospitalization will be arranged. Based on degree of symptoms close psych follow-up was/were recommended which the pt does  agree to. Additional steps to minimize risk: frequent clinic visits.     PROVIDER: Toan Garcia MD    Patient not staffed in clinic.  Note will be reviewed and signed by supervisor Dr. Barron.       MEDICAL DECISION MAKING        (SmartPhrase .[email or web address])     - At least 1 chronic problem that is not stable    - Engaged in prescription drug management during visit (discussed any medication benefits, side effects, alternatives, etc.)

## 2023-02-28 NOTE — PATIENT INSTRUCTIONS
**For crisis resources, please see the information at the end of this document**   Patient Education    Thank you for coming to the [facility] MENT[facility] HEALTH & [facility]DI[facility] [place] [facility]INIC.     Lab Testing:  If you had lab testing today and your results are reassuring or normal they will be mailed to you or sent through WeWork within 7 days. If the lab tests need quick action we will call you with the results. The phone number we will call with results is # [phone]. If this is not the best number please call our clinic and change the number.     Medication Refills:  If you need any refills please call your pharmacy and they will contact us. Our fax number for refills is [phone].   Three business days of notice are needed for general medication refill requests.   Five business days of notice are needed for controlled substance refill requests.   If you need to change to a different pharmacy, please contact the new pharmacy directly. The new pharmacy will help you get your medications transferred.     Contact Us:  Please call [phone] during business hours (8-5:00 M-F).   If you have medication related questions after clinic hours, or on the weekend, please call [phone].     Financial Assistance [phone]   Medical Records [phone]       MENTAL HEALTH CRISIS RESOURCES:  For a emergency help, please call 911 or go to the nearest Emergency Department.     Emergency Walk-In Options:   EmPATH Unit @ [facility] ([place]): [phone] - Specialized mental health emergency area designed to be calming  [facility] ([place]): [phone]  [facility] ([place]): [phone]  [facility]): [phone]    [facility] Crisis Information:   [place]: [phone]  [place]: [phone]  Gil (SHEFALI) - Adult: [phone]     Child: [phone]  [place] - Adult: [phone]     Child: [phone]  [place]:  [phone]  List of all [facility] resources:   https://[place].gov/dhs/people-we-serve/adults/health-care/mental-health/resources/crisis-contacts.jsp    National Crisis Information:   Crisis Text Line: Text  [place]  to 561220  Suicide & Crisis Lifeline: 988  National Suicide Prevention Lifeline: 9-205-657-TALK (1-304.156.9606)       For online chat options, visit https://suicidepreventionlifeline.org/chat/  Poison Control Center: 5-960-927-6162  Trans Lifeline: 8-452-249-1986 - Hotline for transgender people of all ages  The [employer]: 0-100-142-3464 - Hotline for LGBT youth     For Non-Emergency Support:   Fast Tracker: Mental Health & Substance Use Disorder Resources -   https://www.DemandbaseckCelsionn.org/

## 2023-02-28 NOTE — PROGRESS NOTES
Artie Diez is a 42 year old who is being evaluated via a billable video visit.      Pt will join video visit via: VIRIDAXIS  If there are problems joining the visit, send backup video invite via: Text to preferred phone: [phone]    Reason for telehealth visit: Patient has requested telehealth visit    Originating location (patient location): Patient's home    Will anyone else be joining the visit? No      Answers for HPI/ROS submitted by the patient on 2/27/2023  If you checked off any problems, how difficult have these problems made it for you to do your work, take care of things at home, or get along with other people?: Somewhat difficult  PHQ9 TOTAL SCORE: 22  EAMON 7 TOTAL SCORE: 15
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
## 2023-03-15 DIAGNOSIS — Z30.41 SURVEILLANCE OF PREVIOUSLY PRESCRIBED CONTRACEPTIVE PILL: ICD-10-CM

## 2023-03-15 RX ORDER — NORGESTIMATE AND ETHINYL ESTRADIOL 7DAYSX3 LO
1 KIT ORAL DAILY
Qty: 84 TABLET | Refills: 4 | Status: SHIPPED | OUTPATIENT
Start: 2023-03-15 | End: 2024-05-07

## 2023-03-23 ENCOUNTER — LAB (OUTPATIENT)
Dept: LAB | Facility: CLINIC | Age: 43
End: 2023-03-23
Payer: COMMERCIAL

## 2023-03-23 DIAGNOSIS — Z51.81 ENCOUNTER FOR THERAPEUTIC DRUG MONITORING: ICD-10-CM

## 2023-03-23 LAB — VALPROATE SERPL-MCNC: 25 UG/ML

## 2023-03-23 PROCEDURE — 80164 ASSAY DIPROPYLACETIC ACD TOT: CPT

## 2023-03-23 PROCEDURE — 80175 DRUG SCREEN QUAN LAMOTRIGINE: CPT

## 2023-03-23 PROCEDURE — 36415 COLL VENOUS BLD VENIPUNCTURE: CPT

## 2023-03-24 LAB — LAMOTRIGINE SERPL-MCNC: 7.8 UG/ML

## 2023-03-26 ENCOUNTER — HEALTH MAINTENANCE LETTER (OUTPATIENT)
Age: 43
End: 2023-03-26

## 2023-03-27 ASSESSMENT — ENCOUNTER SYMPTOMS
DECREASED LIBIDO: 0
HOT FLASHES: 0

## 2023-03-28 ENCOUNTER — VIRTUAL VISIT (OUTPATIENT)
Dept: PSYCHIATRY | Facility: CLINIC | Age: 43
End: 2023-03-28
Attending: PSYCHIATRY & NEUROLOGY
Payer: COMMERCIAL

## 2023-03-28 DIAGNOSIS — F41.1 GAD (GENERALIZED ANXIETY DISORDER): ICD-10-CM

## 2023-03-28 DIAGNOSIS — F31.81 BIPOLAR 2 DISORDER (H): Primary | ICD-10-CM

## 2023-03-28 PROCEDURE — 99214 OFFICE O/P EST MOD 30 MIN: CPT | Mod: VID | Performed by: STUDENT IN AN ORGANIZED HEALTH CARE EDUCATION/TRAINING PROGRAM

## 2023-03-28 PROCEDURE — G0463 HOSPITAL OUTPT CLINIC VISIT: HCPCS | Mod: PN,GT | Performed by: STUDENT IN AN ORGANIZED HEALTH CARE EDUCATION/TRAINING PROGRAM

## 2023-03-28 RX ORDER — DIVALPROEX SODIUM 250 MG/1
250 TABLET, EXTENDED RELEASE ORAL DAILY
Qty: 90 TABLET | Refills: 1 | Status: SHIPPED | OUTPATIENT
Start: 2023-03-28 | End: 2023-05-23

## 2023-03-28 RX ORDER — PROPRANOLOL HCL 60 MG
60 CAPSULE, EXTENDED RELEASE 24HR ORAL AT BEDTIME
Qty: 30 CAPSULE | Refills: 1 | Status: SHIPPED | OUTPATIENT
Start: 2023-03-28 | End: 2023-05-23

## 2023-03-28 RX ORDER — GABAPENTIN 100 MG/1
100 CAPSULE ORAL 2 TIMES DAILY
Qty: 60 CAPSULE | Refills: 1 | Status: SHIPPED | OUTPATIENT
Start: 2023-03-28 | End: 2023-05-23

## 2023-03-28 RX ORDER — LAMOTRIGINE 100 MG/1
100 TABLET ORAL DAILY
Qty: 30 TABLET | Refills: 1 | Status: SHIPPED | OUTPATIENT
Start: 2023-03-28 | End: 2023-05-23

## 2023-03-28 RX ORDER — LAMOTRIGINE 25 MG/1
TABLET ORAL
Qty: 60 TABLET | Refills: 1 | Status: SHIPPED | OUTPATIENT
Start: 2023-03-28 | End: 2023-05-23

## 2023-03-28 NOTE — PATIENT INSTRUCTIONS
**For crisis resources, please see the information at the end of this document**   Patient Education    Thank you for coming to the [facility] MENTAL HEALTH & ADDICTION [place] CLINIC.     Lab Testing:  If you had lab testing today and your results are reassuring or normal they will be mailed to you or sent through [employer] within 7 days. If the lab tests need quick action we will call you with the results. The phone number we will call with results is # [phone]. If this is not the best number please call our clinic and change the number.     Medication Refills:  If you need any refills please call your pharmacy and they will contact us. Our fax number for refills is [phone].   Three business days of notice are needed for general medication refill requests.   Five business days of notice are needed for controlled substance refill requests.   If you need to change to a different pharmacy, please contact the new pharmacy directly. The new pharmacy will help you get your medications transferred.     Contact Us:  Please call [phone] during business hours (8-5:00 M-F).   If you have medication related questions after clinic hours, or on the weekend, please call [phone].     Financial Assistance [phone]   Medical Records [phone]       MENTAL HEALTH CRISIS RESOURCES:  For a emergency help, please call 911 or go to the nearest Emergency Department.     Emergency Walk-In Options:   EmPATH Unit @ [facility] ([place]): [phone] - Specialized mental health emergency area designed to be calming  [facility] ([place]): [phone]  [facility] ([place]): [phone]  [facility]): [phone]    [facility] Crisis Information:   [place]: [phone]  [place]: [phone]  Gil (SHEFALI) - Adult: [phone]     Child: [phone]  [place] - Adult: [phone]     Child: [phone]  [place]:  [phone]  List of all [facility] resources:   https://[place].gov/dhs/people-we-serve/adults/health-care/mental-health/resources/crisis-contacts.jsp    National Crisis Information:   Crisis Text [place]: Text  [place]  to 863890  Suicide & Crisis Lifeline: 988  National Suicide Prevention Lifeline: 0-185-055-TALK (1-346.795.4398)       For online chat options, visit https://suicidepreventionlifeline.org/chat/  Poison Control Center: 5-516-171-1013  Trans Lifeline: 7-760-111-1563 - Hotline for transgender people of all ages  The [employer] Project: 9-058-472-5259 - Hotline for LGBT youth     For Non-Emergency Support:   Fast Tracker: Mental Health & Substance Use Disorder Resources -   https://www.[employer]n.org/

## 2023-03-28 NOTE — PROGRESS NOTES
Assessment & Plan     Menorrhagia with regular cycle    Artie presents with a few months of heavier, more painful periods despite being on OCP.  She does have a history of a 6cm fibroid.  She has also developed some cognitive and emotional changes and wonders this all may be related to devin-menopause.  Discussed that she is on the younger side for that, and the OCP would affect both symptoms and hormone levels making it more difficult to diagnosis devin-menopause.  Discuss possible management options for symptoms including skipping placebo week to try to skip periods and trying a different OCP.  She wondered if IUD may be more helpful- discussed that people often have spotting after initial placement and I am concerned placement may be challenging given the fibroid- recommended talking to OB/Gyn further about this to get their opinion on placement if she is interested.  Also discussed surgical intervention for the fibroid- she is not planning on having children but isn't too interested in surgery at this point.  She wondered about supplements for menopausal symptoms- discussed black cohosh though I have most commonly seen this used for hot flashes, which she isn't really having.  She is going to consider these options further and work on some lifestyle modification to see if that helps with her symptoms.       Hair loss    Artie was previously on spironolactone for hair loss and this was helpful and she'd like to resume.  She reports previous normal lab work for the hair loss and thinks her medication and stress are contributing, and symptoms have been worse recently.  Will resume prior dose and recommend checking labs in a couple of weeks.     - spironolactone (ALDACTONE) 50 MG tablet; Take 1 tablet (50 mg) by mouth 2 times daily  - **Basic metabolic panel FUTURE 14d; Future      32 minutes spent by me on the date of the encounter doing chart review, history and exam, documentation and further activities per the  note        Return in about 2 weeks (around 4/12/2023) for Lab appointment.    Fahad Concepcion MD  [facility] INTERNAL MEDICINE [place]    Martha Alva is a 42 year old, presenting for the following health issues:  Establish Care (Gynecology questions, medication review)  No flowsheet data found.  HPI     Artie is on an OCP for heavy periods.  She was noted to have borderline enlarged uterus on exam last year and U/S was ordered and showed a large fibroid.  She followed up with OB/Gyn and planned to continue OCP for management at that time.  Her OCP was changed to a lower formulation about 5-6 years ago; she thinks maybe related to hair loss issues.     Today, Artie reports that she is having heavier and more painful periods for a few months (maybe a day longer, regular since she is on the pill) and also having some slower thinking/word finding issues for a few months (has been on gabapentin 5-6 years) and she wonders about devin-menopause.  She feels more agitated than usual, which she initially attributed to stress from work and family, but typically she stops having a period when stressed.  She had an episode of night sweats awhile back, no clear frequent hot flashes.  She is adopted, has a twin sister but they haven't discuss these symptoms.      She was previously prescribed spironolactone for hair loss through dermatology and this helped.  Blood work was checked and that was normal.  She thinks the Depakote is contributing..  This has worsened again recently, correlated with stress.      She sees psychiatry for EAMON and bipolar 2 and is prescribed Depakote, gabapentin, Lamictal, and propranolol.      Review of Systems   Answers for HPI/ROS submitted by the patient on 3/27/2023  General Symptoms: No  Skin Symptoms: No  HENT Symptoms: No  EYE SYMPTOMS: No  HEART SYMPTOMS: No  LUNG SYMPTOMS: No  INTESTINAL SYMPTOMS: No  URINARY SYMPTOMS: No  GYNECOLOGIC SYMPTOMS: Yes  BREAST SYMPTOMS:  "No  SKELETAL SYMPTOMS: No  BLOOD SYMPTOMS: No  NERVOUS SYSTEM SYMPTOMS: No  MENTAL HEALTH SYMPTOMS: No  Bleeding or spotting between periods: No  Heavy or painful periods: Yes  Irregular periods: Yes  Vaginal discharge: No  Hot flashes: No  Vaginal dryness: No  Genital ulcers: No  Reduced libido: No  Painful intercourse: No  Difficulty with sexual arousal: No  Post-menopausal bleeding: No          Objective    /70 (BP Location: Right arm, Patient Position: Sitting, Cuff Size: Adult Regular)   Pulse 72   Ht 1.6 m (5' 2.99\")   Wt 55.7 kg (122 lb 11.2 oz)   SpO2 97%   BMI 21.74 kg/m    Body mass index is 21.74 kg/m .  Physical Exam   GENERAL: healthy, alert and no distress  PSYCH: mentation appears normal, affect normal/bright                   "

## 2023-03-28 NOTE — PROGRESS NOTES
Video- Visit Details  Type of service:  video visit for medication management  Time of service:    Start Time:  10:41 AM    End Time:  11:22 AM    Distant Site (provider location):  [facility]  Mode of Communication:  Video Conference via [facility]  MEDICAL PROGRESS NOTE     CARE TEAM:  PCP- Ran Cabral, Psychotherapist- [place] ([place] [facility]), None  Artie Diez is a 41 year old who uses the name Artie and pronouns she, her, hers.       DIAGNOSIS   Bipolar II Disorder, rapid cycling, MRE hypomanic vs mixed  History of EAMON      ASSESSMENT   Artie reported an episode of dysregulation in a parking lot, where someone parked illegally in a handicapped space.  She was found propranolol helpful for anxiety, and we discussed switching to extended release formulation so that she has this coverage when things pop up.  Denies any lightheadedness with her current propranolol dose.  She continues to do work in therapy to help manage interpersonal symptoms.      Reviewed the Lamictal and Depakote levels that we recently obtained.  Depakote has mildly lower, but lamotrigine is higher at 7.8, up from 4.4.  There is no significant change in Lamictal dose, so this increase is perplexing.  We will get a repeat level in 3 or so months to make sure that she is not trending up further.    She has chronic SI which is passive. She will reach out if these thoughts are changing or worsening. No acute safety concerns.    Future considerations:  - We have discussed perhaps being on a full dose of 1 med rather than low doses of several meds. Given her history of side effects, the current strategy may be a better fit for her at this time.  -Guanfacine might be a good alternative for both attention and sympatholysis, will discuss this if focus on sleep is not yielding positive results.      PLAN                                                                                                                  1) Meds-  - lamotrigine 125 mg daily.  - Depakote ER - 250 mg   - gabapentin- 100 mg every day at noon and 100 mg at bedtime.  - Stop propranolol IR 10-20 mg tid /prn- for anxiety  - Start propranolol ER 60 mg qhs  - melatonin 1-1.5 mg 2 hours      PCP-   - Ativan 0.25-0.5 mg at bedtime/prn (1x-2x per week)  Spironolactone- 50 mg bid- for hair loss  L-Methylfolate     2) Psychotherapy-   Individual therapy on temporary hold, works on DBT skills.   Working with racial trauma consultant     3) Next due-  Labs- Valproate, lamotrigine due in 6/23  EKG-  As indicated  Rating scales- N/a     4) Referrals-  None     5) Dispo- RTC in 4 weeks      PERTINENT BACKGROUND                           [most recent eval 12/07/21]   Dx depression age 14. Started various medication without working diagnosis in late 20's, had been resistant to medication in past, with multiple somatic symptoms, multiple diagnoses in past. Official diagnosis made about 3 years ago by mood disorder specialist in [place], done as second opinion as struggling with approach of first psychiatrist.   Has been on the current combination of medications since saw specialist, and per specialist, if have this type of condition, cannot be on antidepressants. She is prone to side effects with medications and sometimes, its hard for her to manage her medications due to the side effects, so her doctor was keeping her meds at a low dosage. Sleep is on and off. She was actively involved in CBT and DBT while in [place]     Psych pertinent item history includes suicide attempt [xseveral overdoses as a teenager], SIB [mainly in her teens/20's] and mutiple psychotropic trials         SUBJECTIVE     -Had an episode of dysregulation where she yelled at a person who parked in a handicapped stall without certification.  -Parking lots are triggering for reasons she doesn't completely understand.  -Decreased lamictal to 125 mg.    -Wonders if the depakote is too low now.  -Takes propranolol before bed, gets 7 hours of sleep.  -This is the third month she's had a heavy period. Wonders if she's perimenopausal.  -Working with racial trauma therapist. Starting to work on identity development stuff.  -Has workbooks on mindfulness and DBT.      Recent Social History:   Financial/ Work- working , N   Partner/ - Single    Children- None      Living situation- lives alone   Social/ Spiritual Support- Parents, mostly mom    Recent Psych Symptoms:   Depression:  suicidal ideation, low energy, insomnia, poor concentration /memory and excessive guilt  Elevated:  dysregulation  Psychosis:  none  Anxiety:  excessive worry, social anxiety and rumination  Trauma Related:  dysregulation      Recent Substance Use:     -alcohol: No   -cannabis: No   -tobacco: No  -caffeine:  Yes: 1-2 cups per day   -opioids: No   Narcan Kit currrent: No   -other: none        PAST MED TRIALS         New Antidepressants:  Paxil (paroxetine) and Wellbutrin  (bupropion)  Mood Stabilizers:  Lithium Carbonate and Depakote and Depakot ER (valproate)  Stimulants / ADHD Meds:  Adderall (amphetamine salts), Ritalin (methylphenidate) and Strattera (atomoxetine)  Tranquilizers:  Klonopin (clonazepam) and Buspar (buspirone)-  It caused Akathisia  Paxil- low dose, further bad side effects on higher doses, night sweats  Pristiq- Ok, low dose, stepping on Akathisia     Tends not to tolerate most medications, c/o GI symptoms and HA       PSYCH and SUBSTANCE USE Critical Summary Points since July 2022 9/1/22: Transfer DA. Some hypomanic symptoms. Increased lamotrigine  9/27/22: Less hypomania, no med changes  10/18/22: Mood more stable, had increased rumination with lamicatl increase, so we dropped it back down to 125 mg. Increased bedtime gabapentin  11/18/22: Gabapentin increase not tolerable. Increased propranolol PRN from 10-20 mg.  12/9/22: Propranolol increase  perhaps helpful. No changes  1/5/23: Focusing on sleep routine. Added melatonin.  1/31/23: Sleep still bad. No changes.  2/28/23: Sleep improved with some changes to routine. No med changes  3/28/23: One episode of dysregualtion, switching to propranolol extended release. Lamictal level is up for unclear reasons, will repeat in 3 months.           MEDICAL HISTORY and ALLERGY     ALLERGIES: Amphotericin b, Phenylbutazones, and Penicillin g    Patient Active Problem List   Diagnosis     Heavy menses     CARDIOVASCULAR SCREENING; LDL GOAL LESS THAN 160     Major depressive disorder, recurrent episode, moderate (H)     Allergic rhinitis     Anxiety state     Attention deficit disorder     Bipolar I disorder (H)     Dysmenorrhea     Glaucoma suspect     Myopia        MEDICATIONS     Current Outpatient Medications   Medication Sig Dispense Refill     divalproex sodium extended-release (DEPAKOTE ER) 250 MG 24 hr tablet Take 1 tablet (250 mg) by mouth daily 90 tablet 1     gabapentin (NEURONTIN) 100 MG capsule Take 1 capsule (100 mg) by mouth 2 times daily 60 capsule 1     lamoTRIgine (LAMICTAL) 100 MG tablet Take 1 tablet (100 mg) by mouth daily Take along with 2 25 mg tablets for a combined total of 150 mg. 30 tablet 1     lamoTRIgine (LAMICTAL) 25 MG tablet Take 2 tablets (50 mg) by mouth daily. Take along with one 100 mg tablet for a combined total of 150 mg. 60 tablet 1     LORazepam (ATIVAN) 0.5 MG tablet Take 0.5 mg by mouth every 6 hours as needed for anxiety       norgestim-eth estrad triphasic (ORTHO TRI-CYCLEN LO) 0.18/0.215/0.25 MG-25 MCG tablet Take 1 tablet by mouth daily 84 tablet 4     Nutritional Supplements (NUTRITIONAL SUPPLEMENT PO) l-methyl folate  Zinc selenium  Hair skin and nails vitamin       propranolol (INDERAL) 10 MG tablet Take 1-2 tablets (10-20 mg) by mouth 3 times daily as needed (Anxiety) 90 tablet 1     spironolactone (ALDACTONE) 50 MG tablet  (Patient not taking: Reported on 3/28/2023)    "     VITALS     Pulse Readings from Last 3 Encounters:   10/10/22 86   06/29/22 91   02/16/22 79     Wt Readings from Last 3 Encounters:   10/10/22 53.5 kg (118 lb)   07/21/22 53.1 kg (117 lb)   06/29/22 53.1 kg (117 lb)     BP Readings from Last 3 Encounters:   10/10/22 101/67   06/29/22 102/70   02/16/22 109/77        MENTAL STATUS EXAM     Alertness: alert  and oriented  Appearance: well groomed  Behavior/Demeanor: cooperative and pleasant, with good  eye contact   Speech: regular rate and rhythm  Language: intact  Psychomotor: normal or unremarkable  Mood: \"frustrated\"  Affect: full range; congruent to: mood- yes, content- yes  Thought Process/Associations: unremarkable  Thought Content:  Reports suicidal ideation;  Denies violent ideation and delusions   Perception:  Reports none;  Denies auditory hallucinations and visual hallucinations  Insight: good  Judgment: good  Cognition: does  appear grossly intact; formal cognitive testing was not done  Gait and Station: N[facility]     LABS and DATA     PHQ 11/17/2022 1/4/2023 2/27/2023   PHQ-9 Total Score 21 20 22   Q9: Thoughts of better off dead/self-harm past 2 weeks More than half the days Several days More than half the days   F/U: Thoughts of suicide or self-harm No No No   F/U: Self harm-plan - - -   F/U: Self-harm action - - -   F/U: Safety concerns No No No       Recent Labs   Lab Test 11/24/21  1543 10/30/20  1200   CR 0.79 0.79   GFRESTIMATED >90 >90     Recent Labs   Lab Test 06/17/22  1617 11/24/21  1543   AST 10 11   ALT 11 15   ALKPHOS 56 49          PSYCHOTROPIC DRUG INTERACTIONS                                                       PSYCHCLINICDDI   DEPAKOTE & LAMICTAL- Valproate Products may enhance the adverse/toxic effect of LamoTRIgine. Valproate Products may increase the serum concentration of LamoTRIgine. Severity Major     ATIVAN, GABAPENTIN, DEPAKOTE, LAMICTAL- CNS Depressants may enhance the adverse/toxic effect of other CNS Depressants "   LAMICTAL & ORTHO TRI-CYCLEN LO- Estrogen Derivatives (Contraceptive) may decrease the serum concentration of LamoTRIgine         MANAGEMENT:  Monitoring for adverse effects, routine labs, using lowest therapeutic dose of [Depakote & Lamictal] and patient is aware of risks     RISK STATEMENT for SAFETY     Artie endorsed suicidal ideation. SUICIDE RISK ASSESSMENT-  Risk factors for self-harm: previous suicide attempt and relationship conflict.  Mitigating factors: no plan or intent, describes a safety plan, h/o seeking help , minimal substance use , future oriented, stable housing and stable finances.  The patient does not appear to be at imminent risk for self-harm, hospitalization is not recommended which the pt does  agree to. No hospitalization will be arranged. Based on degree of symptoms close psych follow-up was/were recommended which the pt does  agree to. Additional steps to minimize risk: frequent clinic visits.     PROVIDER: Toan Garcia MD    Patient not staffed in clinic.  Note will be reviewed and signed by supervisor Dr. Barron.       MEDICAL DECISION MAKING        (SmartPhrase .[email or web address])     - At least 1 chronic problem that is not stable    - Engaged in prescription drug management during visit (discussed any medication benefits, side effects, alternatives, etc.)

## 2023-03-28 NOTE — PROGRESS NOTES
"Virtual Visit Details    Type of service:  Video Visit     Originating Location (pt. Location): {video visit patient location:130221::\"Home\"}  {PROVIDER LOCATION On-site should be selected for visits conducted from your clinic location or adjoining [facility] hospital, academic office, or other nearby [facility] building. Off-site should be selected for all other provider locations, including home:638727}  Distant Location (provider location):  {virtual location provider:394875}  Platform used for Video Visit: {Virtual Visit Platforms:880503::\"JolieBox\"}          "

## 2023-03-28 NOTE — NURSING NOTE
Is the patient currently in the state of [place]? YES    Visit mode:VIDEO    If the visit is dropped, the patient can be reconnected by: VIDEO VISIT: Text to cell phone:   Telephone Information:   Mobile [phone]       Will anyone else be joining the visit? No  (If patient encounters technical issues they should call [phone])    How would you like to obtain your AVS? MyChart    Are changes needed to the allergy or medication list? NO    Rooming Documentation: PHQ not assigned, not done per department protocol    Reason for visit:  follow up    ALEX Guzman

## 2023-03-29 ENCOUNTER — OFFICE VISIT (OUTPATIENT)
Dept: INTERNAL MEDICINE | Facility: CLINIC | Age: 43
End: 2023-03-29
Payer: COMMERCIAL

## 2023-03-29 VITALS
OXYGEN SATURATION: 97 % | BODY MASS INDEX: 21.74 KG/M2 | HEIGHT: 63 IN | WEIGHT: 122.7 LBS | DIASTOLIC BLOOD PRESSURE: 70 MMHG | HEART RATE: 72 BPM | SYSTOLIC BLOOD PRESSURE: 112 MMHG

## 2023-03-29 DIAGNOSIS — N92.0 MENORRHAGIA WITH REGULAR CYCLE: Primary | ICD-10-CM

## 2023-03-29 DIAGNOSIS — L65.9 HAIR LOSS: ICD-10-CM

## 2023-03-29 PROCEDURE — 99214 OFFICE O/P EST MOD 30 MIN: CPT | Performed by: INTERNAL MEDICINE

## 2023-03-29 RX ORDER — SPIRONOLACTONE 50 MG/1
50 TABLET, FILM COATED ORAL 2 TIMES DAILY
Qty: 180 TABLET | Refills: 0 | Status: SHIPPED | OUTPATIENT
Start: 2023-03-29 | End: 2023-06-15

## 2023-03-29 NOTE — PATIENT INSTRUCTIONS
You could try skipping the placebo week on the pills again to see if you can skip periods.    The fibroid may make IUD placement more difficult, and I'd recommend taking to OB/Gyn more about this if you'd like to consider this option or if you're interested in trying surgical removal.

## 2023-03-29 NOTE — NURSING NOTE
Artie Diez is a 42 year old female patient that presents today in clinic for the following:    Chief Complaint   Patient presents with     Establish Care     Gynecology questions, medication review     The patient's allergies and medications were reviewed as noted. A set of vitals were recorded as noted without incident. The patient does not have any other questions for the provider.    Luan Hodge, EMT at 4:55 PM on 3/29/2023

## 2023-03-31 ENCOUNTER — TELEPHONE (OUTPATIENT)
Dept: INTERNAL MEDICINE | Facility: CLINIC | Age: 43
End: 2023-03-31
Payer: COMMERCIAL

## 2023-03-31 NOTE — TELEPHONE ENCOUNTER
Anesthesia Pre Eval Note    Anesthesia ROS/Med Hx    Overall Review:  EKG was reviewed and Stress test was reviewed       Pulmonary Review:    Positive for COPD   The patient is a current smoker.     Neuro/Psych Review:    Positive for psychiatric history (schizoaffective disorder )    Cardiovascular Review:  Comments:   Cardiolite 04/14/2020  Normal left ventricular size. Normal left ventricular systolic function. Small sized, mild intensity fixed perfusion defect involving the mid to distal anterior wall segments that is probably artifact due to normal contractility on the QGS, although a small previous infarction cannot be completely ruled out.  No inducible ischemia.  EF 67%      Negative for past MI  Negative for angina  Positive for PVD  Positive for hypertension  Positive for hyperlipidemia    GI/HEPATIC/RENAL Review:    Positive for renal disease (stones)    End/Other Review:  Patient does not have an endo/other history    Additional Results:     ALLERGIES:  No Known Allergies        Relevant Problems   No relevant active problems       Physical Exam     Airway   Mallampati: II  TM Distance: >3 FB    Cardiovascular  Cardiovascular exam normal  Cardio Rhythm: Regular  Cardio Rate: Normal    Head Assessment  Head assessment: Normocephalic    General Assessment  General Assessment: No acute distress and Alert and oriented    Dental Exam    Patient has:  Upper dentures and Lower dentures    Pulmonary Exam    Breath sounds clear to auscultation:  Yes  Patient Demonstrates:  Decreased Breath Sounds    Abdominal Exam  Abdominal exam normal      Anesthesia Plan:  Anesthesia Plan    ASA Status: 3    Anesthesia Type: MAC    Induction: Intravenous  Maintenance: TIVA  Premedication: None      Post-op Pain Management: Per Surgeon  Postoperative analgesia plan does NOT include opiods    Checklist  Reviewed: Lab Results, Patient Summary, Care Everywhere, Allergies, Past Med History, Medications, Nursing Notes,  Patient scheduled lab per 3/29/23 checkout scheduling request. Scheduled for 4/14/23    Consultations, Outside Records, NPO Status and Problem list  Consent/Risks Discussed Statement:  The proposed anesthetic plan, including its risks and benefits, have been discussed with the Patient along with the risks and benefits of alternatives. Questions were encouraged and answered and the patient and/or representative understands and agrees to proceed.        I discussed with the patient (and/or patient's legal representative) the risks and benefits of the proposed anesthesia plan, MAC, which may include services performed by other anesthesia providers.    Alternative anesthesia plans, if available, were reviewed with the patient (and/or patient's legal representative). Discussion has been held with the patient (and/or patient's legal representative) regarding risks of anesthesia, which include emergent situations that may require change in anesthesia plan.  The patient (and/or patient's legal representative) has indicated understanding, his/her questions have been answered, and he/she wishes to proceed with the planned anesthetic.  Blood Consent    Blood Products: Not Anticipated     fair balance

## 2023-04-14 ENCOUNTER — APPOINTMENT (OUTPATIENT)
Dept: LAB | Facility: CLINIC | Age: 43
End: 2023-04-14
Payer: COMMERCIAL

## 2023-04-14 NOTE — TELEPHONE ENCOUNTER
Patient went to her lab appointment and states that the  wouldn't do the lab draws due to it being . She is requesting new lbs to be ordered and called to set up another appointment.

## 2023-04-21 ENCOUNTER — LAB (OUTPATIENT)
Dept: LAB | Facility: CLINIC | Age: 43
End: 2023-04-21
Payer: COMMERCIAL

## 2023-04-21 DIAGNOSIS — L65.9 HAIR LOSS: ICD-10-CM

## 2023-04-21 LAB
ANION GAP SERPL CALCULATED.3IONS-SCNC: 9 MMOL/L (ref 7–15)
BUN SERPL-MCNC: 12.1 MG/DL (ref 6–20)
CALCIUM SERPL-MCNC: 9.6 MG/DL (ref 8.6–10)
CHLORIDE SERPL-SCNC: 102 MMOL/L (ref 98–107)
CREAT SERPL-MCNC: 0.85 MG/DL (ref 0.51–0.95)
DEPRECATED HCO3 PLAS-SCNC: 28 MMOL/L (ref 22–29)
GFR SERPL CREATININE-BSD FRML MDRD: 87 ML/MIN/1.73M2
GLUCOSE SERPL-MCNC: 92 MG/DL (ref 70–99)
POTASSIUM SERPL-SCNC: 3.9 MMOL/L (ref 3.4–5.3)
SODIUM SERPL-SCNC: 139 MMOL/L (ref 136–145)

## 2023-04-21 PROCEDURE — 36415 COLL VENOUS BLD VENIPUNCTURE: CPT | Performed by: PATHOLOGY

## 2023-04-21 PROCEDURE — 80048 BASIC METABOLIC PNL TOTAL CA: CPT | Performed by: PATHOLOGY

## 2023-04-24 ASSESSMENT — ANXIETY QUESTIONNAIRES
7. FEELING AFRAID AS IF SOMETHING AWFUL MIGHT HAPPEN: SEVERAL DAYS
GAD7 TOTAL SCORE: 17
2. NOT BEING ABLE TO STOP OR CONTROL WORRYING: NEARLY EVERY DAY
GAD7 TOTAL SCORE: 17
5. BEING SO RESTLESS THAT IT IS HARD TO SIT STILL: SEVERAL DAYS
6. BECOMING EASILY ANNOYED OR IRRITABLE: NEARLY EVERY DAY
4. TROUBLE RELAXING: NEARLY EVERY DAY
GAD7 TOTAL SCORE: 17
7. FEELING AFRAID AS IF SOMETHING AWFUL MIGHT HAPPEN: SEVERAL DAYS
3. WORRYING TOO MUCH ABOUT DIFFERENT THINGS: NEARLY EVERY DAY
1. FEELING NERVOUS, ANXIOUS, OR ON EDGE: NEARLY EVERY DAY

## 2023-04-24 ASSESSMENT — PATIENT HEALTH QUESTIONNAIRE - PHQ9
SUM OF ALL RESPONSES TO PHQ QUESTIONS 1-9: 24
10. IF YOU CHECKED OFF ANY PROBLEMS, HOW DIFFICULT HAVE THESE PROBLEMS MADE IT FOR YOU TO DO YOUR WORK, TAKE CARE OF THINGS AT HOME, OR GET ALONG WITH OTHER PEOPLE: VERY DIFFICULT
SUM OF ALL RESPONSES TO PHQ QUESTIONS 1-9: 24

## 2023-04-25 ENCOUNTER — VIRTUAL VISIT (OUTPATIENT)
Dept: PSYCHIATRY | Facility: CLINIC | Age: 43
End: 2023-04-25
Attending: PSYCHIATRY & NEUROLOGY
Payer: COMMERCIAL

## 2023-04-25 DIAGNOSIS — F31.81 BIPOLAR 2 DISORDER (H): Primary | ICD-10-CM

## 2023-04-25 DIAGNOSIS — F41.1 GAD (GENERALIZED ANXIETY DISORDER): ICD-10-CM

## 2023-04-25 PROCEDURE — 99214 OFFICE O/P EST MOD 30 MIN: CPT | Mod: VID | Performed by: STUDENT IN AN ORGANIZED HEALTH CARE EDUCATION/TRAINING PROGRAM

## 2023-04-25 NOTE — PROGRESS NOTES
Video- Visit Details  Type of service:  video visit for medication management  Time of service:    Start Time:  8:42 AM    End Time:  9:28 AM    Distant Site (provider location):  [facility]  Mode of Communication:  Video Conference via [email or web address]       [facility]  Psychiatry Clinic  MEDICAL PROGRESS NOTE     CARE TEAM:  PCP- Ran Cabral, Psychotherapist- Evangel[facility] ([place] [facility]), None  Artie Diez is a 41 year old who uses the name Artie and pronouns she, her, hers.       DIAGNOSIS   Bipolar II Disorder, rapid cycling, currently depressed, severe  History of EAMON      ASSESSMENT   Artie reported more symptoms of depression.  She found propranolol ER slightly  helpful for anxiety, but recently had a very demoralizing experience of not getting a job due to anxiety symptoms at her final interview. She has been using propranolol IR as PRN still at a lower dose, which is reasonable. Denies any lightheadedness with her current propranolol dose.  She continues to do work in therapy to help manage interpersonal symptoms.       We will get a repeat lamotrigine level in June to make sure that she is not trending up further.    She has chronic SI which is passive, it's currently worse in the context of more depressive symptoms. She will reach out if these thoughts are changing or worsening. No acute safety concerns.    Has been using THC beverages a few times per week for sleep. Discussed some of the risks of this with her, and it appears to be helping her currently. Will continue to monitor this.    Future considerations:  - We have discussed perhaps being on a full dose of 1 med rather than low doses of several meds. Given her history of side effects, the current strategy may be a better fit for her at this time.  -Guanfacine might be a good alternative for both attention and sympatholysis, will discuss this if focus on sleep is not yielding positive results.      PLAN                                                                                                                  1) Meds-  - lamotrigine 125 mg daily.  - Depakote ER - 250 mg   - gabapentin- 100 mg every day at noon and 100 mg at bedtime.  - Start propranolol IR 10 mg bid /prn- for anxiety  - propranolol ER 60 mg qhs  - melatonin 1-1.5 mg 2 hours      PCP-   - Ativan 0.25-0.5 mg at bedtime/prn (1x-2x per week)  Spironolactone- 50 mg bid- for hair loss  L-Methylfolate     2) Psychotherapy-   Individual therapy on temporary hold, works on DBT skills.   Working with racial trauma consultant     3) Next due-  Labs- Valproate, lamotrigine due in 6/23  EKG-  As indicated  Rating scales- N/a     4) Referrals-  None     5) Dispo- RTC in 2 weeks      PERTINENT BACKGROUND                           [most recent eval 12/07/21]   Dx depression age 14. Started various medication without working diagnosis in late 20's, had been resistant to medication in past, with multiple somatic symptoms, multiple diagnoses in past. Official diagnosis made about 3 years ago by mood disorder specialist in [place], done as second opinion as struggling with approach of first psychiatrist.   Has been on the current combination of medications since saw specialist, and per specialist, if have this type of condition, cannot be on antidepressants. She is prone to side effects with medications and sometimes, its hard for her to manage her medications due to the side effects, so her doctor was keeping her meds at a low dosage. Sleep is on and off. She was actively involved in CBT and DBT while in [place]     Psych pertinent item history includes suicide attempt [xseveral overdoses as a teenager], SIB [mainly in her teens/20's] and mutiple psychotropic trials         SUBJECTIVE     -Interviewed for a job she really wanted. Was a finalist, but the letdown has been major.  -Falls apart at final interviews. Feels like she's failed again, and is trapped at a job  that negatively impacts her mental health.  -Mood is overall low.  -Sleep is better since trying THC beverages.  -Propranolol has been helpful. Took the edge off for interview, and lessened nausea she gets. No lightheadedness or dizziness.  -Has low energy. Goes on a walk a few times per week.  -Listening to Amado Sims. Thinking more about anxiety.  -Suicide thoughts  -Taking a trip to [place].      Recent Social History:   Financial/ Work- working , [employer]   Partner/ - Single    Children- None      Living situation- lives alone   Social/ Spiritual Support- Parents, mostly mom    Recent Psych Symptoms:   Depression:  suicidal ideation, low energy, insomnia, poor concentration /memory and excessive guilt  Elevated:  dysregulation  Psychosis:  none  Anxiety:  excessive worry, social anxiety and rumination  Trauma Related:  dysregulation      Recent Substance Use:     -alcohol: No   -cannabis: yes, 3-5 mg a couple times per week.  -tobacco: No  -caffeine:  Yes: 1-2 cups per day   -opioids: No   Narcan Kit currrent: No   -other: none        PAST MED TRIALS         New Antidepressants:  Paxil (paroxetine) and Wellbutrin  (bupropion)  Mood Stabilizers:  Lithium Carbonate and Depakote and Depakot ER (valproate)  Stimulants / ADHD Meds:  Adderall (amphetamine salts), Ritalin (methylphenidate) and Strattera (atomoxetine)  Tranquilizers:  Klonopin (clonazepam) and Buspar (buspirone)-  It caused Akathisia  Paxil- low dose, further bad side effects on higher doses, night sweats  Pristiq- Ok, low dose, stepping on Akathisia     Tends not to tolerate most medications, c/o GI symptoms and HA       PSYCH and SUBSTANCE USE Critical Summary Points since July 2022 9/1/22: Transfer DA. Some hypomanic symptoms. Increased lamotrigine  9/27/22: Less hypomania, no med changes  10/18/22: Mood more stable, had increased rumination with lamicatl increase, so we dropped it back down to 125 mg. Increased bedtime  gabapentin  11/18/22: Gabapentin increase not tolerable. Increased propranolol PRN from 10-20 mg.  12/9/22: Propranolol increase perhaps helpful. No changes  1/5/23: Focusing on sleep routine. Added melatonin.  1/31/23: Sleep still bad. No changes.  2/28/23: Sleep improved with some changes to routine. No med changes  3/28/23: One episode of dysregualtion, switching to propranolol extended release. Lamictal level is up for unclear reasons, will repeat in 3 months.  4/25/23: Worse mood. Added propranolol PRN back to her list.           MEDICAL HISTORY and ALLERGY     ALLERGIES: Amphotericin b, Phenylbutazones, and Penicillin g    Patient Active Problem List   Diagnosis     Heavy menses     CARDIOVASCULAR SCREENING; LDL GOAL LESS THAN 160     Major depressive disorder, recurrent episode, moderate (H)     Allergic rhinitis     Anxiety state     Attention deficit disorder     Bipolar I disorder (H)     Dysmenorrhea     Glaucoma suspect     Myopia        MEDICATIONS     Current Outpatient Medications   Medication Sig Dispense Refill     divalproex sodium extended-release (DEPAKOTE ER) 250 MG 24 hr tablet Take 1 tablet (250 mg) by mouth daily 90 tablet 1     gabapentin (NEURONTIN) 100 MG capsule Take 1 capsule (100 mg) by mouth 2 times daily 60 capsule 1     lamoTRIgine (LAMICTAL) 100 MG tablet Take 1 tablet (100 mg) by mouth daily Take along with one 25 mg tablet for a combined total of 125 mg. 30 tablet 1     lamoTRIgine (LAMICTAL) 25 MG tablet Take 1 tablet (25 mg) by mouth daily. Take along with one 100 mg tablet for a combined total of 125 mg. 60 tablet 1     LORazepam (ATIVAN) 0.5 MG tablet Take 0.5 mg by mouth every 6 hours as needed for anxiety       norgestim-eth estrad triphasic (ORTHO TRI-CYCLEN LO) 0.18/0.215/0.25 MG-25 MCG tablet Take 1 tablet by mouth daily 84 tablet 4     Nutritional Supplements (NUTRITIONAL SUPPLEMENT PO) l-methyl folate  Zinc selenium  Hair skin and nails vitamin       propranolol ER  "(INDERAL LA) 60 MG 24 hr capsule Take 1 capsule (60 mg) by mouth At Bedtime 30 capsule 1     spironolactone (ALDACTONE) 50 MG tablet Take 1 tablet (50 mg) by mouth 2 times daily 180 tablet 0      VITALS     Pulse Readings from Last 3 Encounters:   03/29/23 72   10/10/22 86   06/29/22 91     Wt Readings from Last 3 Encounters:   03/29/23 55.7 kg (122 lb 11.2 oz)   10/10/22 53.5 kg (118 lb)   07/21/22 53.1 kg (117 lb)     BP Readings from Last 3 Encounters:   03/29/23 112/70   10/10/22 101/67   06/29/22 102/70        MENTAL STATUS EXAM     Alertness: alert  and oriented  Appearance: well groomed  Behavior/Demeanor: cooperative and pleasant, with good  eye contact   Speech: regular rate and rhythm  Language: intact  Psychomotor: normal or unremarkable  Mood: \"depressed\"  Affect: blunted; congruent to: mood- yes, content- yes  Thought Process/Associations: unremarkable  Thought Content:  Reports suicidal ideation;  Denies violent ideation and delusions   Perception:  Reports none;  Denies auditory hallucinations and visual hallucinations  Insight: good  Judgment: good  Cognition: does  appear grossly intact; formal cognitive testing was not done  Gait and Station: N/A ([facility])     LABS and DATA         1/4/2023     4:19 PM 2/27/2023     4:00 PM 4/24/2023     5:34 PM   PHQ   PHQ-9 Total Score 20 22 24   Q9: Thoughts of better off dead/self-harm past 2 weeks Several days More than half the days More than half the days   F/U: Thoughts of suicide or self-harm No No No   F/U: Safety concerns No No No       Recent Labs   Lab Test 04/21/23  1757 11/24/21  1543   CR 0.85 0.79   GFRESTIMATED 87 >90     Recent Labs   Lab Test 06/17/22  1617 11/24/21  1543   AST 10 11   ALT 11 15   ALKPHOS 56 49          PSYCHOTROPIC DRUG INTERACTIONS                                                       PSYCHCLINICDDI   DEPAKOTE & LAMICTAL- Valproate Products may enhance the adverse/toxic effect of LamoTRIgine. Valproate Products may increase " the serum concentration of LamoTRIgine. Severity Major     ATIVAN, GABAPENTIN, DEPAKOTE, LAMICTAL- CNS Depressants may enhance the adverse/toxic effect of other CNS Depressants   LAMICTAL & ORTHO TRI-CYCLEN LO- Estrogen Derivatives (Contraceptive) may decrease the serum concentration of LamoTRIgine         MANAGEMENT:  Monitoring for adverse effects, routine labs, using lowest therapeutic dose of [Depakote & Lamictal] and patient is aware of risks     RISK STATEMENT for SAFETY     Artie endorsed suicidal ideation. SUICIDE RISK ASSESSMENT-  Risk factors for self-harm: previous suicide attempt and relationship conflict.  Mitigating factors: no plan or intent, describes a safety plan, h/o seeking help , minimal substance use , future oriented, stable housing and stable finances.  The patient does not appear to be at imminent risk for self-harm, hospitalization is not recommended which the pt does  agree to. No hospitalization will be arranged. Based on degree of symptoms close psych follow-up was/were recommended which the pt does  agree to. Additional steps to minimize risk: frequent clinic visits.     PROVIDER: Toan Garcia MD    Patient not staffed in clinic.  Note will be reviewed and signed by supervisor Dr. Barron.       MEDICAL DECISION MAKING        (SmartPhrase .[email or web address])     - At least 1 chronic problem that is not stable    - Engaged in prescription drug management during visit (discussed any medication benefits, side effects, alternatives, etc.)

## 2023-04-25 NOTE — NURSING NOTE
Is the patient currently in the state of [place]? YES    Visit mode:VIDEO    If the visit is dropped, the patient can be reconnected by: VIDEO VISIT: Send to e-mail at: [email or web address]    Will anyone else be joining the visit? NO      How would you like to obtain your AVS? MyChart    Are changes needed to the allergy or medication list? NO    Reason for visit: Video Visit    BRIANA Calvillo on 4/25/2023 at 8:41 AM

## 2023-04-25 NOTE — PATIENT INSTRUCTIONS
**For crisis resources, please see the information at the end of this document**   Patient Education    Thank you for coming to the [facility] [place] [facility].     Lab Testing:  If you had lab testing today and your results are reassuring or normal they will be mailed to you or sent through [employer] within 7 days. If the lab tests need quick action we will call you with the results. The phone number we will call with results is # [phone]. If this is not the best number please call our clinic and change the number.     Medication Refills:  If you need any refills please call your pharmacy and they will contact us. Our fax number for refills is [phone].   Three business days of notice are needed for general medication refill requests.   Five business days of notice are needed for controlled substance refill requests.   If you need to change to a different pharmacy, please contact the new pharmacy directly. The new pharmacy will help you get your medications transferred.     Contact Us:  Please call [phone] during business hours (8-5:00 M-F).   If you have medication related questions after clinic hours, or on the weekend, please call [phone].     Financial Assistance [phone]   Medical Records [phone]       MENTAL HEALTH CRISIS RESOURCES:  For a emergency help, please call 911 or go to the nearest Emergency Department.     Emergency Walk-In Options:   EmPATH Unit @ [facility] ([place]): [phone] - Specialized mental health emergency area designed to be calming  [facility] ([place]): [phone]  [facility] ([place]): [phone]  [facility]): [phone]    [facility] Crisis Information:   [place]: [phone]  [place]: [phone]  [place] (SHEFALI) - Adult: [phone]     Child: [phone]  [place] - Adult: [phone]     Child: [phone]  [place]:  [phone]  List of all [facility] resources:   https://[place].gov/dhs/people-we-serve/adults/health-care/mental-health/resources/crisis-contacts.jsp    National Crisis Information:   Crisis Text Line: Text  [place]  to 040234  Suicide & Crisis Lifeline: 988  National Suicide Prevention Lifeline: 9-553-592-TALK (1-792.854.3842)       For online chat options, visit https://suicidepreventionlifeline.org/chat/  Poison Control Center: 8-128-682-4787  Trans Lifeline: 0-680-055-2635 - Hotline for transgender people of all ages  The [employer]: 3-983-159-8285 - Hotline for LGBT youth     For Non-Emergency Support:   Fast Tracker: Mental Health & Substance Use Disorder Resources -   https://www.[employer].org/

## 2023-05-09 ENCOUNTER — VIRTUAL VISIT (OUTPATIENT)
Dept: PSYCHIATRY | Facility: CLINIC | Age: 43
End: 2023-05-09
Attending: PSYCHIATRY & NEUROLOGY
Payer: COMMERCIAL

## 2023-05-09 DIAGNOSIS — F41.1 GAD (GENERALIZED ANXIETY DISORDER): ICD-10-CM

## 2023-05-09 DIAGNOSIS — F31.81 BIPOLAR 2 DISORDER (H): Primary | ICD-10-CM

## 2023-05-09 DIAGNOSIS — Z51.81 ENCOUNTER FOR THERAPEUTIC DRUG MONITORING: ICD-10-CM

## 2023-05-09 PROCEDURE — 99214 OFFICE O/P EST MOD 30 MIN: CPT | Mod: HN | Performed by: STUDENT IN AN ORGANIZED HEALTH CARE EDUCATION/TRAINING PROGRAM

## 2023-05-09 NOTE — PATIENT INSTRUCTIONS
**For crisis resources, please see the information at the end of this document**   Patient Education    Thank you for coming to the [facility] [place] [facility].     Lab Testing:  If you had lab testing today and your results are reassuring or normal they will be mailed to you or sent through [employer] within 7 days. If the lab tests need quick action we will call you with the results. The phone number we will call with results is # [phone]. If this is not the best number please call our clinic and change the number.     Medication Refills:  If you need any refills please call your pharmacy and they will contact us. Our fax number for refills is [phone].   Three business days of notice are needed for general medication refill requests.   Five business days of notice are needed for controlled substance refill requests.   If you need to change to a different pharmacy, please contact the new pharmacy directly. The new pharmacy will help you get your medications transferred.     Contact Us:  Please call [phone] during business hours (8-5:00 M-F).   If you have medication related questions after clinic hours, or on the weekend, please call [phone].     Financial Assistance [phone]   Medical Records [phone]       MENTAL HEALTH CRISIS RESOURCES:  For a emergency help, please call 911 or go to the nearest Emergency Department.     Emergency Walk-In Options:   EmPATH Unit @ [facility] ([place]): [phone] - Specialized mental health emergency area designed to be calming  [facility] ([place]): [phone]  [facility] ([place]): [phone]  [facility]): [phone]    [facility] Crisis Information:   [place]: [phone]  [place]: [phone]  [place] (SHEFALI) - Adult: [phone]     Child: [phone]  [place] - Adult: [phone]     Child: [phone]  [place]:  [phone]  List of all [facility] resources:   https://[place].gov/dhs/people-we-serve/adults/health-care/mental-health/resources/crisis-contacts.jsp    National Crisis Information:   Crisis Text Line: Text  [place]  to 565536  Suicide & Crisis Lifeline: 988  National Suicide Prevention Lifeline: 2-855-573-TALK (1-517.758.6206)       For online chat options, visit https://suicidepreventionlifeline.org/chat/  Poison Control Center: 7-066-547-6229  Trans Lifeline: 1-195-657-6901 - Hotline for transgender people of all ages  The [employer] Project: 7-529-449-3477 - Hotline for LGBT youth     For Non-Emergency Support:   Fast Tracker: Mental Health & Substance Use Disorder Resources -   https://www.[employer]mn.org/

## 2023-05-09 NOTE — PROGRESS NOTES
"Virtual Visit Details    Type of service:  Video Visit     Originating Location (pt. Location): {video visit patient location:119340::\"Home\"}  {PROVIDER LOCATION On-site should be selected for visits conducted from your clinic location or adjoining [facility] hospital, academic office, or other nearby [facility] building. Off-site should be selected for all other provider locations, including home:138747}  Distant Location (provider location):  {virtual location provider:651562}  Platform used for Video Visit: {Virtual Visit Platforms:159094::\"isango!\"}    "

## 2023-05-09 NOTE — PROGRESS NOTES
Virtual Visit Details    Type of service:  Video Visit     Originating Location (pt. Location): Home  Distant Location (provider location):  Off-site  Platform used for Video Visit: [facility]  Psychiatry Clinic  MEDICAL PROGRESS NOTE     CARE TEAM:  PCP- Ran Cabral, Psychotherapist- Evangelina ([place] [facility]), None  Artie Diez is a 41 year old who uses the name Artie and pronouns she, her, hers.       DIAGNOSIS   Bipolar II Disorder, rapid cycling, currently depressed, severe  History of EAMON      ASSESSMENT   Artie reported slight improvement in symptoms of depression. She wonders if core mood stabilizing regiment is not working well enough overall. She wanted to talk about increasing depakote, but I want to get a repeat lamotrigine level first. Because of the interaction between lamictal and depakote, they should be adjusted at the same time, which limits clarity. She is interested in potentially switching to lithium, and we will discuss this more once we have levels back to see if there's wiggle room to just go up on depakote.  Denies any lightheadedness with her current propranolol dose.  She continues to do work in therapy to help manage interpersonal symptoms.      She has chronic SI which is passive, it's currently worse in the context of more depressive symptoms. She will reach out if these thoughts are changing or worsening. No acute safety concerns.    Future considerations:  -Consider switching from depakote and lamotrigine to lithium monotherapy.      PLAN                                                                                                                 1) Meds-  - lamotrigine 125 mg daily.  - Depakote ER - 250 mg   - gabapentin- 100 mg every day at noon and 100 mg at bedtime.  - propranolol IR 10 mg bid /prn- for anxiety  - propranolol ER 60 mg qhs  - melatonin 1-1.5 mg 2 hours      PCP-   - Ativan 0.25-0.5 mg at bedtime/prn (1x-2x per  week)  Spironolactone- 50 mg bid- for hair loss  L-Methylfolate     2) Psychotherapy-   Individual therapy on temporary hold, works on DBT skills.   Working with racial trauma consultant     3) Next due-  Labs- Valproate, lamotrigine dordered today  EKG-  As indicated  Rating scales- N/a     4) Referrals-  None     5) Dispo- RTC in 2 weeks      PERTINENT BACKGROUND                           [most recent eval 12/07/21]   Dx depression age 14. Started various medication without working diagnosis in late 20's, had been resistant to medication in past, with multiple somatic symptoms, multiple diagnoses in past. Official diagnosis made about 3 years ago by mood disorder specialist in [place], done as second opinion as struggling with approach of first psychiatrist.   Has been on the current combination of medications since saw specialist, and per specialist, if have this type of condition, cannot be on antidepressants. She is prone to side effects with medications and sometimes, its hard for her to manage her medications due to the side effects, so her doctor was keeping her meds at a low dosage. Sleep is on and off. She was actively involved in CBT and DBT while in [place]     Psych pertinent item history includes suicide attempt [xseveral overdoses as a teenager], SIB [mainly in her teens/20's] and mutiple psychotropic trials         SUBJECTIVE     -It's been a tough few weeks, but things are not as bad as last time.  -Knows her cycles end eventually, and the crest seems to have passed.  -Feeling a little more social. Hanging out with friends is helpful.  -Tends to overthink and ruminate more in depressive episodes.  -Sleep has been a little worse. Not using THC beverages or ativan as much.  -Has a trip to the [employer] [employer] coming up, which is a welcome distraction.  -Feeling more accepting of mood. Still feeling a bit dulled.  -Feels like the energy she puts into things does not yield the benefits she wants.  Everything is on hard-mode for her.    Recent Social History:   Financial/ Work- working , N   Partner/ - Single    Children- None      Living situation- lives alone   Social/ Spiritual Support- Parents, mostly mom    Recent Psych Symptoms:   Depression:  suicidal ideation, low energy, insomnia, poor concentration /memory and excessive guilt  Elevated:  dysregulation  Psychosis:  none  Anxiety:  excessive worry, social anxiety and rumination  Trauma Related:  dysregulation      Recent Substance Use:     -alcohol: No   -cannabis: yes, 3-5 mg a couple times per week.  -tobacco: No  -caffeine:  Yes: 1-2 cups per day   -opioids: No   Narcan Kit currrent: No   -other: none        PAST MED TRIALS         New Antidepressants:  Paxil (paroxetine) and Wellbutrin  (bupropion)  Mood Stabilizers:  Lithium Carbonate and Depakote and Depakot ER (valproate)  Stimulants / ADHD Meds:  Adderall (amphetamine salts), Ritalin (methylphenidate) and Strattera (atomoxetine)  Tranquilizers:  Klonopin (clonazepam) and Buspar (buspirone)-  It caused Akathisia  Paxil- low dose, further bad side effects on higher doses, night sweats  Pristiq- Ok, low dose, stepping on Akathisia     Tends not to tolerate most medications, c/o GI symptoms and HA       PSYCH and SUBSTANCE USE Critical Summary Points since July 2022 9/1/22: Transfer DA. Some hypomanic symptoms. Increased lamotrigine  9/27/22: Less hypomania, no med changes  10/18/22: Mood more stable, had increased rumination with lamicatl increase, so we dropped it back down to 125 mg. Increased bedtime gabapentin  11/18/22: Gabapentin increase not tolerable. Increased propranolol PRN from 10-20 mg.  12/9/22: Propranolol increase perhaps helpful. No changes  1/5/23: Focusing on sleep routine. Added melatonin.  1/31/23: Sleep still bad. No changes.  2/28/23: Sleep improved with some changes to routine. No med changes  3/28/23: One episode of dysregualtion, switching to  propranolol extended release. Lamictal level is up for unclear reasons, will repeat in 3 months.  4/25/23: Worse mood. Added propranolol PRN back to her list.  5/9/23: Depression slightly better. Getting lamotrigine and valproate levels.           MEDICAL HISTORY and ALLERGY     ALLERGIES: Amphotericin b, Phenylbutazones, and Penicillin g    Patient Active Problem List   Diagnosis     Heavy menses     CARDIOVASCULAR SCREENING; LDL GOAL LESS THAN 160     Major depressive disorder, recurrent episode, moderate (H)     Allergic rhinitis     Anxiety state     Attention deficit disorder     Bipolar I disorder (H)     Dysmenorrhea     Glaucoma suspect     Myopia        MEDICATIONS     Current Outpatient Medications   Medication Sig Dispense Refill     divalproex sodium extended-release (DEPAKOTE ER) 250 MG 24 hr tablet Take 1 tablet (250 mg) by mouth daily 90 tablet 1     gabapentin (NEURONTIN) 100 MG capsule Take 1 capsule (100 mg) by mouth 2 times daily 60 capsule 1     lamoTRIgine (LAMICTAL) 100 MG tablet Take 1 tablet (100 mg) by mouth daily Take along with one 25 mg tablet for a combined total of 125 mg. 30 tablet 1     lamoTRIgine (LAMICTAL) 25 MG tablet Take 1 tablet (25 mg) by mouth daily. Take along with one 100 mg tablet for a combined total of 125 mg. 60 tablet 1     LORazepam (ATIVAN) 0.5 MG tablet Take 0.5 mg by mouth every 6 hours as needed for anxiety       norgestim-eth estrad triphasic (ORTHO TRI-CYCLEN LO) 0.18/0.215/0.25 MG-25 MCG tablet Take 1 tablet by mouth daily 84 tablet 4     Nutritional Supplements (NUTRITIONAL SUPPLEMENT PO) l-methyl folate  Zinc selenium  Hair skin and nails vitamin       propranolol ER (INDERAL LA) 60 MG 24 hr capsule Take 1 capsule (60 mg) by mouth At Bedtime 30 capsule 1     spironolactone (ALDACTONE) 50 MG tablet Take 1 tablet (50 mg) by mouth 2 times daily 180 tablet 0      VITALS     Pulse Readings from Last 3 Encounters:   03/29/23 72   10/10/22 86   06/29/22 91     Wt  "Readings from Last 3 Encounters:   03/29/23 55.7 kg (122 lb 11.2 oz)   10/10/22 53.5 kg (118 lb)   07/21/22 53.1 kg (117 lb)     BP Readings from Last 3 Encounters:   03/29/23 112/70   10/10/22 101/67   06/29/22 102/70        MENTAL STATUS EXAM     Alertness: alert  and oriented  Appearance: well groomed  Behavior/Demeanor: cooperative and pleasant, with good  eye contact   Speech: regular rate and rhythm  Language: intact  Psychomotor: normal or unremarkable  Mood: \"less depressed\"  Affect: blunted; congruent to: mood- yes, content- yes  Thought Process/Associations: unremarkable  Thought Content:  Reports suicidal ideation;  Denies violent ideation and delusions   Perception:  Reports none;  Denies auditory hallucinations and visual hallucinations  Insight: good  Judgment: good  Cognition: does  appear grossly intact; formal cognitive testing was not done  Gait and Station: N/A ([facility])     LABS and DATA         1/4/2023     4:19 PM 2/27/2023     4:00 PM 4/24/2023     5:34 PM   PHQ   PHQ-9 Total Score 20 22 24   Q9: Thoughts of better off dead/self-harm past 2 weeks Several days More than half the days More than half the days   F/U: Thoughts of suicide or self-harm No No No   F/U: Safety concerns No No No       Recent Labs   Lab Test 04/21/23  1757 11/24/21  1543   CR 0.85 0.79   GFRESTIMATED 87 >90     Recent Labs   Lab Test 06/17/22  1617 11/24/21  1543   AST 10 11   ALT 11 15   ALKPHOS 56 49          PSYCHOTROPIC DRUG INTERACTIONS                                                       PSYCHCLINICDDI   DEPAKOTE & LAMICTAL- Valproate Products may enhance the adverse/toxic effect of LamoTRIgine. Valproate Products may increase the serum concentration of LamoTRIgine. Severity Major     ATIVAN, GABAPENTIN, DEPAKOTE, LAMICTAL- CNS Depressants may enhance the adverse/toxic effect of other CNS Depressants   LAMICTAL & ORTHO TRI-CYCLEN LO- Estrogen Derivatives (Contraceptive) may decrease the serum concentration of " LamoTRIgine         MANAGEMENT:  Monitoring for adverse effects, routine labs, using lowest therapeutic dose of [Depakote & Lamictal] and patient is aware of risks     RISK STATEMENT for SAFETY     Artie endorsed suicidal ideation. SUICIDE RISK ASSESSMENT-  Risk factors for self-harm: previous suicide attempt and relationship conflict.  Mitigating factors: no plan or intent, describes a safety plan, h/o seeking help , minimal substance use , future oriented, stable housing and stable finances.  The patient does not appear to be at imminent risk for self-harm, hospitalization is not recommended which the pt does  agree to. No hospitalization will be arranged. Based on degree of symptoms close psych follow-up was/were recommended which the pt does  agree to. Additional steps to minimize risk: frequent clinic visits.     PROVIDER: Toan Garcia MD    Patient not staffed in clinic.  Note will be reviewed and signed by supervisor Dr. Barron.       MEDICAL DECISION MAKING        (SmartPhrase .[id number])     - At least 1 chronic problem that is not stable    - Engaged in prescription drug management during visit (discussed any medication benefits, side effects, alternatives, etc.)

## 2023-05-09 NOTE — NURSING NOTE
Is the patient currently in the state of [place]? YES    Visit mode:VIDEO    If the visit is dropped, the patient can be reconnected by: VIDEO VISIT: Send to e-mail at: [email or web address]    Will anyone else be joining the visit? NO      How would you like to obtain your AVS? MyChart    Are changes needed to the allergy or medication list? NO    Reason for visit: Video Visit

## 2023-05-15 ENCOUNTER — TELEPHONE (OUTPATIENT)
Dept: PSYCHIATRY | Facility: CLINIC | Age: 43
End: 2023-05-15
Payer: COMMERCIAL

## 2023-05-15 NOTE — TELEPHONE ENCOUNTER
Patient states she did not count the medications correctly prior to leaving for [place] and she needs a 3 day supply.    Writer spoke to [facility] [address] [place], [place] [address], ph:[phone] regarding patient's gabapentin prescription.   Pharmacist verified gabapentin prescription on file in [place].  They will do an emergency 3 day fill of the medication.  Writer updated patient.

## 2023-05-15 NOTE — TELEPHONE ENCOUNTER
[facility] Call Center    Phone Message    May a detailed message be left on voicemail: yes     Reason for Call: Medication Refill Request    Has the patient contacted the pharmacy for the refill? Yes   Name of medication being requested: Gabapentin  Provider who prescribed the medication:   Pharmacy: [address], [place] [address], ph:[phone]  Date medication is needed: ASAP. Pt is out of town and forgot 3 of her medication. She need's her doctor's approval to get a 3 day supply. Pt needs to pick it up today because she's leaving to another city tomorrow morning.       Action Taken: Other: [employer] pool    Travel Screening: Not Applicable

## 2023-05-22 ENCOUNTER — LAB (OUTPATIENT)
Dept: LAB | Facility: CLINIC | Age: 43
End: 2023-05-22
Payer: COMMERCIAL

## 2023-05-22 DIAGNOSIS — Z51.81 ENCOUNTER FOR THERAPEUTIC DRUG MONITORING: ICD-10-CM

## 2023-05-22 LAB — VALPROATE SERPL-MCNC: 25.9 UG/ML

## 2023-05-22 PROCEDURE — 80164 ASSAY DIPROPYLACETIC ACD TOT: CPT | Mod: 90 | Performed by: PATHOLOGY

## 2023-05-22 PROCEDURE — 80175 DRUG SCREEN QUAN LAMOTRIGINE: CPT | Mod: 90 | Performed by: PATHOLOGY

## 2023-05-22 PROCEDURE — 36415 COLL VENOUS BLD VENIPUNCTURE: CPT | Performed by: PATHOLOGY

## 2023-05-22 PROCEDURE — 99000 SPECIMEN HANDLING OFFICE-LAB: CPT | Performed by: PATHOLOGY

## 2023-05-23 ENCOUNTER — VIRTUAL VISIT (OUTPATIENT)
Dept: PSYCHIATRY | Facility: CLINIC | Age: 43
End: 2023-05-23
Attending: PSYCHIATRY & NEUROLOGY
Payer: COMMERCIAL

## 2023-05-23 DIAGNOSIS — F41.1 GAD (GENERALIZED ANXIETY DISORDER): ICD-10-CM

## 2023-05-23 DIAGNOSIS — F31.81 BIPOLAR 2 DISORDER (H): ICD-10-CM

## 2023-05-23 LAB — LAMOTRIGINE SERPL-MCNC: 6.1 UG/ML

## 2023-05-23 PROCEDURE — 99214 OFFICE O/P EST MOD 30 MIN: CPT | Mod: HN | Performed by: STUDENT IN AN ORGANIZED HEALTH CARE EDUCATION/TRAINING PROGRAM

## 2023-05-23 RX ORDER — PROPRANOLOL HYDROCHLORIDE 10 MG/1
10 TABLET ORAL 2 TIMES DAILY PRN
Qty: 60 TABLET | Refills: 3 | Status: SHIPPED | OUTPATIENT
Start: 2023-05-23 | End: 2023-09-08

## 2023-05-23 RX ORDER — LAMOTRIGINE 25 MG/1
TABLET ORAL
Qty: 60 TABLET | Refills: 3 | Status: SHIPPED | OUTPATIENT
Start: 2023-05-23 | End: 2023-09-08

## 2023-05-23 RX ORDER — PROPRANOLOL HCL 60 MG
60 CAPSULE, EXTENDED RELEASE 24HR ORAL AT BEDTIME
Qty: 30 CAPSULE | Refills: 3 | Status: SHIPPED | OUTPATIENT
Start: 2023-05-23 | End: 2023-07-19

## 2023-05-23 RX ORDER — DIVALPROEX SODIUM 250 MG/1
250 TABLET, EXTENDED RELEASE ORAL DAILY
Qty: 90 TABLET | Refills: 1 | Status: SHIPPED | OUTPATIENT
Start: 2023-05-23 | End: 2023-07-28

## 2023-05-23 RX ORDER — GABAPENTIN 100 MG/1
100 CAPSULE ORAL 2 TIMES DAILY
Qty: 60 CAPSULE | Refills: 3 | Status: SHIPPED | OUTPATIENT
Start: 2023-05-23 | End: 2023-09-08

## 2023-05-23 RX ORDER — LAMOTRIGINE 100 MG/1
100 TABLET ORAL DAILY
Qty: 30 TABLET | Refills: 3 | Status: SHIPPED | OUTPATIENT
Start: 2023-05-23 | End: 2023-09-08

## 2023-05-23 NOTE — NURSING NOTE
Is the patient currently in the state of [place]? YES    Visit mode:VIDEO    If the visit is dropped, the patient can be reconnected by: VIDEO VISIT: Send to e-mail at: [email or web address]    Will anyone else be joining the visit? NO      How would you like to obtain your AVS? MyChart    Are changes needed to the allergy or medication list? NO    Reason for visit: RECHECK    PHQ not complete per department protocol.    BRIANA Calvillo on 5/23/2023 at 9:08 AM

## 2023-05-23 NOTE — PATIENT INSTRUCTIONS
**For crisis resources, please see the information at the end of this document**   Patient Education    Thank you for coming to the [facility] [place] [facility].     Lab Testing:  If you had lab testing today and your results are reassuring or normal they will be mailed to you or sent through Online-OR within 7 days. If the lab tests need quick action we will call you with the results. The phone number we will call with results is # [phone]. If this is not the best number please call our clinic and change the number.     Medication Refills:  If you need any refills please call your pharmacy and they will contact us. Our fax number for refills is [phone].   Three business days of notice are needed for general medication refill requests.   Five business days of notice are needed for controlled substance refill requests.   If you need to change to a different pharmacy, please contact the new pharmacy directly. The new pharmacy will help you get your medications transferred.     Contact Us:  Please call [phone] during business hours (8-5:00 M-F).   If you have medication related questions after clinic hours, or on the weekend, please call [phone].     Financial Assistance [phone]   Medical Records [phone]       MENTAL HEALTH CRISIS RESOURCES:  For a emergency help, please call 911 or go to the nearest Emergency Department.     Emergency Walk-In Options:   EmPATH Unit @ [facility] ([place]): [phone] - Specialized mental health emergency area designed to be calming  [facility] ([place]): [phone]  [facility] ([place]): [phone]  [facility]): [phone]    [facility] Crisis Information:   [place]: [phone]  [place]: [phone]  [place] ([place]) - Adult: [phone]     Child: [phone]  [place] - Adult: [phone]     Child: [phone]  [place]:  [phone]  List of all [facility] resources:   https://[place].gov/dhs/people-we-serve/adults/health-care/mental-health/resources/crisis-contacts.jsp    National Crisis Information:   Crisis Text Line: Text  [place]  to 729756  Suicide & Crisis Lifeline: 988  National Suicide Prevention Lifeline: 2-676-256-TALK (1-347.426.8851)       For online chat options, visit https://suicidepreventionlifeline.org/chat/  Poison Control Center: 1-732-321-9687  Trans Lifeline: 8-928-816-4462 - Hotline for transgender people of all ages  [employer]: 7-233-417-0719 - Hotline for LGBT youth     For Non-Emergency Support:   Fast Tracker: Mental Health & Substance Use Disorder Resources -   https://www.[employer].org/

## 2023-05-23 NOTE — PROGRESS NOTES
Virtual Visit Details    Type of service:  Video Visit     Originating Location (pt. Location): Home  Distant Location (provider location):  On-site  Platform used for Video Visit: [facility]  Psychiatry Clinic  MEDICAL PROGRESS NOTE     CARE TEAM:  PCP- Ran Cabral, Psychotherapist- [facility] ([place] [facility]), None  Artie Diez is a 41 year old who uses the name Artie and pronouns she, her, hers.       DIAGNOSIS   Bipolar II Disorder, rapid cycling, currently depressed, severe  History of EAMON      ASSESSMENT   Artie reported slight improvement in symptoms of depression. She wonders if core mood stabilizing regiment is not working well enough overall. Lamotrigine level was therapeutic at 6.1, without further upward trend that was present with the last level. Because of the interaction between lamictal and depakote, they might need to be adjusted at the same time, which limits clarity. She is interested in potentially switching to lithium, and we talked about getting an MTM visit to clarify what other options might be.  Denies any lightheadedness with her current propranolol dose.  She continues to do work in therapy to help manage interpersonal symptoms.      She has chronic SI which is passive, it's currentlyless significant with improvement in depressive symptoms. She will reach out if these thoughts are changing or worsening. No acute safety concerns.    Future considerations:  -Consider switching from depakote and lamotrigine to lithium monotherapy, vs optimizing lamotrigine      PLAN                                                                                                                 1) Meds-  - lamotrigine 125 mg daily.  - Depakote ER - 250 mg   - gabapentin- 100 mg every day at noon and 100 mg at bedtime.  - propranolol IR 10 mg bid /prn- for anxiety  - propranolol ER 60 mg qhs  - melatonin 1-1.5 mg 2 hours      PCP-   - Ativan 0.25-0.5 mg at  bedtime/prn (1x-2x per week)  Spironolactone- 50 mg bid- for hair loss  L-Methylfolate     2) Psychotherapy-   Individual therapy oongoinjulio, works on DBT skills.   Working with racial trauma consultant on identity development.     3) Next due-  Labs- lamotrigine level pending  EKG-  As indicated  Rating scales- N/a     4) Referrals-  None     5) Dispo- RTC in 6 weeks      PERTINENT BACKGROUND                           [most recent eval 12/07/21]   Dx depression age 14. Started various medication without working diagnosis in late 20's, had been resistant to medication in past, with multiple somatic symptoms, multiple diagnoses in past. Official diagnosis made about 3 years ago by mood disorder specialist in [place], done as second opinion as struggling with approach of first psychiatrist.   Has been on the current combination of medications since saw specialist, and per specialist, if have this type of condition, cannot be on antidepressants. She is prone to side effects with medications and sometimes, its hard for her to manage her medications due to the side effects, so her doctor was keeping her meds at a low dosage. Sleep is on and off. She was actively involved in CBT and DBT while in [place]     Psych pertinent item history includes suicide attempt [xseveral overdoses as a teenager], SIB [mainly in her teens/20's] and mutiple psychotropic trials         SUBJECTIVE     -Got back from vacation[place]  -Had an incident of dysregulation with her mom. They were able to work through this together.  -Forgot to refill her meds before her trip, but got an emergency supply from her pharmacy.  -Sleep has been different due to vacation. Is feeling more tired since getting back, and falling asleep on the couch.  -Anxiety and rumination are about the same, but depression is better.  -Really is questioning the goal of changing mood stabilization regimen.  -Suicide thoughts continue to be passive,  less strong than previously.  -Cannabis beverages have been helpful for sleep and evening anxiety.    Recent Social History:   Financial/ Work- working , N   Partner/ - Single    Children- None      Living situation- lives alone   Social/ Spiritual Support- Parents, mostly mom    Recent Psych Symptoms:   Depression:  suicidal ideation, low energy, poor concentration /memory and excessive guilt  Elevated:  dysregulation  Psychosis:  none  Anxiety:  excessive worry, social anxiety and rumination  Trauma Related:  dysregulation      Recent Substance Use:     -alcohol: No   -cannabis: yes, 3-5 mg a couple times per week.  -tobacco: No  -caffeine:  Yes: 1-2 cups per day   -opioids: No   Narcan Kit currrent: No   -other: none        PAST MED TRIALS         New Antidepressants:  Paxil (paroxetine) and Wellbutrin  (bupropion)  Mood Stabilizers:  Lithium Carbonate and Depakote and Depakot ER (valproate)  Stimulants / ADHD Meds:  Adderall (amphetamine salts), Ritalin (methylphenidate) and Strattera (atomoxetine)  Tranquilizers:  Klonopin (clonazepam) and Buspar (buspirone)-  It caused Akathisia  Paxil- low dose, further bad side effects on higher doses, night sweats  Pristiq- Ok, low dose, stepping on Akathisia     Tends not to tolerate most medications, c/o GI symptoms and HA       PSYCH and SUBSTANCE USE Critical Summary Points since July 2022 9/1/22: Transfer DA. Some hypomanic symptoms. Increased lamotrigine  9/27/22: Less hypomania, no med changes  10/18/22: Mood more stable, had increased rumination with lamicatl increase, so we dropped it back down to 125 mg. Increased bedtime gabapentin  11/18/22: Gabapentin increase not tolerable. Increased propranolol PRN from 10-20 mg.  12/9/22: Propranolol increase perhaps helpful. No changes  1/5/23: Focusing on sleep routine. Added melatonin.  1/31/23: Sleep still bad. No changes.  2/28/23: Sleep improved with some changes to routine. No med  changes  3/28/23: One episode of dysregualtion, switching to propranolol extended release. Lamictal level is up for unclear reasons, will repeat in 3 months.  4/25/23: Worse mood. Added propranolol PRN back to her list.  5/9/23: Depression slightly better. Getting lamotrigine and valproate levels.  5/23/23: Depression remains slightly better. Lamotrigine and depakote levels stable.           MEDICAL HISTORY and ALLERGY     ALLERGIES: Amphotericin b, Phenylbutazones, and Penicillin g    Patient Active Problem List   Diagnosis     Heavy menses     CARDIOVASCULAR SCREENING; LDL GOAL LESS THAN 160     Major depressive disorder, recurrent episode, moderate (H)     Allergic rhinitis     Anxiety state     Attention deficit disorder     Bipolar I disorder (H)     Dysmenorrhea     Glaucoma suspect     Myopia        MEDICATIONS     Current Outpatient Medications   Medication Sig Dispense Refill     divalproex sodium extended-release (DEPAKOTE ER) 250 MG 24 hr tablet Take 1 tablet (250 mg) by mouth daily 90 tablet 1     gabapentin (NEURONTIN) 100 MG capsule Take 1 capsule (100 mg) by mouth 2 times daily 60 capsule 1     lamoTRIgine (LAMICTAL) 100 MG tablet Take 1 tablet (100 mg) by mouth daily Take along with one 25 mg tablet for a combined total of 125 mg. 30 tablet 1     lamoTRIgine (LAMICTAL) 25 MG tablet Take 1 tablet (25 mg) by mouth daily. Take along with one 100 mg tablet for a combined total of 125 mg. 60 tablet 1     LORazepam (ATIVAN) 0.5 MG tablet Take 0.5 mg by mouth every 6 hours as needed for anxiety       norgestim-eth estrad triphasic (ORTHO TRI-CYCLEN LO) 0.18/0.215/0.25 MG-25 MCG tablet Take 1 tablet by mouth daily 84 tablet 4     Nutritional Supplements (NUTRITIONAL SUPPLEMENT PO) l-methyl folate  Zinc selenium  Hair skin and nails vitamin       propranolol ER (INDERAL LA) 60 MG 24 hr capsule Take 1 capsule (60 mg) by mouth At Bedtime 30 capsule 1     spironolactone (ALDACTONE) 50 MG tablet Take 1 tablet (50  "mg) by mouth 2 times daily 180 tablet 0      VITALS     Pulse Readings from Last 3 Encounters:   03/29/23 72   10/10/22 86   06/29/22 91     Wt Readings from Last 3 Encounters:   03/29/23 55.7 kg (122 lb 11.2 oz)   10/10/22 53.5 kg (118 lb)   07/21/22 53.1 kg (117 lb)     BP Readings from Last 3 Encounters:   03/29/23 112/70   10/10/22 101/67   06/29/22 102/70        MENTAL STATUS EXAM     Alertness: alert  and oriented  Appearance: well groomed  Behavior/Demeanor: cooperative and pleasant, with good  eye contact   Speech: regular rate and rhythm  Language: intact  Psychomotor: normal or unremarkable  Mood: \"less depressed\"  Affect: blunted; congruent to: mood- yes, content- yes  Thought Process/Associations: unremarkable  Thought Content:  Reports suicidal ideation;  Denies violent ideation and delusions   Perception:  Reports none;  Denies auditory hallucinations and visual hallucinations  Insight: good  Judgment: good  Cognition: does  appear grossly intact; formal cognitive testing was not done  Gait and Station: N/A (Merged with [facility])     LABS and DATA         1/4/2023     4:19 PM 2/27/2023     4:00 PM 4/24/2023     5:34 PM   PHQ   PHQ-9 Total Score 20 22 24   Q9: Thoughts of better off dead/self-harm past 2 weeks Several days More than half the days More than half the days   F/U: Thoughts of suicide or self-harm No No No   F/U: Safety concerns No No No       Recent Labs   Lab Test 04/21/23  1757 11/24/21  1543   CR 0.85 0.79   GFRESTIMATED 87 >90     Recent Labs   Lab Test 06/17/22  1617 11/24/21  1543   AST 10 11   ALT 11 15   ALKPHOS 56 49          PSYCHOTROPIC DRUG INTERACTIONS                                                       PSYCHCLINICDDI   DEPAKOTE & LAMICTAL- Valproate Products may enhance the adverse/toxic effect of LamoTRIgine. Valproate Products may increase the serum concentration of LamoTRIgine. Severity Major     ATIVAN, GABAPENTIN, DEPAKOTE, LAMICTAL- CNS Depressants may enhance the adverse/toxic " effect of other CNS Depressants   LAMICTAL & ORTHO TRI-CYCLEN LO- Estrogen Derivatives (Contraceptive) may decrease the serum concentration of LamoTRIgine         MANAGEMENT:  Monitoring for adverse effects, routine labs, using lowest therapeutic dose of [Depakote & Lamictal] and patient is aware of risks     RISK STATEMENT for SAFETY     Artie endorsed suicidal ideation. SUICIDE RISK ASSESSMENT-  Risk factors for self-harm: previous suicide attempt and relationship conflict.  Mitigating factors: no plan or intent, describes a safety plan, h/o seeking help , minimal substance use , future oriented, stable housing and stable finances.  The patient does not appear to be at imminent risk for self-harm, hospitalization is not recommended which the pt does  agree to. No hospitalization will be arranged. Based on degree of symptoms close psych follow-up was/were recommended which the pt does  agree to. Additional steps to minimize risk: frequent clinic visits.     PROVIDER: Toan Garcia MD    Patient not staffed in clinic.  Note will be reviewed and signed by supervisor Dr. Barron.       MEDICAL DECISION MAKING        (SmartPhrase .[id number])     - At least 1 chronic problem that is not stable    - Engaged in prescription drug management during visit (discussed any medication benefits, side effects, alternatives, etc.)

## 2023-06-01 ENCOUNTER — TELEPHONE (OUTPATIENT)
Dept: PSYCHIATRY | Facility: CLINIC | Age: 43
End: 2023-06-01
Payer: COMMERCIAL

## 2023-06-01 NOTE — TELEPHONE ENCOUNTER
MTM referral from: [facility] visit (referral by provider)    MTM referral outreach attempt #2 on June 1, 2023 at 11:53 AM      Outcome: Patient not reachable after several attempts, will route to MTM Pharmacist/Provider as an FYI.  [facility] scheduling number is [phone].  Thank you for the referral.    Use [employer] for the carrier/Plan on the flowsheet    Aleah Gonsalves - [facility]

## 2023-06-06 ENCOUNTER — VIRTUAL VISIT (OUTPATIENT)
Dept: PHARMACY | Facility: CLINIC | Age: 43
End: 2023-06-06
Payer: COMMERCIAL

## 2023-06-06 DIAGNOSIS — F31.81 BIPOLAR 2 DISORDER (H): Primary | ICD-10-CM

## 2023-06-06 DIAGNOSIS — Z30.9 CONTRACEPTIVE MANAGEMENT: ICD-10-CM

## 2023-06-06 DIAGNOSIS — L65.9 ALOPECIA: ICD-10-CM

## 2023-06-06 PROCEDURE — 99607 MTMS BY PHARM ADDL 15 MIN: CPT | Mod: VID | Performed by: PHARMACIST

## 2023-06-06 PROCEDURE — 99605 MTMS BY PHARM NP 15 MIN: CPT | Mod: VID | Performed by: PHARMACIST

## 2023-06-06 NOTE — PROGRESS NOTES
"Medication Therapy Management (MTM) Encounter    ASSESSMENT:                            Medication Adherence/Access: No issues identified    Bipolar 2 Disorder: Goal of visit today was to review past mood stabilizer and antipsychotic trials in the event of a medication change in the future.  However, I am unable to find specific details in patient's chart regarding any mood stabilizer/antipsychotic trials other than lamotrigine and Depakote.  She recalls trying lithium and various antipsychotics in the past, but believes they were always prescribed in combination with an antidepressant.  She does not recall doses or duration of these trials.    Patient's Depakote level is low and she expressing hesitancy about increasing the dose to 500mg daily, although we did discuss Depakote DR smallest capsule size is 125mg so we could potentially increase to 375mg total daily dose. We discussed interaction by which Depakote can increase lamotrigine levels. There is not a well-established therapeutic range for lamotrigine, but her level appears within normal limits. It would not be common for lamotrigine to cause ruminating thoughts, so we could consider retrial of lamotrigine 150mg daily.     Hair loss: Stable.    Contraception: Stable. No recent changes.     PLAN:                            No medication changes at this time. Future consideration to discuss with your incoming resident psychiatry provider includes increasing lamotrigine to 150mg daily.     Follow-up: MTM 4 months or sooner if requested.     SUBJECTIVE/OBJECTIVE:                          Artie Diez is a 42 year old female contacted via secure video for an initial visit. She was referred to me from Dr. Toan Garcia.     Reason for referral: \"Get a good history of mood stabilizers and antipsychotics\"    Allergies/ADRs: Reviewed in chart  Past Medical History: Reviewed in chart  Tobacco: She reports that she has never smoked. She has never used smokeless " tobacco.  Alcohol: none    Medication Adherence/Access: no issues reported    Bipolar 2 Disorder:  Current Medications:  Lamotrigine 125mg daily  Depakote ER 250mg daily  Gabapentin 100mg twice daily   Propranolol ER 60mg nightly + IR 10mg twice daily as needed anxiety   Ativan 0.5mg Q6 hours as needed anxiety (takes maybe a couple times per week for sleep)    Artie is seen at [facility] by Dr. Garcia. Most recent visit was 5/23/23 at which time no medication changes were made. Please see note by pt's provider for additional details regarding symptoms and history.     Today, patient reports she has a complex medication history. She was prescribed antidepressants for many years before her MDD diagnosis was changed to bipolar. Depakote and lamotrigine were started in 2017 and she feels overall they have been very helpful.  Her Depakote dose has never been increased, lamotrigine was increased to 150 mg daily however, patient felt it contributed to increased ruminating thoughts.  She does have several stressors at this time including caring for her elderly parents and work stress.  At times, she feels that Depakote and lamotrigine are not as helpful as they once were.  She is also concerned that her Depakote level has decreased recently.  At recent psychiatry visit she has expressed interest in switching to lithium, however, after reflecting further on this she is hesitant about lithium therapy at this time.  She was prescribed lithium in her 20s, but believes she was also on Wellbutrin and Paxil at the same time.  She has also tried various antipsychotic medications but believes these were also prescribed concomitantly with antidepressants.  She is unable to recall names, doses, duration of antipsychotic therapy.  Prefers no antipsychotic therapy at this time. [place]. Will be transitioning to a new psychiatry resident in 1-2 months.                      Hair loss: Takes spironolactone 50mg twice daily for hair-loss due to Depakote. Feels it has been helpful.     Contraception: Ortho-tri-cyclen lo daily.  ----------------      I spent 60 minutes with this patient today. A copy of the visit note was provided to the patient's provider(s).    A summary of these recommendations was sent via [employer].    Indu Tavares, PharmD, BCPP  Medication Therapy Management Pharmacist  [facility]    Telemedicine Visit Details  Type of service:  Video Conference via [employer]  Start Time: 1:08 PM  End Time: 2:09 PM     Medication Therapy Recommendations  Bipolar 2 disorder (H)    Current Medication: lamoTRIgine (LAMICTAL) 100 MG tablet   Rationale: Dose too low - Dosage too low - Effectiveness   Recommendation: Increase Dose - lamoTRIgine 150 MG tablet   Status: Contact Provider - Awaiting Response

## 2023-06-06 NOTE — Clinical Note
Hello!  Thank you for the referral! I met with patient but was unable to get much additional info regarding past med trials. We did talk about possibly increasing lamotrigine which she was possibly interested in, but ok waiting until she sees her new resident.   Thank you!  Indu

## 2023-06-12 DIAGNOSIS — L65.9 HAIR LOSS: ICD-10-CM

## 2023-06-15 NOTE — TELEPHONE ENCOUNTER
spironolactone (ALDACTONE) 50 MG tablet        Last Written Prescription Date:  03-  Last Fill Quantity: 180,   # refills: 0  Last Office Visit : 03-  Future Office visit:  Not onfile    Routing refill request to provider for review/approval because:  Drug not on the FM, P or [facility] refill protocol or controlled substance

## 2023-06-16 RX ORDER — SPIRONOLACTONE 50 MG/1
50 TABLET, FILM COATED ORAL 2 TIMES DAILY
Qty: 180 TABLET | Refills: 0 | Status: SHIPPED | OUTPATIENT
Start: 2023-06-16 | End: 2023-09-14

## 2023-07-19 ENCOUNTER — TELEPHONE (OUTPATIENT)
Dept: PSYCHIATRY | Facility: CLINIC | Age: 43
End: 2023-07-19
Payer: COMMERCIAL

## 2023-07-19 DIAGNOSIS — F41.1 GAD (GENERALIZED ANXIETY DISORDER): ICD-10-CM

## 2023-07-19 RX ORDER — PROPRANOLOL HCL 60 MG
60 CAPSULE, EXTENDED RELEASE 24HR ORAL AT BEDTIME
Qty: 30 CAPSULE | Refills: 0 | Status: SHIPPED | OUTPATIENT
Start: 2023-07-19 | End: 2023-07-28

## 2023-07-19 NOTE — TELEPHONE ENCOUNTER
Per chart review, patient should have refills of all psychiatric medications. Called [facility] and was informed she has refills on all meds, except the propranol ER 60 mg. Per pharmacy staff, they can see the Rx from 5/23, but they also received a note that it was discontinued by the provider, so they canceled out the Rx.     Called Artie and confirmed this is the only medication she's running into issues with. She has one tab left and no refills remaining at the pharmacy. Reviewed her chart and confirmed she meets the refill protocol. Will send a 30 d/s under Dr. Barron, as he supervised the last visit. Asked that Artie request additional refills at the next visit.    Last seen: 5/23/23  RTC: 6 weeks  Cancel: none  No-show: none  Next appt: 7/28/23     Medication requested: propranolol ER (INDERAL LA) 60 MG 24 hr capsule  Directions: Take 1 capsule (60 mg) by mouth At Bedtime  Qty: 30  Last refilled: 6/14/23 per outside med rec     Kanika Madrigal RN on 7/19/2023 at 4:22 PM

## 2023-07-19 NOTE — TELEPHONE ENCOUNTER
[facility] Call Center    Phone Message    May a detailed message be left on voicemail: yes     Reason for Call: Other: pt called regarding her medication she has not recieved any details about it and she is out of themedication and was wondering if an order was put in because she has been waiting for a whole week and have not heard anything and would like the nurse to call her asap     Action Taken: Other: nurse pool    Travel Screening: Not Applicable

## 2023-07-27 ASSESSMENT — ANXIETY QUESTIONNAIRES
GAD7 TOTAL SCORE: 17
4. TROUBLE RELAXING: MORE THAN HALF THE DAYS
GAD7 TOTAL SCORE: 17
2. NOT BEING ABLE TO STOP OR CONTROL WORRYING: MORE THAN HALF THE DAYS
7. FEELING AFRAID AS IF SOMETHING AWFUL MIGHT HAPPEN: MORE THAN HALF THE DAYS
3. WORRYING TOO MUCH ABOUT DIFFERENT THINGS: NEARLY EVERY DAY
IF YOU CHECKED OFF ANY PROBLEMS ON THIS QUESTIONNAIRE, HOW DIFFICULT HAVE THESE PROBLEMS MADE IT FOR YOU TO DO YOUR WORK, TAKE CARE OF THINGS AT HOME, OR GET ALONG WITH OTHER PEOPLE: VERY DIFFICULT
6. BECOMING EASILY ANNOYED OR IRRITABLE: NEARLY EVERY DAY
1. FEELING NERVOUS, ANXIOUS, OR ON EDGE: NEARLY EVERY DAY
5. BEING SO RESTLESS THAT IT IS HARD TO SIT STILL: MORE THAN HALF THE DAYS

## 2023-07-27 ASSESSMENT — PATIENT HEALTH QUESTIONNAIRE - PHQ9
SUM OF ALL RESPONSES TO PHQ QUESTIONS 1-9: 22
10. IF YOU CHECKED OFF ANY PROBLEMS, HOW DIFFICULT HAVE THESE PROBLEMS MADE IT FOR YOU TO DO YOUR WORK, TAKE CARE OF THINGS AT HOME, OR GET ALONG WITH OTHER PEOPLE: SOMEWHAT DIFFICULT
SUM OF ALL RESPONSES TO PHQ QUESTIONS 1-9: 22

## 2023-07-28 ENCOUNTER — VIRTUAL VISIT (OUTPATIENT)
Dept: PSYCHIATRY | Facility: CLINIC | Age: 43
End: 2023-07-28
Attending: PSYCHIATRY & NEUROLOGY
Payer: COMMERCIAL

## 2023-07-28 DIAGNOSIS — F31.81 BIPOLAR 2 DISORDER (H): Primary | ICD-10-CM

## 2023-07-28 DIAGNOSIS — F41.1 GAD (GENERALIZED ANXIETY DISORDER): ICD-10-CM

## 2023-07-28 PROCEDURE — 90792 PSYCH DIAG EVAL W/MED SRVCS: CPT | Mod: VID

## 2023-07-28 RX ORDER — PROPRANOLOL HCL 60 MG
60 CAPSULE, EXTENDED RELEASE 24HR ORAL AT BEDTIME
Qty: 30 CAPSULE | Refills: 1 | Status: SHIPPED | OUTPATIENT
Start: 2023-07-28 | End: 2023-09-08

## 2023-07-28 RX ORDER — DIVALPROEX SODIUM 250 MG/1
250 TABLET, EXTENDED RELEASE ORAL DAILY
Qty: 90 TABLET | Refills: 1 | Status: SHIPPED | OUTPATIENT
Start: 2023-07-28 | End: 2023-09-08

## 2023-07-28 ASSESSMENT — PAIN SCALES - GENERAL: PAINLEVEL: NO PAIN (0)

## 2023-07-28 NOTE — NURSING NOTE
Is the patient currently in the state of [place]? YES    Visit mode:VIDEO    If the visit is dropped, the patient can be reconnected by: VIDEO VISIT:  Send e-mail to at [email or web address]    Will anyone else be joining the visit? Yes: No  (If patient encounters technical issues they should call [phone]    How would you like to obtain your AVS? MyChart    Are changes needed to the allergy or medication list? No  Patient denies any changes since echeck-in regarding medication and allergies and states all information entered during echeck-in remains accurate.       Reason for visit: ALEX Lockett

## 2023-07-28 NOTE — PROGRESS NOTES
"   [facility]  TRANSFER of CARE DIAGNOSTIC ASSESSMENT     CARE TEAM:    PCP- Fahad Concepcion  Therapist- Psychotherapist- Evangelina ([place] [facility], primarily for DBT q week), and most recently Carl (at [facility])      Artie is a 42 year old who uses the pronouns she, her.      Chief Complaint     \"Mood is better now\"     Diagnoses     Bipolar II Disorder, rapid cycling, currently depressed, moderate  EAMON     Assessment     Artie is a 41 yo patient with past psychiatric diagnosis of mood and anxiety disorders since a young age with more diagnostic clarification about 5 years ago by mood disorder specialist in [place]. Per patient report today and records, her previously documented symptoms are consistent with bipolar II disorder with a rapid cycling pattern and anxiety. She has been seen in clinic over the past few years, most recently seen by Dr. Garcia, here today for a transfer of care DA.   Overall appears that her mood and anxiety are well controlled. She and Dr Garcia had been working on adjusting medication balancing optimal symptom control and side effects, noticing interaction between Lamictal and Depakote. We discussed risks vs benefits and alternatives including Depakote subtherapeutic recent level and rec to consider Lithium in the future and she expresses understanding of recommendation and  preference of not making changes at this time as she thinks that her symptoms are adequately controlled and has not experienced new side effects. Denies safety concerns, she has chronic SI which is passive, it's currentlyless significant with improvement in depressive symptoms. She will reach out if these thoughts are changing or worsening. No acute safety concerns.     Artie did not appear to be an imminent safety risk to self or others.    Psychotropic Drug Interactions:    DEPAKOTE & LAMICTAL- Valproate Products may enhance the " adverse/toxic effect of LamoTRIgine. Valproate Products may increase the serum concentration of LamoTRIgine. Severity Major     ATIVAN, GABAPENTIN, DEPAKOTE, LAMICTAL- CNS Depressants may enhance the adverse/toxic effect of other CNS Depressants   LAMICTAL & ORTHO TRI-CYCLEN LO- Estrogen Derivatives (Contraceptive) may decrease the serum concentration of LamoTRIgine   via CY: Cannabis may increase serum concentration of propranolol, melatonin     MANAGEMENT:  Monitoring for adverse effects, routine labs, using lowest therapeutic dose of [Depakote & Lamictal] and patient is aware of risks    [place]MP was not checked today: will be checked next visit    Future considerations:  -Consider switching from depakote and lamotrigine to lithium monotherapy, vs optimizing lamotrigine      Plan     1) Meds-  - Continue lamotrigine 125 mg daily.   - Depakote ER - 250 mg   - gabapentin- 100 mg every day at noon and 100 mg at bedtime.  - propranolol IR 10 mg bid /prn- for anxiety  - propranolol ER 60 mg qhs  - melatonin 1-1.5 mg 2 hours      PCP-   - Ativan 0.25-0.5 mg at bedtime/prn (1x-2x per week)  Spironolactone- 50 mg bid- for hair loss  L-Methylfolate     [facility].   Working with racial trauma consultant on identity development.     3) Next due-  Labs- lamotrigine and Depakote level obtained on 23.   Rating scales- EAMON, PHQ9     4) Referrals-  None     5) Dispo- RTC in 6-8 weeks       Pertinent Background                                                   [most recent eval 23]     The following information was copied/edited from Dr. Garcia's notes and reviewed with Artie bolden.     Dx depression age 14. Started various medication without working diagnosis in late 20's, had been resistant to medication in past, with multiple somatic symptoms, multiple diagnoses in past. Official diagnosis made about 3 years ago by mood disorder specialist in [place], done  "as second opinion as struggling with approach of first psychiatrist.   Has been on the current combination of medications since saw specialist, and per specialist, if have this type of condition, cannot be on antidepressants. She is prone to side effects with medications and sometimes, its hard for her to manage her medications due to the side effects, so her doctor was keeping her meds at a low dosage. Sleep is on and off. She was actively involved in CBT and DBT while in [place]     Psych pertinent item history includes suicide attempt [xseveral overdoses as a teenager], SIB [mainly in her teens/20's] and mutiple psychotropic trials       Subjective     -Goals for continuing care in this clinic include: \"I feel pretty comfortable where I am\"  -Since last visit on 5/23/23 with Dr. Garcia. Met with Pharm D, Indu.   -No medication changes were made.   -Takes spironolactone 50mg twice daily for hair-loss due to Depakote. Feels it has been helpful -Hair loss: Stable, fluctuates, has not worsened, back on spironolactone.   -Icreasing lamotrigine to 150mg? No changes at this moment. Would like to keep current regime, seems in the past when increased Lamictal, had experienced increased ruminations  -Appetite:Denies changes   -Sleep: Fluctuates, some days tired, some days. Needs to be more consistent.  -Mood: \"last 3 weeks more stressful\" working with students, dealing with some personalities at work, certain colleagues. Has brought up some past difficulties.   -Other side effects: Denies  -Ativan 0.5-1 tab. 1-2/week, (Rx by PCP). Rec minimal use and to consider taper off.  -Mother is primary support.  -Denies active SI, denies plan or intent. HI, denies perceptual disturbances.      Pertinent Substance Use: .   -Cannabis-THC drinks 3-5 mg/once a week on average.    -Denies any other recent use      A comprehensive review of systems was performed and is negative other than noted in the HPI.       Mental Status Exam " "    Alertness: alert  and oriented  Appearance: well groomed  Behavior/Demeanor: cooperative, pleasant, and calm, with good  eye contact   Speech: normal and regular rate and rhythm  Language: intact and no problems  Psychomotor: normal or unremarkable  Mood: \"last 3 weeks more stressful\"  Affect: full range; congruent to: mood- yes, content- yes  Thought Process/Associations: unremarkable  Thought Content:  Reports none;  Denies suicidal ideation, violent ideation, delusions , obsessions , phobia , magical thinking, over-valued ideas, and paranoid ideation  Perception:  Reports none;  Denies hallucinations, depersonalization, and derealization  Insight: good  Judgment: good  Cognition: does  appear grossly intact; formal cognitive testing was not done  Gait and Station: N/A (telehealth)     Family and Social History                                 pt reported     The following information was copied/edited from Dr. Garcia's notes and reviewed with Artie bolden.    Family Hx: Adopted, Has a twin sister, unknown.      Social Hx:  Financial/ Work- working , [employer] .  Partner/ - Single \"Men create a lot of anxiety, I think I have attachment issues.\"  Children- None      Living situation- lives alone   Social/ Spiritual Support- Parents, mostly mom     Feels Safe at Home- yes   Legal- None     Trauma History (self-report)- yes, abandoned at age 3 and 1/2 years   Early History/Education- Adopted from [place] with her Twin sister, they were abandoned by their bio mom, she has an older sister who was also adopted from [place] as an infant. She grew up in [place], has a Masters in Communications, older sister adopted 10 years before them      Past Psychiatric History     SIB- yes, started cutting SIB in , in her early 20's while in college   Suicide Attempt [#, most recent]- yes, 2 over doses as a teenager (Over dose on meds a few times in )  Suicidal Ideation Hx- yes     Violence/Aggression Hx- " None  Psychosis Hx- None  Eating Disorder Hx- None  Other- None     Psych Hosp [#, most recent]- None  Commitment- None  ECT- None  Outpatient Programs - One outpatient program in 2001  Other - DBT x 2years ([id number])    SUBSTANCE USE HISTORY   Past Use: None reported   Current use: as documented above     Past Psych Med Trials     New Antidepressants:  Paxil (paroxetine) and Wellbutrin  (bupropion)  Mood Stabilizers:  Lithium Carbonate and Depakote and Depakot ER (valproate)  Stimulants / ADHD Meds:  Adderall (amphetamine salts), Ritalin (methylphenidate) and Strattera (atomoxetine)  Tranquilizers:  Klonopin (clonazepam) and Buspar (buspirone)-  It caused Akathisia  Paxil- low dose, further bad side effects on higher doses, night sweats  Pristiq- Ok, low dose, stepping on Akathisia     Tends not to tolerate most medications, c/o GI symptoms and HA     PSYCH and SUBSTANCE USE Critical Summary Points since July 2023 7/28/2023: Transfer of care DA.       Vitals     Pulse Readings from Last 3 Encounters:   03/29/23 72   10/10/22 86   06/29/22 91     Wt Readings from Last 3 Encounters:   03/29/23 55.7 kg (122 lb 11.2 oz)   10/10/22 53.5 kg (118 lb)   07/21/22 53.1 kg (117 lb)     BP Readings from Last 3 Encounters:   03/29/23 112/70   10/10/22 101/67   06/29/22 102/70          Medical History     ALLERGIES: Amphotericin b, Phenylbutazones, and Penicillin g    Patient Active Problem List   Diagnosis    Heavy menses    CARDIOVASCULAR SCREENING; LDL GOAL LESS THAN 160    Major depressive disorder, recurrent episode, moderate (H)    Allergic rhinitis    Anxiety state    Attention deficit disorder    Bipolar I disorder (H)    Dysmenorrhea    Glaucoma suspect    Myopia        Medications     Current Outpatient Medications   Medication Sig Dispense Refill    divalproex sodium extended-release (DEPAKOTE ER) 250 MG 24 hr tablet Take 1 tablet (250 mg) by mouth daily 90 tablet 1    gabapentin (NEURONTIN) 100 MG capsule Take 1  capsule (100 mg) by mouth 2 times daily 60 capsule 3    lamoTRIgine (LAMICTAL) 100 MG tablet Take 1 tablet (100 mg) by mouth daily Take along with one 25 mg tablet for a combined total of 125 mg. 30 tablet 3    lamoTRIgine (LAMICTAL) 25 MG tablet Take 1 tablet (25 mg) by mouth daily. Take along with one 100 mg tablet for a combined total of 125 mg. 60 tablet 3    LORazepam (ATIVAN) 0.5 MG tablet Take 0.5 mg by mouth every 6 hours as needed for anxiety      norgestim-eth estrad triphasic (ORTHO TRI-CYCLEN LO) 0.18/0.215/0.25 MG-25 MCG tablet Take 1 tablet by mouth daily 84 tablet 4    propranolol (INDERAL) 10 MG tablet Take 1 tablet (10 mg) by mouth 2 times daily as needed (Anxiety) 60 tablet 3    propranolol ER (INDERAL LA) 60 MG 24 hr capsule Take 1 capsule (60 mg) by mouth At Bedtime 30 capsule 0    spironolactone (ALDACTONE) 50 MG tablet Take 1 tablet (50 mg) by mouth 2 times daily 180 tablet 0        Labs and Data         12/9/2022     1:41 AM 3/27/2023    11:07 AM 7/27/2023     5:09 PM   PROMIS-10 Total Score w/o Sub Scores   PROMIS TOTAL - SUBSCORES 22 18 18          No data to display                  2/27/2023     4:00 PM 4/24/2023     5:34 PM 7/27/2023     5:07 PM   PHQ-9 SCORE   PHQ-9 Total Score MyChart 22 (Severe depression) 24 (Severe depression) 22 (Severe depression)   PHQ-9 Total Score 22 24 22         2/27/2023     4:01 PM 4/24/2023     5:35 PM 7/27/2023     5:08 PM   EAMON-7 SCORE   Total Score 15 (severe anxiety) 17 (severe anxiety) 17 (severe anxiety)   Total Score 15 17 17       Liver/kidney function Metabolic Blood counts   Recent Labs   Lab Test 04/21/23  1757 06/17/22  1617 11/24/21  1543   CR 0.85  --  0.79   AST  --  10 11   ALT  --  11 15   ALKPHOS  --  56 49       Recent Labs   Lab Test 06/17/22  1617 11/24/21  1543 10/30/20  1200   CHOL  --   --  167   TRIG  --   --  148   LDL  --   --  81   HDL  --   --  56   TSH 1.09   < > 2.22    < > = values in this interval not displayed.       Recent  Labs   Lab Test 06/17/22  1617   WBC 9.0   HGB 13.0   HCT 38.9   MCV 85           Recent Labs   Lab Test 06/17/22  1617 11/24/21  1543   VANDANA 37 38     Recent Labs   Lab Test 06/17/22  1617 11/24/21  1543   AST 10 11   ALT 11 15   ALKPHOS 56 49     Component   5/22/2023     Valproic acid   ug/mL 25.9 Low  25.0 Low  CM   Comment: Therapeutic Range:  ug/mL  Epilepsy and paula patients:  ug/mL  Some patients require and can tolerate values up to 150 ug/mL    Critical: Greater than 150 ug/mL   Resulting Agency  [facility]              Specimen Collected: 05/22/23 10:50 AM             LAMOTRIGINE LEVEL 5/22/23:              Component Ref Range & Units 2 mo ago  (5/22/23) 4 mo ago  (3/23/23) 1 yr ago  (6/17/22) 1 yr ago  (12/15/21)    Lamotrigine 3.0 - 15.0 ug/mL 6.1 7.8 CM 4.4 CM 3.7 R, CM   Comment: INTERPRETIVE INFORMATION:  Lamotrigine          FUTURE LABS: Autoimmune workup ordered by Bienvenido Deleon MD.        Risk Statement for Safety     TREATMENT RISK STATEMENT: The risks, benefits, alternatives and potential adverse effects have been discussed and are understood by the pt. The pt understands the risks of using street drugs or alcohol. There are no medical contraindications, the pt agrees to treatment with the ability to do so. The pt knows to call the clinic for any problems or to access emergency care if needed.  Medical and substance use concerns are documented above.  Psychotropic drug interaction check was done, including changes made today.     PROVIDER: Ran Gregorio MD    Level of Medical Decision Making:   - At least 1 chronic problem that is not stable  - Engaged in prescription drug management during visit (discussed any medication benefits, side effects, alternatives, etc.)         Patient staffed in clinic with Dr. Pickens who will sign the note.  Supervisor is Dr. Bland.

## 2023-07-28 NOTE — PATIENT INSTRUCTIONS
**For crisis resources, please see the information at the end of this document**   Patient Education    Thank you for coming to the [facility] [place] [facility].     Lab Testing:  If you had lab testing today and your results are reassuring or normal they will be mailed to you or sent through [employer] within 7 days. If the lab tests need quick action we will call you with the results. The phone number we will call with results is # [phone]. If this is not the best number please call our clinic and change the number.     Medication Refills:  If you need any refills please call your pharmacy and they will contact us. Our fax number for refills is [phone].   Three business days of notice are needed for general medication refill requests.   Five business days of notice are needed for controlled substance refill requests.   If you need to change to a different pharmacy, please contact the new pharmacy directly. The new pharmacy will help you get your medications transferred.     Contact Us:  Please call [phone] during business hours (8-5:00 M-F).   If you have medication related questions after clinic hours, or on the weekend, please call [phone].     Financial Assistance [phone]   Medical Records [phone]       MENTAL HEALTH CRISIS RESOURCES:  For a emergency help, please call 911 or go to the nearest Emergency Department.     Emergency Walk-In Options:   [facility] Unit @ [facility] ([place]): [phone] - Specialized mental health emergency area designed to be calming  [facility] ([place]): [phone]  [facility] ([place]): [phone]  [facility]): [phone]    [facility] Crisis Information:   [place]: [phone]  [place]: [phone]  [place] ([place]) - Adult: [phone]     Child: [phone]  [place] - Adult: [phone]     Child: [phone]  [place]:  [phone]  List of all [facility] resources:   https://[place].gov/dhs/people-we-serve/adults/health-care/mental-health/resources/crisis-contacts.jsp    National Crisis Information:   Crisis Text Line: Text  [place]  to 880721  Suicide & Crisis Lifeline: 988  National Suicide Prevention Lifeline: 5-352-909-TALK (1-470.714.6432)       For online chat options, visit https://suicidepreventionlifeline.org/chat/  Poison Control Center: 2-776-927-5815  Trans Lifeline: 6-177-646-2524 - Hotline for transgender people of all ages  The [employer]: 7-562-774-9194 - Hotline for LGBT youth     For Non-Emergency Support:   Fast Tracker: Mental Health & Substance Use Disorder Resources -   https://www.[employer].org/

## 2023-07-28 NOTE — PROGRESS NOTES
Virtual Visit Details    Type of service:  Video Visit     Originating Location (pt. Location): Other Patient's work  Distant Location (provider location):  On-site  Platform used for Video Visit: William

## 2023-09-07 ASSESSMENT — ANXIETY QUESTIONNAIRES
7. FEELING AFRAID AS IF SOMETHING AWFUL MIGHT HAPPEN: MORE THAN HALF THE DAYS
2. NOT BEING ABLE TO STOP OR CONTROL WORRYING: NEARLY EVERY DAY
5. BEING SO RESTLESS THAT IT IS HARD TO SIT STILL: SEVERAL DAYS
GAD7 TOTAL SCORE: 17
4. TROUBLE RELAXING: MORE THAN HALF THE DAYS
GAD7 TOTAL SCORE: 17
6. BECOMING EASILY ANNOYED OR IRRITABLE: NEARLY EVERY DAY
1. FEELING NERVOUS, ANXIOUS, OR ON EDGE: NEARLY EVERY DAY
3. WORRYING TOO MUCH ABOUT DIFFERENT THINGS: NEARLY EVERY DAY

## 2023-09-08 ENCOUNTER — VIRTUAL VISIT (OUTPATIENT)
Dept: PSYCHIATRY | Facility: CLINIC | Age: 43
End: 2023-09-08
Attending: PSYCHIATRY & NEUROLOGY
Payer: COMMERCIAL

## 2023-09-08 DIAGNOSIS — F41.1 GAD (GENERALIZED ANXIETY DISORDER): ICD-10-CM

## 2023-09-08 DIAGNOSIS — F31.81 BIPOLAR 2 DISORDER (H): ICD-10-CM

## 2023-09-08 PROCEDURE — 99214 OFFICE O/P EST MOD 30 MIN: CPT | Mod: VID

## 2023-09-08 RX ORDER — PROPRANOLOL HYDROCHLORIDE 10 MG/1
10 TABLET ORAL 2 TIMES DAILY PRN
Qty: 60 TABLET | Refills: 1 | Status: SHIPPED | OUTPATIENT
Start: 2023-09-08 | End: 2023-12-01

## 2023-09-08 RX ORDER — PROPRANOLOL HCL 60 MG
60 CAPSULE, EXTENDED RELEASE 24HR ORAL AT BEDTIME
Qty: 30 CAPSULE | Refills: 1 | Status: SHIPPED | OUTPATIENT
Start: 2023-09-08 | End: 2023-09-18

## 2023-09-08 RX ORDER — LAMOTRIGINE 25 MG/1
TABLET ORAL
Qty: 30 TABLET | Refills: 1 | Status: SHIPPED | OUTPATIENT
Start: 2023-09-08 | End: 2023-12-01

## 2023-09-08 RX ORDER — GABAPENTIN 100 MG/1
100 CAPSULE ORAL 2 TIMES DAILY
Qty: 60 CAPSULE | Refills: 1 | Status: SHIPPED | OUTPATIENT
Start: 2023-09-08 | End: 2023-12-01

## 2023-09-08 RX ORDER — LAMOTRIGINE 100 MG/1
100 TABLET ORAL DAILY
Qty: 30 TABLET | Refills: 1 | Status: SHIPPED | OUTPATIENT
Start: 2023-09-08 | End: 2023-12-01

## 2023-09-08 RX ORDER — DIVALPROEX SODIUM 250 MG/1
250 TABLET, EXTENDED RELEASE ORAL DAILY
Qty: 30 TABLET | Refills: 1 | Status: SHIPPED | OUTPATIENT
Start: 2023-09-08 | End: 2023-12-01

## 2023-09-08 ASSESSMENT — PAIN SCALES - GENERAL: PAINLEVEL: NO PAIN (0)

## 2023-09-08 NOTE — PROGRESS NOTES
[facility]  MEDICAL PROGRESS NOTE     CARE TEAM:    PCP- Fahad Concepcion  Therapist- Evangelina ([place] [facility], primarily for DBT q week), and most recently Carl (at [facility])       Artie is a 42 year old who uses the pronouns she, her.      Diagnoses     Bipolar II Disorder, rapid cycling, currently depressed, moderate  EAMON     Assessment     Artie is a 41 yo patient with past psychiatric diagnosis of bipolar 2 disorder, anxiety disorder who is here today for a follow-up visit.   Overall appears that Artie's current depressive episode has remain unchanged and she attributes this largely to identified stressors that include complex family dynamics, recent worsening cognitive functioning likely due to major neurocognitive impairment, per her report, despite this, it appears that anxiety has remained at baseline. Reports socializing and continuing working with therapist helps.   We discussed recommendations previously made regarding med changes to optimize current meds (lamictal), recent Depakote subtherapeutic level vs rec to consider lithium as monotherapy and she expressed decision of continuing current medication regimen and that she is not interested in making medication changes at the moment.   She expressed frustration with medication change recommendations as she feels like current medication regimen is providing benefit, tolerating well without side effects and also has historically struggled with trusting psychiatrists as she shares she has not had great experiences in the past and has felt like she has been just an object of experimentation. Reasonably asks to build trust and rapport before considering recommendations and is open to continue this conversation in the near future.     Future Considerations:  -Consider switching from depakote and lamotrigine to lithium monotherapy, vs optimizing lamotrigine    -Continue  encouraging decrease cannabis products use  -Recommend exploring other contraceptive options with Rx and also use a secondary method for pregnancy prevention.    Psychotropic Drug Interactions:  [PSYCHCLINICDDI]  -ADDITIVE SEDATION: Melatonin, Ativan, gabapentin  -ADDITIVE CNS/RESPIRATORY DEPRESSION: Ativan, gabapentin, Lamictal, Depakote  -VIA (likely) valproate-mediated reduction in glucuronide metabolism of lorazepam, Depakote may increase the serum concentration of Ativan  -DEPAKOTE & LAMICTAL- Valproate Products may enhance the adverse/toxic effect of LamoTRIgine. Valproate Products may increase the serum concentration of LamoTRIgine. Severity Major      -LAMICTAL & ORTHO TRI-CYCLEN LO- Estrogen Derivatives (Contraceptive) may decrease the serum concentration of LamoTRIgine, the most likely mechanism for this interaction is enhanced lamotrigine glucuronidation (possibly via UGT1A4)   -Via CY: Cannabis may increase serum concentration of propranolol, melatonin   -DEPAKOTE/ORTHO TRI-CYCLEN LO: Estrogen Derivatives may decrease the serum concentration of Valproate Products   -Ethinyl Estradiol-Containing Products may decrease the serum concentration of Benzodiazepines (metabolized by glucuronidation)   -Ethinyl Estradiol-Containing Products may increase the serum concentration of Propranolol.   Management: Monitoring for adverse effects, routine labs, using lowest therapeutic dose of [Depakote & Lamictal, gabapentin] and patient is aware of risks     [place] was not checked today: will be checked next visit    Risk Statements:   Treatment Risk- Risks, benefits, alternatives and potential adverse effects have been discussed and are understood.   Safety Risk-Artie did not appear to be an imminent safety risk to self or others.     Plan     1) Meds-  - Continue lamotrigine 125 mg daily.   - Depakote ER - 250 mg daily  - gabapentin- 100 mg every day at noon and 100 mg at bedtime.  - propranolol IR 10 mg bid /prn-  "for anxiety  - propranolol ER 60 mg qhs  - melatonin 1-1.5 mg 2 hours      PCP-   - Ativan 0.25-0.5 mg at bedtime/prn (1x-2x per week)  Spironolactone- 50 mg bid- for hair loss  L-Methylfolate     2) Psychotherapy-   Individual therapy oongoing, works on DBT skills.   Working with racial trauma consultant on identity development.     3) Next due-  Labs- lamotrigine and Depakote level obtained on 5/22/23. Determine next due.   Rating scales- EAMON, PHQ9     4) Referrals-  None     5) Dispo- RTC in 8 weeks, approximately         Pertinent Background                                                   [most recent eval 09/08/23]     Artie arnett was diagnosed with depression at age 14. Started various medications without working diagnosis in late 20's, had been resistant to medication in past, with multiple somatic symptoms, multiple diagnoses in past. Current diagnosis made about 4 years ago by mood disorder specialist in [place], done as second opinion as struggling with approach of first psychiatrist.   Has been on the current combination of medications since saw specialist, and per specialist, if have this type of condition, cannot be on antidepressants. She is prone to side effects with medications and sometimes, its hard for her to manage her medications due to the side effects, so her doctor was keeping her meds at a low dosage. Sleep is on and off. She was actively involved in CBT and DBT while in [place]     Psych pertinent item history includes suicide attempt [xseveral overdoses as a teenager], SIB [mainly in her teens/20's] and mutiple psychotropic trials       Subjective     Since our last visit:  -Identifies stressors: Aging parents, they will turn 81-82 soon and dad has early signs of dementia which is stressful.   -Mom has mobility issues.  -They live in a senior community now, talking about prison.   -Sister not as supportive or involved in discussions.   -Mood \"lower\" sadness, crying more, attributes this to " "mourning parents aging changes/cognition changes.   -Shares anxiety about baseline, socializing helps.   -Denies side effects of current medication regimen  -\"medications are fine\"   -Denies safety concerns: Denies SI, HI  -Denies perceptual disturbances  -Denies paranoid thoughts or other delusional thinking     Recent Psych Symptoms:   Depression:  depressed mood, low energy, indecisiveness, feeling hopeless, and mood dysregulation  Elevated:  none  Psychosis:  none  Anxiety:  excessive worry, feeling fearful, and nervous/overwhelmed  Trauma Related:  none  Insomnia:  Denies   Other:  No    Current Social History:  Financial/occupational: working , [employer] .   Living situation (partner, children, pets, etc): Has own place, lives alone.   Social/spiritual support: Parents, mostly her mother   Feels safe at home: Yes    Pertinent Substance Use:   -Cannabis-THC drinks 3-5 mg/once a week on average.    Opioids: No   Narcan Kit current: N/A  Other substances: Denies any other substance use.    Medical Review of Systems:   Lightheadedness/orthostasis: None  Headaches: None  GI: none  Sexual health concerns: None    A comprehensive review of systems was performed and is negative other than noted above.     Mental Status Exam     Alertness: alert  and oriented  Appearance: well groomed  Behavior/Demeanor: cooperative and pleasant, with good  eye contact   Speech: normal and regular rate and rhythm  Language: intact  Psychomotor: normal or unremarkable  Mood: \"lower\"  Affect: full range for topics discussed, somewhat anxious; congruent to: mood- yes, content- yes  Thought Process/Associations: unremarkable  Thought Content:  Reports preoccupations;  Denies suicidal ideation, violent ideation, delusions , obsessions , phobia , magical thinking, over-valued ideas, and paranoid ideation  Perception:  Reports none;  Denies hallucinations, depersonalization, and derealization  Insight: good  Judgment: " good  Cognition: does  appear grossly intact; formal cognitive testing was not done  Gait and Station: N/A (telehealth)     Past Psych Med Trials          Medication Max Dose (mg) Dates / Duration Helpful? DC Reason / Adverse Effects?   Paxil     further bad side effects on higher doses, night sweats    Wellbutrin        Lithium        Depakote        Adderall        Strattera       Clonazepam       Buspar    Akathisia   Pristq         Of note, per chart review, Tends not to tolerate most medications, c/o GI symptoms and HA .        Treatment Course and Hahn Events since  JULY 2023 7/28/2023: Transfer of care DA.        Vitals   There were no vitals taken for this visit.  Pulse Readings from Last 3 Encounters:   03/29/23 72   10/10/22 86   06/29/22 91     Wt Readings from Last 3 Encounters:   03/29/23 55.7 kg (122 lb 11.2 oz)   10/10/22 53.5 kg (118 lb)   07/21/22 53.1 kg (117 lb)     BP Readings from Last 3 Encounters:   03/29/23 112/70   10/10/22 101/67   06/29/22 102/70        Medical History     ALLERGIES: Amphotericin b, Phenylbutazones, and Penicillin g    Patient Active Problem List   Diagnosis    Heavy menses    CARDIOVASCULAR SCREENING; LDL GOAL LESS THAN 160    Major depressive disorder, recurrent episode, moderate (H)    Allergic rhinitis    Anxiety state    Attention deficit disorder    Bipolar I disorder (H)    Dysmenorrhea    Glaucoma suspect    Myopia        Medications     Current Outpatient Medications   Medication Sig Dispense Refill    divalproex sodium extended-release (DEPAKOTE ER) 250 MG 24 hr tablet Take 1 tablet (250 mg) by mouth daily 30 tablet 1    gabapentin (NEURONTIN) 100 MG capsule Take 1 capsule (100 mg) by mouth 2 times daily 60 capsule 1    lamoTRIgine (LAMICTAL) 100 MG tablet Take 1 tablet (100 mg) by mouth daily Take along with one 25 mg tablet for a combined total of 125 mg. 30 tablet 1    lamoTRIgine (LAMICTAL) 25 MG tablet Take 1 tablet (25 mg) by mouth daily. Take along with  one 100 mg tablet for a combined total of 125 mg. 30 tablet 1    propranolol (INDERAL) 10 MG tablet Take 1 tablet (10 mg) by mouth 2 times daily as needed (Anxiety) 60 tablet 1    LORazepam (ATIVAN) 0.5 MG tablet Take 0.5 mg by mouth every 6 hours as needed for anxiety      norgestim-eth estrad triphasic (ORTHO TRI-CYCLEN LO) 0.18/0.215/0.25 MG-25 MCG tablet Take 1 tablet by mouth daily 84 tablet 4    propranolol ER (INDERAL LA) 60 MG 24 hr capsule Take 1 capsule (60 mg) by mouth At Bedtime 90 capsule 0    spironolactone (ALDACTONE) 50 MG tablet Take 1 tablet (50 mg) by mouth 2 times daily 180 tablet 1        Labs and Data         12/9/2022     1:41 AM 3/27/2023    11:07 AM 7/27/2023     5:09 PM   PROMIS-10 Total Score w/o Sub Scores   PROMIS TOTAL - SUBSCORES 22 18 18          No data to display                  4/24/2023     5:34 PM 7/27/2023     5:07 PM 9/7/2023     1:06 PM   PHQ-9 SCORE   PHQ-9 Total Score MyChart 24 (Severe depression) 22 (Severe depression) 22 (Severe depression)   PHQ-9 Total Score 24 22 22         4/24/2023     5:35 PM 7/27/2023     5:08 PM 9/7/2023     1:12 PM   EAMON-7 SCORE   Total Score 17 (severe anxiety) 17 (severe anxiety) 17 (severe anxiety)   Total Score 17 17 17       Liver/Kidney Function, TSH Metabolic Blood counts   Recent Labs   Lab Test 04/21/23  1757 06/17/22  1617 11/24/21  1543   AST  --  10 11   ALT  --  11 15   ALKPHOS  --  56 49   CR 0.85  --  0.79     Recent Labs   Lab Test 06/17/22  1617   TSH 1.09    Recent Labs   Lab Test 10/30/20  1200   CHOL 167   TRIG 148   LDL 81   HDL 56     No lab results found.  Recent Labs   Lab Test 04/21/23  1757   GLC 92    Recent Labs   Lab Test 06/17/22  1617   WBC 9.0   HGB 13.0   HCT 38.9   MCV 85            Recent Labs   Lab Test 06/17/22  1617 11/24/21  1543   VANDANA 37 38           Recent Labs   Lab Test 06/17/22  1617 11/24/21  1543   AST 10 11   ALT 11 15   ALKPHOS 56 49      Component     5/22/2023      Valproic acid   ug/mL  25.9 Low  25.0 Low  CM   Comment: Therapeutic Range:  ug/mL  Epilepsy and paula patients:  ug/mL  Some patients require and can tolerate values up to 150 ug/mL    Critical: Greater than 150 ug/mL   Resulting Agency   [facility]                Specimen Collected: 05/22/23 10:50 AM               LAMOTRIGINE LEVEL 5/22/23:                     Component Ref Range & Units 2 mo ago  (5/22/23) 4 mo ago  (3/23/23) 1 yr ago  (6/17/22) 1 yr ago  (12/15/21)    Lamotrigine 3.0 - 15.0 ug/mL 6.1 7.8 CM 4.4 CM 3.7 R, CM   Comment: INTERPRETIVE INFORMATION:  Lamotrigine         FUTURE LABS: Autoimmune workup ordered by Bienvenido Deleon MD.       PROVIDER: Ran Gregorio MD    Level of Medical Decision Making:   - At least 1 chronic problem that is not stable  - Engaged in prescription drug management during visit (discussed any medication benefits, side effects, alternatives, etc.)        Psychiatry Individual Psychotherapy Note   None    Patient staffed in clinic with Dr. Pickens who will sign the note.  Supervisor is Dr. Bland.

## 2023-09-08 NOTE — PROGRESS NOTES
Virtual Visit Details    Type of service:  Video Visit     Originating Location (pt. Location): Other Patient's work place  Distant Location (provider location):  On-site  Platform used for Video Visit: William

## 2023-09-08 NOTE — PATIENT INSTRUCTIONS
**For crisis resources, please see the information at the end of this document**   Patient Education    Thank you for coming to the [facility] [place] [facility].     Lab Testing:  If you had lab testing today and your results are reassuring or normal they will be mailed to you or sent through [employer] within 7 days. If the lab tests need quick action we will call you with the results. The phone number we will call with results is # [phone]. If this is not the best number please call our clinic and change the number.     Medication Refills:  If you need any refills please call your pharmacy and they will contact us. Our fax number for refills is [phone].   Three business days of notice are needed for general medication refill requests.   Five business days of notice are needed for controlled substance refill requests.   If you need to change to a different pharmacy, please contact the new pharmacy directly. The new pharmacy will help you get your medications transferred.     Contact Us:  Please call [phone] during business hours (8-5:00 M-F).   If you have medication related questions after clinic hours, or on the weekend, please call [phone].     Financial Assistance [phone]   Medical Records [phone]       MENTAL HEALTH CRISIS RESOURCES:  For a emergency help, please call 911 or go to the nearest Emergency Department.     Emergency Walk-In Options:   Em[facility] Unit @ [facility] ([place]): [phone] - Specialized mental health emergency area designed to be calming  [facility] ([place]): [phone]  [facility] ([place]): [phone]  [facility]): [phone]    [facility] Crisis Information:   [place]: [phone]  [place]: [phone]  [place] ([place]) - Adult: [phone]     Child: [phone]  [place] - Adult: [phone]     Child: [phone]  [place]:  [phone]  List of all [facility] resources:   https://[place].gov/dhs/people-we-serve/adults/health-care/mental-health/resources/crisis-contacts.jsp    National Crisis Information:   Crisis Text Line: Text  [place]  to 235563  Suicide & Crisis Lifeline: 988  National Suicide Prevention Lifeline: 1-179-889-TALK (1-842.516.8584)       For online chat options, visit https://suicidepreventionlifeline.org/chat/  Poison Control Center: 3-275-937-7100  Trans Lifeline: 3-092-737-5950 - Hotline for transgender people of all ages  The [employer]: 1-030-688-9549 - Hotline for LGBT youth     For Non-Emergency Support:   Fast Tracker: Mental Health & Substance Use Disorder Resources -   https://www.[place].org/

## 2023-09-13 DIAGNOSIS — L65.9 HAIR LOSS: ICD-10-CM

## 2023-09-15 RX ORDER — SPIRONOLACTONE 50 MG/1
50 TABLET, FILM COATED ORAL 2 TIMES DAILY
Qty: 180 TABLET | Refills: 1 | Status: SHIPPED | OUTPATIENT
Start: 2023-09-15 | End: 2024-03-15

## 2023-09-18 DIAGNOSIS — F41.1 GAD (GENERALIZED ANXIETY DISORDER): ICD-10-CM

## 2023-09-18 RX ORDER — PROPRANOLOL HCL 60 MG
60 CAPSULE, EXTENDED RELEASE 24HR ORAL AT BEDTIME
Qty: 90 CAPSULE | Refills: 0 | Status: SHIPPED | OUTPATIENT
Start: 2023-09-18 | End: 2023-12-01

## 2023-09-18 NOTE — TELEPHONE ENCOUNTER
Dr. Gregorio,    Pharmacy is requesting 90 days supply for the 60 mg Propranolol . Approve for 90 days supply or deny ?    Leanne Metz on 9/18/2023 at 7:10 AM

## 2023-09-22 ENCOUNTER — E-VISIT (OUTPATIENT)
Dept: INTERNAL MEDICINE | Facility: CLINIC | Age: 43
End: 2023-09-22
Payer: COMMERCIAL

## 2023-09-22 DIAGNOSIS — R19.7 DIARRHEA OF PRESUMED INFECTIOUS ORIGIN: Primary | ICD-10-CM

## 2023-09-22 PROCEDURE — 99421 OL DIG E/M SVC 5-10 MIN: CPT | Performed by: INTERNAL MEDICINE

## 2023-09-22 NOTE — PATIENT INSTRUCTIONS
Karel Alva,    I placed orders for stool tests to see if we can determine a source of infection.  Please schedule an appointment with the lab right here in [facility], or call [phone].  I will let you know when the results are back and next steps to take.    Try to keep well hydrated in the meantime and let us know if you are developing any signs of dehydration such as lightheadedness, very dark urine, or decreased urination.    Dr. Concepcion

## 2023-09-27 ENCOUNTER — LAB (OUTPATIENT)
Dept: LAB | Facility: CLINIC | Age: 43
End: 2023-09-27
Payer: COMMERCIAL

## 2023-09-27 DIAGNOSIS — R19.7 DIARRHEA OF PRESUMED INFECTIOUS ORIGIN: ICD-10-CM

## 2023-10-06 LAB
ADV 40+41 DNA STL QL NAA+NON-PROBE: NEGATIVE
ASTRO TYP 1-8 RNA STL QL NAA+NON-PROBE: NEGATIVE
C CAYETANENSIS DNA STL QL NAA+NON-PROBE: NEGATIVE
CAMPYLOBACTER DNA SPEC NAA+PROBE: NEGATIVE
CRYPTOSP DNA STL QL NAA+NON-PROBE: NEGATIVE
E COLI O157 DNA STL QL NAA+NON-PROBE: NORMAL
E HISTOLYT DNA STL QL NAA+NON-PROBE: NEGATIVE
EAEC ASTA GENE ISLT QL NAA+PROBE: NEGATIVE
EC STX1+STX2 GENES STL QL NAA+NON-PROBE: NEGATIVE
EPEC EAE GENE STL QL NAA+NON-PROBE: NEGATIVE
ETEC LTA+ST1A+ST1B TOX ST NAA+NON-PROBE: NEGATIVE
G LAMBLIA DNA STL QL NAA+NON-PROBE: NEGATIVE
NOROVIRUS GI+II RNA STL QL NAA+NON-PROBE: NEGATIVE
P SHIGELLOIDES DNA STL QL NAA+NON-PROBE: NEGATIVE
RVA RNA STL QL NAA+NON-PROBE: NEGATIVE
SALMONELLA SP RPOD STL QL NAA+PROBE: NEGATIVE
SAPO I+II+IV+V RNA STL QL NAA+NON-PROBE: NEGATIVE
SHIGELLA SP+EIEC IPAH ST NAA+NON-PROBE: NEGATIVE
V CHOLERAE DNA SPEC QL NAA+PROBE: NEGATIVE
VIBRIO DNA SPEC NAA+PROBE: NEGATIVE
Y ENTEROCOL DNA STL QL NAA+PROBE: NEGATIVE

## 2023-10-06 PROCEDURE — 99000 SPECIMEN HANDLING OFFICE-LAB: CPT | Performed by: PATHOLOGY

## 2023-10-06 PROCEDURE — 87507 IADNA-DNA/RNA PROBE TQ 12-25: CPT | Performed by: INTERNAL MEDICINE

## 2023-11-21 ENCOUNTER — ALLIED HEALTH/NURSE VISIT (OUTPATIENT)
Dept: INTERNAL MEDICINE | Facility: CLINIC | Age: 43
End: 2023-11-21
Payer: COMMERCIAL

## 2023-11-21 DIAGNOSIS — H61.23 BILATERAL IMPACTED CERUMEN: Primary | ICD-10-CM

## 2023-11-21 PROCEDURE — 69209 REMOVE IMPACTED EAR WAX UNI: CPT | Mod: 50

## 2023-11-21 ASSESSMENT — ANXIETY QUESTIONNAIRES
6. BECOMING EASILY ANNOYED OR IRRITABLE: NEARLY EVERY DAY
2. NOT BEING ABLE TO STOP OR CONTROL WORRYING: MORE THAN HALF THE DAYS
1. FEELING NERVOUS, ANXIOUS, OR ON EDGE: NEARLY EVERY DAY
3. WORRYING TOO MUCH ABOUT DIFFERENT THINGS: NEARLY EVERY DAY
5. BEING SO RESTLESS THAT IT IS HARD TO SIT STILL: SEVERAL DAYS
7. FEELING AFRAID AS IF SOMETHING AWFUL MIGHT HAPPEN: MORE THAN HALF THE DAYS
GAD7 TOTAL SCORE: 16
GAD7 TOTAL SCORE: 16

## 2023-11-21 ASSESSMENT — PATIENT HEALTH QUESTIONNAIRE - PHQ9
SUM OF ALL RESPONSES TO PHQ QUESTIONS 1-9: 22
5. POOR APPETITE OR OVEREATING: MORE THAN HALF THE DAYS

## 2023-11-21 NOTE — PROGRESS NOTES
Artie Diez presents in the primary clinic today at the request of Dr. Kinsey for an ear wash. The patient's ears were assessed for cerumen. After this, an ear wash was performed in which a moderate amount of impacted cerumen was removed from both ears. After the ear wash, the tympanic membrane was visible. The ear wash was provided today under the supervision of Dr. Kinsey who was present if help was needed.    Anh Coreas, EMT at 2:15 PM on 11/21/2023.  Primary Care Clinic: [phone]

## 2023-12-01 ENCOUNTER — VIRTUAL VISIT (OUTPATIENT)
Dept: PSYCHIATRY | Facility: CLINIC | Age: 43
End: 2023-12-01
Attending: PSYCHIATRY & NEUROLOGY
Payer: COMMERCIAL

## 2023-12-01 VITALS — BODY MASS INDEX: 21.62 KG/M2 | WEIGHT: 122 LBS | HEIGHT: 63 IN

## 2023-12-01 DIAGNOSIS — F31.81 BIPOLAR 2 DISORDER (H): ICD-10-CM

## 2023-12-01 DIAGNOSIS — F41.1 GAD (GENERALIZED ANXIETY DISORDER): ICD-10-CM

## 2023-12-01 PROCEDURE — 99214 OFFICE O/P EST MOD 30 MIN: CPT | Mod: VID

## 2023-12-01 PROCEDURE — 90833 PSYTX W PT W E/M 30 MIN: CPT | Mod: VID

## 2023-12-01 RX ORDER — PROPRANOLOL HCL 60 MG
60 CAPSULE, EXTENDED RELEASE 24HR ORAL AT BEDTIME
Qty: 30 CAPSULE | Refills: 3 | Status: SHIPPED | OUTPATIENT
Start: 2023-12-01 | End: 2024-03-22

## 2023-12-01 RX ORDER — DIVALPROEX SODIUM 250 MG/1
250 TABLET, EXTENDED RELEASE ORAL DAILY
Qty: 30 TABLET | Refills: 3 | Status: SHIPPED | OUTPATIENT
Start: 2023-12-01 | End: 2024-03-22

## 2023-12-01 RX ORDER — LAMOTRIGINE 25 MG/1
TABLET ORAL
Qty: 30 TABLET | Refills: 3 | Status: SHIPPED | OUTPATIENT
Start: 2023-12-01 | End: 2024-03-22

## 2023-12-01 RX ORDER — LAMOTRIGINE 100 MG/1
100 TABLET ORAL DAILY
Qty: 30 TABLET | Refills: 3 | Status: SHIPPED | OUTPATIENT
Start: 2023-12-01 | End: 2024-03-22

## 2023-12-01 RX ORDER — PROPRANOLOL HYDROCHLORIDE 10 MG/1
10 TABLET ORAL 2 TIMES DAILY PRN
Qty: 60 TABLET | Refills: 3 | Status: SHIPPED | OUTPATIENT
Start: 2023-12-01 | End: 2024-03-22

## 2023-12-01 RX ORDER — GABAPENTIN 100 MG/1
100 CAPSULE ORAL 3 TIMES DAILY
Qty: 90 CAPSULE | Refills: 3 | Status: SHIPPED | OUTPATIENT
Start: 2023-12-01 | End: 2024-03-22

## 2023-12-01 ASSESSMENT — PAIN SCALES - GENERAL: PAINLEVEL: NO PAIN (0)

## 2023-12-01 ASSESSMENT — PATIENT HEALTH QUESTIONNAIRE - PHQ9
10. IF YOU CHECKED OFF ANY PROBLEMS, HOW DIFFICULT HAVE THESE PROBLEMS MADE IT FOR YOU TO DO YOUR WORK, TAKE CARE OF THINGS AT HOME, OR GET ALONG WITH OTHER PEOPLE: SOMEWHAT DIFFICULT
SUM OF ALL RESPONSES TO PHQ QUESTIONS 1-9: 17
SUM OF ALL RESPONSES TO PHQ QUESTIONS 1-9: 17

## 2023-12-01 ASSESSMENT — ANXIETY QUESTIONNAIRES
7. FEELING AFRAID AS IF SOMETHING AWFUL MIGHT HAPPEN: SEVERAL DAYS
GAD7 TOTAL SCORE: 17
GAD7 TOTAL SCORE: 17
1. FEELING NERVOUS, ANXIOUS, OR ON EDGE: NEARLY EVERY DAY
6. BECOMING EASILY ANNOYED OR IRRITABLE: NEARLY EVERY DAY
3. WORRYING TOO MUCH ABOUT DIFFERENT THINGS: NEARLY EVERY DAY
2. NOT BEING ABLE TO STOP OR CONTROL WORRYING: NEARLY EVERY DAY
4. TROUBLE RELAXING: NEARLY EVERY DAY
IF YOU CHECKED OFF ANY PROBLEMS ON THIS QUESTIONNAIRE, HOW DIFFICULT HAVE THESE PROBLEMS MADE IT FOR YOU TO DO YOUR WORK, TAKE CARE OF THINGS AT HOME, OR GET ALONG WITH OTHER PEOPLE: VERY DIFFICULT
5. BEING SO RESTLESS THAT IT IS HARD TO SIT STILL: SEVERAL DAYS

## 2023-12-01 NOTE — PROGRESS NOTES
Virtual Visit Details    Type of service:  Video Visit   Video Start Time:  1430  Video End Time: 1530    Originating Location (pt. Location): Other Workplace, private room ensured   Distant Location (provider location):  On-site  Platform used for Video Visit: William

## 2023-12-01 NOTE — PROGRESS NOTES
[facility]  MEDICAL PROGRESS NOTE     CARE TEAM:    PCP- Fahad Concepcion  Therapist- Evangelina ([place] [facility], primarily for DBT q week), and most recently Carl (at [facility])       Artie is a 43 year old who uses the pronouns she, her.      Diagnoses     Bipolar II Disorder, rapid cycling, currently depressed, moderate  EAMON     Assessment     Artie is a 44 yo patient with past psychiatric diagnosis of bipolar 2 disorder, anxiety disorder who is here today for a follow-up visit.     Overall appears that Artie's recent depressive symptoms have been slightly worsened over past 1-2 weeks and has started to improve in the past week. She attributes depressive symptoms to psychosocial factors leading to worsened mood at the beginning of Nov when turning 43 and not liking this. She also attributes largely to identified stressors that include complex family dynamics, aging parents, dad's recent worsening cognitive functioning likely due to major neurocognitive impairment, per her report, despite this, mood is currently improved, and it appears that previously reported anxiety symptoms have been slightly worsened due to documented factors.     We discussed recommendations previously made regarding med changes to optimize current meds (Lamictal), recent Depakote subtherapeutic level vs rec to consider lithium as monotherapy and she expressed decision of continuing current medication regimen. She had been prescribed Ativan by her PCP (reports none over past 1 year)  and requests that we take over this prescription due to PCP likely switching jobs. She takes on average  0.25-0.5 mg at bedtime/prn on Sundays only, anticipating work week inducing anxiety and difficulty initiating sleep. Per  no prescriptions issued over past 1 year.    Medication indications, risks vs benefits, side effects, and alternatives were discussed. We extensively  discussed benzodiazepine chronic use and this not being recommended in longitudinal management of anxiety/sleep disturbances ad that even with sporadic use is likely contributing to rebound anxiety/and may negatively impact deeper stages of sleep leading to overall feeling more tired, therefore we advised against benzodiazepine use and  discussed numerous other options to target sleep/anxiety, including optimizing Depakote, lamotrigine or gabapentin. We also advised against cannabis/THC-based drinks, and resources/support were offered. She declined the above options and prefers not to make any changes today, and is open to continue conversation in the future. Denies withdrawal-type side effects or any other side effects from current medications.  No other questions or concerns.       Future Considerations:  -Consider switching from Depakote and lamotrigine to lithium monotherapy, vs optimizing lamotrigine or Depakote  -Continue encouraging decrease cannabis products use  -Recommend exploring other contraceptive options with Rx and also use a secondary method for pregnancy prevention.  -Continue rec against cannabis use, benzodiazepine use.     Psychotropic Drug Interactions:  [PSYCHCLINICDDI]  -ADDITIVE SEDATION: Melatonin, Ativan, gabapentin  -ADDITIVE CNS/RESPIRATORY DEPRESSION: Ativan, gabapentin, Lamictal, Depakote  -VIA (likely) valproate-mediated reduction in glucuronide metabolism of lorazepam, Depakote may increase the serum concentration of Ativan  -DEPAKOTE & LAMICTAL- Valproate Products may enhance the adverse/toxic effect of LamoTRIgine. Valproate Products may increase the serum concentration of LamoTRIgine. Severity Major      -LAMICTAL & ORTHO TRI-CYCLEN LO- Estrogen Derivatives (Contraceptive) may decrease the serum concentration of LamoTRIgine, the most likely mechanism for this interaction is enhanced lamotrigine glucuronidation (possibly via UGT1A4)   -Via CY: Cannabis may increase serum  concentration of propranolol, melatonin   -DEPAKOTE/ORTHO TRI-CYCLEN LO: Estrogen Derivatives may decrease the serum concentration of Valproate Products   -Ethinyl Estradiol-Containing Products may decrease the serum concentration of Benzodiazepines (metabolized by glucuronidation)   -Ethinyl Estradiol-Containing Products may increase the serum concentration of Propranolol.   Management: Monitoring for adverse effects, routine labs, using lowest therapeutic dose of [Depakote & Lamictal, gabapentin] and patient is aware of risks     [place]: will be checked next visit    Risk Statements:   Treatment Risk- Risks, benefits, alternatives and potential adverse effects have been discussed and are understood.   Safety Risk-Artie did not appear to be an imminent safety risk to self or others.     Plan     1) Meds-  - Continue lamotrigine 125 mg daily.   - Depakote ER - 250 mg at bedtime   - gabapentin- 100 mg every day at noon and 100 mg at bedtime.  - propranolol IR 10 mg bid /prn- for anxiety  - propranolol ER 60 mg qhs  - melatonin 1-1.5 mg 2 hours      PCP-   - Ativan 0.25-0.5 mg at bedtime/prn (1x-2x per week). We have not taken over prescribing this medication and advise against due to risks outweighing benefits.   Spironolactone- 50 mg bid- for hair loss  L-Methylfolate     2) Psychotherapy-   Individual therapy [facility], works on DBT skills.   Working with racial trauma consultant on identity development.     3) Next due-  Labs- lamotrigine and Depakote level obtained on 5/22/23. Determine next due.   Rating scales- EAMON, PHQ9     4) Referrals-  None     5) Dispo- RTC in 6 weeks, approximately. Per Dr. Pickens, consistent 60 min appointments are needed for follow-up appointments.       Pertinent Background                                                   [most recent eval 09/08/23]     Artie first was diagnosed with depression at age 14. Started various medications without working diagnosis in late  "20's, had been resistant to medication in past, with multiple somatic symptoms, multiple diagnoses in past. Current diagnosis made about 4 years ago by mood disorder specialist in [place], done as second opinion as struggling with approach of first psychiatrist.   Has been on the current combination of medications since saw specialist, and per specialist, if have this type of condition, cannot be on antidepressants. She is prone to side effects with medications and sometimes, its hard for her to manage her medications due to the side effects, so her doctor was keeping her meds at a low dosage. Sleep is on and off. She was actively involved in CBT and DBT while in [place]     Psych pertinent item history includes suicide attempt [xseveral overdoses as a teenager], SIB [mainly in her teens/20's] and mutiple psychotropic trials       Subjective     Since our last visit (9/8/23).    -Main issues is \"been really highly irritable\" over past month or so.   -Not quiet sure why.  -Taking a lot of it towards parents.   -Conflict as work, \"new supervisor which is good.\"  -Turned 43 recently, \"bothers me\"  -Takes care of parents more. Sees them usually 1/week. Talks with mom almost everyday.   -Live in a senior community in [place].   -Mood: had been \"Maybe unstable because of the irritability for about 1 month now, maybe last week lower because of turning 43.\" \"Better in the last week\"  -Anxiety: Felt anxiety increases through the day, usually worst in the evening after work \"bc not having enough distractions\"  -Likes cannabis drinks, had been drinking more \"\"too many\" meaning multiple drinks per week.   -Propranolol helps with anxiety  - Ativan 0.25-0.5 mg at bedtime/prn (1x-2x per week) On average 0.5 tab per week. Takes melatonin at night more, helps  -Appetite: Unchanged   -Sleep: Unchanged.  -BM/constipation: Unchanged  -Tremor/abnormal movements: Denies   -Side effects? Chest pain, palpitations, sleep " "disturbances,  -Perceptual disturbances: Denies  -Denies concerns for mood elevation/paula/hypomania  -Safety: Denies safety concerns     Recent Psych Symptoms:   Depression:  depressed mood, low energy, indecisiveness, feeling hopeless, and mood dysregulation  Elevated:  none  Psychosis:  none  Anxiety:  excessive worry, feeling fearful, and nervous/overwhelmed  Trauma Related:  none  Insomnia:  Denies   Other:  No    Current Social History:  Financial/occupational: working , N .   Living situation (partner, children, pets, etc): Has own place, lives alone.   Social/spiritual support: Parents, mostly her mother   Feels safe at home: Yes    Pertinent Substance Use:   -Cannabis-THC drinks 3-5 mg/once a week on average.    Opioids: No   Narcan Kit current: N/A  Other substances: Denies any other substance use.    Medical Review of Systems:   Lightheadedness/orthostasis: None  Headaches: None  GI: none  Sexual health concerns: None    A comprehensive review of systems was performed and is negative other than noted above.     Mental Status Exam     Alertness: alert  and oriented  Appearance: well groomed  Behavior/Demeanor: cooperative and pleasant, with good  eye contact   Speech: normal and regular rate and rhythm  Language: intact  Psychomotor: normal or unremarkable  Mood: \"\"Better in the last week\"\"  Affect: full range for topics discussed, somewhat anxious; congruent to: mood- yes, content- yes  Thought Process/Associations: unremarkable  Thought Content:  Reports preoccupations;  Denies suicidal ideation, violent ideation, delusions , obsessions , phobia , magical thinking, over-valued ideas, and paranoid ideation  Perception:  Reports none;  Denies hallucinations, depersonalization, and derealization  Insight: good  Judgment: good  Cognition: does  appear grossly intact; formal cognitive testing was not done  Gait and Station: N/A (telehealth)     Past Psych Med Trials          Medication Max " Dose (mg) Dates / Duration Helpful? DC Reason / Adverse Effects?   Paxil     further bad side effects on higher doses, night sweats    Wellbutrin        Lithium        Depakote        Adderall        Strattera       Clonazepam       Buspar    Akathisia   Pristq         Of note, per chart review, Tends not to tolerate most medications, c/o GI symptoms and HA .        Treatment Course and Hahn Events since  JULY 2023 7/28/2023: Transfer of care DA.        Vitals   There were no vitals taken for this visit.  Pulse Readings from Last 3 Encounters:   11/21/23 66   03/29/23 72   10/10/22 86     Wt Readings from Last 3 Encounters:   11/21/23 55.7 kg (122 lb 12.8 oz)   03/29/23 55.7 kg (122 lb 11.2 oz)   10/10/22 53.5 kg (118 lb)     BP Readings from Last 3 Encounters:   11/21/23 94/61   03/29/23 112/70   10/10/22 101/67        Medical History     ALLERGIES: Amphotericin b, Phenylbutazones, and Penicillin g    Patient Active Problem List   Diagnosis    Heavy menses    CARDIOVASCULAR SCREENING; LDL GOAL LESS THAN 160    Major depressive disorder, recurrent episode, moderate (H)    Allergic rhinitis    Anxiety state    Attention deficit disorder    Bipolar I disorder (H)    Dysmenorrhea    Glaucoma suspect    Myopia        Medications     Current Outpatient Medications   Medication Sig Dispense Refill    divalproex sodium extended-release (DEPAKOTE ER) 250 MG 24 hr tablet Take 1 tablet (250 mg) by mouth daily 30 tablet 3    gabapentin (NEURONTIN) 100 MG capsule Take 1 capsule (100 mg) by mouth 3 times daily Take 1 tab at noon, 1 cap early evening and 1 cap at bedtime 90 capsule 3    lamoTRIgine (LAMICTAL) 100 MG tablet Take 1 tablet (100 mg) by mouth daily Take along with one 25 mg tablet for a combined total of 125 mg. 30 tablet 3    lamoTRIgine (LAMICTAL) 25 MG tablet Take 1 tablet (25 mg) by mouth daily. Take along with one 100 mg tablet for a combined total of 125 mg. 30 tablet 3    LORazepam (ATIVAN) 0.5 MG tablet  Take 0.5 mg by mouth every 6 hours as needed for anxiety      norgestim-eth estrad triphasic (ORTHO TRI-CYCLEN LO) 0.18/0.215/0.25 MG-25 MCG tablet Take 1 tablet by mouth daily 84 tablet 4    propranolol (INDERAL) 10 MG tablet Take 1 tablet (10 mg) by mouth 2 times daily as needed (Anxiety) 60 tablet 3    propranolol ER (INDERAL LA) 60 MG 24 hr capsule Take 1 capsule (60 mg) by mouth at bedtime 30 capsule 3    spironolactone (ALDACTONE) 50 MG tablet Take 1 tablet (50 mg) by mouth 2 times daily 180 tablet 1        Labs and Data         12/9/2022     1:41 AM 3/27/2023    11:07 AM 7/27/2023     5:09 PM   PROMIS-10 Total Score w/o Sub Scores   PROMIS TOTAL - SUBSCORES 22 18 18          No data to display                  7/27/2023     5:07 PM 9/7/2023     1:06 PM 11/21/2023     2:12 PM   PHQ-9 SCORE   PHQ-9 Total Score MyChart 22 (Severe depression) 22 (Severe depression)    PHQ-9 Total Score 22 22 22         7/27/2023     5:08 PM 9/7/2023     1:12 PM 11/21/2023     2:12 PM   EAMON-7 SCORE   Total Score 17 (severe anxiety) 17 (severe anxiety)    Total Score 17 17 16       Liver/Kidney Function, TSH Metabolic Blood counts   Recent Labs   Lab Test 04/21/23  1757 06/17/22  1617 11/24/21  1543   AST  --  10 11   ALT  --  11 15   ALKPHOS  --  56 49   CR 0.85  --  0.79     Recent Labs   Lab Test 06/17/22  1617   TSH 1.09    Recent Labs   Lab Test 10/30/20  1200   CHOL 167   TRIG 148   LDL 81   HDL 56     No lab results found.  Recent Labs   Lab Test 04/21/23  1757   GLC 92    Recent Labs   Lab Test 06/17/22  1617   WBC 9.0   HGB 13.0   HCT 38.9   MCV 85            Recent Labs   Lab Test 06/17/22  1617 11/24/21  1543   VANDANA 37 38           Recent Labs   Lab Test 06/17/22  1617 11/24/21  1543   AST 10 11   ALT 11 15   ALKPHOS 56 49      Component     5/22/2023      Valproic acid   ug/mL 25.9 Low  25.0 Low  CM   Comment: Therapeutic Range:  ug/mL  Epilepsy and paula patients:  ug/mL  Some patients require and can  tolerate values up to 150 ug/mL    Critical: Greater than 150 ug/mL   Resulting Agency   [facility]                Specimen Collected: 05/22/23 10:50 AM               LAMOTRIGINE LEVEL 5/22/23:                     Component Ref Range & Units 2 mo ago  (5/22/23) 4 mo ago  (3/23/23) 1 yr ago  (6/17/22) 1 yr ago  (12/15/21)    Lamotrigine 3.0 - 15.0 ug/mL 6.1 7.8 CM 4.4 CM 3.7 R, CM   Comment: INTERPRETIVE INFORMATION:  Lamotrigine         FUTURE LABS: Autoimmune workup ordered by Bienvenido Deleon MD.       PROVIDER: Ran Gregorio MD     Level of Medical Decision Making:   - At least 1 chronic problem that is not stable  - Engaged in prescription drug management during visit (discussed any medication benefits, side effects, alternatives, etc.)        Psychiatry Individual Psychotherapy Note   Psychotherapy start time - 1510  Psychotherapy end time - 1530  Date treatment plan last reviewed with patient - 12/01/23  Subjective: This supportive psychotherapy session addressed issues related to goals of therapy and current psychosocial stressors. Patient's reaction: Pre-contemplation, Contemplation, Action, and Maintenance in the context of mental status appropriate for ambulatory setting.    Interactive complexity indicated? No  Plan: RTC in timeframe noted above  Psychotherapy services during this visit included myself and the patient.   Treatment Plan         SYMPTOMS; PROBLEMS    MEASURABLE GOALS;    FUNCTIONAL IMPROVEMENT / GAINS INTERVENTIONS DISCHARGE CRITERIA   Depression: depressed mood, low energy, insomnia, and concentration problems  Anxiety: excessive worry, feeling fearful, and nervous/overwhelmed  Marielena/Hypomania: Currently no concerns for hypomania  Substance Use: cannabis  and prescription drugs   Psychosocial: limited social support, mental health symptoms, occupational / vocational stress, and parents relationship stress    reduce depressive symptoms, reduce depressive episodes, find enjoyment at least once a  day, learn best practices for sleep, develop strategies for thought distraction when ruminating, make a plan to manage 2-3 anxiety-provoking situations, reduce manic/hypomanic episodes, stay free of THC/limit Rx meds use, be free of threats to self, exercise for 20 minutes 3-7 days a week , learn 2 new ways of coping with routine stressors, and increase time spent with others Supportive / psychodynamic marked symptom improvement, symptom resolution, reduced visit frequency, achievement of functional goals, can transfer back to primary care, and transition/maintenance individual/DBT therapy        Patient staffed in clinic with Dr. Pickens, who will sign the note.  Supervisor is Dr. Bland.

## 2023-12-01 NOTE — NURSING NOTE
Is the patient currently in the state of [place]? YES    Visit mode:VIDEO    If the visit is dropped, the patient can be reconnected by: VIDEO VISIT: Text to cell phone:   Telephone Information:   Mobile [phone]       Will anyone else be joining the visit? NO  (If patient encounters technical issues they should call [phone]    How would you like to obtain your AVS? MyChart    Are changes needed to the allergy or medication list? No    Reason for visit: RECHECK    Salvador JOHNSON

## 2023-12-01 NOTE — PATIENT INSTRUCTIONS
**For crisis resources, please see the information at the end of this document**   Patient Education    Thank you for coming to the [facility] [place] [facility].     Lab Testing:  If you had lab testing today and your results are reassuring or normal they will be mailed to you or sent through eComm[employer] within 7 days. If the lab tests need quick action we will call you with the results. The phone number we will call with results is # [phone]. If this is not the best number please call our clinic and change the number.     Medication Refills:  If you need any refills please call your pharmacy and they will contact us. Our fax number for refills is [phone].   Three business days of notice are needed for general medication refill requests.   Five business days of notice are needed for controlled substance refill requests.   If you need to change to a different pharmacy, please contact the new pharmacy directly. The new pharmacy will help you get your medications transferred.     Contact Us:  Please call [phone] during business hours (8-5:00 M-F).   If you have medication related questions after clinic hours, or on the weekend, please call [phone].     Financial Assistance [phone]   Medical Records [phone]       MENTAL HEALTH CRISIS RESOURCES:  For a emergency help, please call 911 or go to the nearest Emergency Department.     Emergency Walk-In Options:   [facility] Unit @ [facility] ([place]): [phone] - Specialized mental health emergency area designed to be calming  [facility] ([place]): [phone]  [facility] ([place]): [phone]  [facility]): [phone]    [facility] Crisis Information:   [place]: [phone]  [place]: [phone]  [place] ([place]) - Adult: [phone]     Child: [phone]  [place] - Adult: [phone]     Child: [phone]  [place]:  [phone]  List of all [facility] resources:   https://[place].gov/dhs/people-we-serve/adults/health-care/mental-health/resources/crisis-contacts.jsp    National Crisis Information:   Crisis Text Line: Text  [place]  to 331754  Suicide & Crisis Lifeline: 988  National Suicide Prevention Lifeline: 7-696-199-TALK (1-986.843.8514)       For online chat options, visit https://suicidepreventionlifeline.org/chat/  Poison Control Center: 2-711-172-5825  Trans Lifeline: 3-076-532-2123 - Hotline for transgender people of all ages  The [employer]: 4-646-971-9270 - Hotline for LGBT youth     For Non-Emergency Support:   Fast Tracker: Mental Health & Substance Use Disorder Resources -   https://www.[employer].org/

## 2024-01-26 ENCOUNTER — VIRTUAL VISIT (OUTPATIENT)
Dept: PSYCHIATRY | Facility: CLINIC | Age: 44
End: 2024-01-26
Attending: PSYCHIATRY & NEUROLOGY
Payer: COMMERCIAL

## 2024-01-26 DIAGNOSIS — F31.81 BIPOLAR 2 DISORDER (H): Primary | ICD-10-CM

## 2024-01-26 DIAGNOSIS — F41.1 GAD (GENERALIZED ANXIETY DISORDER): ICD-10-CM

## 2024-01-26 PROCEDURE — 99214 OFFICE O/P EST MOD 30 MIN: CPT | Mod: 95

## 2024-01-26 PROCEDURE — 90833 PSYTX W PT W E/M 30 MIN: CPT | Mod: 95

## 2024-01-26 ASSESSMENT — PAIN SCALES - GENERAL: PAINLEVEL: NO PAIN (0)

## 2024-01-26 NOTE — PROGRESS NOTES
"   [facility]  MEDICAL PROGRESS NOTE     CARE TEAM:    PCP- Fahad Concepcion  Therapist- Evangelina ([place] [facility], primarily for DBT q week), and individual 1:1 Carl (she is at [facility])       Artie is a 43 year old who uses the pronouns she, her.      Diagnoses     Bipolar II Disorder, current episode depressed, mild (history of rapid cycling)  EAMON     Assessment     Artie is a 42 yo patient with past psychiatric diagnosis of bipolar 2 disorder, anxiety disorder who is here today for a follow-up visit.     Overall appears that Artie's recent depressive symptoms have been largely stable/not worsened and appear improved, denying current concerns for depressive symptoms. Regarding reported SI in screening questionnaires, reports has been passive in nature/more \"deep mean\" type of thinking such as purpose of life, support system, etc.  Denies any plan or intent to hurt or kill herself or other acute concerns for safety. Identifies family dynamics and work-related stress as current and ongoing psychosocial stressors.     Regarding anxiety, it appears that this has been improved since our last visit and attributes this to cutting back on cannabis-based drinks and using helpful healthy coping skills.     She is tolerating current medications well without noticing side effects and perceiving benefit from this. Today Artie expresses strong preference of not making medication changes, which is reasonable. She is open to continue conversation around future recommendations described in the section below.   No other questions or concerns today.       Future Considerations:  -Consider switching from Depakote and lamotrigine to lithium monotherapy, vs optimizing lamotrigine or Depakote  -Continue encouraging decrease cannabis products use  -Recommend exploring other contraceptive options with Rx and also use a secondary method for pregnancy " prevention.  -Continue rec against cannabis use, benzodiazepine use.     Psychotropic Drug Interactions:  [PSYCHCLINICDDI]  -ADDITIVE SEDATION: Melatonin, Ativan, gabapentin  -ADDITIVE CNS/RESPIRATORY DEPRESSION: Ativan, gabapentin, Lamictal, Depakote  -VIA (likely) valproate-mediated reduction in glucuronide metabolism of lorazepam, Depakote may increase the serum concentration of Ativan  -DEPAKOTE & LAMICTAL- Valproate Products may enhance the adverse/toxic effect of LamoTRIgine. Valproate Products may increase the serum concentration of LamoTRIgine. Severity Major      -LAMICTAL & ORTHO TRI-CYCLEN LO- Estrogen Derivatives (Contraceptive) may decrease the serum concentration of LamoTRIgine, the most likely mechanism for this interaction is enhanced lamotrigine glucuronidation (possibly via UGT1A4)   -Via CY: Cannabis may increase serum concentration of propranolol, melatonin   -DEPAKOTE/ORTHO TRI-CYCLEN LO: Estrogen Derivatives may decrease the serum concentration of Valproate Products   -Ethinyl Estradiol-Containing Products may decrease the serum concentration of Benzodiazepines (metabolized by glucuronidation)   -Ethinyl Estradiol-Containing Products may increase the serum concentration of Propranolol.   Management: Monitoring for adverse effects, routine labs, using lowest therapeutic dose of [Depakote & Lamictal, gabapentin] and patient is aware of risks     [place]: indicates that controlled prescriptions have been filled as prescribed. No evidence of filling Ativan over past 1 year.     Risk Statements:   Treatment Risk- Risks, benefits, alternatives and potential adverse effects have been discussed and are understood.   Safety Risk-Artie did not appear to be an imminent safety risk to self or others.     Plan     1) Meds-  - Continue lamotrigine 125 mg daily.   - Depakote ER - 250 mg at bedtime   - gabapentin- 100 mg every day at noon and 100 mg at bedtime.  - propranolol IR 10 mg bid  /prn- for anxiety  - propranolol ER 60 mg qhs  - melatonin 1-1.5 mg at bedtime      PCP-   - Reported by patient: Ativan 0.25-0.5 mg at bedtime/prn (1x-2x per week). We have not taken over prescribing this medication and advise against due to risks outweighing benefits. Per  today: No evidence of filling Ativan over past 1 year.   Spironolactone- 50 mg bid- for hair loss  L-Methylfolate     2) Psychotherapy-   Individual therapy oongoing, works with DBT .   Working with racial trauma consultant/ on identity development.     3) Next due-  Labs- lamotrigine and Depakote level obtained on 5/22/23. Determine next due.   Rating scales- EAMON, PHQ9     4) Referrals-  None     5) Other     6) Dispo- RTC in 6-8 weeks, approximately. Per Dr. Pickens, consistent 60 min appointments are needed for follow-up appointments.       Pertinent Background                                                   [most recent eval 09/08/23]     Artie arnett was diagnosed with depression at age 14. Started various medications without working diagnosis in late 20's, had been resistant to medication in past, with multiple somatic symptoms, multiple diagnoses in past. Current diagnosis made about 4 years ago by mood disorder specialist in [place], done as second opinion as struggling with approach of first psychiatrist.   Has been on the current combination of medications since saw specialist, and per specialist, if have this type of condition, cannot be on antidepressants. She is prone to side effects with medications and sometimes, its hard for her to manage her medications due to the side effects, so her doctor was keeping her meds at a low dosage. Sleep is on and off. She was actively involved in CBT and DBT while in [place]     Psych pertinent item history includes suicide attempt [xseveral overdoses as a teenager], SIB [mainly in her teens/20's] and mutiple psychotropic trials       Subjective     Since our last visit  "(12/1/23).  Updates:   Last time we discussed adding an afternoon gabapentin dose, reports has not needed to take it as she has found new healthy coping skill helpful: Coloring books, been doing a lot for a couple hours after work and has been tremendously helpful with anxiety over the past few weeks.   -Has been thinking bout cannabis drinks and had noticed some \"spike in anxiety\" has been decreasing use and is motivated to further reduce/discontinue. Encouraged to do so.   -Regarding reported SI in screening questionnaires, reports has been passive in nature/more \"deep mean\" type of thinking such as purpose of life, support system, etc.  Denies plan or intent. Identifies psychosocial stressors   -Has a friend or 2 who she can reach out to, as well as DBT   -Denies SIB or urges.   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  -Mood: \"I don't feel depressed\"   -Anxiety: Reports has been improved due to healthy coping skills, meds and cutting back canabis beverages consumption to only 1-2/month.  -Propranolol helps with anxiety  -Appetite: Unchanged, good  -Sleep: Unchanged, sleeping through the night  -BM/constipation: Unchanged  -Tremor/abnormal movements: Denies   -Denies vision changes, chest pain, palpitations, changes.   -Perceptual disturbances: Denies  -Denies concerns for mood elevation/paula/hypomania  -Safety: Denies active SI, HI, SIB or other safety concerns.   .   Recent Psych Symptoms:   Depression:  low energy and dysregulation  Elevated:  none  Psychosis:  none  Anxiety:  As above  Trauma Related:  none  Insomnia:  No  Other:  No       Current Social History:  Financial/occupational: working , [employer] .   Living situation (partner, children, pets, etc): Has own place, lives alone.   Social/spiritual support: Parents, mostly her mother   Feels safe at home: Yes    Pertinent Substance Use:   -Cannabis-THC drinks 3-5 mg/once a week on " "average.    Opioids: No   Narcan Kit current: N/A  Other substances: Denies any other substance use.    Medical Review of Systems:   Lightheadedness/orthostasis: None  Headaches: None  GI: none  Sexual health concerns: None    A comprehensive review of systems was performed and is negative other than noted above.     Mental Status Exam     Alertness: alert  and oriented  Appearance: well groomed  Behavior/Demeanor: cooperative and pleasant, with good  eye contact   Speech: normal and regular rate and rhythm  Language: intact  Psychomotor: normal or unremarkable  Mood: \"I don't feel depressed\"   Affect: full range for topics discussed, somewhat anxious; congruent to: mood- yes, content- yes  Thought Process/Associations: unremarkable  Thought Content:  Reports preoccupations;  Denies suicidal ideation, violent ideation, delusions , obsessions , phobia , magical thinking, over-valued ideas, and paranoid ideation  Perception:  Reports none;  Denies hallucinations, depersonalization, and derealization  Insight: good  Judgment: good  Cognition: does  appear grossly intact; formal cognitive testing was not done  Gait and Station: N/A (telehealth)     Past Psych Med Trials          Medication Max Dose (mg) Dates / Duration Helpful? DC Reason / Adverse Effects?   Paxil     further bad side effects on higher doses, night sweats    Wellbutrin        Lithium        Depakote        Adderall        Strattera       Clonazepam       Buspar    Akathisia   Pristq         Of note, per chart review, Tends not to tolerate most medications, c/o GI symptoms and HA .        Treatment Course and Hahn Events since  JULY 2023 7/28/2023: Transfer of care DA.       Vitals   There were no vitals taken for this visit.  Pulse Readings from Last 3 Encounters:   11/21/23 66   03/29/23 72   10/10/22 86     Wt Readings from Last 3 Encounters:   12/01/23 55.3 kg (122 lb)   11/21/23 55.7 kg (122 lb 12.8 oz)   03/29/23 55.7 kg (122 lb 11.2 oz)     BP " Readings from Last 3 Encounters:   11/21/23 94/61   03/29/23 112/70   10/10/22 101/67        Medical History     ALLERGIES: Amphotericin b, Phenylbutazones, and Penicillin g    Patient Active Problem List   Diagnosis    Heavy menses    CARDIOVASCULAR SCREENING; LDL GOAL LESS THAN 160    Major depressive disorder, recurrent episode, moderate (H)    Allergic rhinitis    Anxiety state    Attention deficit disorder    Bipolar I disorder (H)    Dysmenorrhea    Glaucoma suspect    Myopia        Medications     Current Outpatient Medications   Medication Sig Dispense Refill    divalproex sodium extended-release (DEPAKOTE ER) 250 MG 24 hr tablet Take 1 tablet (250 mg) by mouth daily 30 tablet 3    gabapentin (NEURONTIN) 100 MG capsule Take 1 capsule (100 mg) by mouth 3 times daily Take 1 tab at noon, 1 cap early evening and 1 cap at bedtime 90 capsule 3    lamoTRIgine (LAMICTAL) 100 MG tablet Take 1 tablet (100 mg) by mouth daily Take along with one 25 mg tablet for a combined total of 125 mg. 30 tablet 3    lamoTRIgine (LAMICTAL) 25 MG tablet Take 1 tablet (25 mg) by mouth daily. Take along with one 100 mg tablet for a combined total of 125 mg. 30 tablet 3    LORazepam (ATIVAN) 0.5 MG tablet Take 0.5 mg by mouth every 6 hours as needed for anxiety      norgestim-eth estrad triphasic (ORTHO TRI-CYCLEN LO) 0.18/0.215/0.25 MG-25 MCG tablet Take 1 tablet by mouth daily 84 tablet 4    propranolol (INDERAL) 10 MG tablet Take 1 tablet (10 mg) by mouth 2 times daily as needed (Anxiety) 60 tablet 3    propranolol ER (INDERAL LA) 60 MG 24 hr capsule Take 1 capsule (60 mg) by mouth at bedtime 30 capsule 3    spironolactone (ALDACTONE) 50 MG tablet Take 1 tablet (50 mg) by mouth 2 times daily 180 tablet 1     - Reported by patient: Ativan 0.25-0.5 mg at bedtime/prn (1x-2x per week). We have not taken over prescribing this medication and advise against due to risks outweighing benefits. Per  today: No evidence of filling Ativan over  past 1 year.      Labs and Data         3/27/2023    11:07 AM 7/27/2023     5:09 PM 12/1/2023     2:25 PM   PROMIS-10 Total Score w/o Sub Scores   PROMIS TOTAL - SUBSCORES 18 18 21          No data to display                  9/7/2023     1:06 PM 11/21/2023     2:12 PM 12/1/2023     2:22 PM   PHQ-9 SCORE   PHQ-9 Total Score MyChart 22 (Severe depression)  17 (Moderately severe depression)   PHQ-9 Total Score 22 22 17         9/7/2023     1:12 PM 11/21/2023     2:12 PM 12/1/2023     2:24 PM   EAMON-7 SCORE   Total Score 17 (severe anxiety)  17 (severe anxiety)   Total Score 17 16 17       Liver/Kidney Function, TSH Metabolic Blood counts   Recent Labs   Lab Test 04/21/23  1757 06/17/22  1617 11/24/21  1543   AST  --  10 11   ALT  --  11 15   ALKPHOS  --  56 49   CR 0.85  --  0.79     Recent Labs   Lab Test 06/17/22  1617   TSH 1.09    Recent Labs   Lab Test 10/30/20  1200   CHOL 167   TRIG 148   LDL 81   HDL 56     No lab results found.  Recent Labs   Lab Test 04/21/23  1757   GLC 92    Recent Labs   Lab Test 06/17/22  1617   WBC 9.0   HGB 13.0   HCT 38.9   MCV 85            Recent Labs   Lab Test 06/17/22  1617 11/24/21  1543   VANDANA 37 38           Recent Labs   Lab Test 06/17/22  1617 11/24/21  1543   AST 10 11   ALT 11 15   ALKPHOS 56 49      Component     5/22/2023      Valproic acid   ug/mL 25.9 Low  25.0 Low  CM   Comment: Therapeutic Range:  ug/mL  Epilepsy and paula patients:  ug/mL  Some patients require and can tolerate values up to 150 ug/mL    Critical: Greater than 150 ug/mL   Resulting Agency   [facility]                Specimen Collected: 05/22/23 10:50 AM               LAMOTRIGINE LEVEL 5/22/23:                     Component Ref Range & Units 2 mo ago  (5/22/23) 4 mo ago  (3/23/23) 1 yr ago  (6/17/22) 1 yr ago  (12/15/21)    Lamotrigine 3.0 - 15.0 ug/mL 6.1 7.8 CM 4.4 CM 3.7 R, CM   Comment: INTERPRETIVE INFORMATION:  Lamotrigine              PROVIDER: Ran Gregorio MD    Level of  Medical Decision Making:   - At least 1 chronic problem that is not stable  - Engaged in prescription drug management during visit (discussed any medication benefits, side effects, alternatives, etc.)    Psychiatry Individual Psychotherapy Note   Psychotherapy start time - 1600  Psychotherapy end time - 1620  Date treatment plan last reviewed with patient - 01/26/24  Subjective: This supportive psychotherapy session addressed issues related to goals of therapy and current psychosocial stressors. Patient's reaction: Pre-contemplation, Contemplation, Preparatory, Action, and Maintenance in the context of mental status appropriate for ambulatory setting.    Interactive complexity indicated? No  Plan: RTC in timeframe noted above  Psychotherapy services during this visit included myself and the patient.   Treatment Plan         SYMPTOMS; PROBLEMS    MEASURABLE GOALS;    FUNCTIONAL IMPROVEMENT / GAINS INTERVENTIONS DISCHARGE CRITERIA   Depression: depressed mood, low energy, insomnia, and concentration problems  Anxiety: excessive worry, feeling fearful, and nervous/overwhelmed  Marielena/Hypomania: Currently no concerns for hypomania  Substance Use: cannabis  and prescription drugs   Psychosocial: limited social support, mental health symptoms, occupational / vocational stress, and parents relationship stress    reduce depressive symptoms, reduce depressive episodes, find enjoyment at least once a day, learn best practices for sleep, develop strategies for thought distraction when ruminating, make a plan to manage 2-3 anxiety-provoking situations, reduce manic/hypomanic episodes, stay free of THC/limit Rx meds use, be free of threats to self, exercise for 20 minutes 3-7 days a week , learn 2 new ways of coping with routine stressors, and increase time spent with others Supportive / psychodynamic marked symptom improvement, symptom resolution, reduced visit frequency, achievement of functional goals, can transfer back to  primary care, and transition/maintenance individual/DBT therapy        Patient staffed in clinic with Dr. Pickens who will sign the note.  Supervisor is Dr. Bland.

## 2024-01-26 NOTE — NURSING NOTE
Is the patient currently in the state of [place]? YES    Visit mode:VIDEO    If the visit is dropped, the patient can be reconnected by: VIDEO VISIT: Send to e-mail at: [email or web address]    Will anyone else be joining the visit? NO  (If patient encounters technical issues they should call [phone]    How would you like to obtain your AVS? MyChart    Are changes needed to the allergy or medication list? No  Patient denies any changes since echeck-in regarding medication and allergies and states all information entered during echeck-in remains accurate.     Reason for visit: No chief complaint on file.    Veronica DAVEF

## 2024-01-26 NOTE — PROGRESS NOTES
Virtual Visit Details    Type of service:  Video Visit     Originating Location (pt. Location): Home  Distant Location (provider location):  On-site  Platform used for Video Visit: William

## 2024-01-26 NOTE — PATIENT INSTRUCTIONS
**For crisis resources, please see the information at the end of this document**   Patient Education    Thank you for coming to the [facility] [place] [facility].     Lab Testing:  If you had lab testing today and your results are reassuring or normal they will be mailed to you or sent through [employer] within 7 days. If the lab tests need quick action we will call you with the results. The phone number we will call with results is # [phone]. If this is not the best number please call our clinic and change the number.     Medication Refills:  If you need any refills please call your pharmacy and they will contact us. Our fax number for refills is [phone].   Three business days of notice are needed for general medication refill requests.   Five business days of notice are needed for controlled substance refill requests.   If you need to change to a different pharmacy, please contact the new pharmacy directly. The new pharmacy will help you get your medications transferred.     Contact Us:  Please call [phone] during business hours (8-5:00 M-F).   If you have medication related questions after clinic hours, or on the weekend, please call [phone].     Financial Assistance [phone]   Medical Records [phone]       MENTAL HEALTH CRISIS RESOURCES:  For a emergency help, please call 911 or go to the nearest Emergency Department.     Emergency Walk-In Options:   [facility] Unit @ [facility] ([place]): [phone] - Specialized mental health emergency area designed to be calming  [facility] ([place]): [phone]  [facility] ([place]): [phone]  [facility]): [phone]    [facility] Crisis Information:   [place]: [phone]  [place]: [phone]  [place] ([place]FAL[place]) - Adult: [phone]     Child: [phone]  [place] - Adult: [phone]     Child: [phone]  [place]:  [phone]  List of all [employer] resources:   https://[place].gov/dhs/people-we-serve/adults/health-care/mental-health/resources/crisis-contacts.jsp    National Crisis Information:   Crisis Text Line: Text  [place]  to 971269  Suicide & Crisis Lifeline: 988  National Suicide Prevention Lifeline: 3-236-851-TALK (1-873.688.4617)       For online chat options, visit https://suicidepreventionlifeline.org/chat/  Poison Control Center: 5-744-944-4810  Trans Lifeline: 8-930-584-1626 - Hotline for transgender people of all ages  [employer]: 4-474-467-2551 - Hotline for LGBT youth     For Non-Emergency Support:   Fast Tracker: Mental Health & Substance Use Disorder Resources -   https://www.HotelQuicklyckGranifyn.org/

## 2024-02-23 DIAGNOSIS — F41.1 GAD (GENERALIZED ANXIETY DISORDER): ICD-10-CM

## 2024-02-23 DIAGNOSIS — F31.81 BIPOLAR 2 DISORDER (H): ICD-10-CM

## 2024-02-23 RX ORDER — DIVALPROEX SODIUM 250 MG/1
250 TABLET, EXTENDED RELEASE ORAL DAILY
Qty: 30 TABLET | Refills: 3 | OUTPATIENT
Start: 2024-02-23

## 2024-02-23 RX ORDER — PROPRANOLOL HCL 60 MG
CAPSULE, EXTENDED RELEASE 24HR ORAL
Qty: 30 CAPSULE | Refills: 3 | OUTPATIENT
Start: 2024-02-23

## 2024-02-23 RX ORDER — PROPRANOLOL HYDROCHLORIDE 10 MG/1
TABLET ORAL
Qty: 60 TABLET | Refills: 3 | OUTPATIENT
Start: 2024-02-23

## 2024-03-03 ENCOUNTER — MYC REFILL (OUTPATIENT)
Dept: PSYCHIATRY | Facility: CLINIC | Age: 44
End: 2024-03-03
Payer: COMMERCIAL

## 2024-03-03 DIAGNOSIS — F31.81 BIPOLAR 2 DISORDER (H): ICD-10-CM

## 2024-03-03 RX ORDER — DIVALPROEX SODIUM 250 MG/1
250 TABLET, EXTENDED RELEASE ORAL DAILY
Qty: 30 TABLET | Refills: 3 | Status: CANCELLED | OUTPATIENT
Start: 2024-03-03

## 2024-03-04 NOTE — TELEPHONE ENCOUNTER
Last seen: 1/26/24  RTC: 6-8 weeks  Cancel: none  No-show: none  Next appt: 3/22/24     Medication requested: divalproex sodium extended-release (DEPAKOTE ER) 250 MG 24 hr tablet   Directions: Take 1 tablet (250 mg) by mouth daily   Qty: 30  Last refilled: 12/1/24 for 90 d/s     Patient should have one 30 d/s refill remaining    Will call the pharmacy when they open to ask if this 30 d/s can be filled for the patient    Kanika Madrigal RN on 3/4/2024 at 8:38 AM

## 2024-03-04 NOTE — TELEPHONE ENCOUNTER
Writer called [facility] pharmacy and was informed they filled the Rx for #30 on 3/3. Patient should have enough medication to get her to the 3/22 appt.     Kanika Madrigal RN on 3/4/2024 at 9:12 AM

## 2024-03-05 RX ORDER — DIVALPROEX SODIUM 250 MG/1
250 TABLET, EXTENDED RELEASE ORAL DAILY
Qty: 30 TABLET | Refills: 0 | OUTPATIENT
Start: 2024-03-05

## 2024-03-05 NOTE — TELEPHONE ENCOUNTER
divalproex sodium extended-release (DEPAKOTE ER) 250 MG 24 hr tablet 30 tablet 3 12/1/2023 - No  Sig - Route: Take 1 tablet (250 mg) by mouth daily - Oral  Sent to pharmacy as: Divalproex Sodium  MG Oral Tablet Extended Release 24 Hour (DEPAKOTE ER)  ----------------------    Called pharmacy re: multiple requests, patient just had Rx filled 3/4/24and pharmacy staff reports that they have refills on file. Request refused.      Radha OCHOA, RN  [facility] Nursing/Red Flag Triage & Med Refill Team

## 2024-03-09 DIAGNOSIS — L65.9 HAIR LOSS: Primary | ICD-10-CM

## 2024-03-15 RX ORDER — SPIRONOLACTONE 50 MG/1
50 TABLET, FILM COATED ORAL 2 TIMES DAILY
Qty: 180 TABLET | Refills: 2 | Status: SHIPPED | OUTPATIENT
Start: 2024-03-15

## 2024-03-21 ASSESSMENT — ANXIETY QUESTIONNAIRES
1. FEELING NERVOUS, ANXIOUS, OR ON EDGE: MORE THAN HALF THE DAYS
7. FEELING AFRAID AS IF SOMETHING AWFUL MIGHT HAPPEN: SEVERAL DAYS
5. BEING SO RESTLESS THAT IT IS HARD TO SIT STILL: NOT AT ALL
GAD7 TOTAL SCORE: 10
3. WORRYING TOO MUCH ABOUT DIFFERENT THINGS: MORE THAN HALF THE DAYS
2. NOT BEING ABLE TO STOP OR CONTROL WORRYING: MORE THAN HALF THE DAYS
4. TROUBLE RELAXING: SEVERAL DAYS
GAD7 TOTAL SCORE: 10
6. BECOMING EASILY ANNOYED OR IRRITABLE: MORE THAN HALF THE DAYS
GAD7 TOTAL SCORE: 10
7. FEELING AFRAID AS IF SOMETHING AWFUL MIGHT HAPPEN: SEVERAL DAYS

## 2024-03-21 ASSESSMENT — PATIENT HEALTH QUESTIONNAIRE - PHQ9
SUM OF ALL RESPONSES TO PHQ QUESTIONS 1-9: 15
SUM OF ALL RESPONSES TO PHQ QUESTIONS 1-9: 15
10. IF YOU CHECKED OFF ANY PROBLEMS, HOW DIFFICULT HAVE THESE PROBLEMS MADE IT FOR YOU TO DO YOUR WORK, TAKE CARE OF THINGS AT HOME, OR GET ALONG WITH OTHER PEOPLE: SOMEWHAT DIFFICULT

## 2024-03-22 ENCOUNTER — VIRTUAL VISIT (OUTPATIENT)
Dept: PSYCHIATRY | Facility: CLINIC | Age: 44
End: 2024-03-22
Attending: PSYCHIATRY & NEUROLOGY
Payer: COMMERCIAL

## 2024-03-22 VITALS — WEIGHT: 120 LBS | HEIGHT: 63 IN | BODY MASS INDEX: 21.26 KG/M2

## 2024-03-22 DIAGNOSIS — F41.1 GAD (GENERALIZED ANXIETY DISORDER): ICD-10-CM

## 2024-03-22 DIAGNOSIS — F31.81 BIPOLAR 2 DISORDER (H): Primary | ICD-10-CM

## 2024-03-22 PROCEDURE — G2211 COMPLEX E/M VISIT ADD ON: HCPCS | Mod: 95

## 2024-03-22 PROCEDURE — 99214 OFFICE O/P EST MOD 30 MIN: CPT | Mod: 95

## 2024-03-22 PROCEDURE — 90833 PSYTX W PT W E/M 30 MIN: CPT | Mod: 95

## 2024-03-22 RX ORDER — DIVALPROEX SODIUM 250 MG/1
250 TABLET, EXTENDED RELEASE ORAL DAILY
Qty: 30 TABLET | Refills: 3 | Status: SHIPPED | OUTPATIENT
Start: 2024-03-22 | End: 2024-06-14

## 2024-03-22 RX ORDER — LAMOTRIGINE 100 MG/1
100 TABLET ORAL DAILY
Qty: 30 TABLET | Refills: 3 | Status: SHIPPED | OUTPATIENT
Start: 2024-03-22 | End: 2024-06-14

## 2024-03-22 RX ORDER — PROPRANOLOL HYDROCHLORIDE 10 MG/1
10 TABLET ORAL 2 TIMES DAILY PRN
Qty: 60 TABLET | Refills: 3 | Status: SHIPPED | OUTPATIENT
Start: 2024-03-22 | End: 2024-06-14

## 2024-03-22 RX ORDER — GABAPENTIN 100 MG/1
100 CAPSULE ORAL 3 TIMES DAILY
Qty: 90 CAPSULE | Refills: 3 | Status: SHIPPED | OUTPATIENT
Start: 2024-03-22 | End: 2024-06-14

## 2024-03-22 RX ORDER — PROPRANOLOL HCL 60 MG
60 CAPSULE, EXTENDED RELEASE 24HR ORAL AT BEDTIME
Qty: 30 CAPSULE | Refills: 3 | Status: SHIPPED | OUTPATIENT
Start: 2024-03-22 | End: 2024-06-14

## 2024-03-22 RX ORDER — LAMOTRIGINE 25 MG/1
TABLET ORAL
Qty: 30 TABLET | Refills: 3 | Status: SHIPPED | OUTPATIENT
Start: 2024-03-22 | End: 2024-06-14

## 2024-03-22 ASSESSMENT — PAIN SCALES - GENERAL: PAINLEVEL: NO PAIN (0)

## 2024-03-22 NOTE — NURSING NOTE
Is the patient currently in the state of [place]? YES    Visit mode:VIDEO    If the visit is dropped, the patient can be reconnected by: VIDEO VISIT: Text to cell phone:   Telephone Information:   Mobile [phone]       Will anyone else be joining the visit? NO  (If patient encounters technical issues they should call [phone])    How would you like to obtain your AVS? MyChart    Are changes needed to the allergy or medication list? No    Reason for visit: RECHECK    Karina JOHNSON

## 2024-03-22 NOTE — PROGRESS NOTES
Virtual Visit Details    Type of service:  Video Visit     Originating Location (pt. Location): Other Workplace   Distant Location (provider location):  On-site  Platform used for Video Visit: William

## 2024-03-22 NOTE — PATIENT INSTRUCTIONS
**For crisis resources, please see the information at the end of this document**   Patient Education    Thank you for coming to the [facility] [place] [facility].     Lab Testing:  If you had lab testing today and your results are reassuring or normal they will be mailed to you or sent through [employer] within 7 days. If the lab tests need quick action we will call you with the results. The phone number we will call with results is # [phone]. If this is not the best number please call our clinic and change the number.     Medication Refills:  If you need any refills please call your pharmacy and they will contact us. Our fax number for refills is [phone].   Three business days of notice are needed for general medication refill requests.   Five business days of notice are needed for controlled substance refill requests.   If you need to change to a different pharmacy, please contact the new pharmacy directly. The new pharmacy will help you get your medications transferred.     Contact Us:  Please call [phone] during business hours (8-5:00 M-F).   If you have medication related questions after clinic hours, or on the weekend, please call [phone].     Financial Assistance [phone]   Medical Records [phone]       MENTAL HEALTH CRISIS RESOURCES:  For a emergency help, please call 911 or go to the nearest Emergency Department.     Emergency Walk-In Options:   EmPATH Unit @ [facility] ([place]): [phone] - Specialized mental health emergency area designed to be calming  [facility] ([place]): [phone]  [facility] ([place]): [phone]  [facility]): [phone]    [facility] Crisis Information:   Fellows: [phone]  [place]: [phone]  Gil (SHEFALI) - Adult: [phone]     Child: [phone]  Patrick - Adult: [phone]     Child: [phone]  [place]:  [phone]  List of all [facility] resources:   https://[place].gov/dhs/people-we-serve/adults/health-care/mental-health/resources/crisis-contacts.jsp    National Crisis Information:   Crisis Text Line: Text  [place]  to 921317  Suicide & Crisis Lifeline: 988  National Suicide Prevention Lifeline: 9-695-338-TALK (1-551.433.3091)       For online chat options, visit https://suicidepreventionlifeline.org/chat/  Poison Control Center: 7-856-366-2752  Trans Lifeline: 1-301-739-0178 - Hotline for transgender people of all ages  [employer]: 7-358-476-5168 - Hotline for LGBT youth     For Non-Emergency Support:   Fast Tracker: Mental Health & Substance Use Disorder Resources -   https://www.[facility].org/

## 2024-03-22 NOTE — PROGRESS NOTES
"   [facility]  MEDICAL PROGRESS NOTE     CARE TEAM:    PCP- Fahad Concepcion  Therapist- Evangelina ([place] [facility], primarily for DBT q week), and individual 1:1 Carl (she is at [employer] [facility])       Artie is a 43 year old who uses the pronouns she, her.      Diagnoses     Bipolar II Disorder, current episode depressed, mild (history of rapid cycling)  EAMON     Assessment     Artie is a 44 yo patient with past psychiatric diagnosis of bipolar 2 disorder, anxiety disorder who is here today for a follow-up visit.     Overall appears that Artie's mood and anxiety symptoms have been largely stable/not worsened and appear continued being improved, denying current concerns for depressive symptoms and attributes this to her current medication regimen being the best combination thus far, which she had been tolerating well without nothing side effects. Also identifies  cutting back stopping THC drinks and using helpful healthy coping skills as factors helping.  Regarding reported SI in screening questionnaires, reports (same as in our last visit) has been passive in nature/more \"deep mean\" type of thinking such as purpose of life, support system, etc.  Denies any plan or intent to hurt or kill herself or other acute concerns for safety. Identifies family dynamics and work-related stress as current and ongoing psychosocial stressors.     Today Artie continues expressing strong preference of not making medication changes, which is reasonable. She is open to continue conversation around future recommendations described in the section below.   No other questions or concerns today.       Future Considerations:  -Consider switching from Depakote and lamotrigine to lithium monotherapy, vs optimizing lamotrigine or Depakote  -Continue encouraging decrease cannabis products use  -Recommend exploring other contraceptive options with Rx and also use a secondary " method for pregnancy prevention.  -Continue rec against cannabis use, benzodiazepine use.     Psychotropic Drug Interactions:  [PSYCHCLINICDDI]  -ADDITIVE SEDATION: Melatonin, Ativan, gabapentin  -ADDITIVE CNS/RESPIRATORY DEPRESSION: Ativan, gabapentin, Lamictal, Depakote  -VIA (likely) valproate-mediated reduction in glucuronide metabolism of lorazepam, Depakote may increase the serum concentration of Ativan  -DEPAKOTE & LAMICTAL- Valproate Products may enhance the adverse/toxic effect of LamoTRIgine. Valproate Products may increase the serum concentration of LamoTRIgine. Severity Major      -LAMICTAL & ORTHO TRI-CYCLEN LO- Estrogen Derivatives (Contraceptive) may decrease the serum concentration of LamoTRIgine, the most likely mechanism for this interaction is enhanced lamotrigine glucuronidation (possibly via UGT1A4)   -Via CY: Cannabis may increase serum concentration of propranolol, melatonin   -DEPAKOTE/ORTHO TRI-CYCLEN LO: Estrogen Derivatives may decrease the serum concentration of Valproate Products   -Ethinyl Estradiol-Containing Products may decrease the serum concentration of Benzodiazepines (metabolized by glucuronidation)   -Ethinyl Estradiol-Containing Products may increase the serum concentration of Propranolol.   Management: Monitoring for adverse effects, routine labs, using lowest therapeutic dose of [Depakote & Lamictal, gabapentin] and patient is aware of risks     [place] was [place] today: indicates that controlled prescriptions have been filled as prescribed. No evidence of filling Ativan over past 1 year.     Risk Statements:   Treatment Risk- Risks, benefits, alternatives and potential adverse effects have been discussed and are understood.   Safety Risk-Artie did not appear to be an imminent safety risk to self or others.     Plan     1) Meds-   - Continue lamotrigine 125 mg daily.   - Depakote ER - 250 mg at bedtime   - gabapentin- 100 mg every day at noon and 100 mg at bedtime.  -  propranolol IR 10 mg bid /prn- for anxiety  - propranolol ER 60 mg qhs  - melatonin 1-1.5 mg at bedtime      PCP-   - Reported by patient: Ativan 0.25-0.5 mg at bedtime/prn (1x-2x per week). We have not taken over prescribing this medication and advise against due to risks outweighing benefits. Per  (as of today): No evidence of filling Ativan over past 1 year.   Spironolactone- 50 mg bid- for hair loss  L-Methylfolate     2) Psychotherapy-   Individual therapy oongoinjulio, works with DBT .   Working with racial trauma consultant/ on identity development.     3) Next due-  Labs- lamotrigine and Depakote level obtained on 5/22/23. Determine next due.   Rating scales- EAMON, PHQ9     4) Referrals-  None     5) Other   None    6) Dispo- RTC in 6-8 weeks, approximately. Please ensure consistent 60 min appointments for follow-up appointments.       Pertinent Background                                                   [most recent eval 09/08/23]     Artie arnett was diagnosed with depression at age 14. Started various medications without working diagnosis in late 20's, had been resistant to medication in past, with multiple somatic symptoms, multiple diagnoses in past. Current diagnosis made about 4 years ago by mood disorder specialist in [place], done as second opinion as struggling with approach of first psychiatrist.   Has been on the current combination of medications since saw specialist, and per specialist, if have this type of condition, cannot be on antidepressants. She is prone to side effects with medications and sometimes, its hard for her to manage her medications due to the side effects, so her doctor was keeping her meds at a low dosage. Sleep is on and off. She was actively involved in CBT and DBT while in [place]     Psych pertinent item history includes suicide attempt [xseveral overdoses as a teenager], SIB [mainly in her teens/20's] and mutiple psychotropic trials       Subjective     Since our  "last visit (1/26/24)  Updates:   More tired, work has been busier  Getting along well with new supervisor which is good and has relieved a great deal of anxiety/stress.   -Mood: \"Going ok\"   -Anxiety: Anxiety around work-related stress or family interactions, overall better.   -Denies correlation of mood/anxiety symptoms to menstrual cycles. Reports may be entering menopause. Denies hot flashes or other symptoms/changes  -Appetite:No changes   -Weight changes: No changes.   -Sleep: Reports no changes, sleeping well.   -BM/constipation: Denies constipation  -Tremor/abnormal movements: Denies   -Denies vision changes, chest pain, palpitations, changes.   -Perceptual disturbances: Denies  -Denies concerns for mood elevation/paula/hypomania  -Safety: Reports passive SI that she considers baseline for her. Denies active SI/plan or intent. Denies HI, SIB or other safety concerns.     Depression:  low energy and dysregulation  Elevated:  none  Psychosis:  none  Anxiety:  As above  Trauma Related:  none  Insomnia:  No  Other:  No    Current Social History:  Financial/occupational: working , [employer] .   Living situation (partner, children, pets, etc): Has own place, lives alone.   Social/spiritual support: Parents, mostly her mother   Feels safe at home: Yes    Pertinent Substance Use:   -Cannabis- Denies previously reported THC drinks, cut down.   Opioids: No   Narcan Kit current: N/A  Other substances: Denies any other substance use.    Medical Review of Systems:   Lightheadedness/orthostasis: None  Headaches: None  GI: none  Sexual health concerns: None    A comprehensive review of systems was performed and is negative other than noted above.     Mental Status Exam     Alertness: alert  and oriented  Appearance: well groomed  Behavior/Demeanor: cooperative and pleasant, with good  eye contact   Speech: normal and regular rate and rhythm  Language: intact  Psychomotor: normal or unremarkable  Mood: \"going " "ok\"  Affect: full range for topics discussed, somewhat anxious; congruent to: mood- yes, content- yes  Thought Process/Associations: unremarkable  Thought Content:  Reports preoccupations;  Denies violent ideation, suicidal ideation with plan; or intent [details in history], delusions , obsessions , phobia , magical thinking, over-valued ideas, and paranoid ideation  Perception:  Reports none;  Denies hallucinations, depersonalization, and derealization  Insight: good  Judgment: good and adequate for safety  Cognition: does  appear grossly intact; formal cognitive testing was not done  Gait and Station: N/A (telehealth)     Past Psych Med Trials          Medication Max Dose (mg) Dates / Duration Helpful? DC Reason / Adverse Effects?   Paxil     further bad side effects on higher doses, night sweats    Wellbutrin        Lithium        Depakote        Adderall        Strattera       Clonazepam       Buspar    Akathisia   Pristq         Of note, per chart review, Tends not to tolerate most medications, c/o GI symptoms and HA .        Treatment Course and Hahn Events since  JULY 2023 7/28/2023: Transfer of care visit.      Vitals   There were no vitals taken for this visit.  Pulse Readings from Last 3 Encounters:   11/21/23 66   03/29/23 72   10/10/22 86     Wt Readings from Last 3 Encounters:   03/22/24 54.4 kg (120 lb)   12/01/23 55.3 kg (122 lb)   11/21/23 55.7 kg (122 lb 12.8 oz)     BP Readings from Last 3 Encounters:   11/21/23 94/61   03/29/23 112/70   10/10/22 101/67        Medical History     ALLERGIES: Amphotericin b, Phenylbutazones, and Penicillin g    Patient Active Problem List   Diagnosis    Heavy menses    CARDIOVASCULAR SCREENING; LDL GOAL LESS THAN 160    Major depressive disorder, recurrent episode, moderate (H)    Allergic rhinitis    Anxiety state    Attention deficit disorder    Bipolar I disorder (H)    Dysmenorrhea    Glaucoma suspect    Myopia        Medications     Current Outpatient " Medications   Medication Sig Dispense Refill    divalproex sodium extended-release (DEPAKOTE ER) 250 MG 24 hr tablet Take 1 tablet (250 mg) by mouth daily 30 tablet 3    gabapentin (NEURONTIN) 100 MG capsule Take 1 capsule (100 mg) by mouth 3 times daily Take 1 tab at noon, 1 cap early evening and 1 cap at bedtime 90 capsule 3    lamoTRIgine (LAMICTAL) 100 MG tablet Take 1 tablet (100 mg) by mouth daily Take along with one 25 mg tablet for a combined total of 125 mg. 30 tablet 3    lamoTRIgine (LAMICTAL) 25 MG tablet Take 1 tablet (25 mg) by mouth daily. Take along with one 100 mg tablet for a combined total of 125 mg. 30 tablet 3    LORazepam (ATIVAN) 0.5 MG tablet Take 0.5 mg by mouth every 6 hours as needed for anxiety      norgestim-eth estrad triphasic (ORTHO TRI-CYCLEN LO) 0.18/0.215/0.25 MG-25 MCG tablet Take 1 tablet by mouth daily 84 tablet 4    propranolol (INDERAL) 10 MG tablet Take 1 tablet (10 mg) by mouth 2 times daily as needed (Anxiety) 60 tablet 3    propranolol ER (INDERAL LA) 60 MG 24 hr capsule Take 1 capsule (60 mg) by mouth at bedtime 30 capsule 3    spironolactone (ALDACTONE) 50 MG tablet Take 1 tablet (50 mg) by mouth 2 times daily 180 tablet 2     - Reported by patient: Ativan 0.25-0.5 mg at bedtime/prn (1x-2x per week). We have not taken over prescribing this medication and advise against due to risks outweighing benefits. Per  today: No evidence of filling Ativan over past 1 year.      Labs and Data         7/27/2023     5:09 PM 12/1/2023     2:25 PM 3/21/2024     5:39 PM   PROMIS-10 Total Score w/o Sub Scores   PROMIS TOTAL - SUBSCORES 18 21 19          No data to display                  12/1/2023     2:22 PM 1/26/2024     3:09 PM 3/21/2024     5:37 PM   PHQ-9 SCORE   PHQ-9 Total Score MyChart 17 (Moderately severe depression) 21 (Severe depression) 15 (Moderately severe depression)   PHQ-9 Total Score 17 21 15         11/21/2023     2:12 PM 12/1/2023     2:24 PM 3/21/2024     5:38 PM    EAMON-7 SCORE   Total Score  17 (severe anxiety) 10 (moderate anxiety)   Total Score 16 17 10       Liver/Kidney Function, TSH Metabolic Blood counts   Recent Labs   Lab Test 04/21/23  1757 06/17/22  1617 11/24/21  1543   AST  --  10 11   ALT  --  11 15   ALKPHOS  --  56 49   CR 0.85  --  0.79     Recent Labs   Lab Test 06/17/22  1617   TSH 1.09    Recent Labs   Lab Test 10/30/20  1200   CHOL 167   TRIG 148   LDL 81   HDL 56     No lab results found.  Recent Labs   Lab Test 04/21/23  1757   GLC 92    Recent Labs   Lab Test 06/17/22  1617   WBC 9.0   HGB 13.0   HCT 38.9   MCV 85            Recent Labs   Lab Test 06/17/22  1617 11/24/21  1543   VANDANA 37 38           Recent Labs   Lab Test 06/17/22  1617 11/24/21  1543   AST 10 11   ALT 11 15   ALKPHOS 56 49      Component     5/22/2023      Valproic acid   ug/mL 25.9 Low  25.0 Low  CM   Comment: Therapeutic Range:  ug/mL  Epilepsy and paula patients:  ug/mL  Some patients require and can tolerate values up to 150 ug/mL    Critical: Greater than 150 ug/mL   Resulting Agency   [facility]                Specimen Collected: 05/22/23 10:50 AM               LAMOTRIGINE LEVEL 5/22/23:                     Component Ref Range & Units 2 mo ago  (5/22/23) 4 mo ago  (3/23/23) 1 yr ago  (6/17/22) 1 yr ago  (12/15/21)    Lamotrigine 3.0 - 15.0 ug/mL 6.1 7.8 CM 4.4 CM 3.7 R, CM   Comment: INTERPRETIVE INFORMATION:  Lamotrigine            PROVIDER: Ran Gregorio MD    Level of Medical Decision Making:   - At least 1 chronic problem that is not stable  - Engaged in prescription drug management during visit (discussed any medication benefits, side effects, alternatives, etc.)    Psychiatry Individual Psychotherapy Note   Psychotherapy start time - 1600  Psychotherapy end time - 1620  Date treatment plan last reviewed with patient - 03/22/24  Subjective: This supportive psychotherapy session addressed issues related to goals of therapy and current psychosocial stressors.  Patient's reaction: Preparatory, Action, and Maintenance in the context of mental status appropriate for ambulatory setting.    Interactive complexity indicated? No  Plan: RTC in timeframe noted above  Psychotherapy services during this visit included myself and the patient.   Treatment Plan         SYMPTOMS; PROBLEMS    MEASURABLE GOALS;    FUNCTIONAL IMPROVEMENT / GAINS INTERVENTIONS DISCHARGE CRITERIA   Depression: depressed mood, low energy, insomnia, and concentration problems  Anxiety: excessive worry, feeling fearful, and nervous/overwhelmed  Marielena/Hypomania: Currently no concerns for hypomania  Substance Use: cannabis  and prescription drugs   Psychosocial: limited social support, mental health symptoms, occupational / vocational stress, and parents relationship stress    reduce depressive symptoms, reduce depressive episodes, find enjoyment at least once a day, learn best practices for sleep, develop strategies for thought distraction when ruminating, make a plan to manage 2-3 anxiety-provoking situations, reduce manic/hypomanic episodes, stay free of THC/limit Rx meds use, be free of threats to self, exercise for 20 minutes 3-7 days a week , learn 2 new ways of coping with routine stressors, and increase time spent with others Supportive / psychodynamic marked symptom improvement, symptom resolution, reduced visit frequency, achievement of functional goals, can transfer back to primary care, and transition/maintenance individual/DBT therapy        Patient staffed in clinic with Dr. Pickens who will sign the note.  Supervisor is Dr. Bland.      The longitudinal plan of care for Artie guerra: bipolar 2 disorder and generalized anxiety disorder was addressed during this visit. Due to the added complexity in care, I will continue to support this patient in the subsequent management of this condition(s) and with the ongoing continuity of care of this condition(s).

## 2024-03-23 ENCOUNTER — HEALTH MAINTENANCE LETTER (OUTPATIENT)
Age: 44
End: 2024-03-23

## 2024-04-07 ENCOUNTER — OFFICE VISIT (OUTPATIENT)
Dept: URGENT CARE | Facility: URGENT CARE | Age: 44
End: 2024-04-07
Payer: COMMERCIAL

## 2024-04-07 VITALS
OXYGEN SATURATION: 98 % | HEART RATE: 65 BPM | RESPIRATION RATE: 15 BRPM | DIASTOLIC BLOOD PRESSURE: 74 MMHG | TEMPERATURE: 98 F | WEIGHT: 120 LBS | HEIGHT: 63 IN | BODY MASS INDEX: 21.26 KG/M2 | SYSTOLIC BLOOD PRESSURE: 108 MMHG

## 2024-04-07 DIAGNOSIS — J06.9 VIRAL URI: Primary | ICD-10-CM

## 2024-04-07 LAB — DEPRECATED S PYO AG THROAT QL EIA: NEGATIVE

## 2024-04-07 PROCEDURE — 87651 STREP A DNA AMP PROBE: CPT | Performed by: STUDENT IN AN ORGANIZED HEALTH CARE EDUCATION/TRAINING PROGRAM

## 2024-04-07 PROCEDURE — 99213 OFFICE O/P EST LOW 20 MIN: CPT | Performed by: STUDENT IN AN ORGANIZED HEALTH CARE EDUCATION/TRAINING PROGRAM

## 2024-04-07 ASSESSMENT — PAIN SCALES - GENERAL: PAINLEVEL: SEVERE PAIN (6)

## 2024-04-07 NOTE — PROGRESS NOTES
Urgent Care - 04/07/2024      HPI:      Artie Diez is a 43 year old female here for evaluation of throat pain.     Pain in the throat on Friday morning. Has some ache. Has some cough, no significant congestion. No fevers.     Pain in the right throat and neck.     No vomiting, diarrhea. No teeth pain.      Review of Systems  Complete ROS negative unless noted in the HPI above.     Allergies  Allergies   Allergen Reactions    Amphotericin B     Phenylbutazones     Penicillin G Nausea and Rash       Outpatient Medications  Current Outpatient Medications   Medication Sig Dispense Refill    divalproex sodium extended-release (DEPAKOTE ER) 250 MG 24 hr tablet Take 1 tablet (250 mg) by mouth daily 30 tablet 3    gabapentin (NEURONTIN) 100 MG capsule Take 1 capsule (100 mg) by mouth 3 times daily Take 1 tab at noon, 1 cap early evening and 1 cap at bedtime 90 capsule 3    lamoTRIgine (LAMICTAL) 100 MG tablet Take 1 tablet (100 mg) by mouth daily Take along with one 25 mg tablet for a combined total of 125 mg. 30 tablet 3    lamoTRIgine (LAMICTAL) 25 MG tablet Take 1 tablet (25 mg) by mouth daily. Take along with one 100 mg tablet for a combined total of 125 mg. 30 tablet 3    LORazepam (ATIVAN) 0.5 MG tablet Take 0.5 mg by mouth every 6 hours as needed for anxiety      norgestim-eth estrad triphasic (ORTHO TRI-CYCLEN LO) 0.18/0.215/0.25 MG-25 MCG tablet Take 1 tablet by mouth daily 84 tablet 4    propranolol (INDERAL) 10 MG tablet Take 1 tablet (10 mg) by mouth 2 times daily as needed (Anxiety) 60 tablet 3    propranolol ER (INDERAL LA) 60 MG 24 hr capsule Take 1 capsule (60 mg) by mouth at bedtime 30 capsule 3    spironolactone (ALDACTONE) 50 MG tablet Take 1 tablet (50 mg) by mouth 2 times daily 180 tablet 2     No current facility-administered medications for this visit.       Past Medical History  Patient Active Problem List   Diagnosis    Heavy menses    CARDIOVASCULAR SCREENING; LDL GOAL LESS THAN 160    Major  "depressive disorder, recurrent episode, moderate (H)    Allergic rhinitis    Anxiety state    Attention deficit disorder    Bipolar I disorder (H)    Dysmenorrhea    Glaucoma suspect    Myopia       Family History  Family History   Problem Relation Age of Onset    Unknown/Adopted Mother         adopted mother    Unknown/Adopted Father         adopted farther    Unknown/Adopted Sister         twin sister       Social History  Social History     Tobacco Use    Smoking status: Never    Smokeless tobacco: Never   Substance Use Topics    Alcohol use: No        Objective:      /74   Pulse 65   Temp 98  F (36.7  C) (Temporal)   Resp 15   Ht 1.6 m (5' 3\")   Wt 54.4 kg (120 lb)   SpO2 98%   BMI 21.26 kg/m      General: well-appearing, in no acute distress.  HEENT: NC/AT, MMM, BL TM grey with positive light reflex. Oropharynx with erythema, worse on the right posterior oropharynx compared to the left, but no significant asymmetry of swelling.  Appears to have been an ulceration in the right posterior oropharynx that is starting to heal.  No cervical lymphadenopathy.  CVS: RRR. No murmurs, rubs, or gallops.  Resp: CTAB, no wheezes, crackles, rhonchi  Skin: No rash on exposed skin.  Extremities: no peripheral edema bilaterally   Neuro: alert and appropriately interactive     Lab & Imaging Results    Results for orders placed or performed in visit on 04/07/24 (from the past 24 hour(s))   Streptococcus A Rapid Screen w/Reflex to PCR - Clinic Collect    Specimen: Throat; Swab   Result Value Ref Range    Group A Strep antigen Negative Negative       I personally reviewed these results and discussed findings with the patient.      Assessment & Plan:   Artie Diez is a 43 year old yo female, who presented with Sore throat, found to have suspected Viral URI..     Viral URI  Vitals are within normal limits. Patient is well appearing, appears hydrated, and is breathing comfortably and maintaining saturations on room " air. No focal findings on exam to suggest a pneumonia. No evidence of AOM.  There appears to be a healing ulcer in the posterior right oropharynx, which may cause the asymmetry of erythema on exam.  Considered subtype of posterior oropharyngeal ring Alyssa abscess, but there is no significant swelling or asymmetry and swelling to suggest this - she is also had no fevers.  Will plan to continued supportive cares, tylenol and ibuprofen as needed for pain or fever, drinking lots of fluids, honey for cough. Advised they should present to the ER with worsening shortness of breathing, lethargy/fatigue, new or worsening fevers, asymmetry of the swelling in the back of her throat, as well as difficulty maintaining hydration. Patient stated understanding of these recommendations, and felt comfortable discharge home.    -     Streptococcus A Rapid Screen w/Reflex to PCR - Clinic Collect  -     Group A Streptococcus PCR Throat Swab    Patient to follow up with PCP in 3-5 days if not getting better, or sooner if getting worse.     Discussed the above with the patient, who stated understanding and agreed with the plan.     Lissette Davis MD  Internal Medicine and Pediatrics   Urgent Care

## 2024-04-07 NOTE — PATIENT INSTRUCTIONS
Your constellation of symptoms are most consistent with a viral illness.     Continue supportive cares including tylenol and ibuprofen as needed, drinking lots of fluids, and resting. You can try saline nasal spray as needed for congestion and honey to help with cough and sore throat.     Please return to the clinic or visit the emergency room if you have worsening symptoms, if you develop difficulty breathing or shortness of breath, if you develop fatigue/lethargy, or if you have concerns about dehydration.     Follow up with your primary care provider as needed or if your symptoms do not improve within the next 5-7 days, or sooner if you are worse.    If the swelling in your throat gets bigger, or your pain is significantly worse, please go the ER for further evaluation.

## 2024-04-08 DIAGNOSIS — N92.0 MENORRHAGIA WITH REGULAR CYCLE: Primary | ICD-10-CM

## 2024-04-08 LAB — GROUP A STREP BY PCR: NOT DETECTED

## 2024-04-16 ENCOUNTER — ANCILLARY PROCEDURE (OUTPATIENT)
Dept: MAMMOGRAPHY | Facility: CLINIC | Age: 44
End: 2024-04-16
Attending: INTERNAL MEDICINE
Payer: COMMERCIAL

## 2024-04-16 ENCOUNTER — ANCILLARY PROCEDURE (OUTPATIENT)
Dept: ULTRASOUND IMAGING | Facility: CLINIC | Age: 44
End: 2024-04-16
Attending: INTERNAL MEDICINE
Payer: COMMERCIAL

## 2024-04-16 DIAGNOSIS — N92.0 MENORRHAGIA WITH REGULAR CYCLE: ICD-10-CM

## 2024-04-16 DIAGNOSIS — Z12.31 VISIT FOR SCREENING MAMMOGRAM: ICD-10-CM

## 2024-04-16 PROCEDURE — 76856 US EXAM PELVIC COMPLETE: CPT | Mod: GC | Performed by: STUDENT IN AN ORGANIZED HEALTH CARE EDUCATION/TRAINING PROGRAM

## 2024-04-16 PROCEDURE — 77063 BREAST TOMOSYNTHESIS BI: CPT | Mod: GC

## 2024-04-16 PROCEDURE — 77067 SCR MAMMO BI INCL CAD: CPT | Mod: GC

## 2024-04-16 NOTE — PROGRESS NOTES
SUBJECTIVE:                                                   Artie Diez is a 43 year old female who presents to clinic today for the following health issue(s):  Patient presents with:  Consult: surgery      HPI:  New patient to me here today referred by her primary care provider for further discussion of fibroid management.  The patient is a nulliparous female with a 8 cm subserosal fibroid near the fundus.  She has always struggled with heavy painful periods and is on oral contraceptive tablets using them in a traditional fashion.  She has 5-day cycles.  She had a mass palpated during her annual exam 2 years ago and had a transvaginal ultrasound showing a fibroid at that time was 5.3 cm.  She has had increased pelvic pressure with some bladder frequency as well as constipation.  She has had a colonoscopy.  She has no childbearing desires.  She has never been sexually active.    She has questions today regarding surgical options for management.    Patient's last menstrual period was 2024 (approximate)..     Patient is not sexually active, .  Using oral contraceptives for contraception.    reports that she has never smoked. She has never used smokeless tobacco.    STD testing offered?  Declined - not sexually active    Health maintenance updated:  Yes     Today's PHQ-2 Score:       2024     2:32 PM   PHQ-2 (  [employer])   Q1: Little interest or pleasure in doing things 1   Q2: Feeling down, depressed or hopeless 1   PHQ-2 Score 2     Today's PHQ-9 Score:       2024     2:32 PM   PHQ-9 SCORE   PHQ-9 Total Score 9     Today's EAMON-7 Score:       2024     2:32 PM   EAMON-7 SCORE   Total Score 14       Problem list and histories reviewed & adjusted, as indicated.  Additional history: as documented.    Patient Active Problem List   Diagnosis    Heavy menses    CARDIOVASCULAR SCREENING; LDL GOAL LESS THAN 160    Major depressive disorder, recurrent episode, moderate (H)    Anxiety state     Attention deficit disorder    Bipolar I disorder (H)    Dysmenorrhea    Glaucoma suspect    Myopia     Past Surgical History:   Procedure Laterality Date    ENT SURGERY  1996    Tonsils removed    no surgical history        Social History     Tobacco Use    Smoking status: Never    Smokeless tobacco: Never   Substance Use Topics    Alcohol use: No      Problem (# of Occurrences) Relation (Name,Age of Onset)    Unknown/Adopted (3) Mother (Darlin): adopted mother, Father (Jhonny): adopted farther, Sister (Indu): twin sister              Current Outpatient Medications   Medication Sig Dispense Refill    divalproex sodium extended-release (DEPAKOTE ER) 250 MG 24 hr tablet Take 1 tablet (250 mg) by mouth daily 30 tablet 3    gabapentin (NEURONTIN) 100 MG capsule Take 1 capsule (100 mg) by mouth 3 times daily Take 1 tab at noon, 1 cap early evening and 1 cap at bedtime 90 capsule 3    lamoTRIgine (LAMICTAL) 100 MG tablet Take 1 tablet (100 mg) by mouth daily Take along with one 25 mg tablet for a combined total of 125 mg. 30 tablet 3    lamoTRIgine (LAMICTAL) 25 MG tablet Take 1 tablet (25 mg) by mouth daily. Take along with one 100 mg tablet for a combined total of 125 mg. 30 tablet 3    LORazepam (ATIVAN) 0.5 MG tablet Take 0.5 mg by mouth every 6 hours as needed for anxiety      norgestim-eth estrad triphasic (ORTHO TRI-CYCLEN LO) 0.18/0.215/0.25 MG-25 MCG tablet Take 1 tablet by mouth daily 84 tablet 4    propranolol (INDERAL) 10 MG tablet Take 1 tablet (10 mg) by mouth 2 times daily as needed (Anxiety) 60 tablet 3    propranolol ER (INDERAL LA) 60 MG 24 hr capsule Take 1 capsule (60 mg) by mouth at bedtime 30 capsule 3    spironolactone (ALDACTONE) 50 MG tablet Take 1 tablet (50 mg) by mouth 2 times daily 180 tablet 2     No current facility-administered medications for this visit.     Allergies   Allergen Reactions    Amphotericin B     Phenylbutazones     Penicillin G Nausea and Rash       ROS:  12 point  "review of systems negative other than symptoms noted below or in the HPI.  No urinary frequency or dysuria, bladder or kidney problems, Normal menstrual cycles      OBJECTIVE:     /78   Ht 1.6 m (5' 3\")   Wt 56.2 kg (124 lb)   LMP 03/20/2024 (Approximate)   Breastfeeding No   BMI 21.97 kg/m    Body mass index is 21.97 kg/m .    Exam:  Constitutional:  Appearance: Well nourished, well developed alert, in no acute distress  Neurologic:  Mental Status:  Oriented X3.  Normal strength and tone, sensory exam grossly normal, mentation intact and speech normal.    Psychiatric:  Mentation appears normal and affect normal/bright.     In-Clinic Test Results:  No results found for this or any previous visit (from the past 24 hour(s)).      ASSESSMENT/PLAN:                                                        ICD-10-CM    1. Fibroids, subserous  D25.2           There are no Patient Instructions on file for this visit.    Very pleasant 43-year-old female with a large fundal fibroid.  I reviewed the ultrasound from primary care that was done recently.  She is unsure what she would like to do for management of this fibroid.  We reviewed options of observation versus hysterectomy as she has no childbearing desires.  We have given her written referral for surgeons for her to consult with.  We discussed the correlating symptoms that she has been having with pelvic pressure, bowel and bladder interference as well.    (20 minutes was spent on the date of the encounter doing chart review, review of outside records, review and interpretation of pertinent test results, history and exam, documentation, patient counseling, and further activities as noted above.)      ZULEIKA Rosales [facility] [place]    "

## 2024-04-18 ENCOUNTER — OFFICE VISIT (OUTPATIENT)
Dept: OBGYN | Facility: CLINIC | Age: 44
End: 2024-04-18
Attending: INTERNAL MEDICINE
Payer: COMMERCIAL

## 2024-04-18 VITALS
BODY MASS INDEX: 21.97 KG/M2 | DIASTOLIC BLOOD PRESSURE: 78 MMHG | HEIGHT: 63 IN | SYSTOLIC BLOOD PRESSURE: 100 MMHG | WEIGHT: 124 LBS

## 2024-04-18 DIAGNOSIS — D25.2 FIBROIDS, SUBSEROUS: Primary | ICD-10-CM

## 2024-04-18 PROCEDURE — 99203 OFFICE O/P NEW LOW 30 MIN: CPT | Performed by: NURSE PRACTITIONER

## 2024-04-18 ASSESSMENT — ANXIETY QUESTIONNAIRES
2. NOT BEING ABLE TO STOP OR CONTROL WORRYING: MORE THAN HALF THE DAYS
7. FEELING AFRAID AS IF SOMETHING AWFUL MIGHT HAPPEN: MORE THAN HALF THE DAYS
6. BECOMING EASILY ANNOYED OR IRRITABLE: MORE THAN HALF THE DAYS
IF YOU CHECKED OFF ANY PROBLEMS ON THIS QUESTIONNAIRE, HOW DIFFICULT HAVE THESE PROBLEMS MADE IT FOR YOU TO DO YOUR WORK, TAKE CARE OF THINGS AT HOME, OR GET ALONG WITH OTHER PEOPLE: SOMEWHAT DIFFICULT
3. WORRYING TOO MUCH ABOUT DIFFERENT THINGS: MORE THAN HALF THE DAYS
GAD7 TOTAL SCORE: 14
GAD7 TOTAL SCORE: 14
5. BEING SO RESTLESS THAT IT IS HARD TO SIT STILL: MORE THAN HALF THE DAYS
1. FEELING NERVOUS, ANXIOUS, OR ON EDGE: MORE THAN HALF THE DAYS

## 2024-04-18 ASSESSMENT — PATIENT HEALTH QUESTIONNAIRE - PHQ9
SUM OF ALL RESPONSES TO PHQ QUESTIONS 1-9: 9
5. POOR APPETITE OR OVEREATING: MORE THAN HALF THE DAYS

## 2024-05-03 ENCOUNTER — MYC MEDICAL ADVICE (OUTPATIENT)
Dept: INTERNAL MEDICINE | Facility: CLINIC | Age: 44
End: 2024-05-03
Payer: COMMERCIAL

## 2024-05-03 ENCOUNTER — MYC REFILL (OUTPATIENT)
Dept: OBGYN | Facility: CLINIC | Age: 44
End: 2024-05-03
Payer: COMMERCIAL

## 2024-05-03 DIAGNOSIS — Z30.41 SURVEILLANCE OF PREVIOUSLY PRESCRIBED CONTRACEPTIVE PILL: ICD-10-CM

## 2024-05-03 RX ORDER — NORGESTIMATE AND ETHINYL ESTRADIOL 7DAYSX3 LO
1 KIT ORAL DAILY
Qty: 84 TABLET | Refills: 4 | Status: CANCELLED | OUTPATIENT
Start: 2024-05-03

## 2024-05-07 RX ORDER — NORGESTIMATE AND ETHINYL ESTRADIOL 7DAYSX3 LO
1 KIT ORAL DAILY
Qty: 84 TABLET | Refills: 2 | Status: SHIPPED | OUTPATIENT
Start: 2024-05-07 | End: 2024-08-14

## 2024-05-29 ENCOUNTER — OFFICE VISIT (OUTPATIENT)
Dept: OBGYN | Facility: CLINIC | Age: 44
End: 2024-05-29
Payer: COMMERCIAL

## 2024-05-29 VITALS
DIASTOLIC BLOOD PRESSURE: 69 MMHG | OXYGEN SATURATION: 97 % | BODY MASS INDEX: 22.27 KG/M2 | SYSTOLIC BLOOD PRESSURE: 108 MMHG | HEART RATE: 78 BPM | WEIGHT: 125.7 LBS

## 2024-05-29 DIAGNOSIS — D25.1 INTRAMURAL LEIOMYOMA OF UTERUS: Primary | ICD-10-CM

## 2024-05-29 PROCEDURE — 99459 PELVIC EXAMINATION: CPT | Performed by: OBSTETRICS & GYNECOLOGY

## 2024-05-29 PROCEDURE — G2211 COMPLEX E/M VISIT ADD ON: HCPCS | Performed by: OBSTETRICS & GYNECOLOGY

## 2024-05-29 PROCEDURE — 99214 OFFICE O/P EST MOD 30 MIN: CPT | Performed by: OBSTETRICS & GYNECOLOGY

## 2024-05-29 ASSESSMENT — PATIENT HEALTH QUESTIONNAIRE - PHQ9: SUM OF ALL RESPONSES TO PHQ QUESTIONS 1-9: 16

## 2024-05-29 NOTE — PROGRESS NOTES
GYN Clinic Visit  Date of visit: 2024   Chief Complaint: fibroid uterus    HPI:   Artie Diez is a 43 year old  female who I was asked to see in consultation for a fibroid uterus.   Fibroid increased in size in 2 years 5cm to 8cm.  Frequent urination. Thought it was anxiety but anxiety is better now and still having frequent urination. Getting up 2-3 times per night to urinate.   Takes OCP continuously, no bleeding.  GI problems for 2 years. On daily miralax.  Pressure symptoms.   On OCP but period sometimes does not come when it is supposed to.  Stressed by menopause  Bloated  Really gassy      24 -  FINDINGS:  The uterus measures 5.5 x 4.8 x 2.7 cm, there is a large, exophytic,heterogenous structure arising near the fundus, measuring 8.0 x 7.9 x 5.8 cm with internal vascularity on Doppler. This is increased from 3/10/2022 at which time it measured 5.3 x 6.3 x 5.6 cm. Additional smaller focal heterogenous lesion within the uterine myometrium measuring 1.4 x 1.9 x 1.0 cm without internal blood flow on Doppler likely representing another fibroid. The endometrium is difficult to visualize given the overlying fibroid, measures approximately 3 mm .  There is no free fluid in the pelvis. The bilateral adnexa are obscured and the ovaries were unable to be visualized.                                                                      IMPRESSION:   1. Large fundal, subserosal uterine fibroid, slightly increased in  size from 3/10/2022.  2. New smaller intramural fibroid within the anterior wall.  3. Ovaries are not visualized bilaterally.        3/10/22  FINDINGS:  No evidence of an adnexal mass.  The ovaries were not visualized.     The uterus measures 2.2 cm x 7.6 cm x 3.5 cm.  There is a large  isoechoic heterogeneous fundal mass measuring 6.3 x 5.6 x 5.3 cm,  likely a fibroid. The endometrium is within normal limits and measures  3 mm. There is no free fluid in the pelvis.                                                                       IMPRESSION:   1. Large fundal uterine fibroid likely cause of dysmenorrhea.  2. Ovaries not visualized.        Obstetric History:   OB History    Para Term  AB Living   0 0 0 0 0 0   SAB IAB Ectopic Multiple Live Births   0 0 0 0 0         Gynecologic History:  Patient's last menstrual period was 2024 (exact date).  STI history: none  Last Pap: 2022 NIL neg HR HPV  History of abnormal pap: no  History of cervical procedures: no   Contraceptive History: none  Fertility goals: none   Not sexually active    Past Medical History:  Past Medical History:   Diagnosis Date    Depressive disorder     Depressive disorder, not elsewhere classified     Depression (non-psychotic)    Excessive or frequent menstruation     Heavy periods    Fibroid uterus        Past Surgical History:  Past Surgical History:   Procedure Laterality Date    ENT SURGERY      Tonsils removed         Medications:  Current Outpatient Medications   Medication Sig Dispense Refill    divalproex sodium extended-release (DEPAKOTE ER) 250 MG 24 hr tablet Take 1 tablet (250 mg) by mouth daily 30 tablet 3    gabapentin (NEURONTIN) 100 MG capsule Take 1 capsule (100 mg) by mouth 3 times daily Take 1 tab at noon, 1 cap early evening and 1 cap at bedtime 90 capsule 3    lamoTRIgine (LAMICTAL) 100 MG tablet Take 1 tablet (100 mg) by mouth daily Take along with one 25 mg tablet for a combined total of 125 mg. 30 tablet 3    lamoTRIgine (LAMICTAL) 25 MG tablet Take 1 tablet (25 mg) by mouth daily. Take along with one 100 mg tablet for a combined total of 125 mg. 30 tablet 3    LORazepam (ATIVAN) 0.5 MG tablet Take 0.5 mg by mouth every 6 hours as needed for anxiety      norgestim-eth estrad triphasic (ORTHO TRI-CYCLEN LO) 0.18/0.215/0.25 MG-25 MCG tablet Take 1 tablet by mouth daily 84 tablet 2    propranolol (INDERAL) 10 MG tablet Take 1 tablet (10 mg) by mouth 2 times daily as needed (Anxiety)  "60 tablet 3    propranolol ER (INDERAL LA) 60 MG 24 hr capsule Take 1 capsule (60 mg) by mouth at bedtime 30 capsule 3    spironolactone (ALDACTONE) 50 MG tablet Take 1 tablet (50 mg) by mouth 2 times daily 180 tablet 2     No current facility-administered medications for this visit.       Allergy:  Allergies   Allergen Reactions    Amphotericin B     Phenylbutazones     Penicillin G Nausea and Rash     Patient denies food, latex or environmental allergies.     Social History:   at [facility]  Social History     Tobacco Use    Smoking status: Never    Smokeless tobacco: Never   Vaping Use    Vaping status: Never Used   Substance Use Topics    Alcohol use: No    Drug use: No        Family History   Problem Relation Age of Onset    Unknown/Adopted Mother         adopted mother    Unknown/Adopted Father         adopted farther    Unknown/Adopted Sister         twin sister       Physical Exam:  Vitals:    05/29/24 1016   BP: 108/69   BP Location: Left arm   Patient Position: Sitting   Cuff Size: Adult Regular   Pulse: 78   SpO2: 97%   Weight: 57 kg (125 lb 11.2 oz)     Estimated body mass index is 22.27 kg/m  as calculated from the following:    Height as of 4/18/24: 1.6 m (5' 3\").    Weight as of this encounter: 57 kg (125 lb 11.2 oz).     Gen: healthy, alert, active, no distress  Abd: soft, non-tender, non-distended  Extremities: nontender, no edema  :   - external genitalia: vulva and perineum are normal without lesion, mass or erythema  - urethra: well supported urethra, no hypermobility, normal Skenes and Bartholins.   - bladder: no tenderness, no masses  - vagina: intact, rugated mucosa without lesions or abnormal discharge. No prolapse.   - cervix: normal, no lesions or abnormal discharge.    - uterus: 16w size anteverted, no masses or tenderness fibroid palpable FDC between pubic symphysis and umbilicus  - adnexa: no masses or tenderness  - rectal: deferred      Assessment:  Artie Diez is a " 43 year old  who presents in consultation for fibroid uterus.     Plan:  1. Intramural leiomyoma of uterus  Discussed fibroids, symptoms and treatment options.   Discussed hysterectomy, uterine artery/uterine fibroid embolization, GnRH antagonists like Myfembree. Discussed it will continue growing until menopause, unsure what rate. Discussed I do not recommend myomectomy if she does not desire future fertility bc it typically has more bleeding than a hysterectomy and more fibroids could grow. She is considering hysterectomy in the fall. Discussed we don't have our schedules out yet but will contact her when we do. She will need an endometrial biopsy prior to surgery. Discussed abdominal approach due to size, 1-2 night hospital stay, 6 week post op recovery.     - Case Request: HYSTERECTOMY, TOTAL, ABDOMINAL, WITH SALPINGECTOMY, CYSTOSCOPY  - MS PELVIC EXAMINATION      Genevieve Queen MD

## 2024-06-01 ENCOUNTER — HEALTH MAINTENANCE LETTER (OUTPATIENT)
Age: 44
End: 2024-06-01

## 2024-06-13 NOTE — PROGRESS NOTES
[facility]  MEDICAL PROGRESS NOTE     CARE TEAM:    PCP- Fahad Concepcion  Therapist- Evangelina ([place] [facility], primarily for DBT q week), and individual 1:1 Carl (she is at [email or web address])       Artie is a 43 year old who uses the pronouns she, her.      Diagnoses     Bipolar II Disorder, current episode depressed, mild (history of rapid cycling)  EAMON     Assessment     Artie is a 42 yo patient with past psychiatric diagnosis of bipolar 2 disorder, anxiety disorder who is here today for a follow-up visit.     Overall appears that Artie's mood and anxiety symptoms have been largely stable/not worsened and appear continued being improved, denying current concerns for depressive symptoms and attributes this to her current medication regimen being the best combination thus far, which she had been tolerating well without noticing side effects.     Today Artie continues expressing strong preference of not making medication changes, which is reasonable. She is open to continue conversation around future recommendations described in the section below.     Anticipating end of academic year and starting in July transferring pt's care to incoming  resident, we discussed continuing care in this clinic vs exploring other options and pt expressed understanding of teaching clinic setting and also expressed interest in continuing  MH care in the resident clinic.   No other questions or concerns today.       Future Considerations:  -Consider switching from Depakote and lamotrigine to lithium monotherapy, vs optimizing lamotrigine or Depakote  -Continue encouraging decrease cannabis products use  -Recommend exploring other contraceptive options with Rx and also use a secondary method for pregnancy prevention.  -Continue rec against cannabis use, benzodiazepine use.     Psychotropic Drug Interactions:  [PSYCHCLINICDDI]  -ADDITIVE SEDATION: Melatonin,  Ativan, gabapentin  -ADDITIVE CNS/RESPIRATORY DEPRESSION: Ativan, gabapentin, Lamictal, Depakote  -VIA (likely) valproate-mediated reduction in glucuronide metabolism of lorazepam, Depakote may increase the serum concentration of Ativan  -DEPAKOTE & LAMICTAL- Valproate Products may enhance the adverse/toxic effect of LamoTRIgine. Valproate Products may increase the serum concentration of LamoTRIgine. Severity Major      -LAMICTAL & ORTHO TRI-CYCLEN LO- Estrogen Derivatives (Contraceptive) may decrease the serum concentration of LamoTRIgine, the most likely mechanism for this interaction is enhanced lamotrigine glucuronidation (possibly via UGT1A4)   -Via CY: Cannabis may increase serum concentration of propranolol, melatonin   -DEPAKOTE/ORTHO TRI-CYCLEN LO: Estrogen Derivatives may decrease the serum concentration of Valproate Products   -Ethinyl Estradiol-Containing Products may decrease the serum concentration of Benzodiazepines (metabolized by glucuronidation)   -Ethinyl Estradiol-Containing Products may increase the serum concentration of Propranolol.   Management: Monitoring for adverse effects, routine labs, using lowest therapeutic dose of [Depakote & Lamictal, gabapentin] and patient is aware of risks     [place]: indicates that controlled prescriptions have been filled as prescribed. No evidence of filling Ativan over past 1 year.     Risk Statements:   Treatment Risk- Risks, benefits, alternatives and potential adverse effects have been discussed and are understood.   Safety Risk-Artie did not appear to be an imminent safety risk to self or others.     Plan     1) Meds-   - Continue lamotrigine 125 mg daily.   - Depakote ER - 250 mg at bedtime   - gabapentin- 100 mg every day at noon and 100 mg at bedtime.  - propranolol IR 10 mg bid /prn- for anxiety  - propranolol ER 60 mg qhs  - melatonin 1-1.5 mg at bedtime, OTC     PCP-   - Reported by patient: Ativan 0.25-0.5 mg at bedtime/prn  (1x-2x per week). We have not taken over prescribing this medication and advise against due to risks outweighing benefits. Per  (as of today): No evidence of filling Ativan over past 1 year.   Spironolactone- 50 mg bid- for hair loss  L-Methylfolate     2) Psychotherapy-   Individual therapy oongoing, works with DBT .   Working with racial trauma consultant/ on identity development.     3) Next due-  Labs- lamotrigine and Depakote level obtained on 5/22/23. Determine next due.   Rating scales- EAMON, PHQ9     4) Referrals-  None     5) Other   None    6) Dispo- RTC in approximately 8-12 weeks (or earlier if needed) with the new resident.      Pertinent Background                                                   [most recent eval 09/08/23]     Artie arnett was diagnosed with depression at age 14. Started various medications without working diagnosis in late 20's, had been resistant to medication in past, with multiple somatic symptoms, multiple diagnoses in past. Current diagnosis made about 4 years ago by mood disorder specialist in [place], done as second opinion as struggling with approach of first psychiatrist.   Has been on the current combination of medications since saw specialist, and per specialist, if have this type of condition, cannot be on antidepressants. She is prone to side effects with medications and sometimes, its hard for her to manage her medications due to the side effects, so her doctor was keeping her meds at a low dosage. Sleep is on and off. She was actively involved in CBT and DBT while in [place]     Psych pertinent item history includes suicide attempt [xseveral overdoses as a teenager], SIB [mainly in her teens/20's] and mutiple psychotropic trials       Subjective     Since our last visit (3/22/24)  Updates:   Reports physical symptoms of anxiety have been persisting, needing more propranolol.   -Usually this does not last too long and that is been changed. Sometimes needing  "~2 tabs in the evening  -Identifies past couple months at work have been very stressful.   -Overall keeping busy helps although struggles balancing finding a mid-ground   -Shares feeling better, has this week off.   -Staycation currently as was accumulating too many vacation days and needed to use these. Been home this week  -This time of the year is busier at work  -Works closely with students. /admissions/infrastructure  -Shares parents are overall ok. Dad still struggling with some mild cognitive issues, similarly to mom.  -They live in a senior living complex, good support.   -Shares recently saw an OBGYN doc, due to experiencing some devin-menopausal symptoms, wanted an IUD, reports was told has fibroids and IUD was not a good option, recommended hysterectomy, decided it is the route she wants to go. Anticipates will be scheduled in the fall.   -Mood:  \"pretty ok recently\"  -Anxiety: Increased over past couple months, evened out now, as above.  -Appetite: Unchanged  -Weight changes: Denies   -Sleep: Been sleeping 6-7 hrs.  -BM/constipation: Denies   -Tremor/abnormal movements: Denies  -Denies Chest pain, palpitations. Other side effects: Denies   -Perceptual disturbances: Denies   -Safety: Denies SI/HI    Recent Psych Symptoms:   Depression:  low energy and dysregulation  Elevated:  none  Psychosis:  none  Anxiety:  As above  Trauma Related:  none  Insomnia:  No  Other:  No     Current Social History:  Financial/occupational: working , N .   Living situation (partner, children, pets, etc): Has own place, lives alone.   Social/spiritual support: Parents, mostly her mother   Feels safe at home: Yes    Pertinent Substance Use:   -Cannabis- Denies previously reported THC drinks, cut down.   Opioids: No   Narcan Kit current: N/A  Other substances: Denies any other substance use.    Medical Review of Systems:   Lightheadedness/orthostasis: None  Headaches: None  GI: none  Sexual " "health concerns: None    A comprehensive review of systems was performed and is negative other than noted above.     Mental Status Exam     Alertness: alert  and oriented  Appearance: well groomed  Behavior/Demeanor: cooperative and pleasant, with good  eye contact   Speech: normal and regular rate and rhythm  Language: intact  Psychomotor: normal or unremarkable  Mood: \"pretty ok recently\"  Affect: full range for topics discussed, somewhat anxious; congruent to: mood- yes, content- yes  Thought Process/Associations: unremarkable  Thought Content:  Reports preoccupations overall not interfering with overall functioning.  Denies violent ideation, suicidal ideation with plan; or intent [details in history], delusions , obsessions , phobia , magical thinking, over-valued ideas, and paranoid ideation  Perception:  Reports none;  Denies hallucinations, depersonalization, and derealization  Insight: good  Judgment: good and adequate for safety  Cognition: does  appear grossly intact; formal cognitive testing was not done  Gait and Station: N/A (telehealth)      Past Psych Med Trials          Medication Max Dose (mg) Dates / Duration Helpful? DC Reason / Adverse Effects?   Paxil     further bad side effects on higher doses, night sweats    Wellbutrin        Lithium        Depakote        Adderall        Strattera       Clonazepam       Buspar    Akathisia   Pristq         Of note, per chart review, Tends not to tolerate most medications, c/o GI symptoms and HA .        Treatment Course and Hahn Events since  JULY 2023 7/28/2023: Transfer of care visit.      Vitals   There were no vitals taken for this visit.  Pulse Readings from Last 3 Encounters:   05/29/24 78   04/07/24 65   11/21/23 66     Wt Readings from Last 3 Encounters:   05/29/24 57 kg (125 lb 11.2 oz)   04/18/24 56.2 kg (124 lb)   04/07/24 54.4 kg (120 lb)     BP Readings from Last 3 Encounters:   05/29/24 108/69   04/18/24 100/78   04/07/24 108/74        " Medical History     ALLERGIES: Amphotericin b, Phenylbutazones, and Penicillin g    Patient Active Problem List   Diagnosis    Heavy menses    CARDIOVASCULAR SCREENING; LDL GOAL LESS THAN 160    Major depressive disorder, recurrent episode, moderate (H)    Anxiety state    Attention deficit disorder    Bipolar I disorder (H)    Dysmenorrhea    Glaucoma suspect    Myopia        Medications     Current Outpatient Medications   Medication Sig Dispense Refill    divalproex sodium extended-release (DEPAKOTE ER) 250 MG 24 hr tablet Take 1 tablet (250 mg) by mouth daily 30 tablet 3    gabapentin (NEURONTIN) 100 MG capsule Take 1 capsule (100 mg) by mouth 3 times daily Take 1 tab at noon, 1 cap early evening and 1 cap at bedtime 90 capsule 3    lamoTRIgine (LAMICTAL) 100 MG tablet Take 1 tablet (100 mg) by mouth daily Take along with one 25 mg tablet for a combined total of 125 mg. 30 tablet 3    lamoTRIgine (LAMICTAL) 25 MG tablet Take 1 tablet (25 mg) by mouth daily. Take along with one 100 mg tablet for a combined total of 125 mg. 30 tablet 3    LORazepam (ATIVAN) 0.5 MG tablet Take 0.5 mg by mouth every 6 hours as needed for anxiety      norgestim-eth estrad triphasic (ORTHO TRI-CYCLEN LO) 0.18/0.215/0.25 MG-25 MCG tablet Take 1 tablet by mouth daily 84 tablet 2    propranolol (INDERAL) 10 MG tablet Take 1 tablet (10 mg) by mouth 2 times daily as needed (Anxiety) 60 tablet 3    propranolol ER (INDERAL LA) 60 MG 24 hr capsule Take 1 capsule (60 mg) by mouth at bedtime 30 capsule 3    spironolactone (ALDACTONE) 50 MG tablet Take 1 tablet (50 mg) by mouth 2 times daily 180 tablet 2     - Reported by patient: Ativan 0.25-0.5 mg at bedtime/prn (1x-2x per week). We have not taken over prescribing this medication and advise against due to risks outweighing benefits. Per  today: No evidence of filling Ativan over past 1 year.      Labs and Data         7/27/2023     5:09 PM 12/1/2023     2:25 PM 3/21/2024     5:39 PM    PROMIS-10 Total Score w/o Sub Scores   PROMIS TOTAL - SUBSCORES 18 21 19          No data to display                  3/21/2024     5:37 PM 4/18/2024     2:32 PM 5/29/2024    10:21 AM   PHQ-9 SCORE   PHQ-9 Total Score MyChart 15 (Moderately severe depression)     PHQ-9 Total Score 15 9 16         12/1/2023     2:24 PM 3/21/2024     5:38 PM 4/18/2024     2:32 PM   EAMON-7 SCORE   Total Score 17 (severe anxiety) 10 (moderate anxiety)    Total Score 17 10 14       Liver/Kidney Function, TSH Metabolic Blood counts   Recent Labs   Lab Test 04/21/23  1757 06/17/22  1617 11/24/21  1543   AST  --  10 11   ALT  --  11 15   ALKPHOS  --  56 49   CR 0.85  --  0.79     Recent Labs   Lab Test 06/17/22  1617   TSH 1.09    Recent Labs   Lab Test 10/30/20  1200   CHOL 167   TRIG 148   LDL 81   HDL 56     No lab results found.  Recent Labs   Lab Test 04/21/23  1757   GLC 92    Recent Labs   Lab Test 06/17/22  1617   WBC 9.0   HGB 13.0   HCT 38.9   MCV 85            Recent Labs   Lab Test 06/17/22  1617 11/24/21  1543   VANDANA 37 38           Recent Labs   Lab Test 06/17/22  1617 11/24/21  1543   AST 10 11   ALT 11 15   ALKPHOS 56 49      Component     5/22/2023      Valproic acid   ug/mL 25.9 Low  25.0 Low  CM   Comment: Therapeutic Range:  ug/mL  Epilepsy and paula patients:  ug/mL  Some patients require and can tolerate values up to 150 ug/mL    Critical: Greater than 150 ug/mL   Resulting Agency   [facility]                Specimen Collected: 05/22/23 10:50 AM               LAMOTRIGINE LEVEL 5/22/23:                     Component Ref Range & Units 2 mo ago  (5/22/23) 4 mo ago  (3/23/23) 1 yr ago  (6/17/22) 1 yr ago  (12/15/21)    Lamotrigine 3.0 - 15.0 ug/mL 6.1 7.8 CM 4.4 CM 3.7 R, CM   Comment: INTERPRETIVE INFORMATION:  Lamotrigine            ======================  PROVIDER: Ran Gregorio MD    Level of Medical Decision Making:   - At least 1 chronic problem that is not stable  - Engaged in prescription drug  management during visit (discussed any medication benefits, side effects, alternatives, etc.)       Psychiatry Individual Psychotherapy Note   Psychotherapy start time - 1600  Psychotherapy end time - 1620  Date treatment plan last reviewed with patient - 06/14/24  Subjective: This supportive psychotherapy session addressed issues related to goals of therapy and current psychosocial stressors. Patient's reaction: Preparatory, Action, and Maintenance in the context of mental status appropriate for ambulatory setting.    Interactive complexity indicated? No  Plan: RTC in timeframe noted above  Psychotherapy services during this visit included myself and the patient.   Treatment Plan         SYMPTOMS; PROBLEMS    MEASURABLE GOALS;    FUNCTIONAL IMPROVEMENT / GAINS INTERVENTIONS DISCHARGE CRITERIA   Depression: depressed mood, low energy, insomnia, and concentration problems  Anxiety: excessive worry, feeling fearful, and nervous/overwhelmed  Marielena/Hypomania: Currently no concerns for hypomania  Substance Use: cannabis  and prescription drugs   Psychosocial: limited social support, mental health symptoms, occupational / vocational stress, and parents relationship stress    reduce depressive symptoms, reduce depressive episodes, find enjoyment at least once a day, learn best practices for sleep, develop strategies for thought distraction when ruminating, make a plan to manage 2-3 anxiety-provoking situations, reduce manic/hypomanic episodes, stay free of THC/limit Rx meds use, be free of threats to self, exercise for 20 minutes 3-7 days a week , learn 2 new ways of coping with routine stressors, and increase time spent with others Supportive / psychodynamic marked symptom improvement, symptom resolution, reduced visit frequency, achievement of functional goals, can transfer back to primary care, and transition/maintenance individual/DBT therapy         Patient not staffed in clinic.  Note will be reviewed and signed by  supervisor Dr. Hathaway.

## 2024-06-14 ENCOUNTER — VIRTUAL VISIT (OUTPATIENT)
Dept: PSYCHIATRY | Facility: CLINIC | Age: 44
End: 2024-06-14
Attending: PSYCHIATRY & NEUROLOGY
Payer: COMMERCIAL

## 2024-06-14 VITALS — BODY MASS INDEX: 22.15 KG/M2 | WEIGHT: 125 LBS | HEIGHT: 63 IN

## 2024-06-14 DIAGNOSIS — F31.81 BIPOLAR 2 DISORDER (H): Primary | ICD-10-CM

## 2024-06-14 DIAGNOSIS — F41.1 GAD (GENERALIZED ANXIETY DISORDER): ICD-10-CM

## 2024-06-14 PROCEDURE — 90833 PSYTX W PT W E/M 30 MIN: CPT | Mod: 95

## 2024-06-14 PROCEDURE — 99214 OFFICE O/P EST MOD 30 MIN: CPT | Mod: 95

## 2024-06-14 RX ORDER — PROPRANOLOL HYDROCHLORIDE 10 MG/1
10 TABLET ORAL 2 TIMES DAILY PRN
Qty: 60 TABLET | Refills: 3 | Status: SHIPPED | OUTPATIENT
Start: 2024-06-14 | End: 2024-08-30

## 2024-06-14 RX ORDER — DIVALPROEX SODIUM 250 MG/1
250 TABLET, EXTENDED RELEASE ORAL DAILY
Qty: 30 TABLET | Refills: 3 | Status: SHIPPED | OUTPATIENT
Start: 2024-06-14 | End: 2024-08-30

## 2024-06-14 RX ORDER — GABAPENTIN 100 MG/1
100 CAPSULE ORAL 3 TIMES DAILY
Qty: 90 CAPSULE | Refills: 3 | Status: SHIPPED | OUTPATIENT
Start: 2024-06-14 | End: 2024-08-30

## 2024-06-14 RX ORDER — LAMOTRIGINE 25 MG/1
TABLET ORAL
Qty: 30 TABLET | Refills: 3 | Status: SHIPPED | OUTPATIENT
Start: 2024-06-14 | End: 2024-08-30

## 2024-06-14 RX ORDER — LAMOTRIGINE 100 MG/1
100 TABLET ORAL DAILY
Qty: 30 TABLET | Refills: 3 | Status: SHIPPED | OUTPATIENT
Start: 2024-06-14 | End: 2024-08-30

## 2024-06-14 RX ORDER — PROPRANOLOL HCL 60 MG
60 CAPSULE, EXTENDED RELEASE 24HR ORAL AT BEDTIME
Qty: 30 CAPSULE | Refills: 3 | Status: SHIPPED | OUTPATIENT
Start: 2024-06-14 | End: 2024-08-30

## 2024-06-14 ASSESSMENT — ANXIETY QUESTIONNAIRES
2. NOT BEING ABLE TO STOP OR CONTROL WORRYING: MORE THAN HALF THE DAYS
3. WORRYING TOO MUCH ABOUT DIFFERENT THINGS: MORE THAN HALF THE DAYS
4. TROUBLE RELAXING: NEARLY EVERY DAY
1. FEELING NERVOUS, ANXIOUS, OR ON EDGE: NEARLY EVERY DAY
IF YOU CHECKED OFF ANY PROBLEMS ON THIS QUESTIONNAIRE, HOW DIFFICULT HAVE THESE PROBLEMS MADE IT FOR YOU TO DO YOUR WORK, TAKE CARE OF THINGS AT HOME, OR GET ALONG WITH OTHER PEOPLE: VERY DIFFICULT
5. BEING SO RESTLESS THAT IT IS HARD TO SIT STILL: NOT AT ALL
6. BECOMING EASILY ANNOYED OR IRRITABLE: MORE THAN HALF THE DAYS
7. FEELING AFRAID AS IF SOMETHING AWFUL MIGHT HAPPEN: NOT AT ALL
GAD7 TOTAL SCORE: 12
7. FEELING AFRAID AS IF SOMETHING AWFUL MIGHT HAPPEN: NOT AT ALL
8. IF YOU CHECKED OFF ANY PROBLEMS, HOW DIFFICULT HAVE THESE MADE IT FOR YOU TO DO YOUR WORK, TAKE CARE OF THINGS AT HOME, OR GET ALONG WITH OTHER PEOPLE?: VERY DIFFICULT
GAD7 TOTAL SCORE: 12
GAD7 TOTAL SCORE: 12

## 2024-06-14 ASSESSMENT — PATIENT HEALTH QUESTIONNAIRE - PHQ9
SUM OF ALL RESPONSES TO PHQ QUESTIONS 1-9: 17
SUM OF ALL RESPONSES TO PHQ QUESTIONS 1-9: 17
10. IF YOU CHECKED OFF ANY PROBLEMS, HOW DIFFICULT HAVE THESE PROBLEMS MADE IT FOR YOU TO DO YOUR WORK, TAKE CARE OF THINGS AT HOME, OR GET ALONG WITH OTHER PEOPLE: VERY DIFFICULT

## 2024-06-14 ASSESSMENT — PAIN SCALES - GENERAL: PAINLEVEL: NO PAIN (0)

## 2024-06-14 NOTE — PATIENT INSTRUCTIONS
**For crisis resources, please see the information at the end of this document**   Patient Education    Thank you for coming to the [facility] [place] [facility].     Lab Testing:  If you had lab testing today and your results are reassuring or normal they will be mailed to you or sent through [employer] within 7 days. If the lab tests need quick action we will call you with the results. The phone number we will call with results is # [phone]. If this is not the best number please call our clinic and change the number.     Medication Refills:  If you need any refills please call your pharmacy and they will contact us. Our fax number for refills is [phone].   Three business days of notice are needed for general medication refill requests.   Five business days of notice are needed for controlled substance refill requests.   If you need to change to a different pharmacy, please contact the new pharmacy directly. The new pharmacy will help you get your medications transferred.     Contact Us:  Please call [phone] during business hours (8-5:00 M-F).   If you have medication related questions after clinic hours, or on the weekend, please call [phone].     Financial Assistance [phone]   Medical Records [phone]       MENTAL HEALTH CRISIS RESOURCES:  For a emergency help, please call 911 or go to the nearest Emergency Department.     Emergency Walk-In Options:   EmPATH Unit @ [facility] ([place]): [phone] - Specialized mental health emergency area designed to be calming  [facility] ([place]): [phone]  [facility] ([place]): [phone]  [facility]): [phone]    [facility] Crisis Information:   [place]: [phone]  [place]: [phone]  [place] ([place]HEFALI) - Adult: [phone]     Child: [phone]  [place] - Adult: [phone]     Child: [phone]  [place]:  [phone]  List of all [facility] resources:   https://[place].gov/dhs/people-we-serve/adults/health-care/mental-health/resources/crisis-contacts.jsp    National Crisis Information:   Crisis Text Line: Text  [place]  to 446688  Suicide & Crisis Lifeline: 988  National Suicide Prevention Lifeline: 6-200-861-TALK (1-244.193.2602)       For online chat options, visit https://suicidepreventionlifeline.org/chat/  Poison Control Center: 2-715-964-0426  Trans Lifeline: 5-186-312-5312 - Hotline for transgender people of all ages  The [employer]: 6-650-415-8217 - Hotline for LGBT youth     For Non-Emergency Support:   Fast Tracker: Mental Health & Substance Use Disorder Resources -   https://www.[employer].comn.org/

## 2024-06-14 NOTE — NURSING NOTE
Is the patient currently in the state of [place]? YES    Visit mode:VIDEO    If the visit is dropped, the patient can be reconnected by: VIDEO VISIT: Text to cell phone:   Telephone Information:   Mobile [phone]       Will anyone else be joining the visit? NO  (If patient encounters technical issues they should call [phone]    How would you like to obtain your AVS? MyChart    Are changes needed to the allergy or medication list? No    Are refills needed on medications prescribed by this physician? NO    Reason for visit: Video Visit (Recheck)    Peri JOHNSON

## 2024-06-25 ENCOUNTER — TELEPHONE (OUTPATIENT)
Dept: OBGYN | Facility: CLINIC | Age: 44
End: 2024-06-25
Payer: COMMERCIAL

## 2024-06-25 NOTE — TELEPHONE ENCOUNTER
LVMTCB to discuss surgery scheduling. [employer] message also sent to patient.     Marcella  She/her/hers  [facility]

## 2024-07-11 ASSESSMENT — ANXIETY QUESTIONNAIRES
5. BEING SO RESTLESS THAT IT IS HARD TO SIT STILL: NOT AT ALL
4. TROUBLE RELAXING: SEVERAL DAYS
8. IF YOU CHECKED OFF ANY PROBLEMS, HOW DIFFICULT HAVE THESE MADE IT FOR YOU TO DO YOUR WORK, TAKE CARE OF THINGS AT HOME, OR GET ALONG WITH OTHER PEOPLE?: SOMEWHAT DIFFICULT
GAD7 TOTAL SCORE: 9
6. BECOMING EASILY ANNOYED OR IRRITABLE: MORE THAN HALF THE DAYS
7. FEELING AFRAID AS IF SOMETHING AWFUL MIGHT HAPPEN: NOT AT ALL
1. FEELING NERVOUS, ANXIOUS, OR ON EDGE: NEARLY EVERY DAY
GAD7 TOTAL SCORE: 9
7. FEELING AFRAID AS IF SOMETHING AWFUL MIGHT HAPPEN: NOT AT ALL
3. WORRYING TOO MUCH ABOUT DIFFERENT THINGS: MORE THAN HALF THE DAYS
IF YOU CHECKED OFF ANY PROBLEMS ON THIS QUESTIONNAIRE, HOW DIFFICULT HAVE THESE PROBLEMS MADE IT FOR YOU TO DO YOUR WORK, TAKE CARE OF THINGS AT HOME, OR GET ALONG WITH OTHER PEOPLE: SOMEWHAT DIFFICULT
2. NOT BEING ABLE TO STOP OR CONTROL WORRYING: SEVERAL DAYS

## 2024-07-15 ENCOUNTER — OFFICE VISIT (OUTPATIENT)
Dept: OBGYN | Facility: CLINIC | Age: 44
End: 2024-07-15
Attending: OBSTETRICS & GYNECOLOGY
Payer: COMMERCIAL

## 2024-07-15 VITALS
HEIGHT: 62 IN | HEART RATE: 75 BPM | WEIGHT: 126 LBS | DIASTOLIC BLOOD PRESSURE: 72 MMHG | SYSTOLIC BLOOD PRESSURE: 111 MMHG | BODY MASS INDEX: 23.19 KG/M2

## 2024-07-15 DIAGNOSIS — D25.1 INTRAMURAL LEIOMYOMA OF UTERUS: Primary | ICD-10-CM

## 2024-07-15 PROCEDURE — 99215 OFFICE O/P EST HI 40 MIN: CPT | Performed by: OBSTETRICS & GYNECOLOGY

## 2024-07-15 PROCEDURE — 99213 OFFICE O/P EST LOW 20 MIN: CPT | Performed by: OBSTETRICS & GYNECOLOGY

## 2024-07-15 NOTE — PROGRESS NOTES
Chief Complaint   Patient presents with    Establish Care     Hysterectomy consult    Shayla Villanueva LPN   Answers submitted by the patient for this visit:  EAMON-7 (Submitted on 7/11/2024)  EAMON 7 TOTAL SCORE: 9

## 2024-07-15 NOTE — PATIENT INSTRUCTIONS
Thank you for trusting us with your care!     If you need to contact us for questions about:  Symptoms, Scheduling & Medical Questions; Non-urgent (2-3 day response) Bryce message, Urgent (needing response today) [phone] (if after 3:30pm next day response)   Prescriptions: Please call your Pharmacy   Billing: Umair [phone] or [facility]:[phone]

## 2024-07-15 NOTE — LETTER
7/15/2024       RE: Artie Diez  [address]  [place] [address]     Dear Colleague,    Thank you for referring your patient, Artie Diez, to the [facility] [place] at [facility]. Please see a copy of my visit note below.    Chief Complaint   Patient presents with     Establish Care     Hysterectomy consult    Shayla Villanueva LPN   Answers submitted by the patient for this visit:  EAMON-7 (Submitted on 7/11/2024)  EAMON 7 TOTAL SCORE: 9      CC:  Consult from self  for second opinion for hysterectomy route    HPI:  Artie is a nulligravid female w/ known fibroid uterus.  Now noting pressure and some bladder symptoms. Fibroid has grown from 5 cm to 8 over past year.  She was considering hysterectomy w/ Dr. Queen in the fall, but wondered if she could have less invasive option.  Using OCP w/ decent bleeding control.  5 days each w/d cycle. Pressure and fullness are more bothersome. Having urinary frequency which she says may be d/t enlarged uterus or her mood/anxiety.   Not bleeding during active pills. Not sexually active.     Patients records are available and reviewed at today's visit.    Past GYN history:  No STD history  Last PAP smear:  Normal with neg HPV 2022  Last TSH:, normal      Past Medical History:   Diagnosis Date     Bipolar 2 disorder (H)      Depressive disorder      Depressive disorder, not elsewhere classified     Depression (non-psychotic)     Excessive or frequent menstruation     Heavy periods     Fibroid uterus        Past Surgical History:   Procedure Laterality Date     ENT SURGERY  1996    Tonsils removed       Family History   Problem Relation Age of Onset     Unknown/Adopted Mother         adopted mother     Unknown/Adopted Father         adopted farther     Unknown/Adopted Sister         twin sister       Allergies: Amphotericin b, Phenylbutazones, and Penicillin g    Current Outpatient  "Medications   Medication Sig Dispense Refill     divalproex sodium extended-release (DEPAKOTE ER) 250 MG 24 hr tablet Take 1 tablet (250 mg) by mouth daily 30 tablet 3     gabapentin (NEURONTIN) 100 MG capsule Take 1 capsule (100 mg) by mouth 3 times daily Take 1 tab at noon, 1 cap early evening and 1 cap at bedtime 90 capsule 3     lamoTRIgine (LAMICTAL) 100 MG tablet Take 1 tablet (100 mg) by mouth daily Take along with one 25 mg tablet for a combined total of 125 mg. 30 tablet 3     lamoTRIgine (LAMICTAL) 25 MG tablet Take 1 tablet (25 mg) by mouth daily. Take along with one 100 mg tablet for a combined total of 125 mg. 30 tablet 3     LORazepam (ATIVAN) 0.5 MG tablet Take 0.5 mg by mouth every 6 hours as needed for anxiety       norgestim-eth estrad triphasic (ORTHO TRI-CYCLEN LO) 0.18/0.215/0.25 MG-25 MCG tablet Take 1 tablet by mouth daily 84 tablet 2     propranolol (INDERAL) 10 MG tablet Take 1 tablet (10 mg) by mouth 2 times daily as needed (Anxiety) 60 tablet 3     propranolol ER (INDERAL LA) 60 MG 24 hr capsule Take 1 capsule (60 mg) by mouth at bedtime 30 capsule 3     spironolactone (ALDACTONE) 50 MG tablet Take 1 tablet (50 mg) by mouth 2 times daily 180 tablet 2       ROS:  C: NEGATIVE for fever, chills, change in weight  I: NEGATIVE for worrisome rashes, moles or lesions  E: NEGATIVE for vision changes or irritation  E/M: NEGATIVE for ear, mouth and throat problems  R: NEGATIVE for significant cough or SOB  CV: NEGATIVE for chest pain, palpitations or peripheral edema  GI: NEGATIVE for nausea, abdominal pain, heartburn, or change in bowel habits  : NEGATIVE for frequency, dysuria, hematuria, vaginal discharge  M: NEGATIVE for significant arthralgias or myalgia  N: NEGATIVE for weakness, dizziness or paresthesias  E: NEGATIVE for temperature intolerance, skin/hair changes    EXAM:  Blood pressure 111/72, pulse 75, height 1.575 m (5' 2\"), weight 57.2 kg (126 lb), not currently breastfeeding.   BMI= " Body mass index is 23.05 kg/m .  General - pleasant female , mildly anxious talking about pelvic exam/surgical options  Neck - supple without lymphadenopathy or thyromegaly.  Lungs -non labored  Heart - well perfused  Abdomen - soft, nontender, nondistended, no hepatosplenomegaly.  Pelvic - EG: normal adult female, BUS: within normal limits, Vagina: normal tone. Cervix: no lesions or CMT, Uterus: firm, enlarged, but mobile and non-tender  Rectovaginal - deferred.  Musculoskeletal - no gross deformities.  Neurological - normal strength, sensation, and mental status.      ASSESSMENT/PLAN:  42 yo nulligravid female w/ enlarged uterus d/t fibroids. Desires definitive mgmt with hysterectomy.  We discussed at length surgical route options.  Given her nulliparity vaginal hysterectomy is not an option.  We discussed laparoscopic and robotic assisted routes in contrast to an open abdominal hysterectomy.  Given the size of her uterus I believe we be able to do this laparoscopically, but we did discuss this would be a longer procedure.  We discussed the incisions on her abdomen would be multiple and the umbilical incision would likely be enlarged for specimen retrieval.  We discussed if the specimen would not come out through the vagina that retrieval bag system would be used for safe fractionation of the specimen.  I would recommend bilateral salpingectomy as well as total hysterectomy.  We discussed at her age leaving the ovaries in situ would be recommended.  We also discussed that completing a cystoscopy postoperatively to evaluate the integrity of the bladder and the ureters would be recommended.  Patient understands that laparoscopic surgery may be converted to open laparotomy if necessary for safe completion.  She understands the general postoperative recovery is approximately 6 weeks, with more of that time necessary with open surgery.     Risks benefits and alternatives of surgical options were discussed as well.   She does not desire to look into uterine fibroid embolization via interventional radiology.    I did discuss endometrial biopsy for her as part of the preoperative evaluation.  Given that she is having no irregular bleeding per se and that her endometrial stripe where it is available for evaluation is normal, we discussed it would be reasonable to forego the endometrial biopsy preoperatively.  With shared decision making we agreed that endometrial biopsy would not be high yield for her.  She also understands that if there was an occult cancer found at the time of her final pathology that she would be referred to a GYN oncologist and possibly need further surgical management.      She understands she will need a preoperative evaluation within 30 days of her scheduled surgery.  It is not expected that she would need to spend time in the hospital if her procedure was completed laparoscopically.      Letter will be sent to the referring provider.    Wendy Dobbs MD, FACOG  (she/her/hers)    Department of Ob/Gyn/Women's Health  [employer]  [address] [address]  [email or web address]  p. [phone]  f. [phone]          Answers submitted by the patient for this visit:  EAMON-7 (Submitted on 7/11/2024)  EAMON 7 TOTAL SCORE: 9      Again, thank you for allowing me to participate in the care of your patient.      Sincerely,    Wendy Dobbs MD

## 2024-07-16 ENCOUNTER — PREP FOR PROCEDURE (OUTPATIENT)
Dept: OBGYN | Facility: CLINIC | Age: 44
End: 2024-07-16
Payer: COMMERCIAL

## 2024-07-16 DIAGNOSIS — D25.9 UTERINE LEIOMYOMA, UNSPECIFIED LOCATION: Primary | ICD-10-CM

## 2024-07-16 RX ORDER — ACETAMINOPHEN 325 MG/1
975 TABLET ORAL ONCE
OUTPATIENT
Start: 2024-07-16 | End: 2024-07-16

## 2024-07-16 RX ORDER — METRONIDAZOLE 500 MG/100ML
500 INJECTION, SOLUTION INTRAVENOUS
OUTPATIENT
Start: 2024-07-16

## 2024-07-16 RX ORDER — PHENAZOPYRIDINE HYDROCHLORIDE 100 MG/1
200 TABLET, FILM COATED ORAL ONCE
OUTPATIENT
Start: 2024-07-16 | End: 2024-07-16

## 2024-07-16 NOTE — PROGRESS NOTES
Performing surgeon: Self    Surgeon Procedure time (incision-close in minutes): 210    Surgery location: [place]     Urgency of Surgery?: Patients Choice/ Next Available    Is this a multi surgeon case?: No  If yes, please list the department and/or specific surgeon    COVID test needed?: No   If yes, please place order for PCR test    H&P to be completed by?:PCP    Postop appointment?: 2  and 6 weeks    Time off work: Time Off Work: 6 Weeks    If the patient is receiving gender-affirming care, did you inform them of the need for two letters of support: NA    Other comments:  If Pukite and I can do this together, that would be great!     Current BMI:   BMI Readings from Last 1 Encounters:   07/15/24 23.05 kg/m      Wendy Dobbs MD, FACOG  (she/her/hers)    Department of Ob/Gyn/Women's Health  [employer]  [facility]  [address] [place] [address]  [email or web address]  p. [phone]  f. [phone]

## 2024-07-16 NOTE — PROGRESS NOTES
CC:  Consult from self  for second opinion for hysterectomy route    HPI:  Artie is a nulligravid female w/ known fibroid uterus.  Now noting pressure and some bladder symptoms. Fibroid has grown from 5 cm to 8 over past year.  She was considering hysterectomy w/ Dr. Queen in the fall, but wondered if she could have less invasive option.  Using OCP w/ decent bleeding control.  5 days each w/d cycle. Pressure and fullness are more bothersome. Having urinary frequency which she says may be d/t enlarged uterus or her mood/anxiety.   Not bleeding during active pills. Not sexually active.     Patients records are available and reviewed at today's visit.    Past GYN history:  No STD history  Last PAP smear:  Normal with neg HPV 2022  Last TSH:, normal      Past Medical History:   Diagnosis Date    Bipolar 2 disorder (H)     Depressive disorder     Depressive disorder, not elsewhere classified     Depression (non-psychotic)    Excessive or frequent menstruation     Heavy periods    Fibroid uterus        Past Surgical History:   Procedure Laterality Date    ENT SURGERY  1996    Tonsils removed       Family History   Problem Relation Age of Onset    Unknown/Adopted Mother         adopted mother    Unknown/Adopted Father         adopted farther    Unknown/Adopted Sister         twin sister       Allergies: Amphotericin b, Phenylbutazones, and Penicillin g    Current Outpatient Medications   Medication Sig Dispense Refill    divalproex sodium extended-release (DEPAKOTE ER) 250 MG 24 hr tablet Take 1 tablet (250 mg) by mouth daily 30 tablet 3    gabapentin (NEURONTIN) 100 MG capsule Take 1 capsule (100 mg) by mouth 3 times daily Take 1 tab at noon, 1 cap early evening and 1 cap at bedtime 90 capsule 3    lamoTRIgine (LAMICTAL) 100 MG tablet Take 1 tablet (100 mg) by mouth daily Take along with one 25 mg tablet for a combined total of 125 mg. 30 tablet 3    lamoTRIgine (LAMICTAL) 25 MG tablet Take 1 tablet (25 mg) by mouth  "daily. Take along with one 100 mg tablet for a combined total of 125 mg. 30 tablet 3    LORazepam (ATIVAN) 0.5 MG tablet Take 0.5 mg by mouth every 6 hours as needed for anxiety      norgestim-eth estrad triphasic (ORTHO TRI-CYCLEN LO) 0.18/0.215/0.25 MG-25 MCG tablet Take 1 tablet by mouth daily 84 tablet 2    propranolol (INDERAL) 10 MG tablet Take 1 tablet (10 mg) by mouth 2 times daily as needed (Anxiety) 60 tablet 3    propranolol ER (INDERAL LA) 60 MG 24 hr capsule Take 1 capsule (60 mg) by mouth at bedtime 30 capsule 3    spironolactone (ALDACTONE) 50 MG tablet Take 1 tablet (50 mg) by mouth 2 times daily 180 tablet 2       ROS:  C: NEGATIVE for fever, chills, change in weight  I: NEGATIVE for worrisome rashes, moles or lesions  E: NEGATIVE for vision changes or irritation  E/M: NEGATIVE for ear, mouth and throat problems  R: NEGATIVE for significant cough or SOB  CV: NEGATIVE for chest pain, palpitations or peripheral edema  GI: NEGATIVE for nausea, abdominal pain, heartburn, or change in bowel habits  : NEGATIVE for frequency, dysuria, hematuria, vaginal discharge  M: NEGATIVE for significant arthralgias or myalgia  N: NEGATIVE for weakness, dizziness or paresthesias  E: NEGATIVE for temperature intolerance, skin/hair changes    EXAM:  Blood pressure 111/72, pulse 75, height 1.575 m (5' 2\"), weight 57.2 kg (126 lb), not currently breastfeeding.   BMI= Body mass index is 23.05 kg/m .  General - pleasant female , mildly anxious talking about pelvic exam/surgical options  Neck - supple without lymphadenopathy or thyromegaly.  Lungs -non labored  Heart - well perfused  Abdomen - soft, nontender, nondistended, no hepatosplenomegaly.  Pelvic - EG: normal adult female, BUS: within normal limits, Vagina: normal tone. Cervix: no lesions or CMT, Uterus: firm, enlarged, but mobile and non-tender  Rectovaginal - deferred.  Musculoskeletal - no gross deformities.  Neurological - normal strength, sensation, and mental " status.      ASSESSMENT/PLAN:  42 yo nulligravid female w/ enlarged uterus d/t fibroids. Desires definitive mgmt with hysterectomy.  We discussed at length surgical route options.  Given her nulliparity vaginal hysterectomy is not an option.  We discussed laparoscopic and robotic assisted routes in contrast to an open abdominal hysterectomy.  Given the size of her uterus I believe we be able to do this laparoscopically, but we did discuss this would be a longer procedure.  We discussed the incisions on her abdomen would be multiple and the umbilical incision would likely be enlarged for specimen retrieval.  We discussed if the specimen would not come out through the vagina that retrieval bag system would be used for safe fractionation of the specimen.  I would recommend bilateral salpingectomy as well as total hysterectomy.  We discussed at her age leaving the ovaries in situ would be recommended.  We also discussed that completing a cystoscopy postoperatively to evaluate the integrity of the bladder and the ureters would be recommended.  Patient understands that laparoscopic surgery may be converted to open laparotomy if necessary for safe completion.  She understands the general postoperative recovery is approximately 6 weeks, with more of that time necessary with open surgery.     Risks benefits and alternatives of surgical options were discussed as well.  She does not desire to look into uterine fibroid embolization via interventional radiology.    I did discuss endometrial biopsy for her as part of the preoperative evaluation.  Given that she is having no irregular bleeding per se and that her endometrial stripe where it is available for evaluation is normal, we discussed it would be reasonable to forego the endometrial biopsy preoperatively.  With shared decision making we agreed that endometrial biopsy would not be high yield for her.  She also understands that if there was an occult cancer found at the time  of her final pathology that she would be referred to a GYN oncologist and possibly need further surgical management.      She understands she will need a preoperative evaluation within 30 days of her scheduled surgery.  It is not expected that she would need to spend time in the hospital if her procedure was completed laparoscopically.      Letter will be sent to the referring provider.    Wendy Dobbs MD, FACOG  (she/her/hers)    Department of Ob/Gyn/Women's Health  [employer]  [facility] [place] [facility] [address] [address]  [email or web address]  p. [phone]  f. [phone]          Answers submitted by the patient for this visit:  EAMON-7 (Submitted on 7/11/2024)  EAMON 7 TOTAL SCORE: 9

## 2024-08-14 ENCOUNTER — OFFICE VISIT (OUTPATIENT)
Dept: INTERNAL MEDICINE | Facility: CLINIC | Age: 44
End: 2024-08-14
Payer: COMMERCIAL

## 2024-08-14 VITALS
SYSTOLIC BLOOD PRESSURE: 117 MMHG | WEIGHT: 125.2 LBS | OXYGEN SATURATION: 98 % | HEART RATE: 66 BPM | BODY MASS INDEX: 22.9 KG/M2 | DIASTOLIC BLOOD PRESSURE: 76 MMHG

## 2024-08-14 DIAGNOSIS — L65.9 HAIR LOSS: ICD-10-CM

## 2024-08-14 DIAGNOSIS — H61.21 IMPACTED CERUMEN OF RIGHT EAR: ICD-10-CM

## 2024-08-14 DIAGNOSIS — Z00.00 ROUTINE GENERAL MEDICAL EXAMINATION AT A HEALTH CARE FACILITY: Primary | ICD-10-CM

## 2024-08-14 DIAGNOSIS — Z30.41 SURVEILLANCE OF PREVIOUSLY PRESCRIBED CONTRACEPTIVE PILL: ICD-10-CM

## 2024-08-14 PROCEDURE — 69209 REMOVE IMPACTED EAR WAX UNI: CPT | Mod: RT | Performed by: INTERNAL MEDICINE

## 2024-08-14 PROCEDURE — 99396 PREV VISIT EST AGE 40-64: CPT | Mod: 25 | Performed by: INTERNAL MEDICINE

## 2024-08-14 RX ORDER — NORGESTIMATE AND ETHINYL ESTRADIOL 7DAYSX3 LO
1 KIT ORAL DAILY
Qty: 84 TABLET | Refills: 4 | Status: SHIPPED | OUTPATIENT
Start: 2024-08-14

## 2024-08-14 SDOH — HEALTH STABILITY: PHYSICAL HEALTH: ON AVERAGE, HOW MANY DAYS PER WEEK DO YOU ENGAGE IN MODERATE TO STRENUOUS EXERCISE (LIKE A BRISK WALK)?: 1 DAY

## 2024-08-14 SDOH — HEALTH STABILITY: PHYSICAL HEALTH: ON AVERAGE, HOW MANY MINUTES DO YOU ENGAGE IN EXERCISE AT THIS LEVEL?: 30 MIN

## 2024-08-14 ASSESSMENT — PATIENT HEALTH QUESTIONNAIRE - PHQ9
SUM OF ALL RESPONSES TO PHQ QUESTIONS 1-9: 20
10. IF YOU CHECKED OFF ANY PROBLEMS, HOW DIFFICULT HAVE THESE PROBLEMS MADE IT FOR YOU TO DO YOUR WORK, TAKE CARE OF THINGS AT HOME, OR GET ALONG WITH OTHER PEOPLE: SOMEWHAT DIFFICULT
SUM OF ALL RESPONSES TO PHQ QUESTIONS 1-9: 20

## 2024-08-14 ASSESSMENT — SOCIAL DETERMINANTS OF HEALTH (SDOH): HOW OFTEN DO YOU GET TOGETHER WITH FRIENDS OR RELATIVES?: ONCE A WEEK

## 2024-08-14 NOTE — PROGRESS NOTES
Preventive Care Visit  [facility]  Fahad Concepcion MD, Internal Medicine  Aug 14, 2024      Assessment & Plan     Routine general medical examination at a health care facility    Pap smear: next due in 2027 but she has hysterectomy scheduled for November  Immunizations: discussed getting tetanus booster at pharmacy  Lab Studies: see below  Mammogram: done in April  Advanced care planning: materials provided     Impacted cerumen of right ear    Right canal(s) was occluded with cerumen as visualized by otoscope.  The EMT irrigated the canal(s), with good results.  Procedure time 5 minutes.      - REMOVE IMPACTED CERUMEN    Surveillance of previously prescribed contraceptive pill    Refills placed on file    - norgestim-eth estrad triphasic (ORTHO TRI-CYCLEN LO) 0.18/0.215/0.25 MG-25 MCG tablet; Take 1 tablet by mouth daily    Hair loss    On spironolactone.  Hasn't had BMP check recently, so I did place an order for this.  Can be done at the time of her pre-op.    - Basic metabolic panel  (Ca, Cl, CO2, Creat, Gluc, K, Na, BUN); Future        Depression Screening Follow Up        8/14/2024     2:19 PM   PHQ   PHQ-9 Total Score 20   Q9: Thoughts of better off dead/self-harm past 2 weeks Several days   F/U: Thoughts of suicide or self-harm No   F/U: Safety concerns No     Follows with psychiatry    Counseling  Appropriate preventive services were addressed with this patient via screening, questionnaire, or discussion as appropriate for fall prevention, nutrition, physical activity, Tobacco-use cessation, weight loss and cognition.  Checklist reviewing preventive services available has been given to the patient.  Reviewed patient's diet, addressing concerns and/or questions.   She is at risk for lack of exercise and has been provided with information to increase physical activity for the benefit of her well-being.   The patient's PHQ-9 score is consistent with severe depression.  She was provided with information regarding depression.       Return in about 53 weeks (around 8/20/2025) for Annual Wellness Visit.    Subjective   Artie is a 43 year old, presenting for the following:  Physical and Imm/Inj        8/14/2024     4:52 PM   Additional Questions   Roomed by leta jackson        Health Care Directive  Patient does not have a Health Care Directive or Living Will: Discussed advance care planning with patient; information given to patient to review.    HPI    Artie is scheduled for hysterectomy in November for fibroids.      Artie is hoping to increase her physical activity to lose some weight before surgery.  Struggles to sleep due to anxiety and work stress.  Following with psychiatry.  She is working on meal prepping to eat a more balanced diet.             8/14/2024   General Health   How would you rate your overall physical health? Good   Feel stress (tense, anxious, or unable to sleep) Rather much      (!) STRESS CONCERN      8/14/2024   Nutrition   Three or more servings of calcium each day? (!) I DON'T KNOW   Diet: Regular (no restrictions)   How many servings of fruit and vegetables per day? (!) 0-1   How many sweetened beverages each day? 0-1            8/14/2024   Exercise   Days per week of moderate/strenous exercise 1 day   Average minutes spent exercising at this level 30 min      (!) EXERCISE CONCERN      8/14/2024   Social Factors   Frequency of gathering with friends or relatives Once a week   Worry food won't last until get money to buy more No   Food not last or not have enough money for food? No   Do you have housing? (Housing is defined as stable permanent housing and does not include staying ouside in a car, in a tent, in an abandoned building, in an overnight shelter, or couch-surfing.) Yes   Are you worried about losing your housing? No   Lack of transportation? No   Unable to get utilities (heat,electricity)? No            8/14/2024   Dental   Dentist two times every  year? Yes            8/14/2024   TB Screening   Were you born outside of the [place]? Yes          Today's PHQ-9 Score:       8/14/2024     2:19 PM   PHQ-9 SCORE   PHQ-9 Total Score MyChart 20 (Severe depression)   PHQ-9 Total Score 20         8/14/2024   Substance Use   Alcohol more than 3/day or more than 7/wk Not Applicable   Do you use any other substances recreationally? (!) DECLINE        Social History     Tobacco Use    Smoking status: Never    Smokeless tobacco: Never   Vaping Use    Vaping status: Never Used   Substance Use Topics    Alcohol use: No    Drug use: No           4/16/2024   LAST FHS-7 RESULTS   1st degree relative breast or ovarian cancer Unknown   Any relative bilateral breast cancer Unknown   Any male have breast cancer Unknown   Any ONE woman have BOTH breast AND ovarian cancer Unknown   Any woman with breast cancer before 50yrs Unknown   2 or more relatives with breast AND/OR ovarian cancer Unknown   2 or more relatives with breast AND/OR bowel cancer Unknown           Mammogram Screening - Mammogram every 1-2 years updated in Health Maintenance based on mutual decision making        8/14/2024   STI Screening   New sexual partner(s) since last STI/HIV test? No        History of abnormal Pap smear: No - age 30- 64 PAP with HPV every 5 years recommended        Latest Ref Rng & Units 2/16/2022     1:40 PM 6/9/2014    12:00 AM   PAP / HPV   PAP  Negative for Intraepithelial Lesion or Malignancy (NILM)     PAP (Historical)   NIL    HPV 16 DNA Negative Negative     HPV 18 DNA Negative Negative     Other HR HPV Negative Negative       ASCVD Risk   The 10-year ASCVD risk score (Rosa CANTU, et al., 2019) is: 0.4%    Values used to calculate the score:      Age: 43 years      Sex: Female      Is Non- : No      Diabetic: No      Tobacco smoker: No      Systolic Blood Pressure: 117 mmHg      Is BP treated: No      HDL Cholesterol: 56 mg/dL      Total Cholesterol: 167  "mg/dL        8/14/2024   Contraception/Family Planning   Questions about contraception or family planning No           Reviewed and updated as needed this visit by Provider     Meds  Problems               10 point ROS of systems including Constitutional, Eyes, Respiratory, Cardiovascular, Gastroenterology, Genitourinary, Integumentary, Muscularskeletal, Psychiatric were all negative except for pertinent positives noted in my HPI.      Objective    Exam  /76 (BP Location: Right arm, Patient Position: Sitting, Cuff Size: Adult Regular)   Pulse 66   Wt 56.8 kg (125 lb 3.2 oz)   LMP 07/31/2024 (Approximate)   SpO2 98%   BMI 22.90 kg/m     Estimated body mass index is 22.9 kg/m  as calculated from the following:    Height as of 7/15/24: 1.575 m (5' 2\").    Weight as of this encounter: 56.8 kg (125 lb 3.2 oz).    Physical Exam  GENERAL: alert and no distress  EYES: Eyes grossly normal to inspection, PERRL and conjunctivae and sclerae normal  HENT: R canal occluded with wax, normal left canals and TM, nose and mouth without ulcers or lesions  NECK: no adenopathy, no asymmetry, masses, or scars  RESP: lungs clear to auscultation - no rales, rhonchi or wheezes  CV: regular rate and rhythm, normal S1 S2, no S3 or S4, no murmur, click or rub, no peripheral edema  ABDOMEN: soft, nontender, no hepatosplenomegaly, no masses and bowel sounds normal  MS: no gross musculoskeletal defects noted, no edema  SKIN: no suspicious lesions or rashes  NEURO: Normal strength and tone, mentation intact and speech normal  PSYCH: mentation appears normal, affect normal/bright        Signed Electronically by: Fahad Concepcion MD    "

## 2024-08-14 NOTE — PATIENT INSTRUCTIONS
Some insurances only cover some vaccines if you get them at the pharmacy and not in clinic.  You can either check your insurance coverage or just plan to get the following vaccines at the pharmacy: Tetanus (TDaP)      CALCIUM    Recommendations:  Teenagers and premenopausal women: 1200 mg/day  Pregnant and Lactating women: 1500 mg/day  Men and Postmenopausal women on estrogen: 1200mg/day  Postmenopausal women not on estrogen: 1500 mg/day    If you are not eating dairy products you also need 400 IU of vitamin D per day which can be obtained in either a multivitamin or in some of the Calcium tablets.    Dietary sources: These also contain vitamin D  Milk                            8 oz            300 mg  Yogurt                          1 cup           400 mg  Hard cheese                     1.5 oz          300 mg  Cottage cheese                  2 cup           300 mg  Orange juice with Calcium       8 oz            300 mg  Low fat dairy sources are recommended    Supplements:  Tums EX                         300 mg  Tums Ultra                      400 mg  Caltrate 600                    600 mg  Oscal                           500 mg  Oscal/D                         500 mg plus vitamin D  Women's Formula Multivitamin    450 mg      Patient Education   Preventive Care Advice   This is general advice given by our system to help you stay healthy. However, your care team may have specific advice just for you. Please talk to your care team about your preventive care needs.  Nutrition  Eat 5 or more servings of fruits and vegetables each day.  Try wheat bread, brown rice and whole grain pasta (instead of white bread, rice, and pasta).  Get enough calcium and vitamin D. Check the label on foods and aim for 100% of the RDA (recommended daily allowance).  Lifestyle  Exercise at least 150 minutes each week  (30 minutes a day, 5 days a week).  Do muscle strengthening activities 2 days a week. These help control your weight and  prevent disease.  No smoking.  Wear sunscreen to prevent skin cancer.  Have a dental exam and cleaning every 6 months.  Yearly exams  See your health care team every year to talk about:  Any changes in your health.  Any medicines your care team has prescribed.  Preventive care, family planning, and ways to prevent chronic diseases.  Shots (vaccines)   HPV shots (up to age 26), if you've never had them before.  Hepatitis B shots (up to age 59), if you've never had them before.  COVID-19 shot: Get this shot when it's due.  Flu shot: Get a flu shot every year.  Tetanus shot: Get a tetanus shot every 10 years.  Pneumococcal, hepatitis A, and RSV shots: Ask your care team if you need these based on your risk.  Shingles shot (for age 50 and up)  General health tests  Diabetes screening:  Starting at age 35, Get screened for diabetes at least every 3 years.  If you are younger than age 35, ask your care team if you should be screened for diabetes.  Cholesterol test: At age 39, start having a cholesterol test every 5 years, or more often if advised.  Bone density scan (DEXA): At age 50, ask your care team if you should have this scan for osteoporosis (brittle bones).  Hepatitis C: Get tested at least once in your life.  STIs (sexually transmitted infections)  Before age 24: Ask your care team if you should be screened for STIs.  After age 24: Get screened for STIs if you're at risk. You are at risk for STIs (including HIV) if:  You are sexually active with more than one person.  You don't use condoms every time.  You or a partner was diagnosed with a sexually transmitted infection.  If you are at risk for HIV, ask about PrEP medicine to prevent HIV.  Get tested for HIV at least once in your life, whether you are at risk for HIV or not.  Cancer screening tests  Cervical cancer screening: If you have a cervix, begin getting regular cervical cancer screening tests starting at age 21.  Breast cancer scan (mammogram): If you've  ever had breasts, begin having regular mammograms starting at age 40. This is a scan to check for breast cancer.  Colon cancer screening: It is important to start screening for colon cancer at age 45.  Have a colonoscopy test every 10 years (or more often if you're at risk) Or, ask your provider about stool tests like a FIT test every year or Cologuard test every 3 years.  To learn more about your testing options, visit:   .  For help making a decision, visit:   https://bit.ly/uy09435.  Prostate cancer screening test: If you have a prostate, ask your care team if a prostate cancer screening test (PSA) at age 55 is right for you.  Lung cancer screening: If you are a current or former smoker ages 50 to 80, ask your care team if ongoing lung cancer screenings are right for you.  For informational purposes only. Not to replace the advice of your health care provider. Copyright   2023 [facility] [employer]. All rights reserved. Clinically reviewed by the [facility] Transitions Program. [employer] 184481 - REV 01/24.  Learning About Stress  What is stress?     Stress is your body's response to a hard situation. Your body can have a physical, emotional, or mental response. Stress is a fact of life for most people, and it affects everyone differently. What causes stress for you may not be stressful for someone else.  A lot of things can cause stress. You may feel stress when you go on a job interview, take a test, or run a race. This kind of short-term stress is normal and even useful. It can help you if you need to work hard or react quickly. For example, stress can help you finish an important job on time.  Long-term stress is caused by ongoing stressful situations or events. Examples of long-term stress include long-term health problems, ongoing problems at work, or conflicts in your family. Long-term stress can harm your health.  How does stress affect your health?  When you are stressed, your body responds as  though you are in danger. It makes hormones that speed up your heart, make you breathe faster, and give you a burst of energy. This is called the fight-or-flight stress response. If the stress is over quickly, your body goes back to normal and no harm is done.  But if stress happens too often or lasts too long, it can have bad effects. Long-term stress can make you more likely to get sick, and it can make symptoms of some diseases worse. If you tense up when you are stressed, you may develop neck, shoulder, or low back pain. Stress is linked to high blood pressure and heart disease.  Stress also harms your emotional health. It can make you metzger, tense, or depressed. Your relationships may suffer, and you may not do well at work or school.  What can you do to manage stress?  You can try these things to help manage stress:   Do something active. Exercise or activity can help reduce stress. Walking is a great way to get started. Even everyday activities such as housecleaning or yard work can help.  Try yoga or chai chi. These techniques combine exercise and meditation. You may need some training at first to learn them.  Do something you enjoy. For example, listen to music or go to a movie. Practice your hobby or do volunteer work.  Meditate. This can help you relax, because you are not worrying about what happened before or what may happen in the future.  Do guided imagery. Imagine yourself in any setting that helps you feel calm. You can use online videos, books, or a teacher to guide you.  Do breathing exercises. For example:  From a standing position, bend forward from the waist with your knees slightly bent. Let your arms dangle close to the floor.  Breathe in slowly and deeply as you return to a standing position. Roll up slowly and lift your head last.  Hold your breath for just a few seconds in the standing position.  Breathe out slowly and bend forward from the waist.  Let your feelings out. Talk, laugh, cry,  "and express anger when you need to. Talking with supportive friends or family, a counselor, or a lupe leader about your feelings is a healthy way to relieve stress. Avoid discussing your feelings with people who make you feel worse.  Write. It may help to write about things that are bothering you. This helps you find out how much stress you feel and what is causing it. When you know this, you can find better ways to cope.  What can you do to prevent stress?  You might try some of these things to help prevent stress:  Manage your time. This helps you find time to do the things you want and need to do.  Get enough sleep. Your body recovers from the stresses of the day while you are sleeping.  Get support. Your family, friends, and community can make a difference in how you experience stress.  Limit your news feed. Avoid or limit time on social media or news that may make you feel stressed.  Do something active. Exercise or activity can help reduce stress. Walking is a great way to get started.  Where can you learn more?  Go to https://www.Genoom.net/patiented  Enter N032 in the search box to learn more about \"Learning About Stress.\"  Current as of: October 24, 2023               Content Version: 14.0    7457-2268 [employer].   Care instructions adapted under license by your healthcare professional. If you have questions about a medical condition or this instruction, always ask your healthcare professional. [employer] disclaims any warranty or liability for your use of this information.      Learning About Depression Screening  What is depression screening?  Depression screening is a way to see if you have depression symptoms. It may be done by a doctor or counselor. It's often part of a routine checkup. That's because your mental health is just as important as your physical health.  Depression is a mental health condition that affects how you feel, think, and act. You may:  Have less " "energy.  Lose interest in your daily activities.  Feel sad and grouchy for a long time.  Depression is very common. It affects people of all ages.  Many things can lead to depression. Some people become depressed after they have a stroke or find out they have a major illness like cancer or heart disease. The death of a loved one or a breakup may lead to depression. It can run in families. Most experts believe that a combination of inherited genes and stressful life events can cause it.  What happens during screening?  You may be asked to fill out a form about your depression symptoms. You and the doctor will discuss your answers. The doctor may ask you more questions to learn more about how you think, act, and feel.  What happens after screening?  If you have symptoms of depression, your doctor will talk to you about your options.  Doctors usually treat depression with medicines or counseling. Often, combining the two works best. Many people don't get help because they think that they'll get over the depression on their own. But people with depression may not get better unless they get treatment.  The cause of depression is not well understood. There may be many factors involved. But if you have depression, it's not your fault.  A serious symptom of depression is thinking about death or suicide. If you or someone you care about talks about this or about feeling hopeless, get help right away.  It's important to know that depression can be treated. Medicine, counseling, and self-care may help.  Where can you learn more?  Go to https://www.[employer]net/patiented  Enter T185 in the search box to learn more about \"Learning About Depression Screening.\"  Current as of: June 24, 2023  Content Version: 14.1    0915-5442 [employer].   Care instructions adapted under license by your healthcare professional. If you have questions about a medical condition or this instruction, always ask your healthcare " professional. [employer] disclaims any warranty or liability for your use of this information.

## 2024-08-30 ENCOUNTER — VIRTUAL VISIT (OUTPATIENT)
Dept: PSYCHIATRY | Facility: CLINIC | Age: 44
End: 2024-08-30
Attending: PSYCHIATRY & NEUROLOGY
Payer: COMMERCIAL

## 2024-08-30 VITALS — WEIGHT: 125 LBS | BODY MASS INDEX: 22.86 KG/M2

## 2024-08-30 DIAGNOSIS — F31.81 BIPOLAR 2 DISORDER (H): ICD-10-CM

## 2024-08-30 DIAGNOSIS — F41.1 GAD (GENERALIZED ANXIETY DISORDER): ICD-10-CM

## 2024-08-30 PROCEDURE — 90792 PSYCH DIAG EVAL W/MED SRVCS: CPT | Mod: 95

## 2024-08-30 RX ORDER — DIVALPROEX SODIUM 250 MG/1
250 TABLET, EXTENDED RELEASE ORAL DAILY
Qty: 30 TABLET | Refills: 3 | Status: SHIPPED | OUTPATIENT
Start: 2024-08-30

## 2024-08-30 RX ORDER — GABAPENTIN 100 MG/1
100 CAPSULE ORAL 2 TIMES DAILY PRN
Qty: 90 CAPSULE | Refills: 3 | Status: SHIPPED | OUTPATIENT
Start: 2024-08-30

## 2024-08-30 RX ORDER — PROPRANOLOL HCL 60 MG
60 CAPSULE, EXTENDED RELEASE 24HR ORAL AT BEDTIME
Qty: 30 CAPSULE | Refills: 3 | Status: SHIPPED | OUTPATIENT
Start: 2024-08-30

## 2024-08-30 RX ORDER — PROPRANOLOL HYDROCHLORIDE 10 MG/1
10 TABLET ORAL 2 TIMES DAILY PRN
Qty: 60 TABLET | Refills: 3 | Status: SHIPPED | OUTPATIENT
Start: 2024-08-30

## 2024-08-30 RX ORDER — LAMOTRIGINE 100 MG/1
100 TABLET ORAL DAILY
Qty: 30 TABLET | Refills: 3 | Status: SHIPPED | OUTPATIENT
Start: 2024-08-30

## 2024-08-30 RX ORDER — LAMOTRIGINE 25 MG/1
TABLET ORAL
Qty: 30 TABLET | Refills: 3 | Status: SHIPPED | OUTPATIENT
Start: 2024-08-30

## 2024-08-30 ASSESSMENT — PATIENT HEALTH QUESTIONNAIRE - PHQ9
10. IF YOU CHECKED OFF ANY PROBLEMS, HOW DIFFICULT HAVE THESE PROBLEMS MADE IT FOR YOU TO DO YOUR WORK, TAKE CARE OF THINGS AT HOME, OR GET ALONG WITH OTHER PEOPLE: SOMEWHAT DIFFICULT
SUM OF ALL RESPONSES TO PHQ QUESTIONS 1-9: 18
SUM OF ALL RESPONSES TO PHQ QUESTIONS 1-9: 18

## 2024-08-30 ASSESSMENT — ANXIETY QUESTIONNAIRES
8. IF YOU CHECKED OFF ANY PROBLEMS, HOW DIFFICULT HAVE THESE MADE IT FOR YOU TO DO YOUR WORK, TAKE CARE OF THINGS AT HOME, OR GET ALONG WITH OTHER PEOPLE?: SOMEWHAT DIFFICULT
GAD7 TOTAL SCORE: 15
7. FEELING AFRAID AS IF SOMETHING AWFUL MIGHT HAPPEN: SEVERAL DAYS
GAD7 TOTAL SCORE: 15
GAD7 TOTAL SCORE: 15

## 2024-08-30 ASSESSMENT — PAIN SCALES - GENERAL: PAINLEVEL: NO PAIN (0)

## 2024-08-30 NOTE — PATIENT INSTRUCTIONS
It was nice seeing you today, Artie     Treatment Plan summary from today's visit:      1) Medications  - No changes       2) Please return to clinic in 2 months     3) Crisis numbers are below and clinic after hours number is [phone]      4) Labs have been ordered. Please get these done prior to your next appointment at your local [facility] Location.        **For crisis resources, please see the information at the end of this document**   Patient Education    Thank you for coming to the [facility] [place] [facility].     Lab Testing:  If you had lab testing today and your results are reassuring or normal they will be mailed to you or sent through M[employer]fire within 7 days. If the lab tests need quick action we will call you with the results. The phone number we will call with results is # [phone]. If this is not the best number please call our clinic and change the number.     Medication Refills:  If you need any refills please call your pharmacy and they will contact us. Our fax number for refills is [phone].   Three business days of notice are needed for general medication refill requests.   Five business days of notice are needed for controlled substance refill requests.   If you need to change to a different pharmacy, please contact the new pharmacy directly. The new pharmacy will help you get your medications transferred.     Contact Us:  Please call [phone] during business hours (8-5:00 M-F).   If you have medication related questions after clinic hours, or on the weekend, please call [phone].     Financial Assistance [phone]   Medical Records [phone]       MENTAL HEALTH CRISIS RESOURCES:  For a emergency help, please call 911 or go to the nearest Emergency Department.     Emergency Walk-In Options:   [facility] Unit @ [facility] ([place]): [phone] - Specialized mental health emergency area designed to be calming  [facility] ([place]): [phone]  [facility] ([place]): [phone]  [facility] ([place]): [phone]    [facility] Crisis Information:   [place]: [phone]  James: [phone]  Gil ([place]) - Adult: [phone]     Child: [phone]  [place] - Adult: [phone]     Child: [phone]  [place]: [phone]  List of all [facility] resources:   https://[place].[facility]/dhs/people-we-serve/adults/health-care/mental-health/resources/crisis-contacts.jsp    National Crisis Information:   Crisis Text Line: Text  [place]  to 717063  Suicide & Crisis Lifeline: 988  National Suicide Prevention Lifeline: 7-823-370-TALK (1-819.633.3346)       For online chat options, visit https://suicidepreventionlifeline.org/chat/  Poison Control Center: 1-814.714.2937  Trans Lifeline: 5-455-224-3507 - Hotline for transgender people of all ages  The [employer] Project: 9-140-078-8855 - Hotline for LGBT youth     For Non-Emergency Support:   Fast Tracker: Mental Health & Substance Use Disorder Resources -   https://www.[employer].org/

## 2024-08-30 NOTE — PROGRESS NOTES
Virtual Visit Details    Type of service:  Video Visit     Originating Location (pt. Location): Home    Distant Location (provider location):  On-site  Platform used for Video Visit: [facility]  General Clinic Team  TRANSFER of CARE DIAGNOSTIC ASSESSMENT       CARE TEAM:    PCP- Fahad Concepcion  Therapist-  Evangelina ([place] [facility], primarily for DBT q week), and individual 1:1 Carl (she is at [facility])   OB/GYN: Wendy Dobbs MD    Artie is a 43 year old who uses the pronouns she, her, hers.                   Chief Concern    It's been a really busy time                Assessment & Plan   Bipolar II Disorder, current episode depressed, mild (history of rapid cycling)  EAMON    Medical:  Uterine Fibroids    Artie is a 43 year old female with a past psychiatric history of Bipolar II Disorder and EAMON who presents for medication management/transfer of care appointment. Per record, patient saw a mood disorder specialist in [place], with diagnostic clarification of  bipolar II disorder with a rapid cycling pattern and anxiety diagnoses. Medical history is significant for Uterine Fibroids. Substance use does not appear to be playing a contributing role in the patient's presentation.     Today, Artie  reports fair mood and symptoms of depression and anxiety. Artie has been taking medications as prescribed and denies side effects from medications.  She reports varying stressors regarding work and upcoming surgery.  She reports chronic passive death wish, however denies any suicidal ideation, intent, or plan.  No safety concerns today.  Overall she appears to be at baseline.  Does not desire any medication changes.  Will continue medications without changes, and check in closer to her surgery date to go over any potential interactions between her postsurgery medications and her current psychotropic regimen.  Will also obtain  Depakote and Lamictal level, as well as CBC and CMP labs.    Psychotropic Drug Interactions:    -ADDITIVE SEDATION: Melatonin, Ativan, gabapentin  -ADDITIVE CNS/RESPIRATORY DEPRESSION: Ativan, gabapentin, Lamictal, Depakote  -VIA (likely) valproate-mediated reduction in glucuronide metabolism of lorazepam, Depakote may increase the serum concentration of Ativan  -DEPAKOTE & LAMICTAL- Valproate Products may enhance the adverse/toxic effect of LamoTRIgine. Valproate Products may increase the serum concentration of LamoTRIgine. Severity Major      -LAMICTAL & ORTHO TRI-CYCLEN LO- Estrogen Derivatives (Contraceptive) may decrease the serum concentration of LamoTRIgine, the most likely mechanism for this interaction is enhanced lamotrigine glucuronidation (possibly via UGT1A4)   -Via CY: Cannabis may increase serum concentration of propranolol, melatonin   -DEPAKOTE/ORTHO TRI-CYCLEN LO: Estrogen Derivatives may decrease the serum concentration of Valproate Products   -Ethinyl Estradiol-Containing Products may decrease the serum concentration of Benzodiazepines (metabolized by glucuronidation)   -Ethinyl Estradiol-Containing Products may increase the serum concentration of Propranolol.     Management: Monitoring for adverse effects, routine labs, using lowest therapeutic dose of [Depakote & Lamictal, gabapentin] and patient is aware of risks     [place] was [place] today: indicates that controlled prescriptions have been filled as prescribed.  No evidence of any Ativan fills for at least the last year.    Risk Statements:   Treatment Risk: Risks, benefits, alternatives and potential adverse effects have been discussed and are understood.   Safety Risk: Artie did not appear to be an imminent safety risk to self or others.    PLAN    1) Medications:   - Continue lamotrigine 125 mg daily.   - Depakote ER - 250 mg at bedtime   - gabapentin- 100 mg every day at noon and 100 mg at bedtime.  - propranolol IR 10 mg bid /prn-  for anxiety  - propranolol ER 60 mg qhs  - melatonin 1-1.5 mg at bedtime, OTC    PCP-   - Reported by patient: Ativan 0.25-0.5 mg at bedtime/prn (1x-2x per week). We have not taken over prescribing this medication and advise against due to risks outweighing benefits. Per  (as of today): No evidence of filling Ativan in the past 1 year.   Spironolactone- 50 mg bid- for hair loss  L-Methylfolate    2) Psychotherapy:   Individual therapy [facility], works with DBT .   Working with racial trauma consultant/ on identity development.    3) Next due:  Labs:  lamotrigine and Depakote level last obtained on 5/22/23. Now due, Ordered, in addition to CBC and CMP.  EKG: As needed   Rating scales:  EAMON-7, PHQ9    4) Referrals: None    5) Follow-up: Return to clinic in 2 months                   Pertinent Background  The following section contains information copied from previous notes and has been reviewed, edited, and verified by the patient:     Artie arnett was diagnosed with depression at age 14. Started various medications without working diagnosis in late 20's, had been resistant to medication in past, with multiple somatic symptoms, multiple diagnoses in past. Current diagnosis made about 6 years ago by mood disorder specialist in [place], done as second opinion as struggling with approach of first psychiatrist.     Has been on the current combination of medications since she saw the specialist, and per said specialist, patients with this type of condition cannot be on antidepressants. She is prone to side effects with medications and sometimes, its hard for her to manage her medications due to the side effects, so her doctor was keeping her meds at a low dosage. Sleep is on and off. She was actively involved in CBT and DBT while in [place]     Psych pertinent item history includes suicide attempt [xseveral overdoses as a teenager], SIB [mainly in her teens/20's] and mutiple psychotropic trials                      History of Present Illness     It's been a really busy time. Has been having more stress and anxiety. Some emotional interactions with dad. Hopes that in the next few weeks, she'll feel more calm.     Work stress due to being busy. General adulting has felt a bit overwhelming at times.     Is having surgery Nov 22 (laparoscopic total hysterectomy). Trying to mentally prepare for that. The closer the date comes, the more she realizes she does need it, and it affirms her decision for surgery.     Expresses concern about her elderly parents trying to help her post surgery.    Fluctuates in how much propanolol she would need in a given day. Has cut down on Cannabis beverages due to it worsening anxiety    Mood: Fair  Sleep: Always a struggle, has always been overwhelming  Appetite: Not bad, has to be conscientious because certain foods don't agree with her stomach or anxiety makes her stomach uneasy or no appetite   Energy: Tired  SI/HI/Safety Concerns: Has chronic PDW, no SI, plan or intent  Side effects from medications: No     Psychiatric Review of Systems:   Depression: Depressed mood, anhedonia, low energy, insomnia, guilt, poor concentration, Chronic PDW  Anxiety: worries, ruminations  Elevated: no  Psychosis: no  Trauma Related: no  Insomnia: Yes     Other:   ADHD:  trouble sustaining attention, distracted    Current Social History:  Financial/occupational: [employer] .   Living situation: Lives alone in [facility] in [facility]  Social/spiritual support: parents, friends      Pertinent Substance Use:  Alcohol: No   Cannabis: Yes: THC Drinks, 1 can maybe once every few weeks (3-5mg)  Tobacco: No  Caffeine:  Yes: 1 cup of coffee daily   Opioids: No   Narcan Kit current: N/A  Other substances: No     Medical Review of Systems:   Lightheadedness/orthostasis: No  Headaches: No  GI: Stomach upset with increased anxiety  CV: No  Sexual health concerns: No   A comprehensive review of systems was  performed and is negative other than noted above.                  Physical Exam  (Vitals Only)    LMP 07/31/2024 (Approximate)   Pulse Readings from Last 3 Encounters:   08/14/24 66   07/15/24 75   05/29/24 78     Wt Readings from Last 3 Encounters:   08/14/24 56.8 kg (125 lb 3.2 oz)   07/15/24 57.2 kg (126 lb)   06/14/24 56.7 kg (125 lb)     BP Readings from Last 3 Encounters:   08/14/24 117/76   07/15/24 111/72   05/29/24 108/69                      Mental Status Exam    Alertness: alert  and oriented  Appearance: adequately groomed  Behavior/Demeanor: cooperative, pleasant, and calm, with good  eye contact   Speech: normal  Language: intact and no problems  Psychomotor: normal or unremarkable  Mood: description consistent with euthymia  Affect: full range; congruent to: mood- yes, content- yes  Thought Process/Associations: circumstantial and talkative  Thought Content:  Reports  PDW ;  Denies suicidal & violent ideation and delusions  Perception:  Reports none;  Denies hallucinations  Insight: good  Judgment: good  Cognition: does  appear grossly intact; formal cognitive testing was not done  Gait and Station: N/A (telehealth)                   Social History                [per patient report]  Financial: See above    Living situation: See above   Relationships: Single  Children: No  Social/spiritual support: See above  Early history/Education: Adopted from [place] with her Twin sister, they were abandoned by their bio mom, she has an older sister who was also adopted from [place] as an infant. She grew up in [place], has a Masters in Communications, older sister adopted 10 years before them    Legal: None                 Family History     Unknown, patient is adopted. Has a twin sister.                   Past Psychiatric History     Self injurious behavior [method, most recent]: Yes: Started via cutting in teens and early 20's. Last time she cut was during heightened stress 4-5 years ago  Suicide attempt [#,  "most recent, method]: Yes: \"Cry for help\" (from parents), 2 overdose on meds as a teenager  Suicidal ideation hx [passive, active]: Yes: Daily PDW     Violence/Aggression Hx:No   Psychosis Hx: No   Eating Disorder Hx: No  Trauma hx: Yes: abandoned at age 3 and 1/2 years     Psych Hosp [#, most recent]: No  Commitment: No   TMS/ECT: No  Outpatient Programs [Day treatment, DBT, eating disorder tx, etc]: Yes: IOP in 2001, DBT x 2years (2002-1195)    SUBSTANCE USE HISTORY   Past Use: denies hx of use                  Past Psychotropic Medication Trials   Medication Max Dose (mg) Dates / Duration Helpful? DC Reason / Adverse Effects?   Paxil        further bad side effects on higher doses, night sweats    Wellbutrin            Lithium            Depakote            Adderall            Strattera           Clonazepam           Buspar       Akathisia   Pristq              Of note, per chart review, Tends not to tolerate most medications, c/o GI symptoms and HA .                   Past Medical History     Neurologic Hx [head injury, seizures, etc]: None    Pregnant / Breastfeeding : No  Contraception : hysterectomy    Patient Active Problem List   Diagnosis    Heavy menses    CARDIOVASCULAR SCREENING; LDL GOAL LESS THAN 160    Major depressive disorder, recurrent episode, moderate (H)    Anxiety state    Attention deficit disorder    Bipolar I disorder (H)    Dysmenorrhea    Glaucoma suspect    Myopia                     Allergies     Amphotericin b, Phenylbutazones, and Penicillin g                Medications     Current Outpatient Medications   Medication Sig Dispense Refill    divalproex sodium extended-release (DEPAKOTE ER) 250 MG 24 hr tablet Take 1 tablet (250 mg) by mouth daily 30 tablet 3    gabapentin (NEURONTIN) 100 MG capsule Take 1 capsule (100 mg) by mouth 3 times daily Take 1 tab at noon, 1 cap early evening and 1 cap at bedtime 90 capsule 3    lamoTRIgine (LAMICTAL) 100 MG tablet Take 1 tablet (100 mg) by " mouth daily Take along with one 25 mg tablet for a combined total of 125 mg. 30 tablet 3    lamoTRIgine (LAMICTAL) 25 MG tablet Take 1 tablet (25 mg) by mouth daily. Take along with one 100 mg tablet for a combined total of 125 mg. 30 tablet 3    LORazepam (ATIVAN) 0.5 MG tablet Take 0.5 mg by mouth every 6 hours as needed for anxiety      norgestim-eth estrad triphasic (ORTHO TRI-CYCLEN LO) 0.18/0.215/0.25 MG-25 MCG tablet Take 1 tablet by mouth daily 84 tablet 4    propranolol (INDERAL) 10 MG tablet Take 1 tablet (10 mg) by mouth 2 times daily as needed (Anxiety) 60 tablet 3    propranolol ER (INDERAL LA) 60 MG 24 hr capsule Take 1 capsule (60 mg) by mouth at bedtime 30 capsule 3    spironolactone (ALDACTONE) 50 MG tablet Take 1 tablet (50 mg) by mouth 2 times daily 180 tablet 2                     Data         12/1/2023     2:25 PM 3/21/2024     5:39 PM 6/14/2024     3:13 PM   PROMIS-10 Total Score w/o Sub Scores   PROMIS TOTAL - SUBSCORES 21 19 20          No data to display                  6/14/2024     3:12 PM 8/14/2024     2:19 PM 8/30/2024    12:15 AM   PHQ-9 SCORE   PHQ-9 Total Score MyChart 17 (Moderately severe depression) 20 (Severe depression) 18 (Moderately severe depression)   PHQ-9 Total Score 17 20 18         6/14/2024     3:13 PM 7/11/2024    11:29 AM 8/30/2024    12:17 AM   EAMON-7 SCORE   Total Score 12 (moderate anxiety) 9 (mild anxiety) 15 (severe anxiety)   Total Score 12 9 15       Liver/Kidney Function, TSH Metabolic Blood counts   Recent Labs   Lab Test 04/21/23  1757 06/17/22  1617 11/24/21  1543   AST  --  10 11   ALT  --  11 15   ALKPHOS  --  56 49   CR 0.85  --  0.79     Recent Labs   Lab Test 06/17/22  1617   TSH 1.09    Recent Labs   Lab Test 10/30/20  1200   CHOL 167   TRIG 148   LDL 81   HDL 56     No lab results found.  Recent Labs   Lab Test 04/21/23  1757   GLC 92    Recent Labs   Lab Test 06/17/22  1617   WBC 9.0   HGB 13.0   HCT 38.9   MCV 85              ECG: None in  EHR      PROVIDER: Kat Arrednodo MD    Patient staffed in clinic with Dr. Hathaway who will sign the note.  Supervisor is Dr. Garcia.

## 2024-08-30 NOTE — NURSING NOTE
Current patient location: [address]   [place] [address]    Is the patient currently in the state of [place]? YES    Visit mode:VIDEO    If the visit is dropped, the patient can be reconnected by: VIDEO VISIT: Text to cell phone:   Telephone Information:   Mobile [phone]       Will anyone else be joining the visit? NO  (If patient encounters technical issues they should call [phone])    How would you like to obtain your AVS? MyChart    Are changes needed to the allergy or medication list? No    Are refills needed on medications prescribed by this physician? YES    Rooming Documentation:  Questionnaire(s) completed      Reason for visit: RECHECK    Karina DAVEF

## 2024-09-09 NOTE — NURSING NOTE
Is the patient currently in the state of [place]? YES    Visit mode:VIDEO    If the visit is dropped, the patient can be reconnected by: VIDEO VISIT: Send to e-mail at: [email or web address]    Will anyone else be joining the visit? NO  (If patient encounters technical issues they should call [phone]    How would you like to obtain your AVS? MyChart    Are changes needed to the allergy or medication list? Pt stated no changes to allergies and Pt stated no med changes    Reason for visit: RECHECK    Alexa DAVEF     present

## 2024-09-16 ENCOUNTER — LAB (OUTPATIENT)
Dept: LAB | Facility: CLINIC | Age: 44
End: 2024-09-16
Payer: COMMERCIAL

## 2024-09-16 DIAGNOSIS — F31.81 BIPOLAR 2 DISORDER (H): ICD-10-CM

## 2024-09-16 DIAGNOSIS — L65.9 HAIR LOSS: ICD-10-CM

## 2024-09-16 LAB
ALBUMIN SERPL BCG-MCNC: 4.1 G/DL (ref 3.5–5.2)
ALP SERPL-CCNC: 65 U/L (ref 40–150)
ALT SERPL W P-5'-P-CCNC: 6 U/L (ref 0–50)
ANION GAP SERPL CALCULATED.3IONS-SCNC: 11 MMOL/L (ref 7–15)
AST SERPL W P-5'-P-CCNC: 15 U/L (ref 0–45)
BILIRUB SERPL-MCNC: 0.2 MG/DL
BUN SERPL-MCNC: 8.9 MG/DL (ref 6–20)
CALCIUM SERPL-MCNC: 9.5 MG/DL (ref 8.8–10.4)
CHLORIDE SERPL-SCNC: 99 MMOL/L (ref 98–107)
CREAT SERPL-MCNC: 0.85 MG/DL (ref 0.51–0.95)
EGFRCR SERPLBLD CKD-EPI 2021: 86 ML/MIN/1.73M2
ERYTHROCYTE [DISTWIDTH] IN BLOOD BY AUTOMATED COUNT: 12.1 % (ref 10–15)
GLUCOSE SERPL-MCNC: 94 MG/DL (ref 70–99)
HCO3 SERPL-SCNC: 26 MMOL/L (ref 22–29)
HCT VFR BLD AUTO: 39 % (ref 35–47)
HGB BLD-MCNC: 12.9 G/DL (ref 11.7–15.7)
MCH RBC QN AUTO: 28.3 PG (ref 26.5–33)
MCHC RBC AUTO-ENTMCNC: 33.1 G/DL (ref 31.5–36.5)
MCV RBC AUTO: 86 FL (ref 78–100)
PLATELET # BLD AUTO: 361 10E3/UL (ref 150–450)
POTASSIUM SERPL-SCNC: 4.2 MMOL/L (ref 3.4–5.3)
PROT SERPL-MCNC: 7.4 G/DL (ref 6.4–8.3)
RBC # BLD AUTO: 4.56 10E6/UL (ref 3.8–5.2)
SODIUM SERPL-SCNC: 136 MMOL/L (ref 135–145)
VALPROATE SERPL-MCNC: 28.9 UG/ML
WBC # BLD AUTO: 8 10E3/UL (ref 4–11)

## 2024-09-16 PROCEDURE — 99000 SPECIMEN HANDLING OFFICE-LAB: CPT | Performed by: PATHOLOGY

## 2024-09-16 PROCEDURE — 36415 COLL VENOUS BLD VENIPUNCTURE: CPT | Performed by: PATHOLOGY

## 2024-09-16 PROCEDURE — 80175 DRUG SCREEN QUAN LAMOTRIGINE: CPT | Mod: 90 | Performed by: PATHOLOGY

## 2024-09-16 PROCEDURE — 80053 COMPREHEN METABOLIC PANEL: CPT | Performed by: PATHOLOGY

## 2024-09-16 PROCEDURE — 80164 ASSAY DIPROPYLACETIC ACD TOT: CPT

## 2024-09-16 PROCEDURE — 85027 COMPLETE CBC AUTOMATED: CPT | Performed by: PATHOLOGY

## 2024-09-16 NOTE — NURSING NOTE
Chief Complaints and History of Present Illnesses   Patient presents with     New Patient     Chief Complaint(s) and History of Present Illness(es)     New Patient     Laterality: both eyes    Onset: gradual    Timing: when reading    Context: near vision and distance vision    Associated symptoms: dryness.  Negative for eye pain, floaters and itching    Pain scale: 0/10              Comments     Artie is here as a new patient for a medical eye exam. She says she has been having trouble seeing up close with her single vision glasses. She feels her distance vision has changed a little as well. Her current glasses RX is five years old. She has a history of being a glaucoma suspect as well.     Jhonny Matthews COT 3:12 PM December 2, 2020                    No protocol for requested medication.    Medication: metoCLOPramide (REGLAN) 10 MG tablet   Last office visit date: 8/7/24  Pharmacy: [id number] [address]    Order pended, routed to clinician for review.

## 2024-09-19 LAB — LAMOTRIGINE SERPL-MCNC: 6.1 UG/ML

## 2024-10-10 ENCOUNTER — MYC MEDICAL ADVICE (OUTPATIENT)
Dept: INTERNAL MEDICINE | Facility: CLINIC | Age: 44
End: 2024-10-10
Payer: COMMERCIAL

## 2024-10-10 DIAGNOSIS — N92.0 MENORRHAGIA WITH REGULAR CYCLE: Primary | ICD-10-CM

## 2024-10-22 ENCOUNTER — MYC MEDICAL ADVICE (OUTPATIENT)
Dept: OBGYN | Facility: CLINIC | Age: 44
End: 2024-10-22

## 2024-10-27 ASSESSMENT — ANXIETY QUESTIONNAIRES
4. TROUBLE RELAXING: SEVERAL DAYS
GAD7 TOTAL SCORE: 10
8. IF YOU CHECKED OFF ANY PROBLEMS, HOW DIFFICULT HAVE THESE MADE IT FOR YOU TO DO YOUR WORK, TAKE CARE OF THINGS AT HOME, OR GET ALONG WITH OTHER PEOPLE?: SOMEWHAT DIFFICULT
IF YOU CHECKED OFF ANY PROBLEMS ON THIS QUESTIONNAIRE, HOW DIFFICULT HAVE THESE PROBLEMS MADE IT FOR YOU TO DO YOUR WORK, TAKE CARE OF THINGS AT HOME, OR GET ALONG WITH OTHER PEOPLE: SOMEWHAT DIFFICULT
7. FEELING AFRAID AS IF SOMETHING AWFUL MIGHT HAPPEN: SEVERAL DAYS
3. WORRYING TOO MUCH ABOUT DIFFERENT THINGS: NEARLY EVERY DAY
GAD7 TOTAL SCORE: 10
5. BEING SO RESTLESS THAT IT IS HARD TO SIT STILL: NOT AT ALL
6. BECOMING EASILY ANNOYED OR IRRITABLE: SEVERAL DAYS
2. NOT BEING ABLE TO STOP OR CONTROL WORRYING: MORE THAN HALF THE DAYS
1. FEELING NERVOUS, ANXIOUS, OR ON EDGE: MORE THAN HALF THE DAYS

## 2024-10-29 ENCOUNTER — ANCILLARY PROCEDURE (OUTPATIENT)
Dept: ULTRASOUND IMAGING | Facility: CLINIC | Age: 44
End: 2024-10-29
Attending: OBSTETRICS & GYNECOLOGY
Payer: COMMERCIAL

## 2024-10-29 DIAGNOSIS — N92.0 MENORRHAGIA WITH REGULAR CYCLE: ICD-10-CM

## 2024-10-29 PROCEDURE — 76856 US EXAM PELVIC COMPLETE: CPT | Mod: GC | Performed by: RADIOLOGY

## 2024-10-29 PROCEDURE — 76830 TRANSVAGINAL US NON-OB: CPT | Mod: GC | Performed by: RADIOLOGY

## 2024-10-29 ASSESSMENT — PATIENT HEALTH QUESTIONNAIRE - PHQ9
SUM OF ALL RESPONSES TO PHQ QUESTIONS 1-9: 17
SUM OF ALL RESPONSES TO PHQ QUESTIONS 1-9: 17
10. IF YOU CHECKED OFF ANY PROBLEMS, HOW DIFFICULT HAVE THESE PROBLEMS MADE IT FOR YOU TO DO YOUR WORK, TAKE CARE OF THINGS AT HOME, OR GET ALONG WITH OTHER PEOPLE: SOMEWHAT DIFFICULT

## 2024-10-30 ENCOUNTER — VIRTUAL VISIT (OUTPATIENT)
Dept: PSYCHIATRY | Facility: CLINIC | Age: 44
End: 2024-10-30
Attending: PSYCHIATRY & NEUROLOGY
Payer: COMMERCIAL

## 2024-10-30 ENCOUNTER — VIRTUAL VISIT (OUTPATIENT)
Dept: OBGYN | Facility: CLINIC | Age: 44
End: 2024-10-30
Attending: OBSTETRICS & GYNECOLOGY
Payer: COMMERCIAL

## 2024-10-30 DIAGNOSIS — F31.81 BIPOLAR 2 DISORDER (H): ICD-10-CM

## 2024-10-30 DIAGNOSIS — D25.2 FIBROIDS, SUBSEROUS: Primary | ICD-10-CM

## 2024-10-30 DIAGNOSIS — F41.1 GAD (GENERALIZED ANXIETY DISORDER): ICD-10-CM

## 2024-10-30 PROCEDURE — 99442 PR PHYSICIAN TELEPHONE EVALUATION 11-20 MIN: CPT | Mod: 93 | Performed by: OBSTETRICS & GYNECOLOGY

## 2024-10-30 PROCEDURE — 99214 OFFICE O/P EST MOD 30 MIN: CPT | Mod: 95

## 2024-10-30 PROCEDURE — 90833 PSYTX W PT W E/M 30 MIN: CPT | Mod: 95

## 2024-10-30 RX ORDER — LORAZEPAM 0.5 MG/1
0.5 TABLET ORAL EVERY 6 HOURS PRN
Status: CANCELLED | OUTPATIENT
Start: 2024-10-30

## 2024-10-30 RX ORDER — LAMOTRIGINE 25 MG/1
TABLET ORAL
Qty: 30 TABLET | Refills: 3 | Status: SHIPPED | OUTPATIENT
Start: 2024-10-30

## 2024-10-30 RX ORDER — PROPRANOLOL HYDROCHLORIDE 10 MG/1
10 TABLET ORAL 2 TIMES DAILY PRN
Qty: 60 TABLET | Refills: 3 | Status: SHIPPED | OUTPATIENT
Start: 2024-10-30

## 2024-10-30 RX ORDER — DIVALPROEX SODIUM 250 MG/1
250 TABLET, FILM COATED, EXTENDED RELEASE ORAL DAILY
Qty: 30 TABLET | Refills: 3 | Status: SHIPPED | OUTPATIENT
Start: 2024-10-30

## 2024-10-30 RX ORDER — PROPRANOLOL HYDROCHLORIDE 60 MG/1
60 CAPSULE, EXTENDED RELEASE ORAL AT BEDTIME
Qty: 30 CAPSULE | Refills: 3 | Status: SHIPPED | OUTPATIENT
Start: 2024-10-30

## 2024-10-30 RX ORDER — LAMOTRIGINE 100 MG/1
100 TABLET ORAL DAILY
Qty: 30 TABLET | Refills: 3 | Status: SHIPPED | OUTPATIENT
Start: 2024-10-30

## 2024-10-30 RX ORDER — GABAPENTIN 100 MG/1
100 CAPSULE ORAL 2 TIMES DAILY PRN
Qty: 90 CAPSULE | Refills: 3 | Status: SHIPPED | OUTPATIENT
Start: 2024-10-30

## 2024-10-30 ASSESSMENT — PAIN SCALES - GENERAL: PAINLEVEL_OUTOF10: NO PAIN (0)

## 2024-10-30 NOTE — PROGRESS NOTES
Virtual Visit Details    Type of service:  Video Visit     Originating Location (pt. Location): Home    Distant Location (provider location):  On-site  Platform used for Video Visit: [facility]  MEDICAL PROGRESS NOTE  General Clinic Team       CARE TEAM:    PCP- Fahad Concepcion  Therapist-  Evangelina ([place] [facility], primarily for DBT q week), and individual 1:1 Carl (she is at [facility])   OB/GYN: Wendy Dobbs MD    Artie is a 43 year old who uses the pronouns she, her, hers.                   Assessment & Plan   Bipolar II Disorder, current episode depressed, mild (history of rapid cycling)  EAMON     Medical:  Uterine Fibroids       Artie is a 43 year old  female with a past psychiatric history of Bipolar II Disorder and EAMON who presents for medication management. Per record, patient saw a mood disorder specialist in [place], with diagnostic clarification of  bipolar II disorder with a rapid cycling pattern and anxiety diagnoses. Medical history is significant for Uterine Fibroids. Substance use does not appear to be playing a contributing role in the patient's presentation.     Today, Artie  reports anxious mood due to awaiting the results of her ultrasound.  She reports symptoms of attention difficulties, depression, and anxiety.  Artie has been taking medications as prescribed and denies side effects from medications.  She expresses nervousness about her upcoming surgery and having to take time off of work, due to her coworker having to cover for her. She reports chronic passive death wish, however denies any suicidal ideation, intent, or plan. No safety concerns today. Overall she appears to be at her baseline.  Discussed any potential interactions between the potential pain medications she may receive after surgery and her current psychotropic regimen.  No other issues or concerns for today.  Will continue  medications without changes.    Psychotropic Drug Interactions:    ADDITIVE SEDATION: Melatonin, Ativan  ADDITIVE CNS/RESPIRATORY DEPRESSION: Lamictal, Depakote, gabapentin, Ativan, THC  Ortho Tri-Cyclen Lo may increase the serum concentration of Propranolol, melatonin  Ortho Tri-Cyclen Lo may decrease the serum concentration of Ativan, Depakote, Lamictal  Depakote may increase the serum concentration of Ativan, Lamictal  Management: use lowest therapeutic doses of Depakote, Lamictal, gabapentin and routine monitoring    [place] was [place] today: indicates that controlled prescriptions have been filled as prescribed    Risk Statements:   Treatment Risk: Risks, benefits, alternatives and potential adverse effects have been discussed and are understood.   Safety Risk: Artie did not appear to be an imminent safety risk to self or others.    PLAN    1) Medications:   - Continue lamotrigine 125 mg daily   - Continue Depakote ER - 250 mg at bedtime   - Continue gabapentin- 100 mg every day at noon and 100 mg at bedtime.  - Continue propranolol IR 10 mg bid /prn- for anxiety  - Continue propranolol ER 60 mg qhs  - Continue melatonin 1-1.5 mg at bedtime, OTC    PCP-   - Reported by patient: Ativan 0.25-0.5 mg at bedtime/prn (1x-2x per week). We have not taken over prescribing this medication and advise against due to risks outweighing benefits. Per  (as of today): No evidence of filling Ativan in the past 1 year.   Spironolactone- 50 mg bid- for hair loss  L-Methylfolate  Ortho Tri-Cyclen Lo    2) Psychotherapy:   Individual therapy [facility], works with DBT .   Working with racial trauma consultant/ on identity development.    3) Next due:  Labs: Lamotrigine, Depakote, CBC, and CMP level last obtained on 9/2024  EKG: As needed   Rating scales:  PHQ-9, EAMON-7    4) Referrals: None    5) Follow-up: Return to clinic in 10 weeks                   Interval History     Getting more and more nervous about upcoming  "surgery on Nov 22 (laparoscopic total hysterectomy). Has last pre-surgery appointment today. Her leave with start on the 20th, has 6 weeks off. Expresses some concern about how her mom will be taken care of during her downtime. Will have a friend check in on her mom.     Also expresses that prepping for surgery showed her she does not really have many people to rely on/supports, which was hard.    Hopeful that surgery will help the different issues she is experiencing (urinary issues etc).    Mood: Anxiously awaiting ultrasound results  Sleep: Impacted by needing to get up to pee overnight  Appetite: Fine  Energy: Tired, had to wake up overnight multiple times to pee  SI/HI/Safety Concerns: Has chronic PDW, no SI, plan or intent   Side effects from medications: No     Psychiatric Review of Systems:   Depression: Depressed mood, anhedonia, low energy, insomnia, guilt, poor concentration, Chronic PDW   Anxiety: worries, ruminations, some \"palpable\" anxiety  Elevated: no  Psychosis: no  Trauma Related: no  Insomnia: Yes    Other:   ADHD: trouble sustaining attention, losing things, distracted    Current Social History:  Financial/occupational: [employer] .   Living situation: Lives alone in [facility]  Social/spiritual support: parents, friends      Pertinent Substance Use:  [Last updated 10/30/24]  Alcohol: No   Cannabis: Yes: THC Drinks, 1 can maybe once every few weeks (3-5mg)  Tobacco: No  Caffeine:  Yes: 1 cup of coffee daily   Opioids: No   Narcan Kit current: N/A  Other substances: No     Medical Review of Systems / Med sfx:   Lightheadedness/orthostasis: No  Headaches: No  GI: Stomach upset with increased anxiety, takes miralax for constipation  CV: No  Sexual health concerns: No  A comprehensive review of systems was performed and is negative other than noted above.    Contraception: Laparoscopic total hysterectomy scheduled for November 22, 2024                  Summary Points of Current " Care  8/30/2024: Transfer of care appointment, no changes                  Physical Exam  (Vitals Only)    There were no vitals taken for this visit.  Pulse Readings from Last 3 Encounters:   08/14/24 66   07/15/24 75   05/29/24 78     Wt Readings from Last 3 Encounters:   08/30/24 56.7 kg (125 lb)   08/14/24 56.8 kg (125 lb 3.2 oz)   07/15/24 57.2 kg (126 lb)     BP Readings from Last 3 Encounters:   08/14/24 117/76   07/15/24 111/72   05/29/24 108/69                      Mental Status Exam    Alertness: alert  and oriented  Appearance: adequately groomed  Behavior/Demeanor: cooperative, pleasant, and calm, with good  eye contact   Speech: normal  Language: intact and no problems  Psychomotor: normal or unremarkable  Mood: anxious  Affect: full range; congruent to: mood- yes, content- yes  Thought Process/Associations:  Talkative  Thought Content:  Reports  chronic PDW ;  Denies suicidal & violent ideation and delusions  Perception:  Reports none;  Denies hallucinations  Insight: good  Judgment: good  Cognition: does  appear grossly intact; formal cognitive testing was not done  Gait and Station: N/A (telehealth)                  Past Psychotropic Medication Trials     Medication Max Dose (mg) Dates / Duration Helpful? DC Reason / Adverse Effects?   Paxil        further bad side effects on higher doses, night sweats    Wellbutrin            Lithium            Depakote            Adderall            Strattera           Clonazepam           Buspar       Akathisia   Pristq              Of note, per chart review, Tends not to tolerate most medications, c/o GI symptoms and HA .                   Past Medical History     Patient Active Problem List   Diagnosis    Heavy menses    CARDIOVASCULAR SCREENING; LDL GOAL LESS THAN 160    Major depressive disorder, recurrent episode, moderate (H)    Anxiety state    Attention deficit disorder    Bipolar I disorder (H)    Dysmenorrhea    Glaucoma suspect    Myopia                      Medications     Current Outpatient Medications   Medication Sig Dispense Refill    divalproex sodium extended-release (DEPAKOTE ER) 250 MG 24 hr tablet Take 1 tablet (250 mg) by mouth daily. 30 tablet 3    gabapentin (NEURONTIN) 100 MG capsule Take 1 capsule (100 mg) by mouth 2 times daily as needed for other (anxiety). Take 1 tab at noon and 1 cap at bedtime 90 capsule 3    lamoTRIgine (LAMICTAL) 100 MG tablet Take 1 tablet (100 mg) by mouth daily. Take along with one 25 mg tablet for a combined total of 125 mg. 30 tablet 3    lamoTRIgine (LAMICTAL) 25 MG tablet Take 1 tablet (25 mg) by mouth daily. Take along with one 100 mg tablet for a combined total of 125 mg. 30 tablet 3    LORazepam (ATIVAN) 0.5 MG tablet Take 0.5 mg by mouth every 6 hours as needed for anxiety      norgestim-eth estrad triphasic (ORTHO TRI-CYCLEN LO) 0.18/0.215/0.25 MG-25 MCG tablet Take 1 tablet by mouth daily 84 tablet 4    propranolol (INDERAL) 10 MG tablet Take 1 tablet (10 mg) by mouth 2 times daily as needed (Anxiety). 60 tablet 3    propranolol ER (INDERAL LA) 60 MG 24 hr capsule Take 1 capsule (60 mg) by mouth at bedtime. 30 capsule 3    spironolactone (ALDACTONE) 50 MG tablet Take 1 tablet (50 mg) by mouth 2 times daily 180 tablet 2                     Data         3/21/2024     5:39 PM 6/14/2024     3:13 PM 10/27/2024    11:47 AM   PROMIS-10 Total Score w/o Sub Scores   PROMIS TOTAL - SUBSCORES 19 20 21        Patient-reported         8/14/2024     2:19 PM 8/30/2024    12:15 AM 10/29/2024     1:15 PM   PHQ-9 SCORE   PHQ-9 Total Score MyChart 20 (Severe depression) 18 (Moderately severe depression) 17 (Moderately severe depression)   PHQ-9 Total Score 20 18 17        Patient-reported         7/11/2024    11:29 AM 8/30/2024    12:17 AM 10/27/2024    11:46 AM   EAMON-7 SCORE   Total Score 9 (mild anxiety) 15 (severe anxiety) 10 (moderate anxiety)   Total Score 9 15 10        Patient-reported       Liver/Kidney  Function, TSH Metabolic Blood counts   Recent Labs   Lab Test 09/16/24  1829 04/21/23  1757   AST 15  --    ALT 6  --    ALKPHOS 65  --    CR 0.85 0.85     Recent Labs   Lab Test 06/17/22  1617   TSH 1.09    Recent Labs   Lab Test 10/30/20  1200   CHOL 167   TRIG 148   LDL 81   HDL 56     No lab results found.  Recent Labs   Lab Test 09/16/24  1829   GLC 94    Recent Labs   Lab Test 09/16/24  1829   WBC 8.0   HGB 12.9   HCT 39.0   MCV 86              ECG: None in EHR       Psychiatry Individual Psychotherapy Note   Psychotherapy start time - 9:52 AM  Psychotherapy end time - 10:19 AM  Date treatment plan last reviewed with patient - 10/30/24  Subjective: This supportive psychotherapy session addressed issues related to goals of therapy and current psychosocial stressors. Patient's reaction: Action in the context of mental status appropriate for ambulatory setting.    Interactive complexity indicated? No  Plan: RTC in timeframe noted above  Psychotherapy services during this visit included myself and the patient.   Treatment Plan      SYMPTOMS; PROBLEMS   MEASURABLE GOALS;    FUNCTIONAL IMPROVEMENT / GAINS INTERVENTIONS DISCHARGE CRITERIA   Depression: depressed mood, anhedonia, low energy, insomnia, and concentration problems  Anxiety: excessive worry and nervous/overwhelmed  Marielena/Hypomania: rapid cycling   reduce depressive symptoms, reduce panic attacks/ excessive worry, and reduce manic/hypomanic episodes Supportive / psychodynamic marked symptom improvement         Level of Medical Decision Making:   - At least 1 chronic problem that is not stable  - Engaged in prescription drug management during visit (discussed any medication benefits, side effects, alternatives, etc.)    {   Must meet 2 out of 3 of the above MDM elements to bill at the specified level     For help more info about elements / criteria, click here-> MDM Help Grid     **No need to delete blue text, it disappears when note is  signed**        PROVIDER: Kat Arredondo MD    Patient not staffed in clinic.  Note will be reviewed and signed by supervisor Dr. Garcia.

## 2024-10-30 NOTE — LETTER
10/30/2024       RE: Artie Diez  [address]  [place] [address]     Dear Colleague,    Thank you for referring your patient, Artie Diez, to the [facility] [place] at [facility]. Please see a copy of my visit note below.    Artie is a 43 year old who is being evaluated via a billable phone visit.        Subjective  Artie is a 43 year old, presenting for the following health issues:  noted more symptoms w/ some sensation of legs falling asleep.    She notes some concerns about incontinence after surgery.  No prior pregnancy or  and no leaking   Wondering re: size of fibroids and location.    Wondering about timing of surgery and reasons for converting to open if needed.     Discharge criteria.        Objective        Vitals:  No vitals were obtained today due to virtual visit.    Physical Exam   GENERAL: alert and no distress  EYES: Eyes grossly normal to inspection.  No discharge or erythema, or obvious scleral/conjunctival abnormalities.  RESP: No audible wheeze, cough, or visible cyanosis.    SKIN: Visible skin clear. No significant rash, abnormal pigmentation or lesions.  NEURO: Cranial nerves grossly intact.  Mentation and speech appropriate for age.  PSYCH: Appropriate affect, tone, and pace of words    COMPARISON: Pelvic ultrasound 2024, 3/10/2022     HISTORY: Menorrhagia with regular cycle     TECHNIQUE: The pelvis was scanned in standard fashion with a  transabdominal transducer(s) using both grey scale and spectral flow  and  color Doppler techniques.     FINDINGS:  The uterus measures 7.8 x 5.5 x 3.3 cm. There is a 10.3 x 9.7 x 7.4 cm  subserosal fibroid in the anterior fundus, previously measured 8.0 x  7.9 x 5.8 cm. The previously described intramural fibroid  characterized on this exam.  The visualization of the endometrium was  limited due to transabdominal technique in the overlying  fibroid  however measures approximately 3 mm. There is no free fluid in the  pelvis.     The right ovary was not visualized on this examination. Visualization  of the left ovary was limited by transabdominal view and fibroid  tissue, measuring approximately 3.6 x 2.2 x 2.5 cm. There is no  adnexal mass seen. There is normal blood flow to the left ovary.                                                                      IMPRESSION:   1. Interval increase in size of anterior fundal subserosal fibroid.  Previously seen intramural fibroid is not visualized on this exam.  2. The right ovary is not visualized on this exam. Normal appearing  left ovary.     I have personally reviewed the examination and initial interpretation  and I agree with the findings.       We discussed again plans for surgery.  Est time of surgery 4-4.5 hrs. Fibroid is slightly bigger, but should not preclude the laparoscopic removal.  She has had known fibroid since at lesta 2022, so not alarming that growth in 4th decade is seen.   Discussed discharge criteria and restrictions.   Discussed minimal risk of ALEK after surgery given her history.     HERRERA VAZQUEZ MD       Video-Visit Details    Type of service:  phone visit  18 min  Originating Location (pt. Location): Home    Distant Location (provider location):  On-site  Platform used for Video Visit: Telephone  Signed Electronically by: Wendy Dobbs MD        Again, thank you for allowing me to participate in the care of your patient.      Sincerely,    Wendy Dobbs MD

## 2024-10-30 NOTE — PATIENT INSTRUCTIONS
It was nice seeing you today, Artie     Treatment Plan summary from today's visit:      1) Medications  - No changes       2) Please return to clinic in 10 weeks     3) Crisis numbers are below and clinic after hours number is [phone]         **For crisis resources, please see the information at the end of this document**   Patient Education    Thank you for coming to the [facility] [place] [facility].     Lab Testing:  If you had lab testing today and your results are reassuring or normal they will be mailed to you or sent through [facility] within 7 days. If the lab tests need quick action we will call you with the results. The phone number we will call with results is # [phone]. If this is not the best number please call our clinic and change the number.     Medication Refills:  If you need any refills please call your pharmacy and they will contact us. Our fax number for refills is [phone].   Three business days of notice are needed for general medication refill requests.   Five business days of notice are needed for controlled substance refill requests.   If you need to change to a different pharmacy, please contact the new pharmacy directly. The new pharmacy will help you get your medications transferred.     Contact Us:  Please call [phone] during business hours (8-5:00 M-F).   If you have medication related questions after clinic hours, or on the weekend, please call [phone].     Financial Assistance [phone]   Medical Records [phone]       MENTAL HEALTH CRISIS RESOURCES:  For a emergency help, please call 911 or go to the nearest Emergency Department.     Emergency Walk-In Options:   [facility] Unit @ [facility] ([place]): [phone] - Specialized mental health emergency area designed to be calming  [facility] ([place]): [phone]  [facility] ([place]): [phone]  [facility] ([place]): [phone]    [facility] Crisis Information:   [place]: [phone]  [place]: [phone]  [place] ([place]) - Adult: [phone]     Child: [phone]  [place] - Adult: [phone]     Child: [phone]  [place]: [phone]  List of all [facility] resources:   https://[place].[facility]/dhs/people-we-serve/adults/health-care/mental-health/resources/crisis-contacts.jsp    National Crisis Information:   Crisis Text Line: Text  [place]  to 165102  Suicide & Crisis Lifeline: 988  National Suicide Prevention Lifeline: 5-562-921-DQLA (1-507.469.5164)       For online chat options, visit https://suicidepreventionlifeline.org/chat/  Poison Control Center: 1-503.561.4221  Trans Lifeline: 1-348.955.1608 - Hotline for transgender people of all ages  The [employer]: 0-244-446-6175 - Hotline for LGBT youth     For Non-Emergency Support:   Fast Tracker: Mental Health & Substance Use Disorder Resources -   https://www.[facility].org/

## 2024-10-30 NOTE — NURSING NOTE
Current patient location: [address]   [place] [address]    Is the patient currently in the state of [place]? YES    Visit mode:VIDEO    If the visit is dropped, the patient can be reconnected by: VIDEO VISIT: Text to cell phone:   Telephone Information:   Mobile [phone]       Will anyone else be joining the visit? NO  (If patient encounters technical issues they should call [phone])    Are changes needed to the allergy or medication list? No    Are refills needed on medications prescribed by this physician? YES    Rooming Documentation:  Questionnaire(s) completed    Reason for visit: RECHECK    Salvador DAVEF

## 2024-10-30 NOTE — PROGRESS NOTES
Artie is a 43 year old who is being evaluated via a billable phone visit.        Subjective   Artie is a 43 year old, presenting for the following health issues:  noted more symptoms w/ some sensation of legs falling asleep.    She notes some concerns about incontinence after surgery.  No prior pregnancy or  and no leaking   Wondering re: size of fibroids and location.    Wondering about timing of surgery and reasons for converting to open if needed.     Discharge criteria.        Objective         Vitals:  No vitals were obtained today due to virtual visit.    Physical Exam   GENERAL: alert and no distress  EYES: Eyes grossly normal to inspection.  No discharge or erythema, or obvious scleral/conjunctival abnormalities.  RESP: No audible wheeze, cough, or visible cyanosis.    SKIN: Visible skin clear. No significant rash, abnormal pigmentation or lesions.  NEURO: Cranial nerves grossly intact.  Mentation and speech appropriate for age.  PSYCH: Appropriate affect, tone, and pace of words    COMPARISON: Pelvic ultrasound 2024, 3/10/2022     HISTORY: Menorrhagia with regular cycle     TECHNIQUE: The pelvis was scanned in standard fashion with a  transabdominal transducer(s) using both grey scale and spectral flow  and  color Doppler techniques.     FINDINGS:  The uterus measures 7.8 x 5.5 x 3.3 cm. There is a 10.3 x 9.7 x 7.4 cm  subserosal fibroid in the anterior fundus, previously measured 8.0 x  7.9 x 5.8 cm. The previously described intramural fibroid  characterized on this exam.  The visualization of the endometrium was  limited due to transabdominal technique in the overlying fibroid  however measures approximately 3 mm. There is no free fluid in the  pelvis.     The right ovary was not visualized on this examination. Visualization  of the left ovary was limited by transabdominal view and fibroid  tissue, measuring approximately 3.6 x 2.2 x 2.5 cm. There is no  adnexal mass seen. There is normal blood  flow to the left ovary.                                                                      IMPRESSION:   1. Interval increase in size of anterior fundal subserosal fibroid.  Previously seen intramural fibroid is not visualized on this exam.  2. The right ovary is not visualized on this exam. Normal appearing  left ovary.     I have personally reviewed the examination and initial interpretation  and I agree with the findings.       We discussed again plans for surgery.  Est time of surgery 4-4.5 hrs. Fibroid is slightly bigger, but should not preclude the laparoscopic removal.  She has had known fibroid since at lesta 2022, so not alarming that growth in 4th decade is seen.   Discussed discharge criteria and restrictions.   Discussed minimal risk of ALEK after surgery given her history.     HERRERA VAZQUEZ MD       Video-Visit Details    Type of service:  phone visit  18 min  Originating Location (pt. Location): Home    Distant Location (provider location):  On-site  Platform used for Video Visit: Telephone  Signed Electronically by: Wendy Dobbs MD

## 2024-10-31 ENCOUNTER — MYC MEDICAL ADVICE (OUTPATIENT)
Dept: OBGYN | Facility: CLINIC | Age: 44
End: 2024-10-31
Payer: COMMERCIAL

## 2024-11-07 NOTE — PROGRESS NOTES
Preoperative Evaluation  [facility] INTERNAL MEDICINE [address]  4TH FLOOR  [facility] [address]  Phone: [phone]  Fax: [phone]  Primary Provider: Fahad Concepcion MD  Pre-op Performing Provider: Fahad Concepcion MD  Nov 13, 2024             10/31/2024   Surgical Information   What procedure is being done? Hysterectomy  EXAM UNDER ANESTHESIA, PELVIS, Robot Assisted Total Laparoscopic Hysterectomy, Bilateral Salpingectomy, Cystoscopy and Possible Open Laparotomy + Davinci Hysterectomy Total, Salpingectomy Bilateral    Facility or Hospital where procedure/surgery will be performed:  OR [facility] [address] [place] [id number]    Who is doing the procedure / surgery? Graciela Rose MD (assisting)    Date of surgery / procedure: 11/22/24    Time of surgery / procedure: 7:30  AM   Where do you plan to recover after surgery? at home with family        Patient-reported     Fax number for surgical facility: Note does not need to be faxed, will be available electronically in Epic.    Assessment & Plan     The proposed surgical procedure is considered INTERMEDIATE risk.    Preop general physical exam  And  Menorrhagia with regular cycle    Okay to proceed with surgery as scheduled.  CBC and CMP normal in September, so recheck not ordered.       Preoperative Medication Instructions  Antiplatelet or Anticoagulation Medication Instructions   - Patient is on no antiplatelet or anticoagulation medications.    Additional Medication Instructions   - Diuretics: DO NOT TAKE on the day of surgery.  (Spironolactone)    Recommendation  Approval given to proceed with proposed procedure, without further diagnostic evaluation.    Martha Alva is a 44 year old, presenting for the following:  Pre-Op Exam (Surgeon: Wendy Dobbs MD (primary), Graciela Arango MD  (assisting)/Fax number for Preop Evaluation: N/A/Location of Surgery: [facility] OR  16/Date of Surgery: 11/22/2024 @ 7:30 AM/Procedure: EXAM UNDER ANESTHESIA, PELVIS, Robot Assisted Total Laparoscopic Hysterectomy, Bilateral Salpingectomy, Cystoscopy and Possible Open Laparotomy + Davinci Hysterectomy Total, Salpingectomy Bilateral /History of reaction to anesthesia? Unknown)      HPI related to upcoming procedure: Artie is scheduled for hysterectomy due to heavy menses with fibroids that are enlarging. She is having urinary frequency from this as well.      She is otherwise feeling well.     Wondering about procedural Ancef and history of penicillin allergy. Discussed 10 percent risk of cephalosporin allergy with penicillin allergy.            10/31/2024   Pre-Op Questionnaire   Have you ever had a heart attack or stroke? No    Have you ever had surgery on your heart or blood vessels, such as a stent placement, a coronary artery bypass, or surgery on an artery in your head, neck, heart, or legs? No    Do you have chest pain with activity? No    Do you have a history of heart failure? No    Do you currently have a cold, bronchitis or symptoms of other infection? No    Do you have a cough, shortness of breath, or wheezing? No    Do you or anyone in your family have previous history of blood clots? (!) UNKNOWN - no personal history    Do you or does anyone in your family have a serious bleeding problem such as prolonged bleeding following surgeries or cuts? (!) UNKNOWN - no personal history    Have you ever had problems with anemia or been told to take iron pills? No    Have you had any abnormal blood loss such as black, tarry or bloody stools, or abnormal vaginal bleeding? No    Have you ever had a blood transfusion? No    Are you willing to have a blood transfusion if it is medically needed before, during, or after your surgery? Yes    Have you or any of your relatives ever had problems with anesthesia? (!) UNKNOWN - no personal history    Do you have sleep apnea, excessive snoring or daytime  drowsiness? No    Do you have any artifical heart valves or other implanted medical devices like a pacemaker, defibrillator, or continuous glucose monitor? No    Do you have artificial joints? No    Are you allergic to latex? No        Patient-reported     Health Care Directive  Patient does not have a Health Care Directive: Patient states has Advance Directive and will bring in a copy to clinic.    Preoperative Review of    reviewed - controlled substances reflected in medication list.  Lorazepam not recently refilled.       Patient Active Problem List    Diagnosis Date Noted    Bipolar 2 disorder (H) 10/30/2024     Priority: Medium    Major depressive disorder, recurrent episode, moderate (H) 02/26/2015     Priority: Medium    CARDIOVASCULAR SCREENING; LDL GOAL LESS THAN 160 06/07/2013     Priority: Medium    Glaucoma suspect 03/22/2012     Priority: Medium     Overview:   Risk: Pach, Myopia  Pach: 493/492  Q1y visit      Myopia 03/22/2012     Priority: Medium    Heavy menses 02/17/2012     Priority: Medium     Not sexually active.  Use BCP's for cycle control.        EAMON (generalized anxiety disorder) 11/11/2011     Priority: Medium     Overview:   Epic       Dysmenorrhea 04/26/2007     Priority: Medium    Attention deficit disorder 11/05/2002     Priority: Medium     Overview:   Epic         Past Medical History:   Diagnosis Date    Bipolar 2 disorder (H)     Depressive disorder     Depressive disorder, not elsewhere classified     Depression (non-psychotic)    Excessive or frequent menstruation     Heavy periods    Fibroid uterus      Past Surgical History:   Procedure Laterality Date    ENT SURGERY  1996    Tonsils removed     Current Outpatient Medications   Medication Sig Dispense Refill    divalproex sodium extended-release (DEPAKOTE ER) 250 MG 24 hr tablet Take 1 tablet (250 mg) by mouth daily. 30 tablet 3    gabapentin (NEURONTIN) 100 MG capsule Take 1 capsule (100 mg) by mouth 2 times daily as  "needed for other (anxiety). Take 1 tab at noon and 1 cap at bedtime 90 capsule 3    lamoTRIgine (LAMICTAL) 100 MG tablet Take 1 tablet (100 mg) by mouth daily. Take along with one 25 mg tablet for a combined total of 125 mg. 30 tablet 3    lamoTRIgine (LAMICTAL) 25 MG tablet Take 1 tablet (25 mg) by mouth daily. Take along with one 100 mg tablet for a combined total of 125 mg. 30 tablet 3    LORazepam (ATIVAN) 0.5 MG tablet Take 0.5 mg by mouth every 6 hours as needed for anxiety      norgestim-eth estrad triphasic (ORTHO TRI-CYCLEN LO) 0.18/0.215/0.25 MG-25 MCG tablet Take 1 tablet by mouth daily 84 tablet 4    propranolol (INDERAL) 10 MG tablet Take 1 tablet (10 mg) by mouth 2 times daily as needed (Anxiety). 60 tablet 3    propranolol ER (INDERAL LA) 60 MG 24 hr capsule Take 1 capsule (60 mg) by mouth at bedtime. 30 capsule 3    spironolactone (ALDACTONE) 50 MG tablet Take 1 tablet (50 mg) by mouth 2 times daily 180 tablet 2       Allergies   Allergen Reactions    Amoxicillin     Amphotericin B     Phenylbutazones     Penicillin G Nausea and Rash        Social History     Tobacco Use    Smoking status: Never     Passive exposure: Never    Smokeless tobacco: Never   Substance Use Topics    Alcohol use: No       History   Drug Use No           10 point ROS of systems including Constitutional, Eyes, Respiratory, Cardiovascular, Gastroenterology, Genitourinary, Integumentary, Muscularskeletal, Psychiatric were all negative except for pertinent positives noted in my HPI.     Objective    /69 (BP Location: Right arm, Patient Position: Sitting, Cuff Size: Adult Regular)   Pulse 65   Temp 98.2  F (36.8  C) (Oral)   Resp 16   Ht 1.575 m (5' 2.01\")   Wt 57.2 kg (126 lb 1.6 oz)   SpO2 99%   BMI 23.06 kg/m     Estimated body mass index is 23.06 kg/m  as calculated from the following:    Height as of this encounter: 1.575 m (5' 2.01\").    Weight as of this encounter: 57.2 kg (126 lb 1.6 oz).  Physical Exam    " GENERAL APPEARANCE: healthy, alert and no distress     HENT: normal ear canals and TMs, nose and mouth without ulcers or lesions     NECK: no adenopathy, no asymmetry, masses, or scars     RESP: lungs clear to auscultation - no rales, rhonchi or wheezes     CV: regular rates and rhythm, normal S1 S2, no S3 or S4 and no murmur, click or rub -     ABDOMEN:  soft, nontender, no HSM or masses and bowel sounds normal     MS: extremities normal- no gross deformities noted, no evidence of inflammation in joints       NEURO: mentation intact and speech normal     PSYCH: mentation appears normal. and affect normal/bright     LYMPHATICS: No cervical or supraclavicular nodes       Recent Labs   Lab Test 09/16/24  1829   HGB 12.9         POTASSIUM 4.2   CR 0.85        Diagnostics  No labs were ordered during this visit.   No EKG required, no history of coronary heart disease, significant arrhythmia, peripheral arterial disease or other structural heart disease.    Revised Cardiac Risk Index (RCRI)  The patient has the following serious cardiovascular risks for perioperative complications:   - No serious cardiac risks = 0 points     RCRI Interpretation: 0 points: Class I (very low risk - 0.4% complication rate)         Signed Electronically by: Fahad Concepcion MD  A copy of this evaluation report is provided to the requesting physician.

## 2024-11-07 NOTE — PATIENT INSTRUCTIONS
How to Take Your Medication Before Surgery  Preoperative Medication Instructions   Antiplatelet or Anticoagulation Medication Instructions   - Patient is on no antiplatelet or anticoagulation medications.    Additional Medication Instructions   - Diuretics: DO NOT TAKE on the day of surgery.  (Spironolactone)       Patient Education   Preparing for Your Surgery  For Adults  Getting started  In most cases, a nurse will call to review your health history and instructions. They will give you an arrival time based on your scheduled surgery time. Please be ready to share:  Your doctor's clinic name and phone number  Your medical, surgical, and anesthesia history  A list of allergies and sensitivities  A list of medicines, including herbal treatments and over-the-counter drugs  Whether the patient has a legal guardian (ask how to send us the papers in advance)  Note: You may not receive a call if you were seen at our PAC (Preoperative Assessment Center).  Please tell us if you're pregnant--or if there's any chance you might be pregnant. Some surgeries may injure a fetus (unborn baby), so they require a pregnancy test. Surgeries that are safe for a fetus don't always need a test, and you can choose whether to have one.   Preparing for surgery  Within 10 to 30 days of surgery: Have a pre-op exam (sometimes called an H&P, or History and Physical). This can be done at a clinic or pre-operative center.  If you're having a , you may not need this exam. Talk to your care team.  At your pre-op exam, talk to your care team about all medicines you take. (This includes CBD oil and any drugs, such as THC, marijuana, and other forms of cannabis.) If you need to stop any medicine before surgery, ask when to start taking it again.  This is for your safety. Many medicines and drugs can make you bleed too much during surgery. Some change how well surgery (anesthesia) drugs work.  Call your insurance company to let them know  you're having surgery. (If you don't have insurance, call [phone].)  Call your clinic if there's any change in your health. This includes a scrape or scratch near the surgery site, or any signs of a cold (sore throat, runny nose, cough, rash, fever).  Eating and drinking guidelines  For your safety: Unless your surgeon tells you otherwise, follow the guidelines below.  Eat and drink as normal until 8 hours before you arrive for surgery. After that, no food or milk. You can spit out gum when you arrive.  Drink clear liquids until 2 hours before you arrive. These are liquids you can see through, like water, Gatorade, and Propel Water. They also include plain black coffee and tea (no cream or milk).  No alcohol for 24 hours before you arrive. The night before surgery, stop any drinks that contain THC.  If your care team tells you to take medicine on the morning of surgery, it's okay to take it with a sip of water. No other medicines or drugs are allowed (including CBD oil)--follow your care team's instructions.  If you have questions the day of surgery, call your hospital or surgery center.   Preventing infection  Shower or bathe the night before and the morning of surgery. Follow the instructions your clinic gave you. (If no instructions, use regular soap.)  Don't shave or clip hair near your surgery site. We'll remove the hair if needed.  Don't smoke or vape the morning of surgery. No chewing tobacco for 6 hours before you arrive. A nicotine patch is okay. You may spit out nicotine gum when you arrive.  For some surgeries, the surgeon will tell you to fully quit smoking and nicotine.  We will make every effort to keep you safe from infection. We will:  Clean our hands often with soap and water (or an alcohol-based hand rub).  Clean the skin at your surgery site with a special soap that kills germs.  Give you a special gown to keep you warm. (Cold raises the risk of infection.)  Wear hair covers, masks, gowns,  and gloves during surgery.  Give antibiotic medicine, if prescribed. Not all surgeries need this medicine.  What to bring on the day of surgery  Photo ID and insurance card  Copy of your health care directive, if you have one  Glasses and hearing aids (bring cases)  You can't wear contacts during surgery  Inhaler and eye drops, if you use them (tell us about these when you arrive)  CPAP machine or breathing device, if you use them  A few personal items, if spending the night  If you have . . .  A pacemaker, ICD (cardiac defibrillator), or other implant: Bring the ID card.  An implanted stimulator: Bring the remote control.  A legal guardian: Bring a copy of the certified (court-stamped) guardianship papers.  Please remove any jewelry, including body piercings. Leave jewelry and other valuables at home.  If you're going home the day of surgery  You must have a responsible adult drive you home. They should stay with you overnight as well.  If you don't have someone to stay with you, and you aren't safe to go home alone, we may keep you overnight. Insurance often won't pay for this.  After surgery  If it's hard to control your pain or you need more pain medicine, please call your surgeon's office.  Questions?   If you have any questions for your care team, list them here:   ____________________________________________________________________________________________________________________________________________________________________________________________________________________________________________________________  For informational purposes only. Not to replace the advice of your health care provider. Copyright   2003, 2019 [facility]. All rights reserved. Clinically reviewed by Timmy Dye MD. [employer] [id number] - REV 08/24.

## 2024-11-13 ENCOUNTER — OFFICE VISIT (OUTPATIENT)
Dept: INTERNAL MEDICINE | Facility: CLINIC | Age: 44
End: 2024-11-13
Payer: COMMERCIAL

## 2024-11-13 VITALS
WEIGHT: 126.1 LBS | DIASTOLIC BLOOD PRESSURE: 69 MMHG | SYSTOLIC BLOOD PRESSURE: 101 MMHG | RESPIRATION RATE: 16 BRPM | TEMPERATURE: 98.2 F | OXYGEN SATURATION: 99 % | BODY MASS INDEX: 23.21 KG/M2 | HEIGHT: 62 IN | HEART RATE: 65 BPM

## 2024-11-13 DIAGNOSIS — N92.0 MENORRHAGIA WITH REGULAR CYCLE: ICD-10-CM

## 2024-11-13 DIAGNOSIS — Z01.818 PREOP GENERAL PHYSICAL EXAM: Primary | ICD-10-CM

## 2024-11-13 RX ORDER — POLYETHYLENE GLYCOL 3350 17 G/17G
1 POWDER, FOR SOLUTION ORAL AT BEDTIME
COMMUNITY

## 2024-11-14 NOTE — TELEPHONE ENCOUNTER
FMLA forms completed and signed by Dr. Dobbs.  Faxed to [phone] today.  Sent pt a [employer] message of completion.

## 2024-11-21 ENCOUNTER — ANESTHESIA EVENT (OUTPATIENT)
Dept: SURGERY | Facility: CLINIC | Age: 44
End: 2024-11-21
Payer: COMMERCIAL

## 2024-11-22 ENCOUNTER — ANESTHESIA (OUTPATIENT)
Dept: SURGERY | Facility: CLINIC | Age: 44
End: 2024-11-22
Payer: COMMERCIAL

## 2024-11-22 ENCOUNTER — HOSPITAL ENCOUNTER (OUTPATIENT)
Facility: CLINIC | Age: 44
Discharge: HOME | End: 2024-11-22
Attending: OBSTETRICS & GYNECOLOGY | Admitting: OBSTETRICS & GYNECOLOGY
Payer: COMMERCIAL

## 2024-11-22 VITALS
HEART RATE: 80 BPM | TEMPERATURE: 97.8 F | HEIGHT: 63 IN | WEIGHT: 123.9 LBS | BODY MASS INDEX: 21.95 KG/M2 | RESPIRATION RATE: 18 BRPM | DIASTOLIC BLOOD PRESSURE: 66 MMHG | OXYGEN SATURATION: 99 % | SYSTOLIC BLOOD PRESSURE: 103 MMHG

## 2024-11-22 DIAGNOSIS — G89.18 ACUTE POST-OPERATIVE PAIN: Primary | ICD-10-CM

## 2024-11-22 DIAGNOSIS — F31.81 BIPOLAR 2 DISORDER (H): ICD-10-CM

## 2024-11-22 DIAGNOSIS — D25.9 UTERINE LEIOMYOMA, UNSPECIFIED LOCATION: ICD-10-CM

## 2024-11-22 LAB
ABO/RH(D): NORMAL
ANTIBODY SCREEN: NEGATIVE
BASOPHILS # BLD AUTO: 0.1 10E3/UL (ref 0–0.2)
BASOPHILS NFR BLD AUTO: 1 %
EOSINOPHIL # BLD AUTO: 0.1 10E3/UL (ref 0–0.7)
EOSINOPHIL NFR BLD AUTO: 1 %
ERYTHROCYTE [DISTWIDTH] IN BLOOD BY AUTOMATED COUNT: 11.9 % (ref 10–15)
HCG UR QL: NEGATIVE
HCT VFR BLD AUTO: 36.2 % (ref 35–47)
HGB BLD-MCNC: 12.3 G/DL (ref 11.7–15.7)
IMM GRANULOCYTES # BLD: 0.1 10E3/UL
IMM GRANULOCYTES NFR BLD: 1 %
LYMPHOCYTES # BLD AUTO: 2.5 10E3/UL (ref 0.8–5.3)
LYMPHOCYTES NFR BLD AUTO: 23 %
MCH RBC QN AUTO: 28.5 PG (ref 26.5–33)
MCHC RBC AUTO-ENTMCNC: 34 G/DL (ref 31.5–36.5)
MCV RBC AUTO: 84 FL (ref 78–100)
MONOCYTES # BLD AUTO: 0.8 10E3/UL (ref 0–1.3)
MONOCYTES NFR BLD AUTO: 7 %
NEUTROPHILS # BLD AUTO: 7.3 10E3/UL (ref 1.6–8.3)
NEUTROPHILS NFR BLD AUTO: 67 %
NRBC # BLD AUTO: 0 10E3/UL
NRBC BLD AUTO-RTO: 0 /100
PLATELET # BLD AUTO: 346 10E3/UL (ref 150–450)
RBC # BLD AUTO: 4.32 10E6/UL (ref 3.8–5.2)
SPECIMEN EXPIRATION DATE: NORMAL
WBC # BLD AUTO: 10.9 10E3/UL (ref 4–11)

## 2024-11-22 PROCEDURE — 58571 TLH W/T/O 250 G OR LESS: CPT | Mod: 80 | Performed by: OBSTETRICS & GYNECOLOGY

## 2024-11-22 PROCEDURE — 272N000001 HC OR GENERAL SUPPLY STERILE: Performed by: OBSTETRICS & GYNECOLOGY

## 2024-11-22 PROCEDURE — 250N000025 HC SEVOFLURANE, PER MIN: Performed by: OBSTETRICS & GYNECOLOGY

## 2024-11-22 PROCEDURE — 710N000010 HC RECOVERY PHASE 1, LEVEL 2, PER MIN: Performed by: OBSTETRICS & GYNECOLOGY

## 2024-11-22 PROCEDURE — 360N000080 HC SURGERY LEVEL 7, PER MIN: Performed by: OBSTETRICS & GYNECOLOGY

## 2024-11-22 PROCEDURE — S2900 ROBOTIC SURGICAL SYSTEM: HCPCS | Performed by: OBSTETRICS & GYNECOLOGY

## 2024-11-22 PROCEDURE — 258N000003 HC RX IP 258 OP 636: Performed by: NURSE ANESTHETIST, CERTIFIED REGISTERED

## 2024-11-22 PROCEDURE — 36415 COLL VENOUS BLD VENIPUNCTURE: CPT | Performed by: OBSTETRICS & GYNECOLOGY

## 2024-11-22 PROCEDURE — 86900 BLOOD TYPING SEROLOGIC ABO: CPT | Performed by: OBSTETRICS & GYNECOLOGY

## 2024-11-22 PROCEDURE — 250N000013 HC RX MED GY IP 250 OP 250 PS 637: Performed by: OBSTETRICS & GYNECOLOGY

## 2024-11-22 PROCEDURE — 250N000013 HC RX MED GY IP 250 OP 250 PS 637: Performed by: STUDENT IN AN ORGANIZED HEALTH CARE EDUCATION/TRAINING PROGRAM

## 2024-11-22 PROCEDURE — 81025 URINE PREGNANCY TEST: CPT | Performed by: OBSTETRICS & GYNECOLOGY

## 2024-11-22 PROCEDURE — 710N000012 HC RECOVERY PHASE 2, PER MINUTE: Performed by: OBSTETRICS & GYNECOLOGY

## 2024-11-22 PROCEDURE — 88309 TISSUE EXAM BY PATHOLOGIST: CPT | Mod: 26 | Performed by: STUDENT IN AN ORGANIZED HEALTH CARE EDUCATION/TRAINING PROGRAM

## 2024-11-22 PROCEDURE — 250N000009 HC RX 250: Performed by: NURSE ANESTHETIST, CERTIFIED REGISTERED

## 2024-11-22 PROCEDURE — 250N000011 HC RX IP 250 OP 636: Performed by: OBSTETRICS & GYNECOLOGY

## 2024-11-22 PROCEDURE — 258N000001 HC RX 258: Performed by: OBSTETRICS & GYNECOLOGY

## 2024-11-22 PROCEDURE — 250N000011 HC RX IP 250 OP 636: Performed by: NURSE ANESTHETIST, CERTIFIED REGISTERED

## 2024-11-22 PROCEDURE — 250N000011 HC RX IP 250 OP 636: Performed by: ANESTHESIOLOGY

## 2024-11-22 PROCEDURE — 88309 TISSUE EXAM BY PATHOLOGIST: CPT | Mod: TC | Performed by: OBSTETRICS & GYNECOLOGY

## 2024-11-22 PROCEDURE — 999N000141 HC STATISTIC PRE-PROCEDURE NURSING ASSESSMENT: Performed by: OBSTETRICS & GYNECOLOGY

## 2024-11-22 PROCEDURE — 370N000017 HC ANESTHESIA TECHNICAL FEE, PER MIN: Performed by: OBSTETRICS & GYNECOLOGY

## 2024-11-22 PROCEDURE — 86850 RBC ANTIBODY SCREEN: CPT | Performed by: OBSTETRICS & GYNECOLOGY

## 2024-11-22 PROCEDURE — 85004 AUTOMATED DIFF WBC COUNT: CPT | Performed by: OBSTETRICS & GYNECOLOGY

## 2024-11-22 PROCEDURE — 58571 TLH W/T/O 250 G OR LESS: CPT | Mod: GC | Performed by: OBSTETRICS & GYNECOLOGY

## 2024-11-22 RX ORDER — FLUMAZENIL 0.1 MG/ML
INJECTION, SOLUTION INTRAVENOUS PRN
Status: DISCONTINUED | OUTPATIENT
Start: 2024-11-22 | End: 2024-11-22

## 2024-11-22 RX ORDER — ACETAMINOPHEN 325 MG/1
975 TABLET ORAL ONCE
Status: COMPLETED | OUTPATIENT
Start: 2024-11-22 | End: 2024-11-22

## 2024-11-22 RX ORDER — OXYCODONE HYDROCHLORIDE 5 MG/1
5 TABLET ORAL ONCE
Status: COMPLETED | OUTPATIENT
Start: 2024-11-22 | End: 2024-11-22

## 2024-11-22 RX ORDER — KETOROLAC TROMETHAMINE 30 MG/ML
INJECTION, SOLUTION INTRAMUSCULAR; INTRAVENOUS PRN
Status: DISCONTINUED | OUTPATIENT
Start: 2024-11-22 | End: 2024-11-22

## 2024-11-22 RX ORDER — ONDANSETRON 2 MG/ML
4 INJECTION INTRAMUSCULAR; INTRAVENOUS EVERY 30 MIN PRN
Status: DISCONTINUED | OUTPATIENT
Start: 2024-11-22 | End: 2024-11-22 | Stop reason: HOSPADM

## 2024-11-22 RX ORDER — NALOXONE HYDROCHLORIDE 0.4 MG/ML
0.2 INJECTION, SOLUTION INTRAMUSCULAR; INTRAVENOUS; SUBCUTANEOUS
Status: DISCONTINUED | OUTPATIENT
Start: 2024-11-22 | End: 2024-11-22 | Stop reason: HOSPADM

## 2024-11-22 RX ORDER — FENTANYL CITRATE 50 UG/ML
INJECTION, SOLUTION INTRAMUSCULAR; INTRAVENOUS PRN
Status: DISCONTINUED | OUTPATIENT
Start: 2024-11-22 | End: 2024-11-22

## 2024-11-22 RX ORDER — PROPOFOL 10 MG/ML
INJECTION, EMULSION INTRAVENOUS PRN
Status: DISCONTINUED | OUTPATIENT
Start: 2024-11-22 | End: 2024-11-22

## 2024-11-22 RX ORDER — OXYCODONE HYDROCHLORIDE 5 MG/1
5 TABLET ORAL EVERY 6 HOURS PRN
Qty: 6 TABLET | Refills: 0 | Status: SHIPPED | OUTPATIENT
Start: 2024-11-22 | End: 2024-11-25

## 2024-11-22 RX ORDER — FLUMAZENIL 0.1 MG/ML
0.2 INJECTION, SOLUTION INTRAVENOUS
Status: DISCONTINUED | OUTPATIENT
Start: 2024-11-22 | End: 2024-11-22 | Stop reason: HOSPADM

## 2024-11-22 RX ORDER — PROPOFOL 10 MG/ML
INJECTION, EMULSION INTRAVENOUS CONTINUOUS PRN
Status: DISCONTINUED | OUTPATIENT
Start: 2024-11-22 | End: 2024-11-22

## 2024-11-22 RX ORDER — OXYCODONE HYDROCHLORIDE 5 MG/1
5-10 TABLET ORAL EVERY 4 HOURS PRN
Qty: 6 TABLET | Refills: 0 | Status: CANCELLED | OUTPATIENT
Start: 2024-11-22

## 2024-11-22 RX ORDER — NALOXONE HYDROCHLORIDE 0.4 MG/ML
0.4 INJECTION, SOLUTION INTRAMUSCULAR; INTRAVENOUS; SUBCUTANEOUS
Status: DISCONTINUED | OUTPATIENT
Start: 2024-11-22 | End: 2024-11-22 | Stop reason: HOSPADM

## 2024-11-22 RX ORDER — LIDOCAINE HYDROCHLORIDE 20 MG/ML
INJECTION, SOLUTION INFILTRATION; PERINEURAL PRN
Status: DISCONTINUED | OUTPATIENT
Start: 2024-11-22 | End: 2024-11-22

## 2024-11-22 RX ORDER — FENTANYL CITRATE 50 UG/ML
25 INJECTION, SOLUTION INTRAMUSCULAR; INTRAVENOUS EVERY 5 MIN PRN
Status: DISCONTINUED | OUTPATIENT
Start: 2024-11-22 | End: 2024-11-22 | Stop reason: HOSPADM

## 2024-11-22 RX ORDER — FENTANYL CITRATE 50 UG/ML
50 INJECTION, SOLUTION INTRAMUSCULAR; INTRAVENOUS EVERY 5 MIN PRN
Status: DISCONTINUED | OUTPATIENT
Start: 2024-11-22 | End: 2024-11-22 | Stop reason: HOSPADM

## 2024-11-22 RX ORDER — CEFAZOLIN SODIUM/WATER 2 G/20 ML
2 SYRINGE (ML) INTRAVENOUS SEE ADMIN INSTRUCTIONS
Status: DISCONTINUED | OUTPATIENT
Start: 2024-11-22 | End: 2024-11-22 | Stop reason: HOSPADM

## 2024-11-22 RX ORDER — AMOXICILLIN 250 MG
1-2 CAPSULE ORAL 2 TIMES DAILY PRN
Qty: 30 TABLET | Refills: 0 | Status: SHIPPED | OUTPATIENT
Start: 2024-11-22

## 2024-11-22 RX ORDER — SODIUM CHLORIDE, SODIUM LACTATE, POTASSIUM CHLORIDE, CALCIUM CHLORIDE 600; 310; 30; 20 MG/100ML; MG/100ML; MG/100ML; MG/100ML
INJECTION, SOLUTION INTRAVENOUS CONTINUOUS
Status: DISCONTINUED | OUTPATIENT
Start: 2024-11-22 | End: 2024-11-22 | Stop reason: HOSPADM

## 2024-11-22 RX ORDER — ACETAMINOPHEN 325 MG/1
975 TABLET ORAL EVERY 6 HOURS PRN
COMMUNITY
Start: 2024-11-22

## 2024-11-22 RX ORDER — CEFAZOLIN SODIUM/WATER 2 G/20 ML
2 SYRINGE (ML) INTRAVENOUS
Status: COMPLETED | OUTPATIENT
Start: 2024-11-22 | End: 2024-11-22

## 2024-11-22 RX ORDER — HYDROMORPHONE HYDROCHLORIDE 1 MG/ML
0.2 INJECTION, SOLUTION INTRAMUSCULAR; INTRAVENOUS; SUBCUTANEOUS EVERY 5 MIN PRN
Status: DISCONTINUED | OUTPATIENT
Start: 2024-11-22 | End: 2024-11-22 | Stop reason: HOSPADM

## 2024-11-22 RX ORDER — DEXMEDETOMIDINE HYDROCHLORIDE 4 UG/ML
INJECTION, SOLUTION INTRAVENOUS PRN
Status: DISCONTINUED | OUTPATIENT
Start: 2024-11-22 | End: 2024-11-22

## 2024-11-22 RX ORDER — ONDANSETRON 2 MG/ML
INJECTION INTRAMUSCULAR; INTRAVENOUS PRN
Status: DISCONTINUED | OUTPATIENT
Start: 2024-11-22 | End: 2024-11-22

## 2024-11-22 RX ORDER — DEXAMETHASONE SODIUM PHOSPHATE 4 MG/ML
INJECTION, SOLUTION INTRA-ARTICULAR; INTRALESIONAL; INTRAMUSCULAR; INTRAVENOUS; SOFT TISSUE PRN
Status: DISCONTINUED | OUTPATIENT
Start: 2024-11-22 | End: 2024-11-22

## 2024-11-22 RX ORDER — DIPHENHYDRAMINE HYDROCHLORIDE 50 MG/ML
INJECTION INTRAMUSCULAR; INTRAVENOUS PRN
Status: DISCONTINUED | OUTPATIENT
Start: 2024-11-22 | End: 2024-11-22

## 2024-11-22 RX ORDER — ONDANSETRON 4 MG/1
4 TABLET, ORALLY DISINTEGRATING ORAL EVERY 30 MIN PRN
Status: DISCONTINUED | OUTPATIENT
Start: 2024-11-22 | End: 2024-11-22 | Stop reason: HOSPADM

## 2024-11-22 RX ORDER — PHENAZOPYRIDINE HYDROCHLORIDE 200 MG/1
200 TABLET, FILM COATED ORAL ONCE
Status: COMPLETED | OUTPATIENT
Start: 2024-11-22 | End: 2024-11-22

## 2024-11-22 RX ORDER — METRONIDAZOLE 500 MG/100ML
500 INJECTION, SOLUTION INTRAVENOUS
Status: COMPLETED | OUTPATIENT
Start: 2024-11-22 | End: 2024-11-22

## 2024-11-22 RX ORDER — IBUPROFEN 800 MG/1
800 TABLET, FILM COATED ORAL EVERY 6 HOURS PRN
COMMUNITY
Start: 2024-11-22 | End: 2024-11-24

## 2024-11-22 RX ORDER — DEXMEDETOMIDINE HYDROCHLORIDE 4 UG/ML
INJECTION, SOLUTION INTRAVENOUS
Status: COMPLETED | OUTPATIENT
Start: 2024-11-22 | End: 2024-11-22

## 2024-11-22 RX ORDER — BUPIVACAINE HYDROCHLORIDE 2.5 MG/ML
INJECTION, SOLUTION EPIDURAL; INFILTRATION; INTRACAUDAL
Status: COMPLETED | OUTPATIENT
Start: 2024-11-22 | End: 2024-11-22

## 2024-11-22 RX ORDER — ACETAMINOPHEN 325 MG/1
975 TABLET ORAL ONCE
Status: DISCONTINUED | OUTPATIENT
Start: 2024-11-22 | End: 2024-11-22 | Stop reason: HOSPADM

## 2024-11-22 RX ORDER — FUROSEMIDE 10 MG/ML
INJECTION INTRAMUSCULAR; INTRAVENOUS PRN
Status: DISCONTINUED | OUTPATIENT
Start: 2024-11-22 | End: 2024-11-22

## 2024-11-22 RX ORDER — OXYCODONE HYDROCHLORIDE 5 MG/1
5 TABLET ORAL
Status: COMPLETED | OUTPATIENT
Start: 2024-11-22 | End: 2024-11-22

## 2024-11-22 RX ORDER — HYDROMORPHONE HYDROCHLORIDE 1 MG/ML
0.4 INJECTION, SOLUTION INTRAMUSCULAR; INTRAVENOUS; SUBCUTANEOUS EVERY 5 MIN PRN
Status: DISCONTINUED | OUTPATIENT
Start: 2024-11-22 | End: 2024-11-22 | Stop reason: HOSPADM

## 2024-11-22 RX ORDER — DEXAMETHASONE SODIUM PHOSPHATE 4 MG/ML
4 INJECTION, SOLUTION INTRA-ARTICULAR; INTRALESIONAL; INTRAMUSCULAR; INTRAVENOUS; SOFT TISSUE
Status: DISCONTINUED | OUTPATIENT
Start: 2024-11-22 | End: 2024-11-22 | Stop reason: HOSPADM

## 2024-11-22 RX ORDER — SODIUM CHLORIDE, SODIUM LACTATE, POTASSIUM CHLORIDE, CALCIUM CHLORIDE 600; 310; 30; 20 MG/100ML; MG/100ML; MG/100ML; MG/100ML
INJECTION, SOLUTION INTRAVENOUS CONTINUOUS PRN
Status: DISCONTINUED | OUTPATIENT
Start: 2024-11-22 | End: 2024-11-22

## 2024-11-22 RX ORDER — DEXAMETHASONE SODIUM PHOSPHATE 10 MG/ML
INJECTION, SOLUTION INTRAMUSCULAR; INTRAVENOUS
Status: COMPLETED | OUTPATIENT
Start: 2024-11-22 | End: 2024-11-22

## 2024-11-22 RX ORDER — FENTANYL CITRATE 50 UG/ML
25-50 INJECTION, SOLUTION INTRAMUSCULAR; INTRAVENOUS
Status: DISCONTINUED | OUTPATIENT
Start: 2024-11-22 | End: 2024-11-22 | Stop reason: HOSPADM

## 2024-11-22 RX ORDER — NALOXONE HYDROCHLORIDE 0.4 MG/ML
0.1 INJECTION, SOLUTION INTRAMUSCULAR; INTRAVENOUS; SUBCUTANEOUS
Status: DISCONTINUED | OUTPATIENT
Start: 2024-11-22 | End: 2024-11-22 | Stop reason: HOSPADM

## 2024-11-22 RX ADMIN — PHENYLEPHRINE HYDROCHLORIDE 100 MCG: 10 INJECTION INTRAVENOUS at 07:51

## 2024-11-22 RX ADMIN — Medication 50 MG: at 07:38

## 2024-11-22 RX ADMIN — ONDANSETRON 4 MG: 2 INJECTION INTRAMUSCULAR; INTRAVENOUS at 07:38

## 2024-11-22 RX ADMIN — KETOROLAC TROMETHAMINE 30 MG: 30 INJECTION, SOLUTION INTRAMUSCULAR at 10:45

## 2024-11-22 RX ADMIN — DEXMEDETOMIDINE HYDROCHLORIDE 40 MCG: 4 INJECTION, SOLUTION INTRAVENOUS at 11:00

## 2024-11-22 RX ADMIN — PHENYLEPHRINE HYDROCHLORIDE 100 MCG: 10 INJECTION INTRAVENOUS at 07:56

## 2024-11-22 RX ADMIN — DEXMEDETOMIDINE HYDROCHLORIDE 8 MCG: 100 INJECTION, SOLUTION INTRAVENOUS at 10:28

## 2024-11-22 RX ADMIN — DEXAMETHASONE SODIUM PHOSPHATE 2 MG: 10 INJECTION, SOLUTION INTRAMUSCULAR; INTRAVENOUS at 11:00

## 2024-11-22 RX ADMIN — FUROSEMIDE 10 MG: 10 INJECTION, SOLUTION INTRAVENOUS at 10:36

## 2024-11-22 RX ADMIN — DEXAMETHASONE SODIUM PHOSPHATE 8 MG: 4 INJECTION, SOLUTION INTRAMUSCULAR; INTRAVENOUS at 07:38

## 2024-11-22 RX ADMIN — PROPOFOL 50 MCG/KG/MIN: 10 INJECTION, EMULSION INTRAVENOUS at 07:45

## 2024-11-22 RX ADMIN — SODIUM CHLORIDE, POTASSIUM CHLORIDE, SODIUM LACTATE AND CALCIUM CHLORIDE: 600; 310; 30; 20 INJECTION, SOLUTION INTRAVENOUS at 09:24

## 2024-11-22 RX ADMIN — PHENYLEPHRINE HYDROCHLORIDE 100 MCG: 10 INJECTION INTRAVENOUS at 07:38

## 2024-11-22 RX ADMIN — SODIUM CHLORIDE, POTASSIUM CHLORIDE, SODIUM LACTATE AND CALCIUM CHLORIDE: 600; 310; 30; 20 INJECTION, SOLUTION INTRAVENOUS at 07:31

## 2024-11-22 RX ADMIN — MIDAZOLAM 2 MG: 1 INJECTION INTRAMUSCULAR; INTRAVENOUS at 07:30

## 2024-11-22 RX ADMIN — OXYCODONE HYDROCHLORIDE 5 MG: 5 TABLET ORAL at 13:28

## 2024-11-22 RX ADMIN — Medication 20 MG: at 08:28

## 2024-11-22 RX ADMIN — DIPHENHYDRAMINE HYDROCHLORIDE 50 MG: 50 INJECTION, SOLUTION INTRAMUSCULAR; INTRAVENOUS at 07:38

## 2024-11-22 RX ADMIN — METRONIDAZOLE 500 MG: 500 INJECTION, SOLUTION INTRAVENOUS at 07:30

## 2024-11-22 RX ADMIN — Medication 20 MG: at 08:58

## 2024-11-22 RX ADMIN — OXYCODONE HYDROCHLORIDE 5 MG: 5 TABLET ORAL at 19:10

## 2024-11-22 RX ADMIN — HYDROMORPHONE HYDROCHLORIDE 0.2 MG: 1 INJECTION, SOLUTION INTRAMUSCULAR; INTRAVENOUS; SUBCUTANEOUS at 09:52

## 2024-11-22 RX ADMIN — PHENYLEPHRINE HYDROCHLORIDE 0.2 MCG/KG/MIN: 10 INJECTION INTRAVENOUS at 08:52

## 2024-11-22 RX ADMIN — IBUPROFEN 800 MG: 400 TABLET, FILM COATED ORAL at 17:32

## 2024-11-22 RX ADMIN — ACETAMINOPHEN 975 MG: 325 TABLET, FILM COATED ORAL at 06:59

## 2024-11-22 RX ADMIN — DEXMEDETOMIDINE HYDROCHLORIDE 12 MCG: 100 INJECTION, SOLUTION INTRAVENOUS at 07:38

## 2024-11-22 RX ADMIN — PHENAZOPYRIDINE 200 MG: 200 TABLET ORAL at 06:59

## 2024-11-22 RX ADMIN — FLUMAZENIL 0.1 MG: 0.1 INJECTION, SOLUTION INTRAVENOUS at 11:42

## 2024-11-22 RX ADMIN — SUGAMMADEX 200 MG: 100 INJECTION, SOLUTION INTRAVENOUS at 10:48

## 2024-11-22 RX ADMIN — HYDROMORPHONE HYDROCHLORIDE 0.3 MG: 1 INJECTION, SOLUTION INTRAMUSCULAR; INTRAVENOUS; SUBCUTANEOUS at 10:05

## 2024-11-22 RX ADMIN — FENTANYL CITRATE 50 MCG: 50 INJECTION INTRAMUSCULAR; INTRAVENOUS at 12:15

## 2024-11-22 RX ADMIN — LIDOCAINE HYDROCHLORIDE 100 MG: 20 INJECTION, SOLUTION INFILTRATION; PERINEURAL at 07:38

## 2024-11-22 RX ADMIN — PROPOFOL 150 MG: 10 INJECTION, EMULSION INTRAVENOUS at 07:38

## 2024-11-22 RX ADMIN — FENTANYL CITRATE 100 MCG: 50 INJECTION INTRAMUSCULAR; INTRAVENOUS at 07:38

## 2024-11-22 RX ADMIN — PHENYLEPHRINE HYDROCHLORIDE 100 MCG: 10 INJECTION INTRAVENOUS at 10:55

## 2024-11-22 RX ADMIN — BUPIVACAINE HYDROCHLORIDE 60 ML: 2.5 INJECTION, SOLUTION EPIDURAL; INFILTRATION; INTRACAUDAL; PERINEURAL at 11:00

## 2024-11-22 RX ADMIN — Medication 2 G: at 07:30

## 2024-11-22 RX ADMIN — PHENYLEPHRINE HYDROCHLORIDE 100 MCG: 10 INJECTION INTRAVENOUS at 10:39

## 2024-11-22 RX ADMIN — PHENYLEPHRINE HYDROCHLORIDE 100 MCG: 10 INJECTION INTRAVENOUS at 08:01

## 2024-11-22 RX ADMIN — PHENYLEPHRINE HYDROCHLORIDE 100 MCG: 10 INJECTION INTRAVENOUS at 07:46

## 2024-11-22 RX ADMIN — MIDAZOLAM 2 MG: 1 INJECTION INTRAMUSCULAR; INTRAVENOUS at 08:21

## 2024-11-22 ASSESSMENT — ACTIVITIES OF DAILY LIVING (ADL)
ADLS_ACUITY_SCORE: 0

## 2024-11-22 NOTE — DISCHARGE INSTRUCTIONS
To contact a doctor, call Dr Dobbs [phone]  clinic week days or:     [phone] and ask for the Resident On Call for:          Gynecology (answered 24 hours a day)   Emergency Departments:  [facility] Department: [phone]     [facility] Emergency Department: [phone]

## 2024-11-22 NOTE — BRIEF OP NOTE
[facility]    Brief Operative Note    Pre-operative diagnosis: Uterine leiomyoma, unspecified location [D25.9]  Post-operative diagnosis Same as pre-operative diagnosis    Procedure: EXAM UNDER ANESTHESIA, PELVIS,, Bilateral - Vagina  Robot Assisted Total Laparoscopic Hysterectomy, Bilateral Salpingectomy, Cystoscopy and, Bilateral - Abdomen    Surgeon: Surgeons and Role:     * Wendy Dobbs MD - Primary     * Graciela Arango MD - Assisting     * Kanika Silverman MD - Resident - Assisting     * Jojo Soler MD - Resident - Assisting  Anesthesia: General with Block   Estimated Blood Loss: 50cc  UOP: 550 cc orange, clear urine  IVF: 1500cc    Drains: None  Specimens:   ID Type Source Tests Collected by Time Destination   1 : uterus, cervix, bilateral fallopian tubes Tissue Uterus, Cervix, Bilateral Fallopian Tubes SURGICAL PATHOLOGY EXAM Wendy Dobbs MD 11/22/2024 10:32 AM      Findings:   On EUA, bulky, mobile uterus. Normal appearing external genitalia . On open entry into the abdomen, no injury on laparoscopic survey. Normal appearing liver edge, gall bladder, stomach. Normal appearing appendix. Epiploica adherent to right abdominal side wall and taken down without injury. Normal sigmoid physiologic adhesions. Large uterine fundal fibroid noted. Normal bilateral fallopian tubes and ovaries. Bilateral ureters visualized and away from surgical site. Surgical pedicles hemostatic at the end of the case. Uterus and fibroid placed in bag and removed through the abdomen via morcellation. Specimen and bag removed and intact. Cystoscopy with intact bladder. Bilateral brisk ureteral jets visualized. Vaginal cuff intact on final vaginal exam. No vaginal tears.   Complications: None.  Implants: * No implants in log *

## 2024-11-22 NOTE — OR NURSING
1430 Mamta Silverman Md obgyn texted through [employer] pt utv, uncomfortable and bladder scan 720ml  1440 OBGYN general pager paged and communicated pt utv, uncomfortable and bladder scan 720ml  1450 Md Ryder obgyn to return page and updated on pt status, Md to request straight cath at this time and discharge home later if able to void independently. No new orders other than straight cath x1 at this time.

## 2024-11-22 NOTE — ANESTHESIA PROCEDURE NOTES
Airway       Patient location during procedure: OR       Procedure Start/Stop Times: 11/22/2024 7:44 AM  Staff -        CRNA: Diamond Ring APRN CRNA       Performed By: CRNA  Consent for Airway        Urgency: elective  Indications and Patient Condition       Indications for airway management: devin-procedural         Mask difficulty assessment: 1 - vent by mask    Final Airway Details       Final airway type: endotracheal airway       Successful airway: ETT - single and Oral  Endotracheal Airway Details        ETT size (mm): 6.5       Cuffed: yes       Cuff volume (mL): 2       Successful intubation technique: video laryngoscopy       VL Blade Size: MAC 3       Grade View of Cords: 1       Adjucts: stylet       Position: Right       Measured from: lips       Secured at (cm): 19       Bite block used: None    Post intubation assessment        Placement verified by: capnometry, equal breath sounds and chest rise        Number of attempts at approach: 1       Number of other approaches attempted: 0       Secured with: tape       Ease of procedure: easy       Dentition: Intact and Unchanged    Medication(s) Administered   Medication Administration Time: 11/22/2024 7:44 AM

## 2024-11-22 NOTE — OP NOTE
Gynecology OP Note     Pre-operative diagnosis:         Uterine leiomyoma, unspecified location [D25.9]  Post-operative diagnosis        Same as pre-operative diagnosis     Procedure:      EXAM UNDER ANESTHESIA, PELVIS,, Bilateral - Vagina  Robot Assisted Total Laparoscopic Hysterectomy, Bilateral Salpingectomy, Cystoscopy and, Bilateral - Abdomen     Surgeon:         Surgeons and Role:     * Wendy Dobbs MD - Primary     * Graciela Arango MD - Assisting     * Kanika Silverman MD - Resident - Assisting     * Jojo Soler MD - Resident - Assisting  Anesthesia:     General with Block        Estimated Blood Loss: 50cc  UOP: 550 cc orange, clear urine  IVF: 1500cc     Drains: None  Specimens:       ID Type Source Tests Collected by Time Destination   1 : uterus, cervix, bilateral fallopian tubes Tissue Uterus, Cervix, Bilateral Fallopian Tubes SURGICAL PATHOLOGY EXAM Wendy Dobbs MD 11/22/2024 10:32 AM        Findings:    On EUA, bulky, mobile uterus. Normal appearing external genitalia. On open entry into the abdomen, no injury on laparoscopic survey. Normal appearing liver edge, gall bladder, stomach. Normal appearing appendix. Epiploica adherent to right abdominal side wall and taken down without injury. Normal sigmoid physiologic adhesions. Large uterine fundal fibroid noted. Normal bilateral fallopian tubes and ovaries. Bilateral ureters visualized and away from surgical site. Surgical pedicles hemostatic at the end of the case. Uterus and fibroid placed in bag and removed through the abdomen via morcellation. Specimen and bag removed and intact. Cystoscopy with intact bladder. Bilateral brisk ureteral jets visualized. Vaginal cuff intact on final vaginal exam. No vaginal tears.    INDICATIONS FOR PROCEDURE: 44 year old yo woman with multiple known uterine fibroids with significant symptoms who desires definitive management.  Recommendation was to proceed with RA-TLH, BS. Risks, benefits and  alternatives to the procedure were discussed and she opted to proceed.     FINDINGS:  On EUA, bulky, mobile uterus. Normal appearing external genitalia . On open entry into the abdomen, no injury on laparoscopic survey. Normal appearing liver edge, gall bladder, stomach. Normal appearing appendix. Epiploica adherent to right abdominal side wall and taken down without injury. Normal sigmoid physiologic adhesions. Large uterine fundal fibroid noted. Normal bilateral fallopian tubes and ovaries. Bilateral ureters visualized and away from surgical site. Surgical pedicles hemostatic at the end of the case. Uterus and fibroid placed in bag and removed through the abdomen via morcellation. Specimen and bag removed and intact. Cystoscopy with intact bladder. Bilateral brisk ureteral jets visualized. Vaginal cuff intact on final vaginal exam. No vaginal tears.         COMPLICATIONS:  None.     DETAILS OF PROCEDURE:  The patient was brought to the operating room after reviewing the risks, benefits and alternatives to the procedure.  She was positioned in lithotomy with both arms tucked and prepped and draped in the usual sterile fashion.  She did receive preoperative antibiotics and had SCDs in place and working.  The procedure began vaginally, where the cervix was dilated and the modified KOH manipulator/Advincula Arch was placed and attached to the uterine positioning system.  A Bullock catheter was placed in the  bladder.  Procedure then turned to the abdomen where a 3 cm periumbilical incision was made in an omega shape. This was carried to the fascia with bluntly. The fascia was grasped with Kocher clamps, and incised with scissors. The fascial incision was extended to about 3 cm. The peritoneum was grasped, tented up, and entered sharply. The Gel-Foam single port apparatus was placed  and the ports were used for the camera and an assistant. The abdomen was insufflated to 15 mmHg.   Once insufflation was completed, the   camera confirmed placement within the abdominal cavity and there was no evidence of traumatic entry.  The patient was placed in steep trendelenburg.  The remainder of the ports were placed under direct visualization including one on the right and 2 on the left.  The procedure then began on the left side. The left fallopian tube was grasped and the mesosalpinx was cauterized and transected to the fundus at which point the tube was transected and removed from the abdomen. This was repeated on the right.  The left utero-ovarian ligament was then cauterized and divided after identifying  the ureter.  The left round ligament was identified, cauterized and divided and opening up anteriorly to create the bladder flap. The posterior leaf of the broad ligament was opened.  This allowed further opening of the posterior leaf of the broad ligament and skeletonization of the left uterine artery. Once skeletonization was achieved on the left side and the uterine artery was cauterized and divided. Using sequential bites, the pedical was isolated laterally to the cervix. The same steps were carried out on the patient's right, assuring dissection below the colpotomy cup. The uterus was blanched and dusky by this point.   The colpotomy was created and carried circumferentially until the specimen was free. A balloon was placed in the vagina and the vaginal cuff was closed via robot assisted laparoscopy using barbed suture of 0 stratafix in a running fashion.   Once  this was completed we inspected all pedicles and they were found to be hemostatic. Given the size of the uterus, an Endocatch bag was placed through the gel port and the specimen was placed inside and brought to the anterior abdominal wall.  The robot was undocked the specimen was hand morcellated and removed from the abdomen in sections. The Endocatch bag was inspected and noted to be intact.  Following this, we again carefully inspected the pedicles which were found to  be hemostatic.  Attention was turned to the cystoscopy, which revealed intact bladder without injury or foreign objects and bilateral brisk ureteral jets.  The pneumoperitoneum was released.     The supraumbilical port site was then closed, starting with the fascia using an 0-Vicryl. The subcutaneous layer was reapproximated with 3-0 vicryl and skin closed with 4-0 Monocryl. All other skin incisions were closed using 4-0 Monocryl and Dermabond. All other instruments were removed from the vagina and cuff noted to be intact. The patient was taken to the recovery room in stable condition.     Dr. Dobbs was present for the entirety of the procedure.     Kanika Silverman MD  OB/GYN Resident, PGY-4    Staff:  I was scrubbed and present for entire case and agree w/ above note. Dr Arango was present and scrubbed for the entire case as well,  due to the large sized fibroid and bulk that was expected to require the skill of 2 staff to safely complete laparoscopically.     Wendy Dobbs MD

## 2024-11-22 NOTE — ANESTHESIA CARE TRANSFER NOTE
Patient: Artie Diez    Procedure: Procedure(s):  EXAM UNDER ANESTHESIA, PELVIS,  Robot Assisted Total Laparoscopic Hysterectomy, Bilateral Salpingectomy, Cystoscopy and       Diagnosis: Uterine leiomyoma, unspecified location [D25.9]  Diagnosis Additional Information: No value filed.    Anesthesia Type:   No value filed.     Note:    Oropharynx: oropharynx clear of all foreign objects and spontaneously breathing  Level of Consciousness: drowsy  Oxygen Supplementation: face mask  Level of Supplemental Oxygen (L/min / FiO2): 8  Independent Airway: airway patency satisfactory and stable  Dentition: dentition unchanged  Vital Signs Stable: post-procedure vital signs reviewed and stable  Report to RN Given: handoff report given  Patient transferred to: PACU    Handoff Report: Identifed the Patient, Identified the Reponsible Provider, Reviewed the pertinent medical history, Discussed the surgical course, Reviewed Intra-OP anesthesia mangement and issues during anesthesia, Set expectations for post-procedure period and Allowed opportunity for questions and acknowledgement of understanding      Vitals:  Vitals Value Taken Time   BP 98/61 11/22/24 1215   Temp 36.5  C (97.7  F) 11/22/24 1155   Pulse 76 11/22/24 1216   Resp 13 11/22/24 1216   SpO2 99 % 11/22/24 1216   Vitals shown include unfiled device data.    Electronically Signed By: ZULEIKA Villaseñor CRNA  November 22, 2024  12:16 PM

## 2024-11-22 NOTE — ANESTHESIA PROCEDURE NOTES
TAP Procedure Note    Pre-Procedure   Staff -        Anesthesiologist:  Tresa Thomas DO       Resident/Fellow: Hoang Ayoub DO       Performed By: fellow       Location: pre-op       Pre-Anesthestic Checklist: patient identified, IV checked, site marked, risks and benefits discussed, informed consent, monitors and equipment checked, pre-op evaluation, at physician/surgeon's request and post-op pain management  Timeout:       Correct Patient: Yes        Correct Procedure: Yes        Correct Site: Yes        Correct Position: Yes        Correct Laterality: Yes        Site Marked: Yes  Procedure Documentation  Procedure: TAP       Diagnosis: POST OPERATIVE PAIN       Laterality: bilateral       Patient Position: supine       Patient Prep/Sterile Barriers: sterile gloves, mask       Skin prep: Chloraprep       Needle Type: short bevel       Needle Gauge: 21.        Needle Length (millimeters): 110        Ultrasound guided       1. Ultrasound was used to identify targeted nerve, plexus, vascular marker, or fascial plane and place a needle adjacent to it in real-time.       2. Ultrasound was used to visualize the spread of anesthetic in close proximity to the above referenced structure.       3. A permanent image is entered into the patient's record.    Assessment/Narrative         The placement was negative for: blood aspirated, painful injection and site bleeding       Paresthesias: No.       Bolus given via needle..        Secured via.        Insertion/Infusion Method: Single Shot       Complications: none       Injection made incrementally with aspirations every 5 mL.    Medication(s) Administered   Bupivacaine 0.25% PF (Infiltration) - Infiltration   60 mL - 11/22/2024 11:00:00 AM  Dexmedetomidine 4 mcg/mL (Perineural) - Perineural   40 mcg - 11/22/2024 11:00:00 AM  Dexamethasone 10 mg/mL PF (Perineural) - Perineural   2 mg - 11/22/2024 11:00:00 AM   Comments:  Bilateral midaxillary TAP block and bilateral  "rectus sheath block      FOR [facility] ONLY:   Pain Team Contact information: please page the Pain Team Via Bex. Search \"Pain\". During daytime hours, please page the attending first. At night please page the resident first.      "

## 2024-11-22 NOTE — ANESTHESIA PREPROCEDURE EVALUATION
Anesthesia Pre-Procedure Evaluation    Patient: Artie Diez   MRN: [id number] : 1980        Procedure : Procedure(s):  EXAM UNDER ANESTHESIA, PELVIS,  Robot Assisted Total Laparoscopic Hysterectomy, Bilateral Salpingectomy, Cystoscopy and  Possible Open Laparotomy          Past Medical History:   Diagnosis Date    Bipolar 2 disorder (H)     Depressive disorder     Depressive disorder, not elsewhere classified     Depression (non-psychotic)    Excessive or frequent menstruation     Heavy periods    Fibroid uterus       Past Surgical History:   Procedure Laterality Date    ENT SURGERY      Tonsils removed      Allergies   Allergen Reactions    Amoxicillin     Amphotericin B     Phenylbutazones     Penicillin G Nausea and Rash      Social History     Tobacco Use    Smoking status: Never     Passive exposure: Never    Smokeless tobacco: Never   Substance Use Topics    Alcohol use: No      Wt Readings from Last 1 Encounters:   24 56.2 kg (123 lb 14.4 oz)        Anesthesia Evaluation            ROS/MED HX  ENT/Pulmonary: Comment: Robot Assisted Total Laparoscopic Hysterectomy, Bilateral Salpingectomy, Cystoscopy and Possible Open Laparotomy + Davinci Hysterectomy Total, Salpingectomy Bilateral      Neurologic: Comment: Major depressive disorder  Bipolar  Generalized annxiety disorder        Cardiovascular:       METS/Exercise Tolerance:     Hematologic:       Musculoskeletal:       GI/Hepatic:       Renal/Genitourinary:       Endo:       Psychiatric/Substance Use:       Infectious Disease:       Malignancy:       Other:               OUTSIDE LABS:  CBC:   Lab Results   Component Value Date    WBC 8.0 2024    WBC 9.0 2022    HGB 12.9 2024    HGB 13.0 2022    HCT 39.0 2024    HCT 38.9 2022     2024     2022     BMP:   Lab Results   Component Value Date     2024     2023    POTASSIUM 4.2 2024    POTASSIUM 3.9  "04/21/2023    CHLORIDE 99 09/16/2024    CHLORIDE 102 04/21/2023    CO2 26 09/16/2024    CO2 28 04/21/2023    BUN 8.9 09/16/2024    BUN 12.1 04/21/2023    CR 0.85 09/16/2024    CR 0.85 04/21/2023    GLC 94 09/16/2024    GLC 92 04/21/2023     COAGS: No results found for: \"PTT\", \"INR\", \"FIBR\"  POC:   Lab Results   Component Value Date    HCG Negative 11/22/2024     HEPATIC:   Lab Results   Component Value Date    ALBUMIN 4.1 09/16/2024    PROTTOTAL 7.4 09/16/2024    ALT 6 09/16/2024    AST 15 09/16/2024    ALKPHOS 65 09/16/2024    BILITOTAL 0.2 09/16/2024     OTHER:   Lab Results   Component Value Date    JERMAN 9.5 09/16/2024    TSH 1.09 06/17/2022    T4 1.35 06/05/2013              Lyndon Hassan MD    I have reviewed the pertinent notes and labs in the chart from the past 30 days and (re)examined the patient.  Any updates or changes from those notes are reflected in this note.                                   " Comments: GA with ETT  Risks versus benefits discussed. All questions answered            Lyndon Hassan MD    I have reviewed the pertinent notes and labs in the chart from the past 30 days and (re)examined the patient.  Any updates or changes from those notes are reflected in this note.

## 2024-11-23 NOTE — PROGRESS NOTES
1900 - RN assuming care of patient in phase 2 at this time. RN will give oxycodone 5mg which was pulled by coworker Jose Daniel ELIZABETH.

## 2024-11-24 ENCOUNTER — HOSPITAL ENCOUNTER (EMERGENCY)
Facility: CLINIC | Age: 44
Discharge: HOME | End: 2024-11-25
Attending: EMERGENCY MEDICINE | Admitting: EMERGENCY MEDICINE
Payer: COMMERCIAL

## 2024-11-24 ENCOUNTER — TELEPHONE (OUTPATIENT)
Dept: OBGYN | Facility: CLINIC | Age: 44
End: 2024-11-24
Payer: COMMERCIAL

## 2024-11-24 DIAGNOSIS — Z90.710 S/P HYSTERECTOMY: Primary | ICD-10-CM

## 2024-11-24 LAB
ALBUMIN UR-MCNC: NEGATIVE MG/DL
ANION GAP SERPL CALCULATED.3IONS-SCNC: 10 MMOL/L (ref 7–15)
APPEARANCE UR: CLEAR
BASOPHILS # BLD AUTO: 0 10E3/UL (ref 0–0.2)
BASOPHILS NFR BLD AUTO: 0 %
BILIRUB UR QL STRIP: NEGATIVE
BUN SERPL-MCNC: 9 MG/DL (ref 6–20)
CALCIUM SERPL-MCNC: 9.5 MG/DL (ref 8.8–10.4)
CHLORIDE SERPL-SCNC: 103 MMOL/L (ref 98–107)
COLOR UR AUTO: ABNORMAL
CREAT SERPL-MCNC: 0.8 MG/DL (ref 0.51–0.95)
CRP SERPL-MCNC: 35.7 MG/L
EGFRCR SERPLBLD CKD-EPI 2021: >90 ML/MIN/1.73M2
EOSINOPHIL # BLD AUTO: 0.1 10E3/UL (ref 0–0.7)
EOSINOPHIL NFR BLD AUTO: 1 %
ERYTHROCYTE [DISTWIDTH] IN BLOOD BY AUTOMATED COUNT: 12.1 % (ref 10–15)
ERYTHROCYTE [SEDIMENTATION RATE] IN BLOOD BY WESTERGREN METHOD: 21 MM/HR (ref 0–20)
GLUCOSE SERPL-MCNC: 94 MG/DL (ref 70–99)
GLUCOSE UR STRIP-MCNC: NEGATIVE MG/DL
HCO3 SERPL-SCNC: 28 MMOL/L (ref 22–29)
HCT VFR BLD AUTO: 40.3 % (ref 35–47)
HGB BLD-MCNC: 13.3 G/DL (ref 11.7–15.7)
HGB UR QL STRIP: ABNORMAL
IMM GRANULOCYTES # BLD: 0 10E3/UL
IMM GRANULOCYTES NFR BLD: 0 %
KETONES UR STRIP-MCNC: 60 MG/DL
LEUKOCYTE ESTERASE UR QL STRIP: ABNORMAL
LYMPHOCYTES # BLD AUTO: 2.3 10E3/UL (ref 0.8–5.3)
LYMPHOCYTES NFR BLD AUTO: 27 %
MCH RBC QN AUTO: 28.2 PG (ref 26.5–33)
MCHC RBC AUTO-ENTMCNC: 33 G/DL (ref 31.5–36.5)
MCV RBC AUTO: 86 FL (ref 78–100)
MONOCYTES # BLD AUTO: 0.5 10E3/UL (ref 0–1.3)
MONOCYTES NFR BLD AUTO: 6 %
MUCOUS THREADS #/AREA URNS LPF: PRESENT /LPF
NEUTROPHILS # BLD AUTO: 5.7 10E3/UL (ref 1.6–8.3)
NEUTROPHILS NFR BLD AUTO: 66 %
NITRATE UR QL: NEGATIVE
NRBC # BLD AUTO: 0 10E3/UL
NRBC BLD AUTO-RTO: 0 /100
PH UR STRIP: 6.5 [PH] (ref 5–7)
PLATELET # BLD AUTO: 320 10E3/UL (ref 150–450)
POTASSIUM SERPL-SCNC: 4.5 MMOL/L (ref 3.4–5.3)
RBC # BLD AUTO: 4.71 10E6/UL (ref 3.8–5.2)
RBC URINE: 9 /HPF
SODIUM SERPL-SCNC: 141 MMOL/L (ref 135–145)
SP GR UR STRIP: 1.02 (ref 1–1.03)
SQUAMOUS EPITHELIAL: 1 /HPF
UROBILINOGEN UR STRIP-MCNC: NORMAL MG/DL
WBC # BLD AUTO: 8.6 10E3/UL (ref 4–11)
WBC URINE: 6 /HPF

## 2024-11-24 PROCEDURE — 258N000003 HC RX IP 258 OP 636: Performed by: EMERGENCY MEDICINE

## 2024-11-24 PROCEDURE — 82310 ASSAY OF CALCIUM: CPT | Performed by: EMERGENCY MEDICINE

## 2024-11-24 PROCEDURE — 96361 HYDRATE IV INFUSION ADD-ON: CPT | Performed by: EMERGENCY MEDICINE

## 2024-11-24 PROCEDURE — 250N000013 HC RX MED GY IP 250 OP 250 PS 637: Performed by: EMERGENCY MEDICINE

## 2024-11-24 PROCEDURE — 86140 C-REACTIVE PROTEIN: CPT | Performed by: EMERGENCY MEDICINE

## 2024-11-24 PROCEDURE — 99254 IP/OBS CNSLTJ NEW/EST MOD 60: CPT | Mod: 24 | Performed by: STUDENT IN AN ORGANIZED HEALTH CARE EDUCATION/TRAINING PROGRAM

## 2024-11-24 PROCEDURE — 99284 EMERGENCY DEPT VISIT MOD MDM: CPT | Performed by: EMERGENCY MEDICINE

## 2024-11-24 PROCEDURE — 87086 URINE CULTURE/COLONY COUNT: CPT | Performed by: EMERGENCY MEDICINE

## 2024-11-24 PROCEDURE — 85025 COMPLETE CBC W/AUTO DIFF WBC: CPT | Performed by: EMERGENCY MEDICINE

## 2024-11-24 PROCEDURE — 36415 COLL VENOUS BLD VENIPUNCTURE: CPT | Performed by: EMERGENCY MEDICINE

## 2024-11-24 PROCEDURE — 80048 BASIC METABOLIC PNL TOTAL CA: CPT | Performed by: EMERGENCY MEDICINE

## 2024-11-24 PROCEDURE — 96374 THER/PROPH/DIAG INJ IV PUSH: CPT | Performed by: EMERGENCY MEDICINE

## 2024-11-24 PROCEDURE — 250N000011 HC RX IP 250 OP 636: Performed by: EMERGENCY MEDICINE

## 2024-11-24 PROCEDURE — 99284 EMERGENCY DEPT VISIT MOD MDM: CPT | Mod: 25 | Performed by: EMERGENCY MEDICINE

## 2024-11-24 PROCEDURE — 96375 TX/PRO/DX INJ NEW DRUG ADDON: CPT | Performed by: EMERGENCY MEDICINE

## 2024-11-24 PROCEDURE — 85652 RBC SED RATE AUTOMATED: CPT | Performed by: EMERGENCY MEDICINE

## 2024-11-24 PROCEDURE — 81003 URINALYSIS AUTO W/O SCOPE: CPT | Performed by: EMERGENCY MEDICINE

## 2024-11-24 RX ORDER — ONDANSETRON 4 MG/1
4 TABLET, FILM COATED ORAL EVERY 8 HOURS PRN
Qty: 20 TABLET | Refills: 0 | Status: SHIPPED | OUTPATIENT
Start: 2024-11-24

## 2024-11-24 RX ORDER — IBUPROFEN 600 MG/1
600 TABLET, FILM COATED ORAL EVERY 6 HOURS PRN
Qty: 50 TABLET | Refills: 0 | Status: SHIPPED | OUTPATIENT
Start: 2024-11-24

## 2024-11-24 RX ORDER — ONDANSETRON 2 MG/ML
4 INJECTION INTRAMUSCULAR; INTRAVENOUS EVERY 30 MIN PRN
Status: DISCONTINUED | OUTPATIENT
Start: 2024-11-24 | End: 2024-11-25 | Stop reason: HOSPADM

## 2024-11-24 RX ORDER — KETOROLAC TROMETHAMINE 15 MG/ML
15 INJECTION, SOLUTION INTRAMUSCULAR; INTRAVENOUS ONCE
Status: COMPLETED | OUTPATIENT
Start: 2024-11-24 | End: 2024-11-24

## 2024-11-24 RX ORDER — ONDANSETRON 4 MG/1
4 TABLET, ORALLY DISINTEGRATING ORAL EVERY 8 HOURS PRN
Qty: 10 TABLET | Refills: 0 | Status: SHIPPED | OUTPATIENT
Start: 2024-11-24

## 2024-11-24 RX ORDER — OXYCODONE HYDROCHLORIDE 5 MG/1
5 TABLET ORAL ONCE
Status: COMPLETED | OUTPATIENT
Start: 2024-11-24 | End: 2024-11-24

## 2024-11-24 RX ADMIN — OXYCODONE HYDROCHLORIDE 5 MG: 5 TABLET ORAL at 23:04

## 2024-11-24 RX ADMIN — ONDANSETRON 4 MG: 2 INJECTION INTRAMUSCULAR; INTRAVENOUS at 20:34

## 2024-11-24 RX ADMIN — SODIUM CHLORIDE 1000 ML: 9 INJECTION, SOLUTION INTRAVENOUS at 20:34

## 2024-11-24 RX ADMIN — KETOROLAC TROMETHAMINE 15 MG: 15 INJECTION, SOLUTION INTRAMUSCULAR; INTRAVENOUS at 20:34

## 2024-11-24 ASSESSMENT — COLUMBIA-SUICIDE SEVERITY RATING SCALE - C-SSRS
6. HAVE YOU EVER DONE ANYTHING, STARTED TO DO ANYTHING, OR PREPARED TO DO ANYTHING TO END YOUR LIFE?: NO
2. HAVE YOU ACTUALLY HAD ANY THOUGHTS OF KILLING YOURSELF IN THE PAST MONTH?: NO
1. IN THE PAST MONTH, HAVE YOU WISHED YOU WERE DEAD OR WISHED YOU COULD GO TO SLEEP AND NOT WAKE UP?: NO

## 2024-11-24 ASSESSMENT — ACTIVITIES OF DAILY LIVING (ADL)
ADLS_ACUITY_SCORE: 0

## 2024-11-24 NOTE — TELEPHONE ENCOUNTER
PROGRESS NOTE     Patient name Armando Jones and date of birth 2009 was confirmed. Patient consent to video call acquired.     IDENTIFYING INFO:   pronouns: he/him/his   name: Armando Roberts is a 13 year old, male and he was answering the call from home.  This writer spoke to patient by self, while they were in their home for the duration of the visit.      Start time: 11:04 am  Time spent with patient: 39m 52s   minutes- individual therapy appointment with interactive complexity       Chief complaint: He explains he struggles with \"undiagnosed mental issues, antisocial, schizophrenia, and disassociative identity.\"      Report of progress:Armando shared he wants to talk about the experience of people staring at him and this has been going on since he was 3 years old, he describes sensing people staring at him and recognizes this as irrational but does not feel he is able to make it stop.     Session Content: This writer worked with Armando and using distancing activities in order to put space between the thought and the emotional reaction he experiences as he tries to stop himself from experiencing those thoughts of people staring at him.  He agreed to try the technique in order to reduce his emotional response to the experience and evaluate his feelings and emotions after a few attempts at use.       Past session overview:   Values include:    Respect, creativity, safety, wealth, success, knowledge, power, friends, loyalty    Stability, fairness, relaxation, morals, free time, freedom, fun, responsibility, humor, reason, independence    Achievement, spirituality, family, calmness, nature             MENTAL STATUS EXAM  Appearance: healthy, alert and cooperative   Behavior: appropriate and calm  Motor Activity: restless/fidgety    Speech:   a.    Volume: loud  b.    Rate: rapid  c.    Manner: normal  Mood: Normal            Affect: Appropriate  Thought Process/Content: redirectable   Social  Returned answering service call.    Feeling nauseated since earlier in the day, Can't take pain meds, not in a ton of pain right now but concerned because can't drink anything without vomiting.    Explained since symptoms are ongoing and not a single event, recommend eval in the Emergency Dept (labs, fluids, possible imaging based on abdominal exam).    Pastora Pabon MD MPH     Interactions: normal  Personal Judgment: fair  Insight: fair  Suicidal Ideation: Denies suicidal ideation, plan, or intent.  Homicidal Ideation: Denies homicidal ideation, plan, or intent.     Therapeutic intervention provided:  Clinician used simple reflections, open ended questions, and empathy to aid in client engagement in treatment  Empathic Listening, Validation, and Support  Facilitated a discussion about recent issues and current symptoms     Interactive: Therapy must be modified and all interventions need to include careful consideration to adapt treatment for autism spectrum disorder.         APA Diagnosis   DMDD  PRASAD  Autistic Spectrum Disorder  Family conflict     Rule out ADHD, gender dysphoria.      TREATMENT PLAN     This treatment plan was collaboratively developed with the patient.     Treatment Goal(s):   1. \"deal with people\"      Therapeutic Interventions:   1. Individual and family counseling  2. MI  3. CBT, ACT  4. Collaboration of care with the psychiatrist.      Barriers to treatment effectiveness: No     Plan/ Client's planned actions toward goals:  It is suggested continue to build on ACT skills at the next session.         Plan Related to Treatment Services:  Based upon the above information the following recommendations are made      Armando currently has scheduled individual therapy sessions once every weekly. Armando's father was made aware of these recommendations via a my chart message and did agree to them.      He and his father were made aware of how to contact the undersigned in case of an emergency. Armando will go to the nearest emergency room or call 9-1-1 if he ever reports feelings of suicidality or if he believes he may be a threat to self or others. Armando will inform Darlene Wood or other support if he feels unsafe.      Electronically signed, Darlene Wood, PAMELA 07/29/22

## 2024-11-24 NOTE — TELEPHONE ENCOUNTER
Returned answering service call.    Tylenol - ibuprofen alternating every 3 hours  Oxycodone x 1 on empty stomach, then vomited x 1  Feels TAP block has worn off now.  Passing flatus, no stool yet.    Reassurance given re: schedule of medications and dosing. Encouraged oxy with food if feels she needs something stronger. Call back with further questions/concerns.    Pastora Pabon MD MPH

## 2024-11-25 VITALS
SYSTOLIC BLOOD PRESSURE: 102 MMHG | WEIGHT: 125.9 LBS | HEART RATE: 79 BPM | DIASTOLIC BLOOD PRESSURE: 68 MMHG | OXYGEN SATURATION: 98 % | HEIGHT: 63 IN | RESPIRATION RATE: 16 BRPM | BODY MASS INDEX: 22.31 KG/M2 | TEMPERATURE: 98.5 F

## 2024-11-25 NOTE — CONSULTS
HISTORY OF PRESENT ILLNESS     HPI     This is a 65 year old female who presents today for a medicare wellness visit.     She is feeling well. She has no complaints.      PREVENTIVE HEALTH:    Diet:  well balanced  Exercise:  regular     Last lipid testing:  acceptable    Lab Results   Component Value Date    CHOLESTEROL 189 12/07/2021    CHOLESTEROL 200 04/15/2016    HDL 59 12/07/2021    HDL 94 04/15/2016    CALCLDL 87 12/07/2021    CALCLDL 81 04/15/2016    TRIGLYCERIDE 214 (H) 12/07/2021    TRIGLYCERIDE 127 04/15/2016       Last glucose:  normal    Lab Results   Component Value Date    GLUCOSE 85 12/07/2021    GLUCOSE 154 (H) 02/19/2021    HGBA1C 5.6 04/15/2016       Immunizations:  COVID-19:  up to date  TDaP/Td:  up to date  Pneumococcal:  up to date  Influenza:  up to date  Zoster:  up to date    Immunization History   Administered Date(s) Administered   • COVID-19 12Y+ [employer] - Requires Dilution 03/19/2021, 04/09/2021, 12/01/2021   • COVID-19 18Y+ Moderna Bivalent Booster Vaccine 10/24/2022   • COVID-19 Moderna Vaccine 12/01/2021   • Influenza Quadrivalent, MDCK, multi-dose (FLUCELVAX) 10/20/2017   • Influenza, High Dose quadrivalent, preserve-free 10/10/2022   • Influenza, Unspecified Formulation 10/01/2014, 10/20/2017   • Influenza, quadrivalent, MDCK, preserve-free (FLUCELVAX) 10/08/2021   • Influenza, quadrivalent, preserve-free 11/15/2019, 09/11/2020   • Influenza, seasonal, injectable, trivalent 11/01/2016   • Novel Influenza L1V0-19 12/08/2009   • Novel Influenza E8U4-04, Unspecified Formulation 12/08/2009   • Pneumococcal Conjugate 20 Valent Vacc (Prevnar 20) 12/07/2022   • Pneumococcal Polysaccharide Vacc (Pneumovax 23) 11/05/2021   • Shingrix (Shingles Zoster) 04/01/2019, 06/03/2019   • TD Adult, Adsorbed 04/17/2003   • Tdap 10/24/2008, 11/15/2019       Health Maintenance:  Colonoscopy:  up to date  DEXA scan:  up to date  Pap smear:  up to date  Mammogram:  up to date    I reviewed the  GYN Consult  Artie Diez   1980  MRN [id number]    CC: Abdominal pain and nausea    Consultation requested by Dr. Whitten.     HPI: Artie Diez is a 44 year old  POD#2 s/p robot-assisted total laparoscopic hysterectomy and bilateral salpingectomy who presents with complaint of nausea. States symptoms started around 11 am today, after taking a dose of oxycodone. Had an episode of emesis shortly after taking the tablet and has been persistently nauseous since, so much so that she has kept a bucket by her in case of further emesis. Has not tolerated PO intake, including liquids since. She presented to the ED after the nausea failed to improve throughout the day. She reports ongoing abdominal pain, though improved since surgery. Having small amounts of flatus, though no bowel movement yet. Voiding spontaneously, though reports voiding happens in piecemeal manner. Has sensation of incomplete emptying, but states she has had that sensation since prior to surgery, due to bulk effect of fibroid uterus on bladder. Denies fevers or chills. No other acute complaints.    Gyn Hx:  Contraception:Now s/p hysterectomy  OB Hx:       Past Medical History:   Diagnosis Date    Bipolar 2 disorder (H)     Depressive disorder     Depressive disorder, not elsewhere classified     Depression (non-psychotic)    Excessive or frequent menstruation     Heavy periods    Fibroid uterus         Past Surgical History:   Procedure Laterality Date    ENT SURGERY      Tonsils removed        Current Facility-Administered Medications   Medication Dose Route Frequency Provider Last Rate Last Admin    ondansetron (ZOFRAN) injection 4 mg  4 mg Intravenous Q30 Min Mesfin Jacobson MD   4 mg at 24     Current Outpatient Medications   Medication Sig Dispense Refill    ibuprofen (ADVIL/MOTRIN) 600 MG tablet Take 1 tablet (600 mg) by mouth every 6 hours as needed for moderate pain. 50 tablet 0    ondansetron  (ZOFRAN) 4 MG tablet Take 1 tablet (4 mg) by mouth every 8 hours as needed for nausea. 20 tablet 0    acetaminophen (TYLENOL) 325 MG tablet Take 3 tablets (975 mg) by mouth every 6 hours as needed for mild pain.      divalproex sodium extended-release (DEPAKOTE ER) 250 MG 24 hr tablet Take 1 tablet (250 mg) by mouth daily. (Patient taking differently: Take 250 mg by mouth at bedtime.) 30 tablet 3    gabapentin (NEURONTIN) 100 MG capsule Take 1 capsule (100 mg) by mouth 2 times daily as needed for other (anxiety). Take 1 tab at noon and 1 cap at bedtime 90 capsule 3    lamoTRIgine (LAMICTAL) 100 MG tablet Take 1 tablet (100 mg) by mouth daily. Take along with one 25 mg tablet for a combined total of 125 mg. 30 tablet 3    lamoTRIgine (LAMICTAL) 25 MG tablet Take 1 tablet (25 mg) by mouth daily. Take along with one 100 mg tablet for a combined total of 125 mg. 30 tablet 3    LORazepam (ATIVAN) 0.5 MG tablet Take 0.5 mg by mouth every 6 hours as needed for anxiety      oxyCODONE (ROXICODONE) 5 MG tablet Take 1 tablet (5 mg) by mouth every 6 hours as needed for severe pain. 6 tablet 0    polyethylene glycol (MIRALAX) 17 g packet Take 1 packet by mouth at bedtime.      propranolol (INDERAL) 10 MG tablet Take 1 tablet (10 mg) by mouth 2 times daily as needed (Anxiety). 60 tablet 3    propranolol ER (INDERAL LA) 60 MG 24 hr capsule Take 1 capsule (60 mg) by mouth at bedtime. 30 capsule 3    senna-docusate (SENOKOT-S/PERICOLACE) 8.6-50 MG tablet Take 1-2 tablets by mouth 2 times daily as needed for constipation (While on oral opioids.). 30 tablet 0    spironolactone (ALDACTONE) 50 MG tablet Take 1 tablet (50 mg) by mouth 2 times daily 180 tablet 2        Allergies   Allergen Reactions    Amoxicillin     Amphotericin B     Phenylbutazones     Penicillin G Nausea and Rash      Objective:  Vitals:    11/24/24 1951   BP: 117/65   Pulse: 79   Resp: 16   Temp: 98.3  F (36.8  C)   TempSrc: Oral   SpO2: 97%   Weight: 57.1 kg (125  Health Risk Assessment in its entirety. The following items on the Medicare Health Risk Assessment were found to be positive,    1.) Do you have an Advance directive, living will, or power of  for health care document that contains your wishes for end of life care?: No     11h.) Problems with your hearing: Always         Vision and Hearing screens: Not performed    No exam data present  Has hearing aids  Advance Directive:   The patient has the following documents: none    Cognitive Assessment: there is no cognitive impairment by direct observation.    Recent PHQ 2/9 Score    PHQ 2:  Date Adult PHQ 2 Score Adult PHQ 2 Interpretation   11/30/2022 0 No further screening needed       PHQ 9:  Date Adult PHQ 9 Score Adult PHQ 9 Interpretation   9/26/2022 4 Minimal Depression       DEPRESSION ASSESSMENT/PLAN:  Depression screening is negative no further plan needed.     Body mass index is 24.35 kg/m².    BMI ASSESSMENT/PLAN:  Patient is normal weight   30 minutes of physical activity a day       Patient Care Team:  mAelia De Luna DO as PCP - General (Internal Medicine)    Opioid Review: Poonam is not taking opioid medications.      PAST MEDICAL, FAMILY AND SOCIAL HISTORY     The following histories were personally reviewed and updated.  I have reviewed the patient's medications and allergies, past medical, surgical, social and family history, updating these as appropriate.  See Histories section of the EMR for a display of this information.      REVIEW OF SYSTEMS     Review of Systems   All other systems reviewed and are negative.      PHYSICAL EXAM     Physical Exam  Vitals and nursing note reviewed.   Constitutional:       General: She is not in acute distress.     Appearance: Normal appearance. She is well-developed. She is not diaphoretic.   HENT:      Head: Normocephalic and atraumatic.      Right Ear: Tympanic membrane, ear canal and external ear normal.      Left Ear: Tympanic membrane, ear canal and external  "lb 14.4 oz)   Height: 1.6 m (5' 3\")     Gen: Appears comfortable in bed, NAD, cooperative  Heart: Well perfused  Lungs: Non-labored breathing on room air  Abd: Soft, non-distended, moderately tender to palpation diffusely, bruising noted by umbilical incision. Incisions clean,dry, intact, covered with Dermabond.    Labs/Imaging:  Results for orders placed or performed during the hospital encounter of 11/24/24 (from the past 24 hours)   CBC with platelets differential    Narrative    The following orders were created for panel order CBC with platelets differential.  Procedure                               Abnormality         Status                     ---------                               -----------         ------                     CBC with platelets and d...[[id number]]                      Final result                 Please view results for these tests on the individual orders.   Basic metabolic panel   Result Value Ref Range    Sodium 141 135 - 145 mmol/L    Potassium 4.5 3.4 - 5.3 mmol/L    Chloride 103 98 - 107 mmol/L    Carbon Dioxide (CO2) 28 22 - 29 mmol/L    Anion Gap 10 7 - 15 mmol/L    Urea Nitrogen 9.0 6.0 - 20.0 mg/dL    Creatinine 0.80 0.51 - 0.95 mg/dL    GFR Estimate >90 >60 mL/min/1.73m2    Calcium 9.5 8.8 - 10.4 mg/dL    Glucose 94 70 - 99 mg/dL   CRP inflammation   Result Value Ref Range    CRP Inflammation 35.70 (H) <5.00 mg/L   Erythrocyte sedimentation rate auto   Result Value Ref Range    Erythrocyte Sedimentation Rate 21 (H) 0 - 20 mm/hr   CBC with platelets and differential   Result Value Ref Range    WBC Count 8.6 4.0 - 11.0 10e3/uL    RBC Count 4.71 3.80 - 5.20 10e6/uL    Hemoglobin 13.3 11.7 - 15.7 g/dL    Hematocrit 40.3 35.0 - 47.0 %    MCV 86 78 - 100 fL    MCH 28.2 26.5 - 33.0 pg    MCHC 33.0 31.5 - 36.5 g/dL    RDW 12.1 10.0 - 15.0 %    Platelet Count 320 150 - 450 10e3/uL    % Neutrophils 66 %    % Lymphocytes 27 %    % Monocytes 6 %    % Eosinophils 1 %    % Basophils 0 %    % " ear normal.      Nose: Nose normal. No mucosal edema, congestion or rhinorrhea.      Mouth/Throat:      Lips: Pink. No lesions.      Mouth: Mucous membranes are moist. No oral lesions.      Tongue: No lesions.      Palate: No lesions.      Pharynx: Oropharynx is clear. No oropharyngeal exudate or posterior oropharyngeal erythema.      Neck: Neck supple.   Eyes:      General: Lids are normal. No scleral icterus.        Right eye: No discharge.         Left eye: No discharge.      Extraocular Movements: Extraocular movements intact.      Conjunctiva/sclera: Conjunctivae normal.      Pupils: Pupils are equal, round, and reactive to light.   Neck:      Thyroid: No thyroid mass or thyromegaly.      Vascular: No carotid bruit or JVD.      Trachea: No tracheal deviation.   Cardiovascular:      Rate and Rhythm: Normal rate and regular rhythm.      Pulses: No decreased pulses.      Heart sounds: Normal heart sounds. No murmur heard.    No friction rub. No gallop.   Pulmonary:      Effort: Pulmonary effort is normal. No respiratory distress.      Breath sounds: No wheezing or rales.   Abdominal:      General: Bowel sounds are normal. There is no distension.      Palpations: Abdomen is soft. There is no hepatomegaly, splenomegaly or mass.      Tenderness: There is no abdominal tenderness.   Musculoskeletal:         General: No tenderness.      Right lower leg: No edema.      Left lower leg: No edema.   Lymphadenopathy:      Cervical: No cervical adenopathy.      Upper Body:      Right upper body: No supraclavicular adenopathy.      Left upper body: No supraclavicular adenopathy.   Skin:     General: Skin is warm and dry.      Coloration: Skin is not jaundiced.      Findings: No erythema or rash.      Nails: There is no clubbing.   Neurological:      General: No focal deficit present.      Mental Status: She is alert and oriented to person, place, and time.      Cranial Nerves: No cranial nerve deficit.      Sensory: No sensory  Immature Granulocytes 0 %    NRBCs per 100 WBC 0 <1 /100    Absolute Neutrophils 5.7 1.6 - 8.3 10e3/uL    Absolute Lymphocytes 2.3 0.8 - 5.3 10e3/uL    Absolute Monocytes 0.5 0.0 - 1.3 10e3/uL    Absolute Eosinophils 0.1 0.0 - 0.7 10e3/uL    Absolute Basophils 0.0 0.0 - 0.2 10e3/uL    Absolute Immature Granulocytes 0.0 <=0.4 10e3/uL    Absolute NRBCs 0.0 10e3/uL       Assessment/Plan: Artie Diez is a 44 year old  POD#2 from robot-assisted total laparoscopic hysterectomy and bilateral salpingectomy here with concerns of nausea and abdominal pain. Symptoms improved after IV Zofran, IV fluids and Toradol. Physical exam re-assuring with normal vital signs, abdomen soft and non distended. Laboratory evaluation notable for normal CBC and BMP, CRP and ESR elevated as expected in the early postoperative course. Low concern for intra-abdominal infection or bowel injury given these findings. Reviewed that nausea is a common side effect if oxycodone, as well as nausea could be a sign that her bowels are slow to recover after surgery. Reassured that she is still having flatus. No concern for significant intra-abdominal pathology at this time.    # Post operative nausea  - Zofran prescribed  - PO challenge prior to discharge    # Postoperative pain  Pain improving since surgery. Patient requested that she be sent home with prescription for 600 mg tablets of Ibuprofen, instead of 200 mg tablets.  - Ibuprofen 600 mg prescribed    Patient safe to discharge. Reviewed return precautions, including worsening nausea or pain, development of fevers, or inability to void.    She has a postoperative follow up visit scheduled on 2025    Patient seen and care plan discussed under supervision of  Dr. Corona.    Thank you for allowing us to be involved in the care of your patient. Please do not hesitate to give us a call with further questions. Team OB/GYN consult pagers [phone] from 6:30AM to 6:30PM or [phone] from  deficit.      Motor: No weakness.      Gait: Gait normal.   Psychiatric:         Attention and Perception: Attention normal.         Mood and Affect: Mood and affect normal.         Speech: Speech normal.         Behavior: Behavior normal.         Thought Content: Thought content normal.         Cognition and Memory: Cognition normal.         ASSESSMENT/PLAN     ASSESSMENT:  1. Medicare wellness   2. Hyperlipidemia- due for recheck   3. Benign essential HTN - controlled       PLAN:  Orders Placed This Encounter   • Pneumococcal Conjugate 20 Valent Vacc (Prevnar-20) - [id number]   • Lipid Panel With Reflex   • CBC with Automated Differential   • Comprehensive Metabolic Panel   • CBC with Automated Differential   • Comprehensive Metabolic Panel   • Lipid Panel With Reflex       Return in about 1 year (around 12/7/2023) for Medicare Wellness Visit.     6PM to 6:30AM.    Wendy Crowley MD  Ob-Gyn Resident, PGY-3  11/24/2024 10:33 PM     I examined Artie Diez with Dr. Crowley and agree with the presentation, exam and plan of care documented in this note with edits by me.   Gaby Corona MD

## 2024-11-25 NOTE — ED TRIAGE NOTES
Patient said she had hysterectomy on Friday. She said she has been nauseous since this morning. She reported episodes of vomiting .She think it's from Oxycodone. Patient said she was not prescribe antiemetic medication.   Patient called her OB and she was advised to come to ER     Triage Assessment (Adult)       Row Name 11/24/24 1952          Triage Assessment    Airway WDL WDL        Respiratory WDL    Respiratory WDL WDL        Skin Circulation/Temperature WDL    Skin Circulation/Temperature WDL X  4 surgical wound to abdomen        Cardiac WDL    Cardiac WDL WDL        Peripheral/Neurovascular WDL    Peripheral Neurovascular WDL WDL        Cognitive/Neuro/Behavioral WDL    Cognitive/Neuro/Behavioral WDL WDL

## 2024-11-25 NOTE — ED PROVIDER NOTES
[facility] EMERGENCY DEPARTMENT ([facility])    11/24/24      ED PROVIDER NOTE       History     Chief Complaint   Patient presents with    Nausea & Vomiting     Patient said she had hysterectomy on Friday. She said she has been nauseous since this morning. She reported episodes of vomiting .She think it's from Oxycodone. Patient said she was not prescribe antiemetic medication.   Patient called her OB and she was advised to come to ER     HPI  Artie Diez is a 44 year old female with a history of heavy menses, fibroids, and is now s/p Total Laparoscopic Hysterectomy (11/22/24) who presents to the ED with nausea and vomiting.     Patient reports this morning she took an oxycodone for the first time today around 11 am, had some soup, and shortly after became lightheaded, sweaty, nauseous and had an episode of vomiting. Patient notes she had oxycodone post-op and tolerated it. Patient states she felt better after vomiting, just very tried. Patient reports she has been unable to sleep due to nausea. Patient tried to take Advil with water, but her nausea worsened. Patient reports her nausea has progressively worsened all day, is a little better currently. Patient states she is unable to get fluids down due to vomiting. Currently, she endorses lightheadedness, no fevers. Patient states her pain currently is better than yesterday, but worsens with activity. Patient is unable to reposition herself in bed here in the ED due to pain. She has been rotating between tylenol and Advil every 3 hours. Patient is passing flatus, no bowel movement, states she was unable to keep the Leandra Lax down. Last dose of ibuprofen was around 10 am. Denies chest pain or shortness of breath.     Past Medical History  Past Medical History:   Diagnosis Date    Bipolar 2 disorder (H)     Depressive disorder     Depressive disorder, not elsewhere classified     Depression (non-psychotic)    Excessive or frequent menstruation     Heavy  "periods    Fibroid uterus      Past Surgical History:   Procedure Laterality Date    ENT SURGERY  1996    Tonsils removed     ibuprofen (ADVIL/MOTRIN) 600 MG tablet  ondansetron (ZOFRAN) 4 MG tablet  acetaminophen (TYLENOL) 325 MG tablet  divalproex sodium extended-release (DEPAKOTE ER) 250 MG 24 hr tablet  gabapentin (NEURONTIN) 100 MG capsule  lamoTRIgine (LAMICTAL) 100 MG tablet  lamoTRIgine (LAMICTAL) 25 MG tablet  LORazepam (ATIVAN) 0.5 MG tablet  oxyCODONE (ROXICODONE) 5 MG tablet  polyethylene glycol (MIRALAX) 17 g packet  propranolol (INDERAL) 10 MG tablet  propranolol ER (INDERAL LA) 60 MG 24 hr capsule  senna-docusate (SENOKOT-S/PERICOLACE) 8.6-50 MG tablet  spironolactone (ALDACTONE) 50 MG tablet      Allergies   Allergen Reactions    Amoxicillin     Amphotericin B     Phenylbutazones     Penicillin G Nausea and Rash     Family History  Family History   Problem Relation Age of Onset    Unknown/Adopted Mother         adopted mother    Unknown/Adopted Father         adopted farther    Unknown/Adopted Sister         twin sister    Unknown/Adopted Sister         adopted (non bio)     Social History   Social History     Tobacco Use    Smoking status: Never     Passive exposure: Never    Smokeless tobacco: Never   Vaping Use    Vaping status: Never Used   Substance Use Topics    Alcohol use: No    Drug use: No      Past medical history, past surgical history, medications, allergies, family history, and social history were reviewed with the patient. No additional pertinent items.     A complete review of systems was performed with pertinent positives and negatives noted in the HPI, and all other systems negative.    Physical Exam   BP: 117/65  Pulse: 79  Temp: 98.3  F (36.8  C)  Resp: 16  Height: 160 cm (5' 3\")  Weight: 57.1 kg (125 lb 14.4 oz)  SpO2: 97 %  Physical Exam  General: awake, alert, NAD  Head: normal cephalic  HEENT: pupils equal, conjugate gaze intact  Neck: Supple  CV: regular rate and rhythm " without murmur  Lungs: clear to auscultation  Abd: soft, bruised abdomen.  Incisions are clean, dry, intact.  Patient is diffusely tender.    EXT: lower extremities without swelling or edema  Neuro: awake, answers questions appropriately. No focal deficits noted      ED Course, Procedures, & Data      Procedures    Results for orders placed or performed during the hospital encounter of 11/24/24   Basic metabolic panel     Status: Normal   Result Value Ref Range    Sodium 141 135 - 145 mmol/L    Potassium 4.5 3.4 - 5.3 mmol/L    Chloride 103 98 - 107 mmol/L    Carbon Dioxide (CO2) 28 22 - 29 mmol/L    Anion Gap 10 7 - 15 mmol/L    Urea Nitrogen 9.0 6.0 - 20.0 mg/dL    Creatinine 0.80 0.51 - 0.95 mg/dL    GFR Estimate >90 >60 mL/min/1.73m2    Calcium 9.5 8.8 - 10.4 mg/dL    Glucose 94 70 - 99 mg/dL   CRP inflammation     Status: Abnormal   Result Value Ref Range    CRP Inflammation 35.70 (H) <5.00 mg/L   Erythrocyte sedimentation rate auto     Status: Abnormal   Result Value Ref Range    Erythrocyte Sedimentation Rate 21 (H) 0 - 20 mm/hr   UA with Microscopic reflex to Culture     Status: Abnormal    Specimen: Urine, Midstream   Result Value Ref Range    Color Urine Light Yellow Colorless, Straw, Light Yellow, Yellow    Appearance Urine Clear Clear    Glucose Urine Negative Negative mg/dL    Bilirubin Urine Negative Negative    Ketones Urine 60 (A) Negative mg/dL    Specific Gravity Urine 1.019 1.003 - 1.035    Blood Urine Moderate (A) Negative    pH Urine 6.5 5.0 - 7.0    Protein Albumin Urine Negative Negative mg/dL    Urobilinogen Urine Normal Normal, 2.0 mg/dL    Nitrite Urine Negative Negative    Leukocyte Esterase Urine Trace (A) Negative    Mucus Urine Present (A) None Seen /LPF    RBC Urine 9 (H) <=2 /HPF    WBC Urine 6 (H) <=5 /HPF    Squamous Epithelials Urine 1 <=1 /HPF    Narrative    Urine Culture not indicated   CBC with platelets and differential     Status: None   Result Value Ref Range    WBC Count  8.6 4.0 - 11.0 10e3/uL    RBC Count 4.71 3.80 - 5.20 10e6/uL    Hemoglobin 13.3 11.7 - 15.7 g/dL    Hematocrit 40.3 35.0 - 47.0 %    MCV 86 78 - 100 fL    MCH 28.2 26.5 - 33.0 pg    MCHC 33.0 31.5 - 36.5 g/dL    RDW 12.1 10.0 - 15.0 %    Platelet Count 320 150 - 450 10e3/uL    % Neutrophils 66 %    % Lymphocytes 27 %    % Monocytes 6 %    % Eosinophils 1 %    % Basophils 0 %    % Immature Granulocytes 0 %    NRBCs per 100 WBC 0 <1 /100    Absolute Neutrophils 5.7 1.6 - 8.3 10e3/uL    Absolute Lymphocytes 2.3 0.8 - 5.3 10e3/uL    Absolute Monocytes 0.5 0.0 - 1.3 10e3/uL    Absolute Eosinophils 0.1 0.0 - 0.7 10e3/uL    Absolute Basophils 0.0 0.0 - 0.2 10e3/uL    Absolute Immature Granulocytes 0.0 <=0.4 10e3/uL    Absolute NRBCs 0.0 10e3/uL   CBC with platelets differential     Status: None    Narrative    The following orders were created for panel order CBC with platelets differential.  Procedure                               Abnormality         Status                     ---------                               -----------         ------                     CBC with platelets and d...[[id number]]                      Final result                 Please view results for these tests on the individual orders.     Medications   ondansetron (ZOFRAN) injection 4 mg (4 mg Intravenous $Given 11/24/24 2034)   sodium chloride 0.9% BOLUS 1,000 mL (1,000 mLs Intravenous $New Bag 11/24/24 2034)   ketorolac (TORADOL) injection 15 mg (15 mg Intravenous $Given 11/24/24 2034)   oxyCODONE (ROXICODONE) tablet 5 mg (5 mg Oral $Given 11/24/24 2304)     Labs Ordered and Resulted from Time of ED Arrival to Time of ED Departure   CRP INFLAMMATION - Abnormal       Result Value    CRP Inflammation 35.70 (*)    ERYTHROCYTE SEDIMENTATION RATE AUTO - Abnormal    Erythrocyte Sedimentation Rate 21 (*)    ROUTINE UA WITH MICROSCOPIC REFLEX TO CULTURE - Abnormal    Color Urine Light Yellow      Appearance Urine Clear      Glucose Urine Negative       Bilirubin Urine Negative      Ketones Urine 60 (*)     Specific Gravity Urine 1.019      Blood Urine Moderate (*)     pH Urine 6.5      Protein Albumin Urine Negative      Urobilinogen Urine Normal      Nitrite Urine Negative      Leukocyte Esterase Urine Trace (*)     Mucus Urine Present (*)     RBC Urine 9 (*)     WBC Urine 6 (*)     Squamous Epithelials Urine 1     BASIC METABOLIC PANEL - Normal    Sodium 141      Potassium 4.5      Chloride 103      Carbon Dioxide (CO2) 28      Anion Gap 10      Urea Nitrogen 9.0      Creatinine 0.80      GFR Estimate >90      Calcium 9.5      Glucose 94     CBC WITH PLATELETS AND DIFFERENTIAL    WBC Count 8.6      RBC Count 4.71      Hemoglobin 13.3      Hematocrit 40.3      MCV 86      MCH 28.2      MCHC 33.0      RDW 12.1      Platelet Count 320      % Neutrophils 66      % Lymphocytes 27      % Monocytes 6      % Eosinophils 1      % Basophils 0      % Immature Granulocytes 0      NRBCs per 100 WBC 0      Absolute Neutrophils 5.7      Absolute Lymphocytes 2.3      Absolute Monocytes 0.5      Absolute Eosinophils 0.1      Absolute Basophils 0.0      Absolute Immature Granulocytes 0.0      Absolute NRBCs 0.0     URINE CULTURE     No orders to display          Critical care was not performed.     Medical Decision Making  The patient's presentation was of high complexity (an acute health issue posing potential threat to life or bodily function).    The patient's evaluation involved:  review of 3+ test result(s) ordered prior to this encounter (see separate area of note for details)  ordering and/or review of 3+ test(s) in this encounter (see separate area of note for details)  discussion of management or test interpretation with another health professional (GYN)    The patient's management necessitated moderate risk (prescription drug management including medications given in the ED).    Assessment & Plan    .  On exam patient is very uncomfortable appearing, stable vital signs.  unclear if this is undertreated postop pain versus concern for postoperative complication such as bowel injury, less likely infection given she is only 2 days postop could also consider postoperative bleed excetra.    Vital signs are stable, she is afebrile, laboratory studies are within normal limits though she does have elevated inflammatory markers.  Hemoglobin is stable.     Patient was given Toradol, 1 L of fluid and IV Zofran.  She reported improvement of her symptoms.  I discussed the case with OB/GYN they came and evaluated her.  Please see their full note for details.  In short they do not feel that she requires postop imaging at this time.    Proceed with symptomatic management in the ER.  Patient was tolerating p.o. pain was improved.  Will be discharged home with Zofran prescription, ER return precautions and instructions to continue taking her home meds, bowel prep.    I have reviewed the nursing notes. I have reviewed the findings, diagnosis, plan and need for follow up with the patient.    Current Discharge Medication List        START taking these medications    Details   ondansetron (ZOFRAN) 4 MG tablet Take 1 tablet (4 mg) by mouth every 8 hours as needed for nausea.  Qty: 20 tablet, Refills: 0    Associated Diagnoses: S/P hysterectomy             Final diagnoses:   S/P hysterectomy   IElizabeth, am serving as a trained medical scribe to document services personally performed by Mesfin Whitten MD based on the provider's statements to me on November 24, 2024.  This document has been checked and approved by the attending provider.    IMesfin MD, was physically present and have reviewed and verified the accuracy of this note documented by Elizabeth Retana, medical scribe.      Mesfin Whitten MD   [facility] EMERGENCY DEPARTMENT  11/24/2024     Mesfin Whitten MD  11/24/24 1962

## 2024-11-25 NOTE — DISCHARGE INSTRUCTIONS
Please take your oxycodone with Zofran.  Take your medications as needed to tolerate your pain.  Make sure you are maintaining a bowel regimen medications as well as oxycodone and Zofran can both be very constipating.    If vomiting worsens, you develop fevers, or increasing pain return to the ER otherwise follow-up with your surgeon as already scheduled.

## 2024-11-26 LAB — BACTERIA UR CULT: NO GROWTH

## 2024-11-27 LAB
PATH REPORT.COMMENTS IMP SPEC: NORMAL
PATH REPORT.COMMENTS IMP SPEC: NORMAL
PATH REPORT.FINAL DX SPEC: NORMAL
PATH REPORT.GROSS SPEC: NORMAL
PATH REPORT.MICROSCOPIC SPEC OTHER STN: NORMAL
PATH REPORT.RELEVANT HX SPEC: NORMAL
PHOTO IMAGE: NORMAL

## 2024-12-02 ENCOUNTER — TELEPHONE (OUTPATIENT)
Dept: OBGYN | Facility: CLINIC | Age: 44
End: 2024-12-02
Payer: COMMERCIAL

## 2024-12-03 NOTE — ANESTHESIA POSTPROCEDURE EVALUATION
Patient: Artie Diez    Procedure: Procedure(s):  EXAM UNDER ANESTHESIA, PELVIS,  Robot Assisted Total Laparoscopic Hysterectomy, Bilateral Salpingectomy, Cystoscopy       Anesthesia Type:  General    Note:  Disposition: Outpatient   Postop Pain Control: Uneventful            Sign Out: Well controlled pain   PONV:    Neuro/Psych: Uneventful            Sign Out: Acceptable/Baseline neuro status   Airway/Respiratory: Uneventful            Sign Out: Acceptable/Baseline resp. status   CV/Hemodynamics: Uneventful            Sign Out: Acceptable CV status; No obvious hypovolemia; No obvious fluid overload   Other NRE: NONE   DID A NON-ROUTINE EVENT OCCUR? No           Last vitals:  Vitals Value Taken Time   BP 96/60 11/22/24 1515   Temp 36.8  C (98.3  F) 11/22/24 1515   Pulse 80 11/22/24 1415   Resp 16 11/22/24 1515   SpO2 95 % 11/22/24 1515       Electronically Signed By: Lyndon Hassan MD  December 3, 2024  12:43 PM

## 2024-12-08 ENCOUNTER — NURSE TRIAGE (OUTPATIENT)
Dept: NURSING | Facility: CLINIC | Age: 44
End: 2024-12-08
Payer: COMMERCIAL

## 2024-12-08 ENCOUNTER — OFFICE VISIT (OUTPATIENT)
Dept: URGENT CARE | Facility: URGENT CARE | Age: 44
End: 2024-12-08
Payer: COMMERCIAL

## 2024-12-08 VITALS
HEART RATE: 80 BPM | HEIGHT: 63 IN | OXYGEN SATURATION: 100 % | DIASTOLIC BLOOD PRESSURE: 72 MMHG | WEIGHT: 125 LBS | SYSTOLIC BLOOD PRESSURE: 107 MMHG | TEMPERATURE: 97.3 F | BODY MASS INDEX: 22.15 KG/M2

## 2024-12-08 DIAGNOSIS — Z90.710 S/P HYSTERECTOMY: Primary | ICD-10-CM

## 2024-12-08 DIAGNOSIS — R39.15 URGENCY OF URINATION: ICD-10-CM

## 2024-12-08 DIAGNOSIS — N94.9 VAGINAL DISCOMFORT: ICD-10-CM

## 2024-12-08 LAB
ALBUMIN UR-MCNC: NEGATIVE MG/DL
APPEARANCE UR: CLEAR
BILIRUB UR QL STRIP: NEGATIVE
CLUE CELLS: ABNORMAL
COLOR UR AUTO: YELLOW
GLUCOSE UR STRIP-MCNC: NEGATIVE MG/DL
HGB UR QL STRIP: NEGATIVE
KETONES UR STRIP-MCNC: NEGATIVE MG/DL
LEUKOCYTE ESTERASE UR QL STRIP: ABNORMAL
NITRATE UR QL: NEGATIVE
PH UR STRIP: 7 [PH] (ref 5–7)
RBC #/AREA URNS AUTO: NORMAL /HPF
SP GR UR STRIP: 1.01 (ref 1–1.03)
TRICHOMONAS, WET PREP: ABNORMAL
UROBILINOGEN UR STRIP-ACNC: 0.2 E.U./DL
WBC #/AREA URNS AUTO: NORMAL /HPF
WBC'S/HIGH POWER FIELD, WET PREP: ABNORMAL
YEAST, WET PREP: ABNORMAL

## 2024-12-08 PROCEDURE — 87210 SMEAR WET MOUNT SALINE/INK: CPT

## 2024-12-08 PROCEDURE — 87086 URINE CULTURE/COLONY COUNT: CPT

## 2024-12-08 PROCEDURE — 99213 OFFICE O/P EST LOW 20 MIN: CPT

## 2024-12-08 PROCEDURE — 81001 URINALYSIS AUTO W/SCOPE: CPT

## 2024-12-08 ASSESSMENT — PAIN SCALES - GENERAL: PAINLEVEL_OUTOF10: MODERATE PAIN (4)

## 2024-12-08 NOTE — PROGRESS NOTES
Assessment & Plan     S/P hysterectomy  Vaginal discomfort  Urgency of urination  Is a 44-year-old patient's status post hysterectomy 11/22 presenting with sensation of abdominal fullness, cramping, vaginal pain, dysuria.  Denies history of UTIs but is concerned that post hysterectomy things may change.  A UA today is notable only for trace LE, no nitrates, no blood, no WBCs.  A wet prep was also obtained which is negative for trichomonas, clue cells, yeast.  She has no systemic symptoms including no fevers or chills, no CVA tenderness, no nausea or vomiting.  I suspect her symptoms are postoperative sequela.  Possibly some mild urinary retention in the setting of decreased ambulation.  She does have routine follow-up scheduled with her OB/GYN this coming Wednesday.  Patient did request we send her urine for culture which I have done.  Can call and treat with any positive results but otherwise encouraged hydration, ambulation, OTC analgesics.  Offered prescription for Azo though she declined at this time.  - UA Macroscopic with reflex to Microscopic and Culture - Clinic Collect  - Wet prep - Clinic Collect  - UA Microscopic with Reflex to Culture  - Urine Culture Aerobic Bacterial - lab collect           Return if symptoms worsen or fail to improve.    Hoang Fox MD  [facility] URGENT C[facility] [place]    Martha Alva is a 44 year old female who presents to clinic today for the following health issues:  Chief Complaint   Patient presents with    Urgent Care    Abdominal Pain    Back Pain     Pt in clinic to have eval for urgency, abdominal pain and back pain.    Pt states she is s/p hysterectomy .          Uncomplicated hysterectomy 11/22  Was in ER 11/24  Thinks she has a UTI  Lower abdominal cramping  Pain in/around vagina  RLQ abdominal pain is most consistent spot  Cramping up into backside  Affected sleep so took some tylenol, doing NSAID prn  Has been increasing activity   Hard to  "figure out what's \"normal\"  Has had a lot of firmness in abdomen  Low back pain - initially attributed to bad posture vs constipation vs gas  Pain with urination for last 5 days  Due to fibroid had some inconsistency with urination which hasn't resolved completely - some urgency, frequency  No fevers/chills  No nausea/vomiting  No prior hx UTIs but google searched symptoms and felt that they fit  Has post-op visit planned this Wednesday        Objective    /72   Pulse 80   Temp 97.3  F (36.3  C) (Temporal)   Ht 1.6 m (5' 3\")   Wt 56.7 kg (125 lb)   LMP 07/31/2024 (Approximate)   SpO2 100%   BMI 22.14 kg/m    Physical Exam  Constitutional:       Appearance: Normal appearance. She is not toxic-appearing.   HENT:      Head: Normocephalic and atraumatic.   Eyes:      Conjunctiva/sclera: Conjunctivae normal.   Abdominal:      General: Abdomen is flat.      Palpations: Abdomen is soft.      Tenderness: There is no right CVA tenderness or left CVA tenderness.      Comments: Abdominal incisions clean, dry, intact.  Mild distention.  Generalized abdominal tenderness without rebound or guarding.   Musculoskeletal:         General: Normal range of motion.   Skin:     General: Skin is warm and dry.   Neurological:      General: No focal deficit present.      Mental Status: She is alert.          Results for orders placed or performed in visit on 12/08/24 (from the past 24 hours)   UA Macroscopic with reflex to Microscopic and Culture - Clinic Collect    Specimen: Urine, Clean Catch   Result Value Ref Range    Color Urine Yellow Colorless, Straw, Light Yellow, Yellow    Appearance Urine Clear Clear    Glucose Urine Negative Negative mg/dL    Bilirubin Urine Negative Negative    Ketones Urine Negative Negative mg/dL    Specific Gravity Urine 1.015 1.003 - 1.035    Blood Urine Negative Negative    pH Urine 7.0 5.0 - 7.0    Protein Albumin Urine Negative Negative mg/dL    Urobilinogen Urine 0.2 0.2, 1.0 E.U./dL    " Nitrite Urine Negative Negative    Leukocyte Esterase Urine Trace (A) Negative   Wet prep - Clinic Collect    Specimen: Vagina; Swab   Result Value Ref Range    Trichomonas Absent Absent    Yeast Absent Absent    Clue Cells Absent Absent    WBCs/high power field 1+ (A) None   UA Microscopic with Reflex to Culture   Result Value Ref Range    RBC Urine None Seen 0-2 /HPF /HPF    WBC Urine None Seen 0-5 /HPF /HPF    Narrative    Urine Culture not indicated

## 2024-12-08 NOTE — TELEPHONE ENCOUNTER
Nurse Triage SBAR    Situation: UTI symptoms and information     Background: Mother, Darlin, calling. Consent: Not on file. She is not with the patient.     Assessment: Pt thinks she has a UTI. She is looking for advise.     Recommendation: Informed Darlin that the patient should call [facility] for triage.     Lani Zapata, RN Nursing Advisor 12/8/2024 1:57 PM     Reason for Disposition   [1] Caller is not with the adult (patient) AND [2] probable NON-URGENT symptoms    Protocols used: Information Only Call - No Triage-A-

## 2024-12-08 NOTE — PATIENT INSTRUCTIONS
No sign of urinary tract infection on your labs today.  You could try some medicine called Azo which can help with urinary discomfort.  Continue to use ibuprofen and tylenol as needed.  Continue to try to increase activity.  Follow up with your OB/GYN as planned on Wednesday.

## 2024-12-08 NOTE — TELEPHONE ENCOUNTER
"Nurse Triage SBAR    Is this a 2nd Level Triage? NO    Situation:  Urinary problem     Background: Pt reports \"five days ago, peeing felt different, got more painful, cramping in abdomen when urinating\". Pt reports \"having a lot of low back pain\". Pt reports intermittent abdominal pain when not urinating. Pt reports abdominal pain at time of call \"2\". Pt rates pain with urination \"8\". Pt had hysterectomy two weeks ago.    Assessment:  Rule out UTI vs post op complication.    Protocol Recommended Disposition:   See HCP Within 4 Hours (Or PCP Triage), See More Appropriate Guideline    Recommendation:  Writer advised pt to be seen in UC within four hours per protocol. Call back if needing further assistance.     Pt verbalizes understanding and agrees to plan.      Does the patient meet one of the following criteria for ADS visit consideration? 16+ years old, with an [facility] PCP     TIP  Providers, please consider if this condition is appropriate for management at one of our Acute and Diagnostic Services sites.     If patient is a good candidate, please use dotphrase <dot>triageresponse and select Refer to ADS to document.    Reason for Disposition   [1] Pain or burning with passing urine (urination) AND [2] female   [1] SEVERE pain with urination (e.g., excruciating) AND [2] not improved after 2 hours of pain medicine and Sitz bath    Additional Information   Negative: Shock suspected (e.g., cold/pale/clammy skin, too weak to stand, low BP, rapid pulse)   Negative: Sounds like a life-threatening emergency to the triager   Negative: Followed a female genital area injury (e.g., labia, vagina, vulva)   Negative: Vaginal discharge   Negative: Shock suspected (e.g., cold/pale/clammy skin, too weak to stand, low BP, rapid pulse)   Negative: Sounds like a life-threatening emergency to the triager   Negative: [1] Unable to urinate (or only a few drops) > 4 hours AND [2] bladder feels very full (e.g., palpable bladder or strong " urge to urinate)   Negative: Vomiting   Negative: Patient sounds very sick or weak to the triager    Protocols used: Urinary Symptoms-A-AH, Urination Pain - Female-A-AH

## 2024-12-10 LAB — BACTERIA UR CULT: NORMAL

## 2024-12-11 ENCOUNTER — OFFICE VISIT (OUTPATIENT)
Dept: OBGYN | Facility: CLINIC | Age: 44
End: 2024-12-11
Payer: COMMERCIAL

## 2024-12-11 VITALS
BODY MASS INDEX: 21.83 KG/M2 | SYSTOLIC BLOOD PRESSURE: 109 MMHG | WEIGHT: 123.2 LBS | HEIGHT: 63 IN | HEART RATE: 70 BPM | DIASTOLIC BLOOD PRESSURE: 58 MMHG

## 2024-12-11 DIAGNOSIS — Z98.890 POSTOPERATIVE STATE: Primary | ICD-10-CM

## 2024-12-11 PROCEDURE — 99213 OFFICE O/P EST LOW 20 MIN: CPT

## 2024-12-11 NOTE — LETTER
"2024       RE: Artie Diez  [address]  [place] [address]     Dear Colleague,    Thank you for referring your patient, Artie Diez, to the [facility] [place] at [facility]. Please see a copy of my visit note below.    [facility]  Follow-Up Visit    S: Artie Diez is a 44 year old  here for follow up after her robot-assisted total laparoscopic hysterectomy, bilateral salpingectomy, and cystoscopy on 2024. She was subsequently seen in the ED on 24 due to nausea/vomiting and inability to tolerate PO intake. Nausea and vomiting was thought to be secondary to taking oxycodone for pain.    Artie notes her frustration at not being discharged from the hospital after her surgery with zofran, as she knows other people who were discharged with it after similar procedures. Her nausea has improved significantly. For pain, she has most recently been taking either ibuprofen or tylenol around once a day. She is having more regular bowel movements than her typical using the senokot. She is feeling a bit bloated. Voiding spontaneously but feels different each time. Sometimes her stream will stop for at least 10 seconds; at times, she has to just sit and wait. She thought she had a UTI initially, but had negative testing. Had not been moving too much in the initial postoperative period but starting to get out more and go on walks.    O:  /58   Pulse 70   Ht 1.6 m (5' 3\")   Wt 55.9 kg (123 lb 3.2 oz)   LMP 2024 (Approximate)   BMI 21.82 kg/m    Gen: Well-appearing, NAD  HEENT: Normocephalic, atraumatic  CV:        Well perfused; no LE edema  Pulm:  Normal respiratory effort and rate  Abd: Soft, non-tender, minimally distended in the lower abdomen. 3 cm periumbilical incision and three laparoscopic incisions clean/dry/intact, well approximated, covered by skin glue. Minimal amount of bruising " around periumbilical incision.    A/P:  Artie Diez is a 44 year old  here for follow up after robotic-assisted total laparoscopic hysterectomy, bilateral salpingectomy, and cystoscopy. Overall doing well in the postoperative period. Discussed expected healing at this time in process, ongoing activity restrictions (lifting, etc). Follow up scheduled with Dr. Dobbs 2024.    Staffed with Dr. Anish Pickett MD  OB/GYN PGY-3  2024 4:27 PM    The Patient was seen in [facility] by Dr. Pickett.   I reviewed the history & exam. Assessment and plan were jointly made.   Genevieve Oconnell MD, MPH        Again, thank you for allowing me to participate in the care of your patient.      Sincerely,    Colt Pickett MD

## 2024-12-11 NOTE — PROGRESS NOTES
"[facility]  Follow-Up Visit    S: Artie Diez is a 44 year old  here for follow up after her robot-assisted total laparoscopic hysterectomy, bilateral salpingectomy, and cystoscopy on 2024. She was subsequently seen in the ED on 24 due to nausea/vomiting and inability to tolerate PO intake. Nausea and vomiting was thought to be secondary to taking oxycodone for pain.    Artie notes her frustration at not being discharged from the hospital after her surgery with zofran, as she knows other people who were discharged with it after similar procedures. Her nausea has improved significantly. For pain, she has most recently been taking either ibuprofen or tylenol around once a day. She is having more regular bowel movements than her typical using the senokot. She is feeling a bit bloated. Voiding spontaneously but feels different each time. Sometimes her stream will stop for at least 10 seconds; at times, she has to just sit and wait. She thought she had a UTI initially, but had negative testing. Had not been moving too much in the initial postoperative period but starting to get out more and go on walks.    O:  /58   Pulse 70   Ht 1.6 m (5' 3\")   Wt 55.9 kg (123 lb 3.2 oz)   LMP 2024 (Approximate)   BMI 21.82 kg/m    Gen: Well-appearing, NAD  HEENT: Normocephalic, atraumatic  CV:        Well perfused; no LE edema  Pulm:  Normal respiratory effort and rate  Abd: Soft, non-tender, minimally distended in the lower abdomen. 3 cm periumbilical incision and three laparoscopic incisions clean/dry/intact, well approximated, covered by skin glue. Minimal amount of bruising around periumbilical incision.    A/P:  Artie Diez is a 44 year old  here for follow up after robotic-assisted total laparoscopic hysterectomy, bilateral salpingectomy, and cystoscopy. Overall doing well in the postoperative period. Discussed expected healing at this time in process, ongoing activity " restrictions (lifting, etc). Follow up scheduled with Dr. Dobbs 1/6/2024.    Staffed with Dr. Anish Pickett MD  OB/GYN PGY-3  12/11/2024 4:27 PM    The Patient was seen in [facility] by Dr. Pickett.   I reviewed the history & exam. Assessment and plan were jointly made.   Genevieve Oconnell MD, MPH

## 2024-12-11 NOTE — PATIENT INSTRUCTIONS
Thank you for trusting us with your care!   Please be aware, if you are on Mychart, you may see your results prior to your providers review. If labs are abnormal, we will call or message you on [employer] with a follow up plan.    If you need to contact us for questions about:  Symptoms, Scheduling & Medical Questions; Non-urgent (2-3 day response) BookBub message, Urgent (needing response today) [phone] (if after 3:30pm next day response)   Prescriptions: Please call your Pharmacy   Billing: Umair [phone] or [facility]:[phone]

## 2024-12-20 ENCOUNTER — MYC MEDICAL ADVICE (OUTPATIENT)
Dept: OBGYN | Facility: CLINIC | Age: 44
End: 2024-12-20
Payer: COMMERCIAL

## 2024-12-20 NOTE — TELEPHONE ENCOUNTER
Hey - I would just have her keep using the hydrocortisone cream. The distribution is actually maybe a little more consistent with having a reaction to the prep, it's a little outside the glue areas but nothing that I think she needs to come in for immediately.

## 2024-12-22 ENCOUNTER — HEALTH MAINTENANCE LETTER (OUTPATIENT)
Age: 44
End: 2024-12-22

## 2024-12-24 ENCOUNTER — OFFICE VISIT (OUTPATIENT)
Dept: OBGYN | Facility: CLINIC | Age: 44
End: 2024-12-24
Attending: OBSTETRICS & GYNECOLOGY
Payer: COMMERCIAL

## 2024-12-24 VITALS
SYSTOLIC BLOOD PRESSURE: 118 MMHG | DIASTOLIC BLOOD PRESSURE: 80 MMHG | HEIGHT: 63 IN | HEART RATE: 81 BPM | TEMPERATURE: 98.1 F | BODY MASS INDEX: 21.09 KG/M2 | WEIGHT: 119 LBS

## 2024-12-24 DIAGNOSIS — B37.2 YEAST INFECTION OF THE SKIN: Primary | ICD-10-CM

## 2024-12-24 PROCEDURE — 99213 OFFICE O/P EST LOW 20 MIN: CPT | Performed by: OBSTETRICS & GYNECOLOGY

## 2024-12-24 RX ORDER — NYSTATIN 100000 U/G
OINTMENT TOPICAL 2 TIMES DAILY
Qty: 30 G | Refills: 0 | Status: SHIPPED | OUTPATIENT
Start: 2024-12-24

## 2024-12-24 ASSESSMENT — PAIN SCALES - GENERAL: PAINLEVEL_OUTOF10: MODERATE PAIN (5)

## 2024-12-24 NOTE — PROGRESS NOTES
"Very itchy and red around incisions. Has been using hydrocortisone cream.  4 weeks post op.      /80   Pulse 81   Temp 98.1  F (36.7  C)   Ht 1.6 m (5' 2.99\")   Wt 54 kg (119 lb)   LMP 07/31/2024 (Approximate)   BMI 21.09 kg/m    Incisions w/ skin glue present. Removed.   Stellate red dots around all incision, worse at umbilicus.    Wet prep: + yeast    A/p: Yeast infection of skin around post op incisions. Rx for antifungal ointment bid.  F/up as planned for routine Post op.    Path reviewed. Neg  Plan vaginal exam at POst op 6 wks    Wendy Dobbs MD, FACOG  (she/her/hers)    Department of Ob/Gyn/Women's Health  [employer] [facility] [employer]  [place] [facility]  [address] [place] [address]. [place] [address]  [email or web address]  p. [phone]  f. [phone]    "

## 2024-12-24 NOTE — LETTER
"12/24/2024       RE: Artie Diez  [address]  [place] [address]     Dear Colleague,    Thank you for referring your patient, Artie Diez, to the [facility] [place] [facility]. Please see a copy of my visit note below.    Very itchy and red around incisions. Has been using hydrocortisone cream.  4 weeks post op.      /80   Pulse 81   Temp 98.1  F (36.7  C)   Ht 1.6 m (5' 2.99\")   Wt 54 kg (119 lb)   LMP 07/31/2024 (Approximate)   BMI 21.09 kg/m    Incisions w/ skin glue present. Removed.   Stellate red dots around all incision, worse at umbilicus.    Wet prep: + yeast    A/p: Yeast infection of skin around post op incisions. Rx for antifungal ointment bid.  F/up as planned for routine Post op.    Path reviewed. Neg  Plan vaginal exam at POst op 6 wks    Wendy Dobbs MD, FACOG  (she/her/hers)    Department of Ob/Gyn/Women's Health  [facility]  [place] [facility]  [address] [place], [place] [address]. [place] [address]  [email or web address]  p. [phone]  f. [phone]      Again, thank you for allowing me to participate in the care of your patient.      Sincerely,    Wendy Dobbs MD    "

## 2024-12-24 NOTE — NURSING NOTE
Post op 11-22-24  rash on abdomin  still very irritated and one inscision has drainage-  today temp 98.1

## 2024-12-28 ENCOUNTER — HOSPITAL ENCOUNTER (EMERGENCY)
Facility: CLINIC | Age: 44
Discharge: HOME | End: 2024-12-29
Attending: EMERGENCY MEDICINE | Admitting: EMERGENCY MEDICINE
Payer: COMMERCIAL

## 2024-12-28 ENCOUNTER — TELEPHONE (OUTPATIENT)
Dept: OBGYN | Facility: CLINIC | Age: 44
End: 2024-12-28
Payer: COMMERCIAL

## 2024-12-28 DIAGNOSIS — G89.18 POSTOPERATIVE PAIN: Primary | ICD-10-CM

## 2024-12-28 DIAGNOSIS — L30.9 DERMATITIS: ICD-10-CM

## 2024-12-28 LAB
ANION GAP SERPL CALCULATED.3IONS-SCNC: 10 MMOL/L (ref 7–15)
BASOPHILS # BLD AUTO: 0 10E3/UL (ref 0–0.2)
BASOPHILS NFR BLD AUTO: 1 %
BUN SERPL-MCNC: 16.4 MG/DL (ref 6–20)
CALCIUM SERPL-MCNC: 9.3 MG/DL (ref 8.8–10.4)
CHLORIDE SERPL-SCNC: 101 MMOL/L (ref 98–107)
CREAT SERPL-MCNC: 0.91 MG/DL (ref 0.51–0.95)
CRP SERPL-MCNC: 3.02 MG/L
EGFRCR SERPLBLD CKD-EPI 2021: 79 ML/MIN/1.73M2
EOSINOPHIL # BLD AUTO: 0.2 10E3/UL (ref 0–0.7)
EOSINOPHIL NFR BLD AUTO: 2 %
ERYTHROCYTE [DISTWIDTH] IN BLOOD BY AUTOMATED COUNT: 12.2 % (ref 10–15)
GLUCOSE SERPL-MCNC: 136 MG/DL (ref 70–99)
HCO3 SERPL-SCNC: 28 MMOL/L (ref 22–29)
HCT VFR BLD AUTO: 38.2 % (ref 35–47)
HGB BLD-MCNC: 12.7 G/DL (ref 11.7–15.7)
IMM GRANULOCYTES # BLD: 0 10E3/UL
IMM GRANULOCYTES NFR BLD: 0 %
LYMPHOCYTES # BLD AUTO: 1 10E3/UL (ref 0.8–5.3)
LYMPHOCYTES NFR BLD AUTO: 13 %
MCH RBC QN AUTO: 28.3 PG (ref 26.5–33)
MCHC RBC AUTO-ENTMCNC: 33.2 G/DL (ref 31.5–36.5)
MCV RBC AUTO: 85 FL (ref 78–100)
MONOCYTES # BLD AUTO: 0.2 10E3/UL (ref 0–1.3)
MONOCYTES NFR BLD AUTO: 2 %
NEUTROPHILS # BLD AUTO: 6.4 10E3/UL (ref 1.6–8.3)
NEUTROPHILS NFR BLD AUTO: 82 %
NRBC # BLD AUTO: 0 10E3/UL
NRBC BLD AUTO-RTO: 0 /100
PLATELET # BLD AUTO: 296 10E3/UL (ref 150–450)
POTASSIUM SERPL-SCNC: 4.3 MMOL/L (ref 3.4–5.3)
RBC # BLD AUTO: 4.48 10E6/UL (ref 3.8–5.2)
SODIUM SERPL-SCNC: 139 MMOL/L (ref 135–145)
WBC # BLD AUTO: 7.8 10E3/UL (ref 4–11)

## 2024-12-28 PROCEDURE — 85014 HEMATOCRIT: CPT | Performed by: EMERGENCY MEDICINE

## 2024-12-28 PROCEDURE — 86140 C-REACTIVE PROTEIN: CPT | Performed by: EMERGENCY MEDICINE

## 2024-12-28 PROCEDURE — 99284 EMERGENCY DEPT VISIT MOD MDM: CPT | Performed by: EMERGENCY MEDICINE

## 2024-12-28 PROCEDURE — 80048 BASIC METABOLIC PNL TOTAL CA: CPT | Performed by: EMERGENCY MEDICINE

## 2024-12-28 PROCEDURE — 36415 COLL VENOUS BLD VENIPUNCTURE: CPT | Performed by: EMERGENCY MEDICINE

## 2024-12-28 PROCEDURE — 85004 AUTOMATED DIFF WBC COUNT: CPT | Performed by: EMERGENCY MEDICINE

## 2024-12-28 RX ORDER — OXYCODONE HYDROCHLORIDE 5 MG/1
5 TABLET ORAL EVERY 6 HOURS PRN
Qty: 5 TABLET | Refills: 0 | Status: SHIPPED | OUTPATIENT
Start: 2024-12-28 | End: 2024-12-31

## 2024-12-28 ASSESSMENT — COLUMBIA-SUICIDE SEVERITY RATING SCALE - C-SSRS
2. HAVE YOU ACTUALLY HAD ANY THOUGHTS OF KILLING YOURSELF IN THE PAST MONTH?: NO
6. HAVE YOU EVER DONE ANYTHING, STARTED TO DO ANYTHING, OR PREPARED TO DO ANYTHING TO END YOUR LIFE?: NO
1. IN THE PAST MONTH, HAVE YOU WISHED YOU WERE DEAD OR WISHED YOU COULD GO TO SLEEP AND NOT WAKE UP?: NO

## 2024-12-28 ASSESSMENT — ACTIVITIES OF DAILY LIVING (ADL)
ADLS_ACUITY_SCORE: 42
ADLS_ACUITY_SCORE: 42

## 2024-12-28 NOTE — TELEPHONE ENCOUNTER
Ongoing rash after surgery. Was seen in clinic yesterday and started on prednisone burst.    Started prednisone earlier today. Last night started having pain and took a oxycodone. Skin feels like pins and needles, around the incision is now ashy colored instead of red/purple. Thinks it's getting drier.    Discussed options including giving prednisone more time to work vs coming to ER. She prefers to watch and wait. Discussed return precautions, gave her oxycodone refill. Has Derm follow-up on 1/2 and postop appointment on 1/6.    Fior Baires MD,  12/28/24 4:14 PM

## 2024-12-28 NOTE — Clinical Note
Artie Diez was seen and treated in our emergency department on 12/28/2024.  She may return to work on 01/06/2025.       If you have any questions or concerns, please don't hesitate to call.      Lalo Portillo MD

## 2024-12-29 VITALS
DIASTOLIC BLOOD PRESSURE: 66 MMHG | WEIGHT: 119.93 LBS | TEMPERATURE: 98.1 F | OXYGEN SATURATION: 95 % | RESPIRATION RATE: 20 BRPM | HEART RATE: 71 BPM | BODY MASS INDEX: 21.24 KG/M2 | SYSTOLIC BLOOD PRESSURE: 132 MMHG

## 2024-12-29 PROCEDURE — 250N000013 HC RX MED GY IP 250 OP 250 PS 637: Performed by: EMERGENCY MEDICINE

## 2024-12-29 PROCEDURE — 250N000012 HC RX MED GY IP 250 OP 636 PS 637: Performed by: EMERGENCY MEDICINE

## 2024-12-29 PROCEDURE — 99243 OFF/OP CNSLTJ NEW/EST LOW 30: CPT | Mod: GC | Performed by: STUDENT IN AN ORGANIZED HEALTH CARE EDUCATION/TRAINING PROGRAM

## 2024-12-29 RX ORDER — CLINDAMYCIN HYDROCHLORIDE 300 MG/1
300 CAPSULE ORAL 4 TIMES DAILY
Qty: 40 CAPSULE | Refills: 0 | Status: SHIPPED | OUTPATIENT
Start: 2024-12-29 | End: 2024-12-29

## 2024-12-29 RX ORDER — FLUCONAZOLE 150 MG/1
150 TABLET ORAL
Qty: 2 TABLET | Refills: 0 | Status: SHIPPED | OUTPATIENT
Start: 2024-12-31 | End: 2025-01-04

## 2024-12-29 RX ORDER — HYDROXYZINE HYDROCHLORIDE 50 MG/1
50 TABLET, FILM COATED ORAL ONCE
Status: COMPLETED | OUTPATIENT
Start: 2024-12-29 | End: 2024-12-29

## 2024-12-29 RX ORDER — PREDNISONE 20 MG/1
60 TABLET ORAL ONCE
Status: COMPLETED | OUTPATIENT
Start: 2024-12-29 | End: 2024-12-29

## 2024-12-29 RX ORDER — CLINDAMYCIN PHOSPHATE 900 MG/50ML
900 INJECTION, SOLUTION INTRAVENOUS ONCE
Status: DISCONTINUED | OUTPATIENT
Start: 2024-12-29 | End: 2024-12-29

## 2024-12-29 RX ORDER — HYDROXYZINE HYDROCHLORIDE 25 MG/1
25 TABLET, FILM COATED ORAL 3 TIMES DAILY
Qty: 21 TABLET | Refills: 0 | Status: SHIPPED | OUTPATIENT
Start: 2024-12-29 | End: 2025-01-05

## 2024-12-29 RX ORDER — PREDNISONE 20 MG/1
TABLET ORAL
Qty: 21 TABLET | Refills: 0 | Status: SHIPPED | OUTPATIENT
Start: 2024-12-29 | End: 2025-01-05

## 2024-12-29 RX ORDER — FLUCONAZOLE 150 MG/1
150 TABLET ORAL ONCE
Status: COMPLETED | OUTPATIENT
Start: 2024-12-29 | End: 2024-12-29

## 2024-12-29 RX ORDER — FAMOTIDINE 20 MG/1
20 TABLET, FILM COATED ORAL 2 TIMES DAILY
Qty: 14 TABLET | Refills: 0 | Status: SHIPPED | OUTPATIENT
Start: 2024-12-29 | End: 2025-01-05

## 2024-12-29 RX ADMIN — HYDROXYZINE HYDROCHLORIDE 50 MG: 50 TABLET, FILM COATED ORAL at 01:07

## 2024-12-29 RX ADMIN — FLUCONAZOLE 150 MG: 150 TABLET ORAL at 01:07

## 2024-12-29 RX ADMIN — PREDNISONE 60 MG: 20 TABLET ORAL at 01:07

## 2024-12-29 ASSESSMENT — ACTIVITIES OF DAILY LIVING (ADL): ADLS_ACUITY_SCORE: 42

## 2024-12-29 NOTE — DISCHARGE INSTRUCTIONS
If you can wait for OB/GYN to evaluate your rash, that would be ideal as they may have some additional recommendations.    Otherwise please fill your medications and take as directed    Please follow up with your OB/GYN clinic on Monday for recheck.    For fevers greater than 100.5

## 2024-12-29 NOTE — ED PROVIDER NOTES
ED PROVIDER NOTE  [facility]  [facility]      History     Chief Complaint   Patient presents with    Rash     Lap hysterectomy about 5 weeks ago, developed painful rash around incisions about 10 days ago; was treated with topical antifungal, prednisone (has only taken one dose so far), and oxycodone. Was told rash was a delayed reaction to surgical prep. Rash and pain are getting worse.     HPI  Artie Diez is a 44 year old female who is status post lap hysterectomy about 5 weeks ago and developed a rash around her incisions about 10 days ago that was treated with topical antifungals and prednisone however the rash now has spread across her abdomen and she presents here for evaluation.  Patient denies any fevers, states that the rash is both painful and pruritic.  Patient denies any fevers    This part of the medical record was transcribed by Bennie Harrell Medical Scribe, from a dictation done by Llao Portillo MD.     I have reviewed the Medications, Allergies, Past Medical and Surgical History, and Social History in the [employer] system.    Past Medical History:   Diagnosis Date    Bipolar 2 disorder (H)     Depressive disorder     Depressive disorder, not elsewhere classified     Depression (non-psychotic)    Excessive or frequent menstruation     Heavy periods    Fibroid uterus        Past Surgical History:   Procedure Laterality Date    DAVINCI HYSTERECTOMY TOTAL, SALPINGECTOMY BILATERAL Bilateral 11/22/2024    Procedure: Robot Assisted Total Laparoscopic Hysterectomy, Bilateral Salpingectomy, Cystoscopy;  Surgeon: Wendy Dobbs MD;  Location: [facility] OR    ENT SURGERY  1996    Tonsils removed    EXAM UNDER ANESTHESIA PELVIC Bilateral 11/22/2024    Procedure: EXAM UNDER ANESTHESIA, PELVIS,;  Surgeon: Wendy Dobbs MD;  Location: [facility] OR         Dose / Directions   acetaminophen 325 MG tablet  Commonly known as: TYLENOL  Used for: Acute post-operative pain      Dose: 975  mg  Take 3 tablets (975 mg) by mouth every 6 hours as needed for mild pain.  Refills: 0     divalproex sodium extended-release 250 MG 24 hr tablet  Commonly known as: DEPAKOTE ER  Used for: Bipolar 2 disorder (H)      Dose: 250 mg  Take 1 tablet (250 mg) by mouth daily.  Quantity: 30 tablet  Refills: 3     gabapentin 100 MG capsule  Commonly known as: NEURONTIN  Used for: Bipolar 2 disorder (H)      Dose: 100 mg  Take 1 capsule (100 mg) by mouth 2 times daily as needed for other (anxiety). Take 1 tab at noon and 1 cap at bedtime  Quantity: 90 capsule  Refills: 3     hydrOXYzine 50 MG capsule  Commonly known as: VISTARIL  Used for: Allergic contact dermatitis due to adhesives      Dose: 100 mg  Take 2 capsules (100 mg) by mouth 3 times daily as needed for itching.  Quantity: 60 capsule  Refills: 1     ibuprofen 600 MG tablet  Commonly known as: ADVIL/MOTRIN  Used for: S/P hysterectomy      Dose: 600 mg  Take 1 tablet (600 mg) by mouth every 6 hours as needed for moderate pain.  Quantity: 50 tablet  Refills: 0     * lamoTRIgine 100 MG tablet  Commonly known as: LaMICtal  Used for: Bipolar 2 disorder (H)      Dose: 100 mg  Take 1 tablet (100 mg) by mouth daily. Take along with one 25 mg tablet for a combined total of 125 mg.  Quantity: 30 tablet  Refills: 3     * lamoTRIgine 25 MG tablet  Commonly known as: LaMICtal  Used for: Bipolar 2 disorder (H)      Take 1 tablet (25 mg) by mouth daily. Take along with one 100 mg tablet for a combined total of 125 mg.  Quantity: 30 tablet  Refills: 3     LORazepam 0.5 MG tablet  Commonly known as: ATIVAN      Dose: 0.5 mg  Take 0.5 mg by mouth every 6 hours as needed for anxiety  Refills: 0     nystatin 044372 UNIT/GM external ointment  Commonly known as: MYCOSTATIN  Used for: Yeast infection of the skin      Apply topically 2 times daily.  Quantity: 30 g  Refills: 0     ondansetron 4 MG ODT tab  Commonly known as: ZOFRAN ODT      Dose: 4 mg  Take 1 tablet (4 mg) by mouth every 8  hours as needed for nausea.  Quantity: 10 tablet  Refills: 0     ondansetron 4 MG tablet  Commonly known as: ZOFRAN  Used for: S/P hysterectomy      Dose: 4 mg  Take 1 tablet (4 mg) by mouth every 8 hours as needed for nausea.  Quantity: 20 tablet  Refills: 0     oxyCODONE 5 MG tablet  Commonly known as: ROXICODONE  Used for: Postoperative pain      Dose: 5 mg  Take 1 tablet (5 mg) by mouth every 6 hours as needed for severe pain.  Quantity: 5 tablet  Refills: 0     polyethylene glycol 17 g packet  Commonly known as: MIRALAX      Dose: 1 packet  Take 1 packet by mouth at bedtime.  Refills: 0     predniSONE 10 MG tablet  Commonly known as: DELTASONE  Used for: Allergic contact dermatitis due to adhesives      Dose: 10 mg  Take 1 tablet (10 mg) by mouth daily.  Quantity: 5 tablet  Refills: 1     * propranolol ER 60 MG 24 hr capsule  Commonly known as: INDERAL LA  Used for: EAMON (generalized anxiety disorder)      Dose: 60 mg  Take 1 capsule (60 mg) by mouth at bedtime.  Quantity: 30 capsule  Refills: 3     * propranolol 10 MG tablet  Commonly known as: INDERAL  Used for: EAMON (generalized anxiety disorder)      Dose: 10 mg  Take 1 tablet (10 mg) by mouth 2 times daily as needed (Anxiety).  Quantity: 60 tablet  Refills: 3     senna-docusate 8.6-50 MG tablet  Commonly known as: SENOKOT-S/PERICOLACE  Used for: Acute post-operative pain      Dose: 1-2 tablet  Take 1-2 tablets by mouth 2 times daily as needed for constipation (While on oral opioids.).  Quantity: 30 tablet  Refills: 0     spironolactone 50 MG tablet  Commonly known as: ALDACTONE  Used for: Hair loss      Dose: 50 mg  Take 1 tablet (50 mg) by mouth 2 times daily.  Quantity: 180 tablet  Refills: 3         Past medical history, past surgical history, medications, and allergies were reviewed with the patient. Additional pertinent items: None    Family History   Problem Relation Age of Onset    Unknown/Adopted Mother         adopted mother    Unknown/Adopted  Father         adopted farther    Unknown/Adopted Sister         twin sister    Unknown/Adopted Sister         adopted (non bio)       Social History     Tobacco Use    Smoking status: Never     Passive exposure: Never    Smokeless tobacco: Never   Substance Use Topics    Alcohol use: No     Social history was reviewed with the patient. Additional pertinent items: None    Allergies   Allergen Reactions    Amoxicillin     Amphotericin B     Phenylbutazones     Penicillin G Nausea and Rash       Review of Systems  A medically appropriate review of systems was performed with pertinent positives and negatives noted in the HPI, and all other systems negative.    Physical Exam   BP: 119/67  Pulse: 97  Temp: 98.2  F (36.8  C)  Resp: 16  Weight: 54.4 kg (119 lb 14.9 oz)      Physical Exam  Vitals and nursing note reviewed.   Constitutional:       Comments: Ambulatory but somewhat uncomfortable secondary to her abdominal wall rash   HENT:      Head: Atraumatic.   Eyes:      Extraocular Movements: Extraocular movements intact.      Pupils: Pupils are equal, round, and reactive to light.   Pulmonary:      Breath sounds: Normal breath sounds.   Abdominal:      Palpations: Abdomen is soft.   Musculoskeletal:         General: No deformity.      Cervical back: Neck supple.   Skin:     Comments: Patient's abdominal wall has a scaling type red maculopapular rash with more concentrations around the laparoscopy sites then the rest the abdomen but there is a general spread of patchy maculopapular almost urticarial type rash of the anterior abdomen   Neurological:      General: No focal deficit present.      Mental Status: She is alert and oriented to person, place, and time.   Psychiatric:         Mood and Affect: Mood normal.         ED Course     Orders Placed This Encounter   Procedures    CRP inflammation    Basic metabolic panel    CBC with platelets and differential    Peripheral IV catheter    CBC with platelets differential           Procedures         Results for orders placed or performed during the hospital encounter of 12/28/24 (from the past 24 hours)   CBC with platelets differential    Narrative    The following orders were created for panel order CBC with platelets differential.  Procedure                               Abnormality         Status                     ---------                               -----------         ------                     CBC with platelets and d...[[id number]]                      Final result                 Please view results for these tests on the individual orders.   CRP inflammation   Result Value Ref Range    CRP Inflammation 3.02 <5.00 mg/L   Basic metabolic panel   Result Value Ref Range    Sodium 139 135 - 145 mmol/L    Potassium 4.3 3.4 - 5.3 mmol/L    Chloride 101 98 - 107 mmol/L    Carbon Dioxide (CO2) 28 22 - 29 mmol/L    Anion Gap 10 7 - 15 mmol/L    Urea Nitrogen 16.4 6.0 - 20.0 mg/dL    Creatinine 0.91 0.51 - 0.95 mg/dL    GFR Estimate 79 >60 mL/min/1.73m2    Calcium 9.3 8.8 - 10.4 mg/dL    Glucose 136 (H) 70 - 99 mg/dL   CBC with platelets and differential   Result Value Ref Range    WBC Count 7.8 4.0 - 11.0 10e3/uL    RBC Count 4.48 3.80 - 5.20 10e6/uL    Hemoglobin 12.7 11.7 - 15.7 g/dL    Hematocrit 38.2 35.0 - 47.0 %    MCV 85 78 - 100 fL    MCH 28.3 26.5 - 33.0 pg    MCHC 33.2 31.5 - 36.5 g/dL    RDW 12.2 10.0 - 15.0 %    Platelet Count 296 150 - 450 10e3/uL    % Neutrophils 82 %    % Lymphocytes 13 %    % Monocytes 2 %    % Eosinophils 2 %    % Basophils 1 %    % Immature Granulocytes 0 %    NRBCs per 100 WBC 0 <1 /100    Absolute Neutrophils 6.4 1.6 - 8.3 10e3/uL    Absolute Lymphocytes 1.0 0.8 - 5.3 10e3/uL    Absolute Monocytes 0.2 0.0 - 1.3 10e3/uL    Absolute Eosinophils 0.2 0.0 - 0.7 10e3/uL    Absolute Basophils 0.0 0.0 - 0.2 10e3/uL    Absolute Immature Granulocytes 0.0 <=0.4 10e3/uL    Absolute NRBCs 0.0 10e3/uL     Medications   sodium chloride (PF) 0.9% PF flush 3  mL (3 mLs Intracatheter $Given 12/28/24 8949)   sodium chloride (PF) 0.9% PF flush 3 mL (has no administration in time range)   predniSONE (DELTASONE) tablet 60 mg (has no administration in time range)   fluconazole (DIFLUCAN) tablet 150 mg (has no administration in time range)   hydrOXYzine HCl (ATARAX) tablet 50 mg (has no administration in time range)   clindamycin (CLEOCIN) 900 mg in 50 mL D5W intermittent infusion (has no administration in time range)     OB/GYN was asked to see the patient's abdominal rash and comment.    Critical care was not performed.     Medical Decision Making  The patient's presentation was of moderate complexity (an acute illness with systemic symptoms).    The patient's evaluation involved:  ordering and/or review of 3+ test(s) in this encounter (see above)    The patient's management necessitated moderate risk (with consideration of abdominal wall cellulitis).      Assessments & Plan (with Medical Decision Making)     I have reviewed the nursing notes.    OB/GYN is busy in surgery and the patient is unsure whether or not she wants to wait for them to help evaluate this rash.  At this time the patient seems to have a dermatitis type atopic dermatitis or yeast or allergic type rash of her abdominal wall that does not seem to be infectious based on the labs above.    Patient will undergo general treatment for bacteria yeast and allergic type rash and will be sent home on the medications listed below.    I have reviewed the findings, diagnosis, plan and need for follow up with the patient.      New Prescriptions    CLINDAMYCIN (CLEOCIN) 300 MG CAPSULE    Take 1 capsule (300 mg) by mouth 4 times daily for 10 days.    FAMOTIDINE (PEPCID) 20 MG TABLET    Take 1 tablet (20 mg) by mouth 2 times daily for 7 days.    FLUCONAZOLE (DIFLUCAN) 150 MG TABLET    Take 1 tablet (150 mg) by mouth every 3 days for 2 doses.    HYDROXYZINE HCL (ATARAX) 25 MG TABLET    Take 1 tablet (25 mg) by mouth 3 times  daily for 7 days.    PREDNISONE (DELTASONE) 20 MG TABLET    1 tab daily for 3 days, then 1/2 tab daily for 4 days       Final diagnoses:   Dermatitis - abdominal     If you can wait for OB/GYN to evaluate your rash, that would be ideal as they may have some additional recommendations.    Otherwise please fill your medications and take as directed    Please follow up with your OB/GYN clinic on Monday for recheck.    For fevers greater than 100.5      TOM GALVAN MD    12/28/2024   [facility] EMERGENCY DEPARTMENT       Tom Galvan MD  12/29/24 0036      Addendum:  Patient was able to be seen by OB/GYN at this time they are recommending against antibiotics.  IV clindamycin and oral clindamycin were discontinued.    Tom Galvan MD, Tom Rosales MD  12/29/24 0051

## 2024-12-29 NOTE — ED TRIAGE NOTES
Lap hysterectomy about 5 weeks ago, developed painful rash around incisions about 10 days ago; was treated with topical antifungal, prednisone (has only taken one dose so far), and oxycodone. Was told rash was a delayed reaction to surgical prep. Rash and pain are getting worse.     Triage Assessment (Adult)       Row Name 12/28/24 3785          Triage Assessment    Airway WDL WDL        Respiratory WDL    Respiratory WDL WDL        Skin Circulation/Temperature WDL    Skin Circulation/Temperature WDL X  rash at abd        Cardiac WDL    Cardiac WDL WDL        Peripheral/Neurovascular WDL    Peripheral Neurovascular WDL WDL        Cognitive/Neuro/Behavioral WDL    Cognitive/Neuro/Behavioral WDL WDL

## 2025-01-03 ASSESSMENT — ANXIETY QUESTIONNAIRES
IF YOU CHECKED OFF ANY PROBLEMS ON THIS QUESTIONNAIRE, HOW DIFFICULT HAVE THESE PROBLEMS MADE IT FOR YOU TO DO YOUR WORK, TAKE CARE OF THINGS AT HOME, OR GET ALONG WITH OTHER PEOPLE: VERY DIFFICULT
3. WORRYING TOO MUCH ABOUT DIFFERENT THINGS: NEARLY EVERY DAY
5. BEING SO RESTLESS THAT IT IS HARD TO SIT STILL: SEVERAL DAYS
8. IF YOU CHECKED OFF ANY PROBLEMS, HOW DIFFICULT HAVE THESE MADE IT FOR YOU TO DO YOUR WORK, TAKE CARE OF THINGS AT HOME, OR GET ALONG WITH OTHER PEOPLE?: VERY DIFFICULT
7. FEELING AFRAID AS IF SOMETHING AWFUL MIGHT HAPPEN: SEVERAL DAYS
1. FEELING NERVOUS, ANXIOUS, OR ON EDGE: NEARLY EVERY DAY
4. TROUBLE RELAXING: NEARLY EVERY DAY
GAD7 TOTAL SCORE: 17
GAD7 TOTAL SCORE: 17
6. BECOMING EASILY ANNOYED OR IRRITABLE: NEARLY EVERY DAY
2. NOT BEING ABLE TO STOP OR CONTROL WORRYING: NEARLY EVERY DAY

## 2025-01-06 ENCOUNTER — VIRTUAL VISIT (OUTPATIENT)
Dept: PSYCHIATRY | Facility: CLINIC | Age: 45
End: 2025-01-06
Attending: PSYCHIATRY & NEUROLOGY
Payer: COMMERCIAL

## 2025-01-06 ENCOUNTER — OFFICE VISIT (OUTPATIENT)
Dept: OBGYN | Facility: CLINIC | Age: 45
End: 2025-01-06
Attending: OBSTETRICS & GYNECOLOGY
Payer: COMMERCIAL

## 2025-01-06 VITALS
BODY MASS INDEX: 21.08 KG/M2 | DIASTOLIC BLOOD PRESSURE: 76 MMHG | WEIGHT: 119 LBS | SYSTOLIC BLOOD PRESSURE: 122 MMHG | HEART RATE: 79 BPM

## 2025-01-06 DIAGNOSIS — Z98.890 POSTOPERATIVE STATE: Primary | ICD-10-CM

## 2025-01-06 DIAGNOSIS — F31.81 BIPOLAR 2 DISORDER (H): ICD-10-CM

## 2025-01-06 DIAGNOSIS — F41.1 GAD (GENERALIZED ANXIETY DISORDER): ICD-10-CM

## 2025-01-06 PROCEDURE — 99213 OFFICE O/P EST LOW 20 MIN: CPT | Performed by: OBSTETRICS & GYNECOLOGY

## 2025-01-06 RX ORDER — DIVALPROEX SODIUM 250 MG/1
250 TABLET, FILM COATED, EXTENDED RELEASE ORAL DAILY
Qty: 30 TABLET | Refills: 3 | Status: SHIPPED | OUTPATIENT
Start: 2025-02-01

## 2025-01-06 RX ORDER — PROPRANOLOL HYDROCHLORIDE 60 MG/1
60 CAPSULE, EXTENDED RELEASE ORAL AT BEDTIME
Qty: 30 CAPSULE | Refills: 3 | Status: SHIPPED | OUTPATIENT
Start: 2025-02-01

## 2025-01-06 RX ORDER — LAMOTRIGINE 25 MG/1
TABLET ORAL
Qty: 30 TABLET | Refills: 3 | Status: SHIPPED | OUTPATIENT
Start: 2025-01-30

## 2025-01-06 RX ORDER — GABAPENTIN 100 MG/1
100 CAPSULE ORAL 2 TIMES DAILY PRN
Qty: 90 CAPSULE | Refills: 3 | Status: SHIPPED | OUTPATIENT
Start: 2025-02-01

## 2025-01-06 RX ORDER — LAMOTRIGINE 100 MG/1
100 TABLET ORAL DAILY
Qty: 30 TABLET | Refills: 3 | Status: SHIPPED | OUTPATIENT
Start: 2025-01-30

## 2025-01-06 RX ORDER — PROPRANOLOL HYDROCHLORIDE 10 MG/1
10 TABLET ORAL 2 TIMES DAILY PRN
Qty: 60 TABLET | Refills: 3 | Status: SHIPPED | OUTPATIENT
Start: 2025-02-01

## 2025-01-06 ASSESSMENT — PATIENT HEALTH QUESTIONNAIRE - PHQ9
SUM OF ALL RESPONSES TO PHQ QUESTIONS 1-9: 22
SUM OF ALL RESPONSES TO PHQ QUESTIONS 1-9: 22
10. IF YOU CHECKED OFF ANY PROBLEMS, HOW DIFFICULT HAVE THESE PROBLEMS MADE IT FOR YOU TO DO YOUR WORK, TAKE CARE OF THINGS AT HOME, OR GET ALONG WITH OTHER PEOPLE: SOMEWHAT DIFFICULT

## 2025-01-06 ASSESSMENT — PAIN SCALES - GENERAL: PAINLEVEL_OUTOF10: NO PAIN (0)

## 2025-01-06 NOTE — LETTER
1/6/2025       RE: Artie Diez  [address]  [place] [address]     Dear Colleague,    Thank you for referring your patient, Artie Diez, to the [facility] [place] at [facility]. Please see a copy of my visit note below.    Chief Complaint   Patient presents with     Surgical Followup     Total Hysterectomy DOS  11/22/2024    Shayla Villanueva LPN     Starting to improve overall. Long struggle with rash now on steroid taper per derm team.  Some generalized abd pain, occ sharp on right, but no fever or incision drainage.     /76   Pulse 79   Wt 54 kg (119 lb)   LMP 07/31/2024 (Approximate)   BMI 21.08 kg/m    Abdomen much less intensly red today.  Appears to be clearing w/ steroid.  Incisions well healed.   Spec exam w/small blair, cuff intact. Bimanual w/ suture material palpable, but w/o defect    A/p: 45 yo s/p RATLH/BS and cysto.  C/b delay skin rash presumed contact dermatitis. Now recovering well.    Path reviewed, benign.   Rec 8 total weeks pelvic rest.    F/up PRN    Wendy Dobbs MD, FACOG  (she/her/hers)    Department of Ob/Gyn/Women's Health  [facility]  [place] [facility]  [address] [place], [place] [address] [address]  [email or web address]  p. [phone]  f. [phone]        Again, thank you for allowing me to participate in the care of your patient.      Sincerely,    Wendy Dobbs MD

## 2025-01-06 NOTE — PATIENT INSTRUCTIONS
Thank you for trusting us with your care!   Please be aware, if you are on Mychart, you may see your results prior to your providers review. If labs are abnormal, we will call or message you on [employer] with a follow up plan.    If you need to contact us for questions about:  Symptoms, Scheduling & Medical Questions; Non-urgent (2-3 day response) Zagster message, Urgent (needing response today) [phone] (if after 3:30pm next day response)   Prescriptions: Please call your Pharmacy   Billing: Umair [phone] or [facility]:[phone]

## 2025-01-06 NOTE — NURSING NOTE
Current patient location:  car    Is the patient currently in the state of [place]? YES    Visit mode:VIDEO    If the visit is dropped, the patient can be reconnected by:VIDEO VISIT: Text to cell phone:   Telephone Information:   Mobile [phone]       Will anyone else be joining the visit? NO  (If patient encounters technical issues they should call [phone] :[phone])    Are changes needed to the allergy or medication list? No    Are refills needed on medications prescribed by this physician? Discuss with provider    Rooming Documentation:  Questionnaire(s) completed    Reason for visit: JUDY JOHNSON

## 2025-01-06 NOTE — PROGRESS NOTES
Virtual Visit Details    Type of service:  Video Visit     Originating Location (pt. Location): Home    Distant Location (provider location):  On-site  Platform used for Video Visit: [facility]  MEDICAL PROGRESS NOTE  General Clinic Team       CARE TEAM:    PCP- Fahad Concepcion  Therapist-  Evangelina ([place] [facility], primarily for DBT q week), and individual 1:1 Carl (she is at [employer])   OB/GYN: Wendy Dobbs MD    Artie is a 44 year old who uses the pronouns she, her, hers.                 Assessment & Plan   Bipolar II Disorder, current episode depressed, mild (history of rapid cycling)  EAMON     Medical:  Uterine Fibroids S/p Laparoscopic total hysterectomy November 2024       Artie is a 44 year old  female with a past psychiatric history of Bipolar II Disorder and EAMON who presents for medication management. Per record, patient saw a mood disorder specialist in [place], with diagnostic clarification of  bipolar II disorder with a rapid cycling pattern and anxiety diagnoses. Medical history is significant for Uterine Fibroids S/p Laparoscopic total hysterectomy. Substance use does not appear to be playing a contributing role in the patient's presentation.     Today, Artie  reports that she is in better spirits today than she has been in the last several weeks. She recently had a hysterectomy and has been recovering from this. Had some post operation challenges, due to an allergic dermatitis of unknown origin that she had to deal with, which hindered some holiday plans. She is currently on oral steroids for her allergic dermatitis, and was advised to watch for paula symptoms today.  Her chronic PDW is a bit better as well.  Overall, she appears to be doing well. Will continue current plan of care    Psychotropic Drug Interactions:    ADDITIVE SEDATION: Melatonin, Ativan  ADDITIVE CNS/RESPIRATORY DEPRESSION: Lamictal,  Depakote, gabapentin, Ativan, THC  Depakote may increase the serum concentration of Ativan, Lamictal  Management: use lowest therapeutic doses of Depakote, Lamictal, gabapentin and routine monitoring    [place] was checked today: indicates that controlled prescriptions have been filled as prescribed    Risk Statements:   Treatment Risk: Risks, benefits, alternatives and potential adverse effects have been discussed and are understood.   Safety Risk: Artie did not appear to be an imminent safety risk to self or others.    PLAN    1) Medications:   - Continue lamotrigine 125 mg daily   - Continue Depakote ER - 250 mg at bedtime   - Continue gabapentin- 100 mg every day at noon and 100 mg at bedtime.  - Continue propranolol IR 10 mg bid /prn- for anxiety  - Continue propranolol ER 60 mg qhs  - Continue melatonin 1-1.5 mg at bedtime, OTC    PCP-   - Reported by patient: Ativan 0.25-0.5 mg at bedtime/prn (1x-2x per week). We have not taken over prescribing this medication and advise against due to risks outweighing benefits. Per  (as of today): No evidence of filling Ativan in the past 1 year.   Spironolactone- 50 mg bid- for hair loss  L-Methylfolate  Prednisone    2) Psychotherapy:   Individual therapy okaylyn, works with DBT .   Working with racial trauma consultant/ on identity development.    3) Next due:  Labs: Lamotrigine, Depakote, CBC, and CMP level last obtained on 9/2024  EKG: As needed   Rating scales:  PHQ-9, EAMON-7    4) Referrals: None    5) Follow-up: Return to clinic in 8 weeks                   Interval History     Had last post op appointment today, operation was 6 weeks ago. Had a bump in her recovery. Got really delayed Allergic dermatitis, which spread all over her body. This was more stressful than the entire surgery.    Had to replace all her items with fragrance free versions.    Is in better spirits now than she was two weeks ago, as the skin issues had ruined holiday planning. Has  been responding to the meds for the skin reaction. Taking prednisone (30 days). Provided psychoeducation to patient to watch out for any paula symptoms, as prednisone may precipitate paula in patients with bipolar disorder. Reports that she has taken 4 days of the medication and has only noticed that she may be a bit less mentally sharp.     Has been knitting to help with stress.    Will be back to work in a week.    Mood: The best it's been for several weeks (had a good Doctors appt)  Sleep: Pretty tired, doing more physical activity  Appetite: Not the best, has GI problems  Energy: Tired  SI/HI/Safety Concerns: Has chronic PDW, a little better today, no SI, plan or intent   Side effects from medications: No     Psychiatric Review of Systems:   Depression: Depressed mood, anhedonia, low energy, guilt, poor concentration, Chronic PDW   Anxiety: worries, ruminations  Elevated: no  Psychosis: no  Trauma Related: no  Insomnia: Yes    Other:   ADHD: trouble sustaining attention, losing things, distracted    Current Social History:  Financial/occupational: [employer] .   Living situation: Lives alone in [place] in [place]  Social/spiritual support: parents, friends      Pertinent Substance Use:  [Last updated 10/30/24]  Alcohol: No   Cannabis: Yes: THC Drinks, 1 can maybe once every few weeks (3-5mg)  Tobacco: No  Caffeine:  Yes: 1 cup of coffee daily   Opioids: No   Narcan Kit current: N/A  Other substances: No     Medical Review of Systems / Med sfx:   Lightheadedness/orthostasis: No  Headaches: Yes, due to uncomfortable sleeping position  GI: No  CV: No  Sexual health concerns: No  A comprehensive review of systems was performed and is negative other than noted above.    Contraception: S/p Laparoscopic total hysterectomy November 22, 2024                  Summary Points of Current Care  8/30/2024: Transfer of care appointment, no changes                  Physical Exam  (Vitals Only)    LMP 07/31/2024  (Approximate)   Pulse Readings from Last 3 Encounters:   01/06/25 79   12/29/24 71   12/27/24 90     Wt Readings from Last 3 Encounters:   01/06/25 54 kg (119 lb)   12/28/24 54.4 kg (119 lb 14.9 oz)   12/24/24 54 kg (119 lb)     BP Readings from Last 3 Encounters:   01/06/25 122/76   12/29/24 132/66   12/27/24 112/78                      Mental Status Exam    Alertness: alert  and oriented  Appearance: adequately groomed  Behavior/Demeanor: cooperative, pleasant, and calm, with good  eye contact   Speech: normal  Language: intact and no problems  Psychomotor: normal or unremarkable  Mood: anxious  Affect: full range; congruent to: mood- yes, content- yes  Thought Process/Associations:  Talkative  Thought Content:  Reports  chronic PDW ;  Denies suicidal & violent ideation and delusions  Perception:  Reports none;  Denies hallucinations  Insight: good  Judgment: good  Cognition: does  appear grossly intact; formal cognitive testing was not done  Gait and Station: N/A (telehealth)                  Past Psychotropic Medication Trials     Medication Max Dose (mg) Dates / Duration Helpful? DC Reason / Adverse Effects?   Paxil        further bad side effects on higher doses, night sweats    Wellbutrin            Lithium            Depakote            Adderall            Strattera           Clonazepam           Buspar       Akathisia   Pristq              Of note, per chart review, Tends not to tolerate most medications, c/o GI symptoms and HA .                   Past Medical History     Patient Active Problem List   Diagnosis    Heavy menses    CARDIOVASCULAR SCREENING; LDL GOAL LESS THAN 160    Major depressive disorder, recurrent episode, moderate (H)    EAMON (generalized anxiety disorder)    Attention deficit disorder    Dysmenorrhea    Glaucoma suspect    Myopia    Bipolar 2 disorder (H)                     Medications     Current Outpatient Medications   Medication Sig Dispense Refill    acetaminophen (TYLENOL) 325 MG  tablet Take 3 tablets (975 mg) by mouth every 6 hours as needed for mild pain.      clobetasol (TEMOVATE) 0.05 % external ointment Apply to rash on body twice daily for no more than 6 weeks. 120 g 1    diphenhydrAMINE HCl (BENADRYL ALLERGY PO) Take by mouth.      divalproex sodium extended-release (DEPAKOTE ER) 250 MG 24 hr tablet Take 1 tablet (250 mg) by mouth daily. 30 tablet 3    gabapentin (NEURONTIN) 100 MG capsule Take 1 capsule (100 mg) by mouth 2 times daily as needed for other (anxiety). Take 1 tab at noon and 1 cap at bedtime 90 capsule 3    hydrOXYzine (VISTARIL) 50 MG capsule Take 2 capsules (100 mg) by mouth 3 times daily as needed for itching. 60 capsule 1    ibuprofen (ADVIL/MOTRIN) 600 MG tablet Take 1 tablet (600 mg) by mouth every 6 hours as needed for moderate pain. 50 tablet 0    lamoTRIgine (LAMICTAL) 100 MG tablet Take 1 tablet (100 mg) by mouth daily. Take along with one 25 mg tablet for a combined total of 125 mg. 30 tablet 3    lamoTRIgine (LAMICTAL) 25 MG tablet Take 1 tablet (25 mg) by mouth daily. Take along with one 100 mg tablet for a combined total of 125 mg. 30 tablet 3    LORazepam (ATIVAN) 0.5 MG tablet Take 0.5 mg by mouth every 6 hours as needed for anxiety      nystatin (MYCOSTATIN) 326642 UNIT/GM external ointment Apply topically 2 times daily. 30 g 0    ondansetron (ZOFRAN ODT) 4 MG ODT tab Take 1 tablet (4 mg) by mouth every 8 hours as needed for nausea. 10 tablet 0    polyethylene glycol (MIRALAX) 17 g packet Take 1 packet by mouth at bedtime.      predniSONE (DELTASONE) 10 MG tablet Take 3 tablets (30 mg) by mouth daily for 10 days, THEN 2 tablets (20 mg) daily for 10 days, THEN 1 tablet (10 mg) daily for 10 days. 60 tablet 0    propranolol (INDERAL) 10 MG tablet Take 1 tablet (10 mg) by mouth 2 times daily as needed (Anxiety). 60 tablet 3    propranolol ER (INDERAL LA) 60 MG 24 hr capsule Take 1 capsule (60 mg) by mouth at bedtime. 30 capsule 3    senna-docusate  (SENOKOT-S/PERICOLACE) 8.6-50 MG tablet Take 1-2 tablets by mouth 2 times daily as needed for constipation (While on oral opioids.). 30 tablet 0    spironolactone (ALDACTONE) 50 MG tablet Take 1 tablet (50 mg) by mouth 2 times daily. 180 tablet 3                     Data         3/21/2024     5:39 PM 6/14/2024     3:13 PM 10/27/2024    11:47 AM   PROMIS-10 Total Score w/o Sub Scores   PROMIS TOTAL - SUBSCORES 19 20 21        Patient-reported         8/30/2024    12:15 AM 10/29/2024     1:15 PM 1/6/2025     3:36 PM   PHQ-9 SCORE   PHQ-9 Total Score MyChart 18 (Moderately severe depression) 17 (Moderately severe depression) 22 (Severe depression)   PHQ-9 Total Score 18 17  22        Patient-reported         8/30/2024    12:17 AM 10/27/2024    11:46 AM 1/3/2025    12:12 PM   EAMON-7 SCORE   Total Score 15 (severe anxiety) 10 (moderate anxiety) 17 (severe anxiety)   Total Score 15 10  17        Patient-reported       Liver/Kidney Function, TSH Metabolic Blood counts   Recent Labs   Lab Test 12/28/24 2216 11/24/24 2028 09/16/24  1829   AST  --   --  15   ALT  --   --  6   ALKPHOS  --   --  65   CR 0.91 0.80 0.85     Recent Labs   Lab Test 06/17/22  1617   TSH 1.09    Recent Labs   Lab Test 10/30/20  1200   CHOL 167   TRIG 148   LDL 81   HDL 56     No lab results found.  Recent Labs   Lab Test 12/28/24 2216   *    Recent Labs   Lab Test 12/28/24 2216   WBC 7.8   HGB 12.7   HCT 38.2   MCV 85              ECG: None in EHR       Psychiatry Individual Psychotherapy Note   Psychotherapy start time - 4:08 PM  Psychotherapy end time - 4:26 PM  Date treatment plan last reviewed with patient - 10/30/24  Subjective: This supportive psychotherapy session addressed issues related to goals of therapy and current psychosocial stressors. Patient's reaction: Action in the context of mental status appropriate for ambulatory setting.    Interactive complexity indicated? No  Plan: RTC in timeframe noted above  Psychotherapy  services during this visit included myself and the patient.   Treatment Plan      SYMPTOMS; PROBLEMS   MEASURABLE GOALS;    FUNCTIONAL IMPROVEMENT / GAINS INTERVENTIONS DISCHARGE CRITERIA   Depression: depressed mood, anhedonia, low energy, insomnia, and concentration problems  Anxiety: excessive worry and nervous/overwhelmed  Marielena/Hypomania: rapid cycling   reduce depressive symptoms, reduce panic attacks/ excessive worry, and reduce manic/hypomanic episodes Supportive / psychodynamic marked symptom improvement         Level of Medical Decision Making:   - At least 1 chronic problem that is not stable  - Engaged in prescription drug management during visit (discussed any medication benefits, side effects, alternatives, etc.)    {   Must meet 2 out of 3 of the above MDM elements to bill at the specified level     For help more info about elements / criteria, click here-> MDM Help Grid     **No need to delete blue text, it disappears when note is signed**    The longitudinal plan of care for the diagnosis(es)/condition(s) as documented were addressed during this visit. Due to the added complexity in care, I will continue to support Artie in the subsequent management and with ongoing continuity of care.     PROVIDER: Kat Arredondo MD    Patient not staffed in clinic.  Note will be reviewed and signed by supervisor Dr. Garcia.

## 2025-01-06 NOTE — PROGRESS NOTES
Chief Complaint   Patient presents with    Surgical Followup     Total Hysterectomy DOS  11/22/2024    Shayla Villanueva LPN

## 2025-01-06 NOTE — PATIENT INSTRUCTIONS
It was nice seeing you today, Artie     Treatment Plan summary from today's visit:      1) Medications  - No changes       2) Please return to clinic in 2 months     3) Crisis numbers are below and clinic after hours number is [phone]         **For crisis resources, please see the information at the end of this document**   Patient Education    Thank you for coming to the [facility] [place] [facility].     Lab Testing:  If you had lab testing today and your results are reassuring or normal they will be mailed to you or sent through [employer] within 7 days. If the lab tests need quick action we will call you with the results. The phone number we will call with results is # [phone]. If this is not the best number please call our clinic and change the number.     Medication Refills:  If you need any refills please call your pharmacy and they will contact us. Our fax number for refills is [phone].   Three business days of notice are needed for general medication refill requests.   Five business days of notice are needed for controlled substance refill requests.   If you need to change to a different pharmacy, please contact the new pharmacy directly. The new pharmacy will help you get your medications transferred.     Contact Us:  Please call [phone] during business hours (8-5:00 M-F).   If you have medication related questions after clinic hours, or on the weekend, please call [phone].     Financial Assistance [phone]   Medical Records [phone]       MENTAL HEALTH CRISIS RESOURCES:  For a emergency help, please call 911 or go to the nearest Emergency Department.     Emergency Walk-In Options:   EmPATH Unit @ [facility] ([place]): [phone] - Specialized mental health emergency area designed to be calming  [facility] ([place]): [phone]  [facility] ([place]): [phone]  [facility] ([place]): [phone]    [facility] Crisis Information:   [place]: [phone]  [place]: [phone]  [place] ([place]) - Adult: [phone]     Child: [phone]  [place] - Adult: [phone]     Child: [phone]  [place]: [phone]  List of all [facility] resources:   [email or web address]/people-[email or web address]/adults/health-care/mental-health/resources/crisis-contacts.jsp    National Crisis Information:   Crisis Text Line: Text  [place]  to 788096  Suicide & Crisis Lifeline: 988  National Suicide Prevention Lifeline: 4-836-757-VQTO (1-887.218.8969)       For online chat options, visit https://suicidepreventionlifeline.org/chat/  Poison Control Center: 1-626.410.8607  Trans Lifeline: 1-913.875.2965 - Hotline for transgender people of all ages  [employer]: 8-205-898-6660 - Hotline for LGBT youth     For Non-Emergency Support:   Fast Tracker: Mental Health & Substance Use Disorder Resources -   https://www.[employer].org/

## 2025-01-07 NOTE — PROGRESS NOTES
Starting to improve overall. Long struggle with rash now on steroid taper per derm team.  Some generalized abd pain, occ sharp on right, but no fever or incision drainage.     /76   Pulse 79   Wt 54 kg (119 lb)   LMP 07/31/2024 (Approximate)   BMI 21.08 kg/m    Abdomen much less intensly red today.  Appears to be clearing w/ steroid.  Incisions well healed.   Spec exam w/small blair, cuff intact. Bimanual w/ suture material palpable, but w/o defect    A/p: 43 yo s/p RATLH/BS and cysto.  C/b delay skin rash presumed contact dermatitis. Now recovering well.    Path reviewed, benign.   Rec 8 total weeks pelvic rest.    F/up PRN    Wendy Dobbs MD, FACOG  (she/her/hers)    Department of Ob/Gyn/Women's Health  [employer]  [facility]  [address] [place] [address]  [email or web address]  p. [phone]  f. [phone]

## 2025-01-09 ENCOUNTER — TELEPHONE (OUTPATIENT)
Dept: DERMATOLOGY | Facility: CLINIC | Age: 45
End: 2025-01-09
Payer: COMMERCIAL

## 2025-01-09 NOTE — TELEPHONE ENCOUNTER
"Pt has the \"flu\" has low grade fever. Wondering if she can take benadryl while on prednisone as she cannot sleep. Is there any concern of taking prednisone while she is sick? Also has questions about tapering prednisone sooner as she feels it is affecting her mental health and its exacerbating depression and anxiety.  "
Dr Tucker gave okay to use benadryl while on prednisone. Pt will stop prednisone now and resume when feeling better at a dose of 20mg. Understands to take for 14 days. Will seek care for illness if needed.  
[facility] Call Center    Phone Message    May a detailed message be left on voicemail: yes     Reason for Call: Other: Pt is calling to speak to the clinic because she is sick and has a fever and would like to know what medicine she can take while being on predniSONE (DELTASONE) 10 MG tablet, please call back thanks!     Action Taken: Message routed to:  [facility] DERM    Travel Screening: Not Applicable     Date of Service:                                                                     
asymptomatic

## 2025-02-03 ENCOUNTER — TELEPHONE (OUTPATIENT)
Dept: DERMATOLOGY | Facility: CLINIC | Age: 45
End: 2025-02-03
Payer: COMMERCIAL

## 2025-02-03 ENCOUNTER — MYC MEDICAL ADVICE (OUTPATIENT)
Dept: DERMATOLOGY | Facility: CLINIC | Age: 45
End: 2025-02-03
Payer: COMMERCIAL

## 2025-02-03 NOTE — TELEPHONE ENCOUNTER
Pt concerned about getting a rebound effect if stopping clobetasol abruptly. States she can tell her skin is thinning a bit but it doesn't bother her. She does feel a slight tingling sensation and noticed a couple red spots pop up. Currenlty using the medication once per day. Pt was tearful in recalling all she has been through in the last couple months for the routine surgery she had and she doesn't want to add to anything. She has a follow up this Thursday.  When photos come through route to provider.

## 2025-02-03 NOTE — TELEPHONE ENCOUNTER
Toan Tucker MD Becker, Kristi, RN; [facility]14 minutes ago (2:43 PM)     [facility]  I think overall things from the photo look really good.  Her rash is much better.  The red areas around the scars are fairly nonspecific and would be difficult for me to comment on by just viewing the photos.  They certainly do not look dangerous.    I think it would be okay to apply the clobetasol for a few more days leading up to her upcoming appointment on 2/6.  At that point, we could consider a different course of action such as decreasing the strength of the steroids or other alternative options.    Thanks!    Tona Tucker MD PGY-4  Dermatology Resident

## 2025-02-03 NOTE — TELEPHONE ENCOUNTER
[facility] Call Center    Phone Message    May a detailed message be left on voicemail: no     Reason for Call: Other: Pt Artie is using:  clobetasol 0.05% ointment BID to affected area for no more than 6 weeks- Would like to stop or gradually stop the topical- Needs instructions.  Call [phone] before next appt.      Action Taken: Message routed to:  [facility] DERM/ Dr. Tucker    Travel Screening: Not Applicable     Date of Service:

## 2025-02-04 NOTE — PROGRESS NOTES
[facility] Dermatology Note  Encounter Date: Feb 6, 2025  Office Visit     Dermatology Problem List:  # Allergic contact dermatitis, unclear trigger  - current: tacrolimus 0.1% ointment, referral to allergy for patch testing 02/06/25   - prior: prednisone taper, clobetasol 0.05% ointment, gentle skin care    ____________________________________________    Assessment & Plan:   # Allergic contact dermatitis, unclear trigger, improving  # Id reaction  # Postinflammatory hyperpigmentation  Improving after completing prednisone taper and ultrapotent corticosteroids, with residual post-inflammatory hyperpigmentation present on today's examination. Presentation continues to strongly suggest allergic contact dermatitis, although atypical that the patient's symptoms occurred 5 weeks after surgery (this is rather delayed). It is possible that initially the patient developed a folliculitis-like eruption, followed by a severe allergic contact dermatitis after applying nystatin ointment.    Given improvement and patient's concerns regarding steroid atrophy, discussed tapering topical steroids and adding in a topical calcineurin inhibitor. Also recommended referral for patch testing to better elucidate the underlying inciting trigger. No need for systemic steroids at this point given that the eruption has resolved.   - start tacrolimus 0.1% ointment BID to abdomen. Counseled on potential risks of stinging/burning  - taper clobetasol 0.05% ointment: once daily every other day for 3 days, every 3rd day for 3 days, every 4th day for 3 days, then stop  - Instructions in AVS written in detail; handout provided  - referral to derm/allergy clinic for patch testing      Procedures Performed:   - none    Follow-up: prn for new or changing lesions    Staff: Dr. Wale Tucker MD PGY-4  Dermatology Resident    I have personally examined this patient and agree with the resident doctor's documentation and  "plan of care. I have reviewed and amended the resident's note above. The documentation accurately reflects my clinical observations, diagnoses, treatment and follow-up plans.     Casie Echevarria MD  Dermatology Staff    ____________________________________________    CC: Derm Problem (Rash on abdomen follow up - has started tapering off clobetasol to once per day, has noticed more red spots and \"slight itching\", feels better after applying clobetasol, has noticed thinner skin in the area. Pt is wondering what triggered this reaction.)    HPI:  Ms. Artie Diez is a(n) 44 year old female who presents today as a return patient for follow-up of allergic contact dermatitis with id reaction.  Last seen on 1/2/2025, where the patient was started on ultra potent topical steroids and a prednisone taper.    Today, the patient states that her skin no longer has a rash. She has been tapering the clobetasol ointment (now applying every day). She is wondering if the rash might be coming back with the clobetasol taper. She has also noticed some pinkness around the surgical hysterectomy scars on the abdomen. She is very upset about how this rash has impacted her quality of life and is very concerned that the rash might come back.     Patient is otherwise feeling well, without additional skin concerns.    Labs Reviewed:  N/A    Physical Exam:  Vitals: LMP 07/31/2024 (Approximate)   SKIN: Focused examination of the abdomen was performed.  - on the abdomen there are some hyperpigmented macules and some pink smooth linear surgical scars  - no eczematous morphology visualized  - no signs of skin atrophy on the abdomen  - several brown macules and papules on the abdomen  - No other lesions of concern on areas examined.     Medications:  Current Outpatient Medications   Medication Sig Dispense Refill    acetaminophen (TYLENOL) 325 MG tablet Take 3 tablets (975 mg) by mouth every 6 hours as needed for mild pain.      clobetasol " (TEMOVATE) 0.05 % external ointment Apply to rash on body twice daily for no more than 6 weeks. 120 g 1    diphenhydrAMINE HCl (BENADRYL ALLERGY PO) Take by mouth.      divalproex sodium extended-release (DEPAKOTE ER) 250 MG 24 hr tablet Take 1 tablet (250 mg) by mouth daily. 30 tablet 3    gabapentin (NEURONTIN) 100 MG capsule Take 1 capsule (100 mg) by mouth 2 times daily as needed for other (anxiety). Take 1 tab at noon and 1 cap at bedtime 90 capsule 3    hydrOXYzine (VISTARIL) 50 MG capsule Take 2 capsules (100 mg) by mouth 3 times daily as needed for itching. 60 capsule 1    ibuprofen (ADVIL/MOTRIN) 600 MG tablet Take 1 tablet (600 mg) by mouth every 6 hours as needed for moderate pain. 50 tablet 0    lamoTRIgine (LAMICTAL) 100 MG tablet Take 1 tablet (100 mg) by mouth daily. Take along with one 25 mg tablet for a combined total of 125 mg. 30 tablet 3    lamoTRIgine (LAMICTAL) 25 MG tablet Take 1 tablet (25 mg) by mouth daily. Take along with one 100 mg tablet for a combined total of 125 mg. 30 tablet 3    LORazepam (ATIVAN) 0.5 MG tablet Take 0.5 mg by mouth every 6 hours as needed for anxiety      nystatin (MYCOSTATIN) 545296 UNIT/GM external ointment Apply topically 2 times daily. 30 g 0    ondansetron (ZOFRAN ODT) 4 MG ODT tab Take 1 tablet (4 mg) by mouth every 8 hours as needed for nausea. 10 tablet 0    polyethylene glycol (MIRALAX) 17 g packet Take 1 packet by mouth at bedtime.      propranolol (INDERAL) 10 MG tablet Take 1 tablet (10 mg) by mouth 2 times daily as needed (Anxiety). 60 tablet 3    propranolol ER (INDERAL LA) 60 MG 24 hr capsule Take 1 capsule (60 mg) by mouth at bedtime. 30 capsule 3    senna-docusate (SENOKOT-S/PERICOLACE) 8.6-50 MG tablet Take 1-2 tablets by mouth 2 times daily as needed for constipation (While on oral opioids.). 30 tablet 0    spironolactone (ALDACTONE) 50 MG tablet Take 1 tablet (50 mg) by mouth 2 times daily. 180 tablet 3    tacrolimus (PROTOPIC) 0.1 % external  ointment Apply to area on abdomen as described in the instructions, up to twice daily 60 g 3     No current facility-administered medications for this visit.      Past Medical History:   Patient Active Problem List   Diagnosis    Heavy menses    CARDIOVASCULAR SCREENING; LDL GOAL LESS THAN 160    Major depressive disorder, recurrent episode, moderate (H)    EAMON (generalized anxiety disorder)    Attention deficit disorder    Dysmenorrhea    Glaucoma suspect    Myopia    Bipolar 2 disorder (H)     Past Medical History:   Diagnosis Date    Bipolar 2 disorder (H)     Depressive disorder     Depressive disorder, not elsewhere classified     Depression (non-psychotic)    Excessive or frequent menstruation     Heavy periods    Fibroid uterus        CC Casie Echevarria MD  [address] [place] [address] on close of this encounter.

## 2025-02-06 ENCOUNTER — OFFICE VISIT (OUTPATIENT)
Dept: DERMATOLOGY | Facility: CLINIC | Age: 45
End: 2025-02-06
Attending: DERMATOLOGY
Payer: COMMERCIAL

## 2025-02-06 DIAGNOSIS — L23.89 ALLERGIC CONTACT DERMATITIS DUE TO OTHER AGENTS: ICD-10-CM

## 2025-02-06 DIAGNOSIS — L81.9 POST-INFLAMMATORY PIGMENTARY CHANGES: Primary | ICD-10-CM

## 2025-02-06 PROCEDURE — 99214 OFFICE O/P EST MOD 30 MIN: CPT | Mod: GC | Performed by: DERMATOLOGY

## 2025-02-06 RX ORDER — TACROLIMUS 1 MG/G
OINTMENT TOPICAL
Qty: 60 G | Refills: 3 | Status: SHIPPED | OUTPATIENT
Start: 2025-02-06

## 2025-02-06 ASSESSMENT — PAIN SCALES - GENERAL: PAINLEVEL_OUTOF10: MILD PAIN (2)

## 2025-02-06 NOTE — PATIENT INSTRUCTIONS
Please taper the clobetasol ointment as follows: once daily every other day for 3 days, every 3rd day for 3 days, every 4th day for 3 days, then stop    Please start tacrolimus ointment twice daily to the abdomen. On the days you apply clobetasol ointment, please only apply the tacrolimus ointment once daily.

## 2025-02-06 NOTE — NURSING NOTE
"Dermatology Rooming Note    Artie Diez's goals for this visit include:   Chief Complaint   Patient presents with    Derm Problem     Rash on abdomen follow up - has started tapering off clobetasol to once per day, has noticed more red spots and \"slight itching\", feels better after applying clobetasol, has noticed thinner skin in the area. Pt is wondering what triggered this reaction.     TOMEKA Lane    "

## 2025-02-06 NOTE — LETTER
2/6/2025       RE: Artie Diez  [address]  [place] [address]     Dear Colleague,    Thank you for referring your patient, Artie Diez, to the [facility] [place] at [facility]. Please see a copy of my visit note below.    [facility] Dermatology Note  Encounter Date: Feb 6, 2025  Office Visit     Dermatology Problem List:  # Allergic contact dermatitis, unclear trigger  - current: tacrolimus 0.1% ointment, referral to allergy for patch testing 02/06/25   - prior: prednisone taper, clobetasol 0.05% ointment, gentle skin care    ____________________________________________    Assessment & Plan:   # Allergic contact dermatitis, unclear trigger, improving  # Id reaction  # Postinflammatory hyperpigmentation  Improving after completing prednisone taper and ultrapotent corticosteroids, with residual post-inflammatory hyperpigmentation present on today's examination. Presentation continues to strongly suggest allergic contact dermatitis, although atypical that the patient's symptoms occurred 5 weeks after surgery (this is rather delayed). It is possible that initially the patient developed a folliculitis-like eruption, followed by a severe allergic contact dermatitis after applying nystatin ointment.    Given improvement and patient's concerns regarding steroid atrophy, discussed tapering topical steroids and adding in a topical calcineurin inhibitor. Also recommended referral for patch testing to better elucidate the underlying inciting trigger. No need for systemic steroids at this point given that the eruption has resolved.   - start tacrolimus 0.1% ointment BID to abdomen. Counseled on potential risks of stinging/burning  - taper clobetasol 0.05% ointment: once daily every other day for 3 days, every 3rd day for 3 days, every 4th day for 3 days, then stop  - Instructions in AVS written in detail;  "handout provided  - referral to derm/allergy clinic for patch testing      Procedures Performed:   - none    Follow-up: prn for new or changing lesions    Staff: Dr. Wale Tucker MD PGY-4  Dermatology Resident    I have personally examined this patient and agree with the resident doctor's documentation and plan of care. I have reviewed and amended the resident's note above. The documentation accurately reflects my clinical observations, diagnoses, treatment and follow-up plans.     Casie Echevarria MD  Dermatology Staff    ____________________________________________    CC: Derm Problem (Rash on abdomen follow up - has started tapering off clobetasol to once per day, has noticed more red spots and \"slight itching\", feels better after applying clobetasol, has noticed thinner skin in the area. Pt is wondering what triggered this reaction.)    HPI:  Ms. Artie Diez is a(n) 44 year old female who presents today as a return patient for follow-up of allergic contact dermatitis with id reaction.  Last seen on 1/2/2025, where the patient was started on ultra potent topical steroids and a prednisone taper.    Today, the patient states that her skin no longer has a rash. She has been tapering the clobetasol ointment (now applying every day). She is wondering if the rash might be coming back with the clobetasol taper. She has also noticed some pinkness around the surgical hysterectomy scars on the abdomen. She is very upset about how this rash has impacted her quality of life and is very concerned that the rash might come back.     Patient is otherwise feeling well, without additional skin concerns.    Labs Reviewed:  N/A    Physical Exam:  Vitals: LMP 07/31/2024 (Approximate)   SKIN: Focused examination of the abdomen was performed.  - on the abdomen there are some hyperpigmented macules and some pink smooth linear surgical scars  - no eczematous morphology visualized  - no signs of skin atrophy on the " abdomen  - several brown macules and papules on the abdomen  - No other lesions of concern on areas examined.     Medications:  Current Outpatient Medications   Medication Sig Dispense Refill     acetaminophen (TYLENOL) 325 MG tablet Take 3 tablets (975 mg) by mouth every 6 hours as needed for mild pain.       clobetasol (TEMOVATE) 0.05 % external ointment Apply to rash on body twice daily for no more than 6 weeks. 120 g 1     diphenhydrAMINE HCl (BENADRYL ALLERGY PO) Take by mouth.       divalproex sodium extended-release (DEPAKOTE ER) 250 MG 24 hr tablet Take 1 tablet (250 mg) by mouth daily. 30 tablet 3     gabapentin (NEURONTIN) 100 MG capsule Take 1 capsule (100 mg) by mouth 2 times daily as needed for other (anxiety). Take 1 tab at noon and 1 cap at bedtime 90 capsule 3     hydrOXYzine (VISTARIL) 50 MG capsule Take 2 capsules (100 mg) by mouth 3 times daily as needed for itching. 60 capsule 1     ibuprofen (ADVIL/MOTRIN) 600 MG tablet Take 1 tablet (600 mg) by mouth every 6 hours as needed for moderate pain. 50 tablet 0     lamoTRIgine (LAMICTAL) 100 MG tablet Take 1 tablet (100 mg) by mouth daily. Take along with one 25 mg tablet for a combined total of 125 mg. 30 tablet 3     lamoTRIgine (LAMICTAL) 25 MG tablet Take 1 tablet (25 mg) by mouth daily. Take along with one 100 mg tablet for a combined total of 125 mg. 30 tablet 3     LORazepam (ATIVAN) 0.5 MG tablet Take 0.5 mg by mouth every 6 hours as needed for anxiety       nystatin (MYCOSTATIN) 135286 UNIT/GM external ointment Apply topically 2 times daily. 30 g 0     ondansetron (ZOFRAN ODT) 4 MG ODT tab Take 1 tablet (4 mg) by mouth every 8 hours as needed for nausea. 10 tablet 0     polyethylene glycol (MIRALAX) 17 g packet Take 1 packet by mouth at bedtime.       propranolol (INDERAL) 10 MG tablet Take 1 tablet (10 mg) by mouth 2 times daily as needed (Anxiety). 60 tablet 3     propranolol ER (INDERAL LA) 60 MG 24 hr capsule Take 1 capsule (60 mg) by  mouth at bedtime. 30 capsule 3     senna-docusate (SENOKOT-S/PERICOLACE) 8.6-50 MG tablet Take 1-2 tablets by mouth 2 times daily as needed for constipation (While on oral opioids.). 30 tablet 0     spironolactone (ALDACTONE) 50 MG tablet Take 1 tablet (50 mg) by mouth 2 times daily. 180 tablet 3     tacrolimus (PROTOPIC) 0.1 % external ointment Apply to area on abdomen as described in the instructions, up to twice daily 60 g 3     No current facility-administered medications for this visit.      Past Medical History:   Patient Active Problem List   Diagnosis     Heavy menses     CARDIOVASCULAR SCREENING; LDL GOAL LESS THAN 160     Major depressive disorder, recurrent episode, moderate (H)     EAMON (generalized anxiety disorder)     Attention deficit disorder     Dysmenorrhea     Glaucoma suspect     Myopia     Bipolar 2 disorder (H)     Past Medical History:   Diagnosis Date     Bipolar 2 disorder (H)      Depressive disorder      Depressive disorder, not elsewhere classified     Depression (non-psychotic)     Excessive or frequent menstruation     Heavy periods     Fibroid uterus        CC Casie Echevarria MD  [address] [place] [address] on close of this encounter.      Again, thank you for allowing me to participate in the care of your patient.      Sincerely,    Toan Tucker MD

## 2025-03-01 ASSESSMENT — ANXIETY QUESTIONNAIRES
1. FEELING NERVOUS, ANXIOUS, OR ON EDGE: NEARLY EVERY DAY
IF YOU CHECKED OFF ANY PROBLEMS ON THIS QUESTIONNAIRE, HOW DIFFICULT HAVE THESE PROBLEMS MADE IT FOR YOU TO DO YOUR WORK, TAKE CARE OF THINGS AT HOME, OR GET ALONG WITH OTHER PEOPLE: SOMEWHAT DIFFICULT
6. BECOMING EASILY ANNOYED OR IRRITABLE: MORE THAN HALF THE DAYS
7. FEELING AFRAID AS IF SOMETHING AWFUL MIGHT HAPPEN: SEVERAL DAYS
3. WORRYING TOO MUCH ABOUT DIFFERENT THINGS: NEARLY EVERY DAY
GAD7 TOTAL SCORE: 14
4. TROUBLE RELAXING: MORE THAN HALF THE DAYS
8. IF YOU CHECKED OFF ANY PROBLEMS, HOW DIFFICULT HAVE THESE MADE IT FOR YOU TO DO YOUR WORK, TAKE CARE OF THINGS AT HOME, OR GET ALONG WITH OTHER PEOPLE?: SOMEWHAT DIFFICULT
2. NOT BEING ABLE TO STOP OR CONTROL WORRYING: MORE THAN HALF THE DAYS
GAD7 TOTAL SCORE: 14
5. BEING SO RESTLESS THAT IT IS HARD TO SIT STILL: SEVERAL DAYS

## 2025-03-05 ENCOUNTER — VIRTUAL VISIT (OUTPATIENT)
Dept: PSYCHIATRY | Facility: CLINIC | Age: 45
End: 2025-03-05
Attending: PSYCHIATRY & NEUROLOGY
Payer: COMMERCIAL

## 2025-03-05 DIAGNOSIS — F31.81 BIPOLAR 2 DISORDER (H): Primary | ICD-10-CM

## 2025-03-05 DIAGNOSIS — F41.1 GAD (GENERALIZED ANXIETY DISORDER): ICD-10-CM

## 2025-03-05 PROCEDURE — G2211 COMPLEX E/M VISIT ADD ON: HCPCS | Mod: 95

## 2025-03-05 PROCEDURE — 90833 PSYTX W PT W E/M 30 MIN: CPT | Mod: 95

## 2025-03-05 PROCEDURE — 98006 SYNCH AUDIO-VIDEO EST MOD 30: CPT | Mod: HN

## 2025-03-05 ASSESSMENT — PAIN SCALES - GENERAL: PAINLEVEL_OUTOF10: NO PAIN (0)

## 2025-03-05 NOTE — PATIENT INSTRUCTIONS
It was nice seeing you today, Artie     Treatment Plan summary from today's visit:      1) Medications  - No changes       2) Please return to clinic in 2 months     3) Crisis numbers are below and clinic after hours number is [phone]         **For crisis resources, please see the information at the end of this document**   Patient Education    Thank you for coming to the [facility] [place] [facility].     Lab Testing:  If you had lab testing today and your results are reassuring or normal they will be mailed to you or sent through [facility] [employer] within 7 days. If the lab tests need quick action we will call you with the results. The phone number we will call with results is # [phone]. If this is not the best number please call our clinic and change the number.     Medication Refills:  If you need any refills please call your pharmacy and they will contact us. Our fax number for refills is [phone].   Three business days of notice are needed for general medication refill requests.   Five business days of notice are needed for controlled substance refill requests.   If you need to change to a different pharmacy, please contact the new pharmacy directly. The new pharmacy will help you get your medications transferred.     Contact Us:  Please call [phone] during business hours (8-5:00 M-F).   If you have medication related questions after clinic hours, or on the weekend, please call [phone].     Financial Assistance [phone]   Medical Records [phone]       MENTAL HEALTH CRISIS RESOURCES:  For a emergency help, please call 911 or go to the nearest Emergency Department.     Emergency Walk-In Options:   [facility] ([place]): [phone] - Specialized mental health emergency area designed to be calming  [facility] ([place]): [phone]  [facility] ([place]): [phone]  [facility] ([place]): [phone]    [facility] Crisis Information:   [place]: [phone]  [place]: [phone]  [place] ([place]) - Adult: [phone]     Child: [phone]  [place] - Adult: [phone]     Child: [phone]  [place]: [phone]  List of all [facility] resources:   https://[place].[email or web address]/dhs/people-we-serve/adults/health-care/mental-health/resources/crisis-contacts.jsp    National Crisis Information:   Crisis Text Line: Text  [place]  to 847478  Suicide & Crisis Lifeline: 988  National Suicide Prevention Lifeline: 8-889-143-ILBF (1-749.995.9459)       For online chat options, visit https://suicidepreventionlifeline.org/chat/  Poison Control Center: 1-935.655.6027  Trans Lifeline: 1-804.840.9765 - Hotline for transgender people of all ages  [employer]: 0-380-671-3515 - Hotline for LGBT youth     For Non-Emergency Support:   Fast Tracker: Mental Health & Substance Use Disorder Resources -   https://www.[employer].org/

## 2025-03-05 NOTE — PROGRESS NOTES
Virtual Visit Details    Type of service:  Video Visit     Originating Location (pt. Location): Home    Distant Location (provider location):  On-site  Platform used for Video Visit: [facility]  MEDICAL PROGRESS NOTE  General Clinic Team       CARE TEAM:    PCP- Fahad Concepcion  Therapist-  Evangelina ([place] [facility], primarily for DBT q week), and individual 1:1 Carl (she is at [employer] [facility])   OB/GYN: Wendy Dobbs MD    Artie is a 44 year old who uses the pronouns she, her, hers.                 Assessment & Plan   Bipolar II Disorder, current episode depressed, mild (history of rapid cycling)  EAMON     Medical:  Uterine Fibroids S/p Laparoscopic total hysterectomy November 2024       Artie is a 44 year old  female with a past psychiatric history of Bipolar II Disorder and EAMON who presents for medication management. Per record, patient saw a mood disorder specialist in [place], with diagnostic clarification of  bipolar II disorder with a rapid cycling pattern and anxiety diagnoses. Medical history is significant for Uterine Fibroids S/p Laparoscopic total hysterectomy. Substance use does not appear to be playing a contributing role in the patient's presentation.     Today, Artie  reports she was going through a rough patch some weeks ago, in the context of postsurgery complications, subsequent treatment with steroids, and development of slight mood elevation/impulsivity.  However, she notes that she has been improving for the last few weeks.  Recovery from surgery has overall been very challenging, and navigating the severe allergic dermatitis (postsurgery complication) and subsequent sequela of this has been a stressful one.  She notes that she had lost 10 pounds, however, has started to put some weight back on.  Her chronic passive death wish appears to be at baseline today.  No immediate safety concerns, and she is  "appropriate to continue in the outpatient setting.  She continues to see her therapist weekly, who is a good source of support.  Overall, no immediate medication changes are indicated, and as such, will continue current plan of care for patient.    Psychotropic Drug Interactions:    ADDITIVE SEDATION: Melatonin, Ativan  ADDITIVE CNS/RESPIRATORY DEPRESSION: Lamictal, Depakote, gabapentin, Ativan, THC  Depakote may increase the serum concentration of Ativan, Lamictal  Management: use lowest therapeutic doses of Depakote, Lamictal, gabapentin and routine monitoring    [place] was checked today: indicates that controlled prescriptions have been filled as prescribed    Risk Statements:   Treatment Risk: Risks, benefits, alternatives and potential adverse effects have been discussed and are understood.   Safety Risk: Artie did not appear to be an imminent safety risk to self or others.    PLAN    1) Medications:   - Continue lamotrigine 125 mg daily   - Continue Depakote ER - 250 mg at bedtime   - Continue gabapentin- 100 mg every day at noon and 100 mg at bedtime.  - Continue propranolol IR 10 mg bid /prn- for anxiety  - Continue propranolol ER 60 mg qhs  - Continue melatonin 1-1.5 mg at bedtime, OTC    PCP-   - Reported by patient: Ativan 0.25-0.5 mg at bedtime/prn (1x-2x per week). We have not taken over prescribing this medication and advise against due to risks outweighing benefits. Per  (as of today): No evidence of filling Ativan in the past 1 year.   Spironolactone- 50 mg bid- for hair loss  L-Methylfolate  Prednisone    2) Psychotherapy:   Individual therapy [facility], works with DBT .   Working with racial trauma consultant/ on identity development.    3) Next due:  Labs: Lamotrigine, Depakote, CBC, and CMP level last obtained on 9/2024  EKG: As needed   Rating scales:  PHQ-9, EAMON-7    4) Referrals: None    5) Follow-up: Return to clinic in 8 weeks                   Interval History     Had a \"pretty " "bad period\" (normally has them twice a year,  for 3 weeks at a time). This correlates with when she was on steroids for an allergic reaction. Was more impulsive. Talked to therapist about doing IOP. Was considering [facility], but it conflicted with her schedule. Has lost 10 pounds. Was talking to dermatologist about her concerns regarding topical steroids, and felt her concerns were dismissed.    Had final appointment with OBGYN. After reflection, feels the appointment didn't go well generally.    Feels she has low tolerance for Doctors at the moment, including her friends who are physicians    Has started feeling better in the last few weeks, has gained some weight back. However, is struggling with how long her recovery is taking and still having the persistent rash. Has been using propanolol for anxiety      Mood: \"Going back to normal has been stressful\"  Sleep: Not good  Appetite: Okay  Energy: Not good  SI/HI/Safety Concerns: Has chronic PDW, no SI, plan or intent   Side effects from medications: No     Psychiatric Review of Systems:   Depression: Depressed mood, anhedonia, low energy, guilt, poor concentration, Chronic PDW   Anxiety: worries, ruminations (slightly worse)  Elevated: no  Psychosis: no  Trauma Related: no  Insomnia: Yes    Other:   ADHD: trouble sustaining attention, losing things, distracted    Current Social History:  Financial/occupational: N .   Living situation: Lives alone in [facility] [place] [place] in [facility] [place]  Social/spiritual support: parents, friends      Pertinent Substance Use:  [Last updated 10/30/24]  Alcohol: No   Cannabis: Yes: THC Drinks, 1 can maybe once every few weeks (3-5mg)  Tobacco: No  Caffeine:  Yes: 1 cup of coffee daily   Opioids: No   Narcan Kit current: N/A  Other substances: No     Medical Review of Systems / Med sfx:   Lightheadedness/orthostasis: No  Headaches: No  GI: No  CV: No  Sexual health concerns: No  A comprehensive review of systems was performed and is " negative other than noted above.    Contraception: S/p Laparoscopic total hysterectomy November 22, 2024                  Summary Points of Current Care  8/30/2024: Transfer of care appointment, no changes                  Physical Exam  (Vitals Only)    LMP 07/31/2024 (Approximate)   Pulse Readings from Last 3 Encounters:   01/06/25 79   12/29/24 71   12/27/24 90     Wt Readings from Last 3 Encounters:   01/06/25 54 kg (119 lb)   12/28/24 54.4 kg (119 lb 14.9 oz)   12/24/24 54 kg (119 lb)     BP Readings from Last 3 Encounters:   01/06/25 122/76   12/29/24 132/66   12/27/24 112/78                      Mental Status Exam    Alertness: alert  and oriented  Appearance: adequately groomed  Behavior/Demeanor: cooperative, pleasant, and calm, with good  eye contact   Speech: normal  Language: intact and no problems  Psychomotor: normal or unremarkable  Mood: depressed  Affect: restricted and tearful; congruent to: mood- yes, content- yes  Thought Process/Associations: overinclusive  and rambling  Thought Content:  Reports  chronic PDW ;  Denies suicidal & violent ideation and delusions  Perception:  Reports none;  Denies hallucinations  Insight: good  Judgment: good  Cognition: does  appear grossly intact; formal cognitive testing was not done  Gait and Station: N/A (telehealth)                  Past Psychotropic Medication Trials     Medication Max Dose (mg) Dates / Duration Helpful? DC Reason / Adverse Effects?   Paxil        further bad side effects on higher doses, night sweats    Wellbutrin            Lithium            Depakote            Adderall            Strattera           Clonazepam           Buspar       Akathisia   Pristq              Of note, per chart review, Tends not to tolerate most medications, c/o GI symptoms and HA .                   Past Medical History     Patient Active Problem List   Diagnosis    Heavy menses    CARDIOVASCULAR SCREENING; LDL GOAL LESS THAN 160    Major depressive disorder,  recurrent episode, moderate (H)    EAMON (generalized anxiety disorder)    Attention deficit disorder    Dysmenorrhea    Glaucoma suspect    Myopia    Bipolar 2 disorder (H)                     Medications     Current Outpatient Medications   Medication Sig Dispense Refill    acetaminophen (TYLENOL) 325 MG tablet Take 3 tablets (975 mg) by mouth every 6 hours as needed for mild pain.      clobetasol (TEMOVATE) 0.05 % external ointment Apply to rash on body twice daily for no more than 6 weeks. 120 g 1    diphenhydrAMINE HCl (BENADRYL ALLERGY PO) Take by mouth.      divalproex sodium extended-release (DEPAKOTE ER) 250 MG 24 hr tablet Take 1 tablet (250 mg) by mouth daily. 30 tablet 3    gabapentin (NEURONTIN) 100 MG capsule Take 1 capsule (100 mg) by mouth 2 times daily as needed for other (anxiety). Take 1 tab at noon and 1 cap at bedtime 90 capsule 3    hydrOXYzine (VISTARIL) 50 MG capsule Take 2 capsules (100 mg) by mouth 3 times daily as needed for itching. 60 capsule 1    ibuprofen (ADVIL/MOTRIN) 600 MG tablet Take 1 tablet (600 mg) by mouth every 6 hours as needed for moderate pain. 50 tablet 0    lamoTRIgine (LAMICTAL) 100 MG tablet Take 1 tablet (100 mg) by mouth daily. Take along with one 25 mg tablet for a combined total of 125 mg. 30 tablet 3    lamoTRIgine (LAMICTAL) 25 MG tablet Take 1 tablet (25 mg) by mouth daily. Take along with one 100 mg tablet for a combined total of 125 mg. 30 tablet 3    LORazepam (ATIVAN) 0.5 MG tablet Take 0.5 mg by mouth every 6 hours as needed for anxiety      nystatin (MYCOSTATIN) 580677 UNIT/GM external ointment Apply topically 2 times daily. 30 g 0    ondansetron (ZOFRAN ODT) 4 MG ODT tab Take 1 tablet (4 mg) by mouth every 8 hours as needed for nausea. 10 tablet 0    polyethylene glycol (MIRALAX) 17 g packet Take 1 packet by mouth at bedtime.      propranolol (INDERAL) 10 MG tablet Take 1 tablet (10 mg) by mouth 2 times daily as needed (Anxiety). 60 tablet 3    propranolol  ER (INDERAL LA) 60 MG 24 hr capsule Take 1 capsule (60 mg) by mouth at bedtime. 30 capsule 3    senna-docusate (SENOKOT-S/PERICOLACE) 8.6-50 MG tablet Take 1-2 tablets by mouth 2 times daily as needed for constipation (While on oral opioids.). 30 tablet 0    spironolactone (ALDACTONE) 50 MG tablet Take 1 tablet (50 mg) by mouth 2 times daily. 180 tablet 3    tacrolimus (PROTOPIC) 0.1 % external ointment Apply to area on abdomen as described in the instructions, up to twice daily 60 g 3                     Data         6/14/2024     3:13 PM 10/27/2024    11:47 AM 3/1/2025    10:50 PM   PROMIS-10 Total Score w/o Sub Scores   PROMIS TOTAL - SUBSCORES 20 21  22       Patient-reported         10/29/2024     1:15 PM 1/6/2025     3:36 PM 3/4/2025    12:39 PM   PHQ-9 SCORE   PHQ-9 Total Score MyChart 17 (Moderately severe depression) 22 (Severe depression) 22 (Severe depression)   PHQ-9 Total Score 17  22  22        Patient-reported         10/27/2024    11:46 AM 1/3/2025    12:12 PM 3/1/2025    10:49 PM   EAMON-7 SCORE   Total Score 10 (moderate anxiety) 17 (severe anxiety) 14 (moderate anxiety)   Total Score 10  17  14        Patient-reported       Liver/Kidney Function, TSH Metabolic Blood counts   Recent Labs   Lab Test 12/28/24 2216 11/24/24 2028 09/16/24  1829   AST  --   --  15   ALT  --   --  6   ALKPHOS  --   --  65   CR 0.91 0.80 0.85     Recent Labs   Lab Test 06/17/22  1617   TSH 1.09    Recent Labs   Lab Test 10/30/20  1200   CHOL 167   TRIG 148   LDL 81   HDL 56     No lab results found.  Recent Labs   Lab Test 12/28/24 2216   *    Recent Labs   Lab Test 12/28/24 2216   WBC 7.8   HGB 12.7   HCT 38.2   MCV 85              ECG: None in EHR       Psychiatry Individual Psychotherapy Note   Psychotherapy start time - 9:30 AM  Psychotherapy end time - 9:50 AM  Date treatment plan last reviewed with patient - 10/30/24  Subjective: This supportive psychotherapy session addressed issues related to goals  of therapy and current psychosocial stressors. Patient's reaction: Action in the context of mental status appropriate for ambulatory setting.    Interactive complexity indicated? No  Plan: RTC in timeframe noted above  Psychotherapy services during this visit included myself and the patient.   Treatment Plan      SYMPTOMS; PROBLEMS   MEASURABLE GOALS;    FUNCTIONAL IMPROVEMENT / GAINS INTERVENTIONS DISCHARGE CRITERIA   Depression: depressed mood, anhedonia, low energy, insomnia, and concentration problems  Anxiety: excessive worry and nervous/overwhelmed  Marielena/Hypomania: rapid cycling   reduce depressive symptoms, reduce panic attacks/ excessive worry, and reduce manic/hypomanic episodes Supportive / psychodynamic marked symptom improvement         Level of Medical Decision Making:   - At least 1 chronic problem that is not stable  - Engaged in prescription drug management during visit (discussed any medication benefits, side effects, alternatives, etc.)    {   Must meet 2 out of 3 of the above MDM elements to bill at the specified level     For help more info about elements / criteria, click here-> MDM Help Grid     **No need to delete blue text, it disappears when note is signed**    The longitudinal plan of care for the diagnosis(es)/condition(s) as documented were addressed during this visit. Due to the added complexity in care, I will continue to support Artie in the subsequent management and with ongoing continuity of care.     PROVIDER: Kat Arredondo MD    Patient not staffed in clinic.  Note will be reviewed and signed by supervisor Dr. Garcia.

## 2025-03-05 NOTE — NURSING NOTE
Current patient location: [address]   [place] [address]    Is the patient currently in the state of [place]? YES    Visit mode: VIDEO    If the visit is dropped, the patient can be reconnected by:VIDEO VISIT: Send to e-mail at: [email or web address]    Will anyone else be joining the visit? NO  (If patient encounters technical issues they should call [phone])    Are changes needed to the allergy or medication list? No    Are refills needed on medications prescribed by this physician? YES    Rooming Documentation:  Questionnaire(s) completed    Reason for visit: RECHECK    Veronica DAVEF

## 2025-05-12 DIAGNOSIS — F31.81 BIPOLAR 2 DISORDER (H): ICD-10-CM

## 2025-05-12 DIAGNOSIS — F41.1 GAD (GENERALIZED ANXIETY DISORDER): ICD-10-CM

## 2025-05-13 RX ORDER — DIVALPROEX SODIUM 250 MG/1
250 TABLET, FILM COATED, EXTENDED RELEASE ORAL DAILY
Qty: 30 TABLET | Refills: 0 | Status: SHIPPED | OUTPATIENT
Start: 2025-05-13

## 2025-05-13 RX ORDER — PROPRANOLOL HYDROCHLORIDE 60 MG/1
60 CAPSULE, EXTENDED RELEASE ORAL AT BEDTIME
Qty: 30 CAPSULE | Refills: 0 | Status: SHIPPED | OUTPATIENT
Start: 2025-05-13

## 2025-05-13 RX ORDER — PROPRANOLOL HYDROCHLORIDE 10 MG/1
10 TABLET ORAL 2 TIMES DAILY PRN
Qty: 60 TABLET | Refills: 0 | Status: SHIPPED | OUTPATIENT
Start: 2025-05-13

## 2025-05-13 NOTE — TELEPHONE ENCOUNTER
"Date of Last Office Visit: 3/5/2025  [facility]      RTC: 8 weeks   No shows: 0  Cancellations: 0  Date of Next Office Visit:   5/21/2025 8:20 AM (30 min)  [facility]    Arrive by:  8:05 AM   ADULT PSYCHIATRY RETURN    URPSY ([facility])   Kat Arredondo MD     ------------------------------    Last Script Details:    propranolol (INDERAL) 10 MG tablet 60 tablet 3 2/1/2025 -- No   Sig - Route: Take 1 tablet (10 mg) by mouth 2 times daily as needed (Anxiety). - Oral     propranolol ER (INDERAL LA) 60 MG 24 hr capsule 30 capsule 3 2/1/2025 -- No   Sig - Route: Take 1 capsule (60 mg) by mouth at bedtime. - Oral     divalproex sodium extended-release (DEPAKOTE ER) 250 MG 24 hr tablet 30 tablet 3 2/1/2025 -- No   Sig - Route: Take 1 tablet (250 mg) by mouth daily. - Oral     ------------------------------  From last visit note:   \"- Continue Depakote ER - 250 mg at bedtime   - Continue gabapentin- 100 mg every day at noon and 100 mg at bedtime.  - Continue propranolol IR 10 mg bid /prn- for anxiety  - Continue propranolol ER 60 mg qhs\"    Lapse in medication adherence greater than 3 days?:  No    Refill Decision:    [x]Medication refilled per  Medication Refill in Ambulatory Care  policy.         [] Supervision: no future appointment scheduled. Scheduling has been notified to contact the pt for appointment.    Warnings Override History for divalproex sodium extended-release (DEPAKOTE ER) 250 MG 24 hr tablet [[id number]]    Overridden by Kat Arredondo MD on Jan 6, 2025 4:32 PM   Drug-Drug   1. LORAZEPAM / VALPROATE PRODUCTS [Level: Major] [Reason: Tolerated medication/side effects in past]   Other Orders: LORazepam (ATIVAN) 0.5 MG tablet      2. VALPROIC ACID / LAMOTRIGINE [Level: Major] [Reason: Tolerated medication/side effects in past]   Other Orders: lamoTRIgine (LAMICTAL) 25 MG tablet      3. VALPROIC ACID / LAMOTRIGINE [Level: Major] [Reason: Tolerated " medication/side effects in past]   Other Orders: lamoTRIgine (LAMICTAL) 100 MG tablet             Request from pharmacy:  Requested Prescriptions   Pending Prescriptions Disp Refills    propranolol (INDERAL) 10 MG tablet 60 tablet 3     Sig: Take 1 tablet (10 mg) by mouth 2 times daily as needed (Anxiety).       Beta-Blockers Protocol Passed - 5/13/2025  1:06 PM        Passed - Most recent blood pressure under 140/90 in past 12 months     BP Readings from Last 3 Encounters:   01/06/25 122/76   12/29/24 132/66   12/27/24 112/78       No data recorded            Passed - Patient is age 6 or older        Passed - Medication is active on med list and the sig matches. RN to manually verify dose and sig if red X/fail.     If the protocol passes (green check), you do not need to verify med dose and sig.    A prescription matches if they are the same clinical intention.    For Example: once daily and every morning are the same.    The protocol can not identify upper and lower case letters as matching and will fail.     For Example: Take 1 tablet (50 mg) by mouth daily     TAKE 1 TABLET (50 MG) BY MOUTH DAILY    For all fails (red x), verify dose and sig.    If the refill does match what is on file, the RN can still proceed to approve the refill request.       If they do not match, route to the appropriate provider.             Passed - Medication indicated for associated diagnosis     Medication is associated with one or more of the following diagnoses:     Hypertension (HTN)   Atrial fibrillation/flutter   Angina   ASCVD   Migraine   Heart Failure   Tremor   Anxiety   Ocular hypertension   Glaucoma   PTSD   Obstructive hypertrophic cardiomyopathy   History of myocarditis   Palpitations   POTS (postural orthostatic tachycardia syndrome)   SVT (supraventricular tachycardia)   Hyperthyroidism   Tachycardia   Acute non-st segment elevation myocardial infarction   Subsequent non-st segment elevation myocardial  infarction   Ischemic myocardial dysfunction          Passed - Recent (12 mo) or future (90 days) visit within the authorizing provider's specialty     The patient must have completed an in-person or virtual visit within the past 12 months or has a future visit scheduled within the next 90 days with the authorizing provider s specialty.  Urgent care and e-visits do not qualify as an office visit for this protocol.            divalproex sodium extended-release (DEPAKOTE ER) 250 MG 24 hr tablet 30 tablet 3     Sig: Take 1 tablet (250 mg) by mouth daily.       Anti-Seizure Meds Protocol  Failed - 5/13/2025  1:06 PM        Failed - Review Authorizing provider's last note.      Refer to last progress notes: confirm request is for original authorizing provider (cannot be through other providers).          Failed - Depakote level within therapeutic range in past 26 months     Lab Results   Component Value Date    VALPROATE 28.9 09/16/2024     Depakote level must be checked 2-4 weeks after dosage change.          Failed - EAMON-7 score of less than 5 in past 6 months.     Please review last EAMON-7 score.       10/27/2024    11:46 AM 1/3/2025    12:12 PM 3/1/2025    10:49 PM   EAMON-7 SCORE   Total Score 10 (moderate anxiety) 17 (severe anxiety) 14 (moderate anxiety)   Total Score 10  17  14        Patient-reported             Failed - Medication indicated for associated diagnosis     Medication is associated with one or more of the following diagnoses:     Bipolar   Dementia   Depression   Epilepsy   Migraine   Seizure   Trigeminal Neuralgia   Cyclothymia          Passed - Normal ALT or AST on file in past 26 months     Recent Labs   Lab Test 09/16/24 1829   ALT 6     Recent Labs   Lab Test 09/16/24 1829   AST 15             Passed - Medication is active on med list and the sig matches. RN to manually verify dose and sig if red X/fail.     If the protocol passes (green check), you do not need to verify med dose and sig.    A  prescription matches if they are the same clinical intention.    For Example: once daily and every morning are the same.    The protocol can not identify upper and lower case letters as matching and will fail.     For Example: Take 1 tablet (50 mg) by mouth daily     TAKE 1 TABLET (50 MG) BY MOUTH DAILY    For all fails (red x), verify dose and sig.    If the refill does match what is on file, the RN can still proceed to approve the refill request.       If they do not match, route to the appropriate provider.             Passed - Has GFR on file in past 12 months and most recent value is normal        Passed - Recent (12 mo) or future (90 days) visit within the authorizing provider's specialty     The patient must have completed an in-person or virtual visit within the past 12 months or has a future visit scheduled within the next 90 days with the authorizing provider s specialty.  Urgent care and e-visits do not qualify as an office visit for this protocol.          Passed - No active pregnancy on record        Passed - No positive pregnancy test in last 12 months          propranolol ER (INDERAL LA) 60 MG 24 hr capsule 30 capsule 3     Sig: Take 1 capsule (60 mg) by mouth at bedtime.       Beta-Blockers Protocol Passed - 5/13/2025  1:06 PM        Passed - Most recent blood pressure under 140/90 in past 12 months     BP Readings from Last 3 Encounters:   01/06/25 122/76   12/29/24 132/66   12/27/24 112/78       No data recorded            Passed - Patient is age 6 or older        Passed - Medication is active on med list and the sig matches. RN to manually verify dose and sig if red X/fail.     If the protocol passes (green check), you do not need to verify med dose and sig.    A prescription matches if they are the same clinical intention.    For Example: once daily and every morning are the same.    The protocol can not identify upper and lower case letters as matching and will fail.     For Example: Take 1  tablet (50 mg) by mouth daily     TAKE 1 TABLET (50 MG) BY MOUTH DAILY    For all fails (red x), verify dose and sig.    If the refill does match what is on file, the RN can still proceed to approve the refill request.       If they do not match, route to the appropriate provider.             Passed - Medication indicated for associated diagnosis     Medication is associated with one or more of the following diagnoses:     Hypertension (HTN)   Atrial fibrillation/flutter   Angina   ASCVD   Migraine   Heart Failure   Tremor   Anxiety   Ocular hypertension   Glaucoma   PTSD   Obstructive hypertrophic cardiomyopathy   History of myocarditis   Palpitations   POTS (postural orthostatic tachycardia syndrome)   SVT (supraventricular tachycardia)   Hyperthyroidism   Tachycardia   Acute non-st segment elevation myocardial infarction   Subsequent non-st segment elevation myocardial infarction   Ischemic myocardial dysfunction          Passed - Recent (12 mo) or future (90 days) visit within the authorizing provider's specialty     The patient must have completed an in-person or virtual visit within the past 12 months or has a future visit scheduled within the next 90 days with the authorizing provider s specialty.  Urgent care and e-visits do not qualify as an office visit for this protocol.

## 2025-06-11 ENCOUNTER — TELEPHONE (OUTPATIENT)
Dept: PSYCHIATRY | Facility: CLINIC | Age: 45
End: 2025-06-11
Payer: COMMERCIAL

## 2025-06-11 DIAGNOSIS — F33.1 MAJOR DEPRESSIVE DISORDER, RECURRENT EPISODE, MODERATE (H): ICD-10-CM

## 2025-06-11 DIAGNOSIS — F31.81 BIPOLAR 2 DISORDER (H): Primary | ICD-10-CM

## 2025-06-11 DIAGNOSIS — F41.1 GAD (GENERALIZED ANXIETY DISORDER): ICD-10-CM

## 2025-06-11 NOTE — TELEPHONE ENCOUNTER
Outreach per provider request: Hi,   Please reach out to patient in 3 weeks to check in on work stressors and anxiety symptoms (and if using propranolol BID PRN was helpful).   Thank you,   Kat     -------    Update:  June 11, 2025 11:52 AM: Outreach by [employer].  June 12, 2025 1:17 PM: Outreach to patient by phone. No answer, left HIPAA compliant voicemail to return call to clinic or review [employer] message.               Info for call return:    Work stressors:  Anxiety symptoms:  PRN BID propranolol:    Background Information:  From last visit note: As such, encouraged patient to utilize both PRNs of propranolol as tolerated, and to watch for any lightheadedness or dizziness.  Additionally, due to patient noting that the work stressors may dissipate in the next few weeks, collaboratively discussed holding off on medication adjustments and reassessing once the stressor has passed.  She is also open to considering additional medication changes with the next resident in the coming months.  She also continues to see her therapist weekly, and cites this as helpful.

## 2025-06-12 NOTE — TELEPHONE ENCOUNTER
Received return call from patient who states that she has been taking the propranolol 10 mg BID, but states she had discussed taking it up to 3 or 4 times per day and states she has done this. Writer advised that prescription only reads 10 mg BID, but would verify with provider.     Patient denies any fatigue or dizziness with the increase dose. She also states that work stressors and anxiety have improved, but she also attributes this to the summer and having that to look forward to. She feels that this is a less stressful time, so that things will improve naturally.     Patient also states that a few years ago when she was working with Dr. Spear, she had tried 150 mg of Lamictal. However, due to it making her tired, she eventually went back to 125 mg. However, patient states this is on the low end for bipolar and would be interested in trying the 150 mg again since she only tried it for a short period of time. Will ask scheduling to schedule transfer visit with new resident, but patient also open to MTM visit if provider feels this would be appropriate.     Patient also expressed difficulty having to change providers every year and would be interested in a referral for a long-term psychiatrist.

## 2025-06-12 NOTE — TELEPHONE ENCOUNTER
Kat Arredondo MD to Me  Psychiatry Scheduling-Ump (Selected Message)  ML      6/12/25  3:00 PM  No, We only discussed using all her her propanolol avaible, because she was just using 10mg daily, and not BID.     Could offer MTM to discuss options, in light of patients regimen, history of rapid cycling, and medication sensitivity     Thanks!    Follow Up:    Writer returned call to patient and she states she was confused because she thought she was already taking propranolol 10 mg BID PRN, but states she will not take more than this dose.     Patient also in agreement with MTM referral.

## 2025-06-23 NOTE — TELEPHONE ENCOUNTER
FUTURE VISIT INFORMATION      FUTURE VISIT INFORMATION:  Date: 9/17/25  Time: 10a  Location: [facility]  REFERRAL INFORMATION:  Referring provider:  Casie Echevarria MD   Referring providers clinic:  [facility] [place]  Reason for visit/diagnosis: L23.89 (ICD-10-CM) - Allergic contact dermatitis due to other agents, strongly suspected ACD, possibly 2/2 nystatin ointment? please patch test       RECORDS REQUESTED FROM:       Clinic name Comments Records Status   [facility] [place] 1/2/25 - OV, Caitlin Epic   [facility] 12/28/24 - ED, [facility] [place] 12/27/24 - OV, [facility]

## 2025-07-01 ENCOUNTER — VIRTUAL VISIT (OUTPATIENT)
Dept: PHARMACY | Facility: CLINIC | Age: 45
End: 2025-07-01
Payer: COMMERCIAL

## 2025-07-01 DIAGNOSIS — L65.9 HAIR LOSS: ICD-10-CM

## 2025-07-01 DIAGNOSIS — F31.81 BIPOLAR 2 DISORDER (H): Primary | ICD-10-CM

## 2025-07-01 PROCEDURE — 99605 MTMS BY PHARM NP 15 MIN: CPT | Mod: 95 | Performed by: PHARMACIST

## 2025-07-01 PROCEDURE — 99607 MTMS BY PHARM ADDL 15 MIN: CPT | Mod: 95 | Performed by: PHARMACIST

## 2025-07-01 NOTE — Clinical Note
Hello! Patient would like to increase lamotrigine to 150mg daily. Are you ok if I make this change? She has follow-up scheduled with new resident in 2 weeks.   Thank you!  Indu

## 2025-07-01 NOTE — PATIENT INSTRUCTIONS
"Recommendations from today's MTM visit:                                                      I will talk to provider about increasing lamotrigine to 150mg daily    It was great speaking with you today.  I value your experience and would be very thankful for your time in providing feedback in our clinic survey. In the next few days, you may receive an email or text message from [facility] with a link to a survey related to your  clinical pharmacist.\"     To schedule another MTM appointment, please call the clinic directly or you may call the MTM scheduling line at [phone] or toll-free at [phone].     My Clinical Pharmacist's contact information:                                                      Please feel free to contact me with any questions or concerns you have.      Indu Tavares, PharmD, BCPP  Medication Therapy Management Pharmacist  [facility]    "

## 2025-07-01 NOTE — PROGRESS NOTES
Medication Therapy Management (MTM) Encounter    ASSESSMENT:                            Medication Adherence/Access: No issues identified.    Mental Health   Bipolar II Disorder:   Discussed medication options such as increasing lamotrigine or depakote. She is hesitant to increase Depakote due to associated hair loss.  She has been on lamotrigine dose of 150 mg in the past, it was reduced back to 125 mg due to some increased daytime fatigue.  She is interested in trying to increase the dose again at this time to help with depression/anxiety symptoms.  We discussed drug interaction with Depakote - Depakote increases lamotrigine concentrations and monitoring for rash with any dose increases.    Hair loss  Stable at this time    PLAN:                            I will talk to provider about increasing lamotrigine to 150mg daily  *approved per provider; Rx sent; patient notified    Follow-up:   Appointments in Next Year      Jul 01, 2025 9:30 AM  MTM New with Indu Tavares [facility] ([facility] ) [phone]     Jul 18, 2025 1:50 PM  (Arrive by 1:35 PM)  Adult Med Follow UP with Casie Winn MD  [facility] ([facility]) [phone]     Aug 22, 2025 2:30 PM  (Arrive by 2:15 PM)  PHYSICAL with Fahad Concepcion MD  [facility] [place] ([facility] [place] ) [phone]     Sep 17, 2025 10:00 AM  (Arrive by 9:45 AM)  New Allergy with Artemio Snow MD  [facility] [place] ([facility] [place] ) [phone]            SUBJECTIVE/OBJECTIVE:                          Artie Diez is a 44 year old female seen for an initial visit. She was referred to me from psychiatry.      Reason for visit: discuss options, in light  of patients regimen, history of rapid cycling, and medication sensitivity - Patient interested in increasing lamotrigine     Allergies/ADRs: Reviewed in chart  Past Medical History: Reviewed in chart  Tobacco: She reports that she has never smoked. She has never been exposed to tobacco smoke. She has never used smokeless tobacco.  Alcohol: reviewed in chart    Medication Adherence/Access: no issues reported.    Mental Health   Bipolar II Disorder  Lamotrigine 125mg daily  Depakote ER 250mg nightly  Gabapentin 100mg twice daily   Propranolol LA 60mg nightly   Propranolol 10mg twice daily as needed - typically taking once daily for sure; sometimes takes twice    Today patient reports:   - last visit with psychiatry 5/21/25 at which time no medication changes were made; but encouraged to utilize as needed medications  - endorses some worsening anxiety, feeling more on edge/irritable   - recent stressors - surgery several months ago (Nov 2024) and took several months to recover   - she is interested in increasing lamotrigine to 150mg daily  - last tried to increase lamotrigine with provider Dr. Garcia; increased to 150mg and experienced possibly some increased daytime fatigue so decreased back to 125mg  - utilizing prns as above  - depakote was started about 8 years ago when first diagnosed with bipolar - does feel it has been helpful; most recent level 28.9ug/mL 9/16/24. Hesitant to increase dose due to hair loss  - sees therapy provider regularly             Hair loss  Spironolactone 50mg twice daily  Taking for VPA associated hair loss - unsure how helpful     ----------------      I spent 60 minutes with this patient today.     A summary of these recommendations was sent via [employer].    Indu Tavares, PharmD, BCPP  Medication Therapy Management Pharmacist  [facility]      Telemedicine Visit Details  The patient's medications can be safely assessed via a telemedicine encounter.  Type of  service:  Video Conference via [employer]  Originating Location (pt. Location): Home    Distant Location (provider location):  Off-site  Start Time: 9:40 AM  End Time: 10:37 AM     Medication Therapy Recommendations  Bipolar 2 disorder (H)   1 Current Medication: lamoTRIgine (LAMICTAL) 25 MG tablet (Discontinued)   Current Medication Sig: Take 1 tablet (25 mg) by mouth daily. Take along with one 100 mg tablet for a combined total of 125 mg.   Rationale: Dose too low - Dosage too low - Effectiveness   Recommendation: Increase Dose - lamoTRIgine 50 mg half-tab   Status: Accepted per Provider   Identified Date: 7/1/2025 Completed Date: 7/2/2025

## 2025-07-02 RX ORDER — LAMOTRIGINE 100 MG/1
100 TABLET ORAL DAILY
Qty: 30 TABLET | Refills: 2 | Status: SHIPPED | OUTPATIENT
Start: 2025-07-02

## 2025-07-02 RX ORDER — LAMOTRIGINE 25 MG/1
TABLET ORAL
Qty: 60 TABLET | Refills: 2 | Status: SHIPPED | OUTPATIENT
Start: 2025-07-02

## 2025-07-15 ASSESSMENT — ANXIETY QUESTIONNAIRES
1. FEELING NERVOUS, ANXIOUS, OR ON EDGE: NEARLY EVERY DAY
GAD7 TOTAL SCORE: 16
8. IF YOU CHECKED OFF ANY PROBLEMS, HOW DIFFICULT HAVE THESE MADE IT FOR YOU TO DO YOUR WORK, TAKE CARE OF THINGS AT HOME, OR GET ALONG WITH OTHER PEOPLE?: SOMEWHAT DIFFICULT
7. FEELING AFRAID AS IF SOMETHING AWFUL MIGHT HAPPEN: MORE THAN HALF THE DAYS
2. NOT BEING ABLE TO STOP OR CONTROL WORRYING: MORE THAN HALF THE DAYS
6. BECOMING EASILY ANNOYED OR IRRITABLE: NEARLY EVERY DAY
3. WORRYING TOO MUCH ABOUT DIFFERENT THINGS: NEARLY EVERY DAY
4. TROUBLE RELAXING: MORE THAN HALF THE DAYS
GAD7 TOTAL SCORE: 16
IF YOU CHECKED OFF ANY PROBLEMS ON THIS QUESTIONNAIRE, HOW DIFFICULT HAVE THESE PROBLEMS MADE IT FOR YOU TO DO YOUR WORK, TAKE CARE OF THINGS AT HOME, OR GET ALONG WITH OTHER PEOPLE: SOMEWHAT DIFFICULT
5. BEING SO RESTLESS THAT IT IS HARD TO SIT STILL: SEVERAL DAYS

## 2025-07-17 NOTE — PROGRESS NOTES
"Virtual Visit Details    Type of service:  Video Visit     Originating Location (pt. Location): Home    Distant Location (provider location):  On-site  Platform used for Video Visit: William   Visit started at 0159pm  Ended at 0258pm       [facility]  General Clinic Team  TRANSFER of CARE DIAGNOSTIC ASSESSMENT       CARE TEAM:    PCP- Fahad Concepcion  Therapist-  Evangelina ([place] [facility], primarily for DBT q week), and individual 1:1 Carl (she is at [facility])   OB/GYN: Wendy Dobbs MD    Artie is a 44 year old who uses the pronouns she, her, hers.                   Chief Concern    \"Med doses\"                Assessment & Plan   Bipolar II Disorder, current episode depressed, mild (history of rapid cycling)  EAMON     Medical:  Uterine Fibroids S/p Laparoscopic total hysterectomy November 2024        Artie is a 44 year old  female with a past psychiatric history of Bipolar II Disorder and EAMON who presents for transfer of care. Per record, patient saw a mood disorder specialist in [place], with diagnostic clarification of  bipolar II disorder with a rapid cycling pattern and anxiety diagnoses. Medical history is significant for Uterine Fibroids S/p Laparoscopic total hysterectomy lst fall after which she reportedly had hypomanic symptoms after being prescribed steroids. Substance use does not appear to be playing a contributing role in the patient's presentation.      Today, Artie  reports that she feels some low mood with some anxiety since her hysterectomy, but also notes that she \"cares less what people think\" and has felt more impulsive. She shared that she feels that her hypomania after the surgery was due to stressors and not getting along with doctors rather than hypomania. Perimenopause symptoms have continue with brain fog. She currently feels overwhelmed that her life is \"catching up\" with her after the surgery such as home " chores and returning to work. She presents with some tangential and rapid speech with full range of affect which, in the context of recent change in baseline behavior to confronting others in her life more and sharing things she has not shared before, are concerning for some hypomanic symptoms. Due to this change, recommended going up on Lamictal slowly with every other week dose of an extra 25mg, so that she takes 150mg total daily every other week from 125mg daily. She also continues to see her therapist weekly, and cites this as helpful. Has chronic SI without plan and intent and says this has not changed. Can cite reasons to live and safety plan.        Psychotropic Drug Interactions:    ADDITIVE SEDATION: Melatonin  ADDITIVE CNS/RESPIRATORY DEPRESSION: Lamictal, Depakote, gabapentin, THC  Depakote may increase the serum concentration of Lamictal  Management: use lowest therapeutic doses of Depakote, Lamictal, gabapentin and routine monitoring     [place]MP was checked today: indicates that controlled prescriptions have been filled as prescribed      Risk Statements:   Treatment Risk: Risks, benefits, alternatives and potential adverse effects have been discussed and are understood.   Safety Risk: Artie did not appear to be an imminent safety risk to self or others.       PLAN     1) Medications:   - Increase Lamotrigine - 150 mg every other day. On alternate days, 125 mg every other day  - Continue Depakote ER - 250 mg at bedtime   - Continue gabapentin- 100 mg every day at noon and 100 mg at bedtime.  - Continue propranolol IR 10 mg bid /prn- for anxiety - encouraged bedtime use  - Continue propranolol ER 60 mg qhs  - Continue melatonin 1-1.5 mg at bedtime, OTC     PCP-   - Reported by patient in past: Ativan 0.25-0.5 mg at bedtime/prn (1x-2x per week). We have not taken over prescribing this medication and advise against due to risks outweighing benefits. Per  (as of today): No evidence of filling Ativan in  the past 1 year.   Spironolactone- 50 mg bid- for hair loss  L-Methylfolate  Prednisone     2) Psychotherapy:   Individual therapy ongoing, works with DBT .   Working with racial trauma consultant/ on identity development     3) Next due:  Labs: Lamotrigine, Depakote, CBC, and CMP level last obtained on 9/2024 - Depakote was low at 28.9. CBC, CMP, A1C, TSH labs ordered today.   Rating scales: PHQ-9, EAMON-7     4) Referrals: None     5) Follow-up: Return to clinic in 4 weeks  Nursing staff to call patient in two weeks to see how higher dose of Lamictal is going.                    Pertinent Background  The following section contains information copied from previous notes and has been reviewed, edited, and verified by the patient:     Artie arnett was diagnosed with depression at age 14. Started various medications without working diagnosis in late 20's, had been resistant to medication in past, with multiple somatic symptoms, multiple diagnoses in past. Current diagnosis made about 6 years ago by mood disorder specialist in [place], done as second opinion as struggling with approach of first psychiatrist.     Has been on the current combination of medications since she saw the specialist, and per said specialist, patients with this type of condition cannot be on antidepressants. She is prone to side effects with medications and sometimes, its hard for her to manage her medications due to the side effects, so her doctor was keeping her meds at a low dosage. Sleep is on and off. She was actively involved in CBT and DBT while in [place]     Psych pertinent item history includes suicide attempt [xseveral overdoses as a teenager], SIB [mainly in her teens/20's] and mutiple psychotropic trials                     History of Present Illness     Met with MTM a week or so ago and was thinking of going up on Lamictal. Increased when first started here several years ago. Still on the 125. Tried 150 with Dr. Garcia and  "felt too tired so went back to 125 then. Thinking about retrying the 150 again. Stress was higher a few months ago. But decided to wait to go up at that time. Met with the pharmacist in the meantime.   Has the feeling of tiredness in her body lately, does not last the entire day. Was more of a physical sensation, a little light headed when she has tried the higher dose of Lamictal.    Hesitant to go up on Depakote due to hair loss history despite low level. Also on Lamotrigine. Hair loss stable at this time.   6 months to heal with her hysterectomy. Was a stressful time. Had a worse anxiety and depression after the surgery. Talked about a PHP at that time, had an intake appt but did not do it due to her schedule and realized she could not take more leave.     Per MTM note: \"Discussed medication options such as increasing lamotrigine or depakote. She is hesitant to increase Depakote due to associated hair loss.  She has been on lamotrigine dose of 150 mg in the past, it was reduced back to 125 mg due to some increased daytime fatigue.  She is interested in trying to increase the dose again at this time to help with depression/anxiety symptoms.  We discussed drug interaction with Depakote - Depakote increases lamotrigine concentrations and monitoring for rash with any dose increases.\"     Perimenopause and has low energy. SYMPTOMS of perimenopause have been forgetfulness and slowed thinking, findings words, has become worse she thinks.  Work is stressful. Has been back to work since leave for her surgery. Has been playing catch up since then. Has an allergic reaction in recovery after the surgery and is going to do allergy testing now. She feels it was the steroids.     Feels she has not gotten back to baseline in her routine with home responsibilities. Is thinking of getting help in the fall with this.   Finds gabapentin helpful.   Discussed options for transferring care and agreed to discuss further next time as she " "does not currently want to transfer to a mid level.     Mood: \"accumulation of all those things\"  Sleep: Has been a struggle (2/2 increased anxiety), harder to function with less sleep lately, stays up late due to anxiety which is higher in the evenings. Has been trying to use propranolol as needed in the evening.   Appetite: Not bad, some overeating but is conscientious, eats late in the evening    Energy: tired. Low    SI/HI/Safety Concerns: Has chronic Passive death wish, no SI, plan or intent, has noticed it more since surgery.       Psychiatric Review of Systems:   Depression: Better since the surgery episode, low energy, insomnia, poor concentration, still some feelings of guilt but feels this her chronic state. Feels it is exhausting. Chronic PDW. Has had more lows lately.   Anxiety: worries, ruminations, higher than normal lately   Elevated: Denied, talks fast at baseline, expressive in social situations with bursts of energy when engaging socially, feels that since the surgery she has \"lost her filter and cares less what others think\". Was more of a cautious speaker before the surgery. Feels a little bit more uninhibited since the surgery. Said she cares less about conflict with her friends. Steroids after the surgery \"messed with my brain\" - realized she could not drive because she felt not confident to do so. Driving felt foreign and that is how she noticed the reaction to the steroids. Was less inhibited, bought things she would not have otherwise. Likes feeling less uninhibited, feels it fits her personality more instead of bottling up things and can express more now.   Psychosis: no  Trauma Related: no  Insomnia: Yes   Side effects from medications: No   Medication changes since last visit: said she did try 25mg addition to reach 150mg daily of Lamictal with MTM recently.     Other:   ADHD:  trouble sustaining attention, distracted, attributes to perimenopausal symptoms and also feels she has " undiagnosed ADHD which she would like to address in future.     Current Social History:  Financial/occupational: N .   Living situation: Lives alone in [facility] in [facility] [facility]  Social/spiritual support: parents, friends      Pertinent Substance Use:  Alcohol: No   Cannabis: Yes: THC Drinks, 1 can maybe once every few weeks (3-5mg), has not taken it in a while however although she has some. Some cans at her parent's place. Did have a drink about a week ago with a friend.   Tobacco: No  Caffeine:  Yes: 1 cup of coffee daily   Opioids: No   Narcan Kit current: N/A  Other substances: No     Medical Review of Systems:   Lightheadedness/orthostasis: no  Dizziness: no  Headaches: yes with a different brand of Depakote from the pharmacy, has noticed it with brand changes. Has been switching brands of depakote bc had some of the old formulation left in her house  GI: some IBS  CV: No  Sexual health concerns: No   A comprehensive review of systems was performed and is negative other than noted above.  Contraception: S/p Laparoscopic total hysterectomy November 22, 2024                  Physical Exam  (Vitals Only)    LMP 07/31/2024 (Approximate)   Pulse Readings from Last 3 Encounters:   01/06/25 79   12/29/24 71   12/27/24 90     Wt Readings from Last 3 Encounters:   01/06/25 54 kg (119 lb)   12/28/24 54.4 kg (119 lb 14.9 oz)   12/24/24 54 kg (119 lb)     BP Readings from Last 3 Encounters:   01/06/25 122/76   12/29/24 132/66   12/27/24 112/78                      Mental Status Exam     Alertness: alert  and oriented  Appearance: adequately groomed  Behavior/Demeanor: cooperative, pleasant, and calm, with good  eye contact   Speech: increased rate  Language: intact and no problems  Psychomotor: normal or unremarkable  Mood: anxious  Affect: full range; congruent to: mood- yes, content- yes  Thought Process/Associations: circumstantial and talkative  Thought Content:  Reports PDW;  Denies suicidal & violent  "ideation and delusions  Perception:  Reports none;  Denies hallucinations  Insight: fair - feels she did not have hypomania after her surgery   Judgment: good  Cognition: does  appear grossly intact; formal cognitive testing was not done  Gait and Station: N/A (telehealth)                   Social History                [per patient report]   Financial: See above    Living situation: See above   Relationships: Single  Children: No  Social/spiritual support: See above  Early history/Education: Adopted from [place] with her Twin sister, they were abandoned by their bio mom, she has an older sister who was also adopted from [place] as an infant. She grew up in [place], has a Masters in Communications, older sister adopted 10 years before them    Legal: None                 Family History     Unknown, patient is adopted. Has a twin sister.                   Past Psychiatric History     Self injurious behavior [method, most recent]: Yes: Started via cutting in teens and early 20's. Last time she cut was during heightened stress 4-5 years ago  Suicide attempt [#, most recent, method]: Yes: \"Cry for help\" (from parents), 2 overdose on meds as a teenager  Suicidal ideation hx [passive, active]: Yes: Daily PDW     Violence/Aggression Hx:No   Psychosis Hx: No   Eating Disorder Hx: No  Trauma hx: Yes: abandoned at age 3 and 1/2 years     Psych Hosp [#, most recent]: No  Commitment: No   TMS/ECT: No  Outpatient Programs [Day treatment, DBT, eating disorder tx, etc]: Yes: IOP in 2001, DBT x 2years (4151-7498)    SUBSTANCE USE HISTORY   Past Use: denies hx of use                  Past Psychotropic Medication Trials    Medication Max Dose (mg) Dates / Duration Helpful? DC Reason / Adverse Effects?   Paxil        further bad side effects on higher doses, night sweats    Wellbutrin            Lithium            Depakote            Adderall            Strattera           Clonazepam           Buspar       Akathisia   Pristiq            "   Of note, per chart review, Tends not to tolerate most medications, c/o GI symptoms and HA .                   Past Medical History     Neurologic Hx [head injury, seizures, etc]: None     Pregnant / Breastfeeding : No  Contraception : hysterectomy  laparoscopic total hysterectomy in fall 2024.     Patient Active Problem List   Diagnosis    Heavy menses    CARDIOVASCULAR SCREENING; LDL GOAL LESS THAN 160    Major depressive disorder, recurrent episode, moderate (H)    EAMON (generalized anxiety disorder)    Attention deficit disorder    Dysmenorrhea    Glaucoma suspect    Myopia    Bipolar 2 disorder (H)                     Allergies     Amoxicillin, Amphotericin b, Phenylbutazones, and Penicillin g                Medications     Current Outpatient Medications   Medication Sig Dispense Refill    acetaminophen (TYLENOL) 325 MG tablet Take 3 tablets (975 mg) by mouth every 6 hours as needed for mild pain.      divalproex sodium extended-release (DEPAKOTE ER) 250 MG 24 hr tablet Take 1 tablet (250 mg) by mouth daily. 30 tablet 2    gabapentin (NEURONTIN) 100 MG capsule Take 1 capsule (100 mg) by mouth 2 times daily as needed for other (anxiety). Take 1 tab at noon and 1 cap at bedtime 90 capsule 2    ibuprofen (ADVIL/MOTRIN) 600 MG tablet Take 1 tablet (600 mg) by mouth every 6 hours as needed for moderate pain. 50 tablet 0    lamoTRIgine (LAMICTAL) 100 MG tablet Take 1 tablet (100 mg) by mouth daily. Take along with two 25 mg tablet for a combined total of 150 mg. 30 tablet 2    lamoTRIgine (LAMICTAL) 25 MG tablet Take 2 tablet (50 mg) by mouth daily. Take along with one 100 mg tablet for a combined total of 150 mg. 60 tablet 2    propranolol (INDERAL) 10 MG tablet Take 1 tablet (10 mg) by mouth 2 times daily as needed (Anxiety). 60 tablet 2    propranolol ER (INDERAL LA) 60 MG 24 hr capsule Take 1 capsule (60 mg) by mouth at bedtime. 30 capsule 2    spironolactone (ALDACTONE) 50 MG tablet Take 1 tablet (50 mg) by  mouth 2 times daily. 180 tablet 3                     Data         10/27/2024    11:47 AM 3/1/2025    10:50 PM 7/15/2025     8:49 PM   PROMIS-10 Total Score w/o Sub Scores   PROMIS TOTAL - SUBSCORES 21  22 22        Patient-reported          No data to display                  1/6/2025     3:36 PM 3/4/2025    12:39 PM 5/21/2025     8:12 AM   PHQ-9 SCORE   PHQ-9 Total Score MyChart 22 (Severe depression) 22 (Severe depression) 22 (Severe depression)   PHQ-9 Total Score 22  22  22        Patient-reported    Proxy-reported         3/1/2025    10:49 PM 5/18/2025     6:45 PM 7/15/2025     8:46 PM   EAMON-7 SCORE   Total Score 14 (moderate anxiety) 18 (severe anxiety) 16 (severe anxiety)   Total Score 14  18  16        Patient-reported       Liver/Kidney Function, TSH Metabolic Blood counts   Recent Labs   Lab Test 12/28/24 2216 11/24/24 2028 09/16/24  1829   AST  --   --  15   ALT  --   --  6   ALKPHOS  --   --  65   CR 0.91 0.80 0.85     Recent Labs   Lab Test 06/17/22  1617   TSH 1.09    Recent Labs   Lab Test 10/30/20  1200   CHOL 167   TRIG 148   LDL 81   HDL 56     No lab results found.  Recent Labs   Lab Test 12/28/24 2216   *    Recent Labs   Lab Test 12/28/24 2216   WBC 7.8   HGB 12.7   HCT 38.2   MCV 85              ECG: None in EHR      PROVIDER:Casie Winn MD  Psychiatry Resident Physician  PGY3    Patient staffed in clinic with Dr. Garcia who will sign the note.  Supervisor is Dr. Hathaway.    Answers submitted by the patient for this visit:  Patient Health Questionnaire (G7) (Submitted on 7/15/2025)  EAMON 7 TOTAL SCORE: 16

## 2025-07-18 ENCOUNTER — VIRTUAL VISIT (OUTPATIENT)
Dept: PSYCHIATRY | Facility: CLINIC | Age: 45
End: 2025-07-18
Attending: STUDENT IN AN ORGANIZED HEALTH CARE EDUCATION/TRAINING PROGRAM
Payer: COMMERCIAL

## 2025-07-18 VITALS — WEIGHT: 120 LBS | BODY MASS INDEX: 21.26 KG/M2 | HEIGHT: 63 IN

## 2025-07-18 DIAGNOSIS — F41.1 GAD (GENERALIZED ANXIETY DISORDER): ICD-10-CM

## 2025-07-18 DIAGNOSIS — F31.81 BIPOLAR 2 DISORDER (H): Primary | ICD-10-CM

## 2025-07-18 PROCEDURE — 90792 PSYCH DIAG EVAL W/MED SRVCS: CPT | Mod: 95

## 2025-07-18 RX ORDER — DIVALPROEX SODIUM 250 MG/1
500 TABLET, FILM COATED, EXTENDED RELEASE ORAL DAILY
Qty: 30 TABLET | Refills: 2 | Status: CANCELLED | OUTPATIENT
Start: 2025-07-18

## 2025-07-18 RX ORDER — LAMOTRIGINE 100 MG/1
100 TABLET ORAL DAILY
Qty: 30 TABLET | Refills: 1 | Status: SHIPPED | OUTPATIENT
Start: 2025-07-18

## 2025-07-18 RX ORDER — GABAPENTIN 100 MG/1
100 CAPSULE ORAL 2 TIMES DAILY PRN
Qty: 90 CAPSULE | Refills: 1 | Status: SHIPPED | OUTPATIENT
Start: 2025-07-18 | End: 2025-07-31

## 2025-07-18 RX ORDER — PROPRANOLOL HYDROCHLORIDE 60 MG/1
60 CAPSULE, EXTENDED RELEASE ORAL AT BEDTIME
Qty: 30 CAPSULE | Refills: 1 | Status: SHIPPED | OUTPATIENT
Start: 2025-07-18

## 2025-07-18 RX ORDER — PROPRANOLOL HYDROCHLORIDE 10 MG/1
10 TABLET ORAL 2 TIMES DAILY PRN
Qty: 60 TABLET | Refills: 1 | Status: SHIPPED | OUTPATIENT
Start: 2025-07-18

## 2025-07-18 RX ORDER — DIVALPROEX SODIUM 250 MG/1
250 TABLET, FILM COATED, EXTENDED RELEASE ORAL DAILY
Qty: 30 TABLET | Refills: 1 | Status: SHIPPED | OUTPATIENT
Start: 2025-07-18

## 2025-07-18 RX ORDER — LAMOTRIGINE 25 MG/1
TABLET ORAL
Qty: 60 TABLET | Refills: 1 | Status: SHIPPED | OUTPATIENT
Start: 2025-07-18

## 2025-07-18 NOTE — NURSING NOTE
Current patient location: [address]   [place] [address]    Is the patient currently in the state of [place]? YES    Visit mode: VIDEO    If the visit is dropped, the patient can be reconnected by:VIDEO VISIT: Text to cell phone:   Telephone Information:   Mobile [phone]       Will anyone else be joining the visit? NO  (If patient encounters technical issues they should call [phone]    Are changes needed to the allergy or medication list? No    Are refills needed on medications prescribed by this physician? YES    Rooming Documentation:  Questionnaire(s) completed    Reason for visit: RECHECK    Karina JOHNSON

## 2025-07-18 NOTE — PATIENT INSTRUCTIONS
Med Changes:   For Lamictal - will increase dose to 150mg every other day from 125mg. Nursing will call you in 2 weeks to see how the 150mg of Lamictal every other day is going.   Continue to use propranolol for anxiety before bed.     Other: CBC, CMP, A1C, TSH labs ordered. Can be completed at any [facility] lab.     Next Visit : Follow up in 4 weeks, virtually    **For crisis resources, please see the information at the end of this document**   Patient Education    Thank you for coming to the [facility] [place] [facility].     Lab Testing:  If you had lab testing today and your results are reassuring or normal they will be mailed to you or sent through [employer] within 7 days. If the lab tests need quick action we will call you with the results. The phone number we will call with results is # [phone]. If this is not the best number please call our clinic and change the number.     Medication Refills:  If you need any refills please call your pharmacy and they will contact us. Our fax number for refills is [phone].   Three business days of notice are needed for general medication refill requests.   Five business days of notice are needed for controlled substance refill requests.   If you need to change to a different pharmacy, please contact the new pharmacy directly. The new pharmacy will help you get your medications transferred.     Contact Us:  Please call [phone] during business hours (8-5:00 M-F).   If you have medication related questions after clinic hours, or on the weekend, please call [phone].     Financial Assistance [phone]   Medical Records [phone]       MENTAL HEALTH CRISIS RESOURCES:  For a emergency help, please call 911 or go to the nearest Emergency Department.     Emergency Walk-In Options:   EmPATH Unit @ [facility] ([place]): [phone] - Specialized mental health emergency area designed to be calming  [facility]  [place] ([place]): [phone]  [facility] ([place]): [phone]  [facility] ([place]): [phone]    [facility] Crisis Information:   [place]: [phone]  [place]: [phone]  [place] ([place]) - Adult: [phone]     Child: [phone]  Patrick - Adult: [phone]     Child: [phone]  [place]: [phone]  List of all [facility] resources:   https://[place].gov/dhs/people-we-serve/adults/health-care/mental-health/resources/crisis-contacts.jsp    National Crisis Information:   Crisis Text Line: Text  [place]  to 812653  Suicide & Crisis Lifeline: 988  National Suicide Prevention Lifeline: 4-409-361-TALK (1-201.131.9735)       For online chat options, visit https://suicidepreventionlifeline.org/chat/  Poison Control Center: 9-685-026-9026  Trans Lifeline: 9-353-004-9113 - Hotline for transgender people of all ages  The [employer]: 6-934-043-4274 - Hotline for LGBT youth     For Non-Emergency Support:   Fast Tracker: Mental Health & Substance Use Disorder Resources -   https://www.[employer].org/

## 2025-07-31 ENCOUNTER — TELEPHONE (OUTPATIENT)
Dept: INTERNAL MEDICINE | Facility: CLINIC | Age: 45
End: 2025-07-31
Payer: COMMERCIAL

## 2025-08-04 ENCOUNTER — LAB (OUTPATIENT)
Dept: LAB | Facility: CLINIC | Age: 45
End: 2025-08-04
Payer: COMMERCIAL

## 2025-08-04 ENCOUNTER — TELEPHONE (OUTPATIENT)
Dept: PSYCHIATRY | Facility: CLINIC | Age: 45
End: 2025-08-04

## 2025-08-04 DIAGNOSIS — F31.81 BIPOLAR 2 DISORDER (H): ICD-10-CM

## 2025-08-04 DIAGNOSIS — Z51.81 ENCOUNTER FOR THERAPEUTIC DRUG MONITORING: ICD-10-CM

## 2025-08-04 LAB
ALBUMIN SERPL BCG-MCNC: 4.5 G/DL (ref 3.5–5.2)
ALP SERPL-CCNC: 79 U/L (ref 40–150)
ALT SERPL W P-5'-P-CCNC: 7 U/L (ref 0–50)
ANION GAP SERPL CALCULATED.3IONS-SCNC: 11 MMOL/L (ref 7–15)
AST SERPL W P-5'-P-CCNC: 14 U/L (ref 0–45)
BASOPHILS # BLD AUTO: 0 10E3/UL (ref 0–0.2)
BASOPHILS NFR BLD AUTO: 0 %
BILIRUB SERPL-MCNC: 0.4 MG/DL
BUN SERPL-MCNC: 13.5 MG/DL (ref 6–20)
CALCIUM SERPL-MCNC: 9.8 MG/DL (ref 8.8–10.4)
CHLORIDE SERPL-SCNC: 99 MMOL/L (ref 98–107)
CREAT SERPL-MCNC: 0.92 MG/DL (ref 0.51–0.95)
EGFRCR SERPLBLD CKD-EPI 2021: 78 ML/MIN/1.73M2
EOSINOPHIL # BLD AUTO: 0.1 10E3/UL (ref 0–0.7)
EOSINOPHIL NFR BLD AUTO: 1 %
ERYTHROCYTE [DISTWIDTH] IN BLOOD BY AUTOMATED COUNT: 12 % (ref 10–15)
EST. AVERAGE GLUCOSE BLD GHB EST-MCNC: 97 MG/DL
GLUCOSE SERPL-MCNC: 84 MG/DL (ref 70–99)
HBA1C MFR BLD: 5 %
HCO3 SERPL-SCNC: 28 MMOL/L (ref 22–29)
HCT VFR BLD AUTO: 42.7 % (ref 35–47)
HGB BLD-MCNC: 13.9 G/DL (ref 11.7–15.7)
IMM GRANULOCYTES # BLD: 0 10E3/UL
IMM GRANULOCYTES NFR BLD: 0 %
LYMPHOCYTES # BLD AUTO: 2 10E3/UL (ref 0.8–5.3)
LYMPHOCYTES NFR BLD AUTO: 21 %
MCH RBC QN AUTO: 28.1 PG (ref 26.5–33)
MCHC RBC AUTO-ENTMCNC: 32.6 G/DL (ref 31.5–36.5)
MCV RBC AUTO: 86 FL (ref 78–100)
MONOCYTES # BLD AUTO: 0.6 10E3/UL (ref 0–1.3)
MONOCYTES NFR BLD AUTO: 7 %
NEUTROPHILS # BLD AUTO: 7.1 10E3/UL (ref 1.6–8.3)
NEUTROPHILS NFR BLD AUTO: 72 %
NRBC # BLD AUTO: 0 10E3/UL
NRBC BLD AUTO-RTO: 0 /100
PLATELET # BLD AUTO: 303 10E3/UL (ref 150–450)
POTASSIUM SERPL-SCNC: 3.8 MMOL/L (ref 3.4–5.3)
PROT SERPL-MCNC: 7.3 G/DL (ref 6.4–8.3)
RBC # BLD AUTO: 4.94 10E6/UL (ref 3.8–5.2)
SODIUM SERPL-SCNC: 138 MMOL/L (ref 135–145)
TSH SERPL DL<=0.005 MIU/L-ACNC: 2.04 UIU/ML (ref 0.3–4.2)
WBC # BLD AUTO: 9.9 10E3/UL (ref 4–11)

## 2025-08-04 PROCEDURE — 99000 SPECIMEN HANDLING OFFICE-LAB: CPT | Performed by: PATHOLOGY

## 2025-08-04 PROCEDURE — 85025 COMPLETE CBC W/AUTO DIFF WBC: CPT | Performed by: PATHOLOGY

## 2025-08-04 PROCEDURE — 83036 HEMOGLOBIN GLYCOSYLATED A1C: CPT

## 2025-08-04 PROCEDURE — 36415 COLL VENOUS BLD VENIPUNCTURE: CPT | Performed by: PATHOLOGY

## 2025-08-04 PROCEDURE — 84443 ASSAY THYROID STIM HORMONE: CPT | Performed by: PATHOLOGY

## 2025-08-04 PROCEDURE — 80053 COMPREHEN METABOLIC PANEL: CPT | Performed by: PATHOLOGY

## 2025-08-04 PROCEDURE — 80175 DRUG SCREEN QUAN LAMOTRIGINE: CPT | Mod: 90 | Performed by: PATHOLOGY

## 2025-08-04 PROCEDURE — 80165 DIPROPYLACETIC ACID FREE: CPT | Mod: 90 | Performed by: PATHOLOGY

## 2025-08-04 PROCEDURE — 80164 ASSAY DIPROPYLACETIC ACD TOT: CPT | Mod: 90 | Performed by: PATHOLOGY

## 2025-08-05 LAB — LAMOTRIGINE SERPL-MCNC: 11.1 UG/ML

## 2025-08-06 DIAGNOSIS — F31.81 BIPOLAR 2 DISORDER (H): ICD-10-CM

## 2025-08-06 LAB
VALPROATE FREE MFR SERPL: ABNORMAL %
VALPROATE FREE SERPL-MCNC: <7 UG/ML
VALPROATE SERPL-MCNC: 35 UG/ML

## 2025-08-06 RX ORDER — DIVALPROEX SODIUM 125 MG/1
125 CAPSULE, COATED PELLETS ORAL DAILY
Qty: 30 CAPSULE | Refills: 1 | Status: SHIPPED | OUTPATIENT
Start: 2025-08-06

## 2025-08-07 ENCOUNTER — TELEPHONE (OUTPATIENT)
Dept: PSYCHIATRY | Facility: CLINIC | Age: 45
End: 2025-08-07
Payer: COMMERCIAL

## 2025-08-07 ENCOUNTER — OFFICE VISIT (OUTPATIENT)
Dept: PSYCHIATRY | Facility: CLINIC | Age: 45
End: 2025-08-07
Attending: STUDENT IN AN ORGANIZED HEALTH CARE EDUCATION/TRAINING PROGRAM
Payer: COMMERCIAL

## 2025-08-07 VITALS
SYSTOLIC BLOOD PRESSURE: 115 MMHG | BODY MASS INDEX: 21.84 KG/M2 | WEIGHT: 123.3 LBS | TEMPERATURE: 97.8 F | DIASTOLIC BLOOD PRESSURE: 74 MMHG | HEART RATE: 67 BPM

## 2025-08-07 DIAGNOSIS — F31.81 BIPOLAR 2 DISORDER (H): Primary | ICD-10-CM

## 2025-08-07 PROCEDURE — 99214 OFFICE O/P EST MOD 30 MIN: CPT

## 2025-08-07 PROCEDURE — 99214 OFFICE O/P EST MOD 30 MIN: CPT | Mod: GC

## 2025-08-07 PROCEDURE — G2211 COMPLEX E/M VISIT ADD ON: HCPCS

## 2025-08-07 ASSESSMENT — PAIN SCALES - GENERAL: PAINLEVEL_OUTOF10: NO PAIN (0)

## 2025-08-15 PROCEDURE — 82306 VITAMIN D 25 HYDROXY: CPT | Performed by: INTERNAL MEDICINE

## 2025-08-15 PROCEDURE — 83001 ASSAY OF GONADOTROPIN (FSH): CPT | Performed by: INTERNAL MEDICINE

## 2025-08-15 PROCEDURE — 82607 VITAMIN B-12: CPT | Performed by: INTERNAL MEDICINE

## 2025-08-15 PROCEDURE — 99000 SPECIMEN HANDLING OFFICE-LAB: CPT | Performed by: PATHOLOGY

## 2025-08-15 PROCEDURE — 82670 ASSAY OF TOTAL ESTRADIOL: CPT | Performed by: INTERNAL MEDICINE

## 2025-08-19 ENCOUNTER — PATIENT OUTREACH (OUTPATIENT)
Dept: CARE COORDINATION | Facility: CLINIC | Age: 45
End: 2025-08-19
Payer: COMMERCIAL

## 2025-08-21 ENCOUNTER — MYC MEDICAL ADVICE (OUTPATIENT)
Dept: PSYCHIATRY | Facility: CLINIC | Age: 45
End: 2025-08-21
Payer: COMMERCIAL

## 2025-09-02 ENCOUNTER — MYC REFILL (OUTPATIENT)
Dept: PSYCHIATRY | Facility: CLINIC | Age: 45
End: 2025-09-02
Payer: COMMERCIAL

## 2025-09-02 DIAGNOSIS — F31.81 BIPOLAR 2 DISORDER (H): ICD-10-CM

## 2025-09-02 RX ORDER — GABAPENTIN 100 MG/1
100-300 CAPSULE ORAL 2 TIMES DAILY PRN
Qty: 90 CAPSULE | Refills: 0 | Status: SHIPPED | OUTPATIENT
Start: 2025-09-02

## 2025-09-17 ENCOUNTER — PRE VISIT (OUTPATIENT)
Dept: ALLERGY | Facility: CLINIC | Age: 45
End: 2025-09-17

## (undated) DEVICE — ANTIFOG SOLUTION SEE SHARP 150M TROCAR SWABS 30978 (COI)

## (undated) DEVICE — SYR 10ML LL W/O NDL 302995

## (undated) DEVICE — DRAPE UNDER BUTTOCK 8483

## (undated) DEVICE — BLADE SWITCH SCISSORS TIP 5MM 89-5100B

## (undated) DEVICE — COVER CAMERA IN-LIGHT DISP LT-C02

## (undated) DEVICE — ADAPTER DRAPE ALLY AU-AD

## (undated) DEVICE — ENDO ACCESS PLATFORM GELPOINT MINI CNGL3

## (undated) DEVICE — GOWN XLG DISP 9545

## (undated) DEVICE — PAD PERI INDIV WRAP 11" 2022A

## (undated) DEVICE — TUBING SUCTION MEDI-VAC 1/4"X20' N620A

## (undated) DEVICE — UTERINE MANIPULATOR RUMI 6.7MMX8CM UMB678

## (undated) DEVICE — TUBING IRRIG CYSTO/BLADDER SET 81" LF 2C4040

## (undated) DEVICE — DAVINCI XI SEAL UNIVERSAL 5-12MM 470500

## (undated) DEVICE — Device

## (undated) DEVICE — SU DERMABOND ADVANCED .7ML DNX12

## (undated) DEVICE — PREP DYNA-HEX 4% CHG SCRUB 4OZ BOTTLE MDS098710

## (undated) DEVICE — NDL INSUFFLATION 13GA 120MM C2201

## (undated) DEVICE — GLOVE BIOGEL PI ULTRATOUCH G SZ 6.5 42165

## (undated) DEVICE — DAVINCI XI ESU FORCE BIPOLAR 8MM 471405

## (undated) DEVICE — SOL NACL 0.9% INJ 1000ML BAG 2B1324X

## (undated) DEVICE — CATH TRAY FOLEY SURESTEP 16FR WDRAIN BAG STLK LATEX A300316A

## (undated) DEVICE — STABILIZER ARCH KOH-EFFICIENT 3.0CM KC-ARCH-30

## (undated) DEVICE — LINEN TOWEL PACK X30 5481

## (undated) DEVICE — DAVINCI XI DRIVER NDL LARGE 8MM EXT 471006

## (undated) DEVICE — CLEANER INST PRE-KLENZ SOAK SHIELD TUBE 6 ML MEDIUM 2D66J4

## (undated) DEVICE — DAVINCI XI DRAPE COLUMN 470341

## (undated) DEVICE — ENDO TROCAR CONMED AIRSEAL BLADELESS 08X120MM IAS8-120LP

## (undated) DEVICE — ESU GROUND PAD UNIVERSAL W/O CORD

## (undated) DEVICE — SYR 50ML LL W/O NDL 309653

## (undated) DEVICE — DAVINCI XI OBTURATOR BLADELESS 8MM 470359

## (undated) DEVICE — DAVINCI HOT SHEARS TIP COVER  400180

## (undated) DEVICE — ENDO POUCH UNIVERSAL RETRIEVAL SYSTEM INZII 12/15MM CD004

## (undated) DEVICE — PREP CHLORAPREP 26ML TINTED HI-LITE ORANGE 930815

## (undated) DEVICE — TRAY PREP DRY SKIN SCRUB 067

## (undated) DEVICE — ESU PENCIL W/HOLSTER E2350H

## (undated) DEVICE — DAVINCI XI MONOPOLAR SCISSORS HOT SHEARS 8MM 470179

## (undated) DEVICE — GLOVE BIOGEL PI MICRO INDICATOR UNDERGLOVE SZ 7.0 48970

## (undated) DEVICE — KIT POSITIONER PINK PAD XL ADVANCED 40588

## (undated) DEVICE — SOL WATER IRRIG 1000ML BOTTLE 2F7114

## (undated) DEVICE — DAVINCI XI DRAPE ARM 470015

## (undated) DEVICE — SU VICRYL+ 0 27 UR6 VLT VCP603H

## (undated) DEVICE — PANTIES MESH LG/XLG 2PK 706M2

## (undated) DEVICE — LINEN GOWN X4 5410

## (undated) DEVICE — SU VICRYL 3-0 SH 27" J316H

## (undated) DEVICE — SU STRATAFIX PDS PLUS 0 CT-1 30CM SXPP1B450

## (undated) DEVICE — TUBING FILTER TRI-LUMEN AIRSEAL ASC-EVAC1

## (undated) DEVICE — BLADE KNIFE SURG 11 371111

## (undated) DEVICE — BLADE KNIFE SURG 10 371110

## (undated) DEVICE — SOL NACL 0.9% IRRIG 1000ML BOTTLE 2F7124

## (undated) RX ORDER — DIPHENHYDRAMINE HYDROCHLORIDE 50 MG/ML
INJECTION INTRAMUSCULAR; INTRAVENOUS
Status: DISPENSED
Start: 2024-11-22

## (undated) RX ORDER — HYDROMORPHONE HYDROCHLORIDE 1 MG/ML
INJECTION, SOLUTION INTRAMUSCULAR; INTRAVENOUS; SUBCUTANEOUS
Status: DISPENSED
Start: 2024-11-22

## (undated) RX ORDER — IBUPROFEN 400 MG/1
TABLET, FILM COATED ORAL
Status: DISPENSED
Start: 2024-11-22

## (undated) RX ORDER — ONDANSETRON 2 MG/ML
INJECTION INTRAMUSCULAR; INTRAVENOUS
Status: DISPENSED
Start: 2024-11-22

## (undated) RX ORDER — PROPOFOL 10 MG/ML
INJECTION, EMULSION INTRAVENOUS
Status: DISPENSED
Start: 2024-11-22

## (undated) RX ORDER — FENTANYL CITRATE 50 UG/ML
INJECTION, SOLUTION INTRAMUSCULAR; INTRAVENOUS
Status: DISPENSED
Start: 2024-11-22

## (undated) RX ORDER — DEXAMETHASONE SODIUM PHOSPHATE 4 MG/ML
INJECTION, SOLUTION INTRA-ARTICULAR; INTRALESIONAL; INTRAMUSCULAR; INTRAVENOUS; SOFT TISSUE
Status: DISPENSED
Start: 2024-11-22

## (undated) RX ORDER — METRONIDAZOLE 500 MG/100ML
INJECTION, SOLUTION INTRAVENOUS
Status: DISPENSED
Start: 2024-11-22

## (undated) RX ORDER — FENTANYL CITRATE-0.9 % NACL/PF 10 MCG/ML
PLASTIC BAG, INJECTION (ML) INTRAVENOUS
Status: DISPENSED
Start: 2024-11-22

## (undated) RX ORDER — KETOROLAC TROMETHAMINE 30 MG/ML
INJECTION, SOLUTION INTRAMUSCULAR; INTRAVENOUS
Status: DISPENSED
Start: 2024-11-22

## (undated) RX ORDER — CEFAZOLIN SODIUM/WATER 2 G/20 ML
SYRINGE (ML) INTRAVENOUS
Status: DISPENSED
Start: 2024-11-22

## (undated) RX ORDER — OXYCODONE HYDROCHLORIDE 5 MG/1
TABLET ORAL
Status: DISPENSED
Start: 2024-11-22

## (undated) RX ORDER — PHENAZOPYRIDINE HYDROCHLORIDE 200 MG/1
TABLET, FILM COATED ORAL
Status: DISPENSED
Start: 2024-11-22

## (undated) RX ORDER — ACETAMINOPHEN 325 MG/1
TABLET ORAL
Status: DISPENSED
Start: 2024-11-22